# Patient Record
Sex: FEMALE | Race: WHITE | NOT HISPANIC OR LATINO | Employment: UNEMPLOYED | URBAN - METROPOLITAN AREA
[De-identification: names, ages, dates, MRNs, and addresses within clinical notes are randomized per-mention and may not be internally consistent; named-entity substitution may affect disease eponyms.]

---

## 2018-03-06 ENCOUNTER — TELEPHONE (OUTPATIENT)
Dept: SCHEDULING | Facility: CLINIC | Age: 38
End: 2018-03-06

## 2018-03-06 ENCOUNTER — OFFICE VISIT (OUTPATIENT)
Dept: CARDIOLOGY | Facility: CLINIC | Age: 38
End: 2018-03-06
Payer: COMMERCIAL

## 2018-03-06 DIAGNOSIS — I30.0 ACUTE IDIOPATHIC PERICARDITIS: Primary | ICD-10-CM

## 2018-03-06 DIAGNOSIS — I49.3 PVC (PREMATURE VENTRICULAR CONTRACTION): ICD-10-CM

## 2018-03-06 PROBLEM — R00.2 PALPITATIONS: Status: ACTIVE | Noted: 2017-09-14

## 2018-03-06 PROBLEM — R00.2 PALPITATIONS: Status: RESOLVED | Noted: 2017-09-14 | Resolved: 2018-03-06

## 2018-03-06 PROCEDURE — 93000 ELECTROCARDIOGRAM COMPLETE: CPT | Performed by: INTERNAL MEDICINE

## 2018-03-06 PROCEDURE — 99213 OFFICE O/P EST LOW 20 MIN: CPT | Performed by: INTERNAL MEDICINE

## 2018-03-06 RX ORDER — FEXOFENADINE HCL 60 MG/1
60 TABLET, FILM COATED ORAL AS NEEDED
COMMUNITY
Start: 2017-10-25 | End: 2018-12-14

## 2018-03-06 RX ORDER — INDOMETHACIN 50 MG/1
50 CAPSULE ORAL 2 TIMES DAILY WITH MEALS
Qty: 180 CAPSULE | Refills: 2 | Status: SHIPPED | OUTPATIENT
Start: 2018-03-06 | End: 2018-05-17

## 2018-03-06 RX ORDER — IBUPROFEN 200 MG
2-3 TABLET ORAL AS NEEDED
COMMUNITY
End: 2018-11-27

## 2018-03-06 RX ORDER — ACETAMINOPHEN 500 MG
2 TABLET ORAL 2 TIMES WEEKLY
COMMUNITY
Start: 2017-08-31 | End: 2018-12-14

## 2018-03-06 RX ORDER — FLUTICASONE PROPIONATE 50 MCG
1 SPRAY, SUSPENSION (ML) NASAL AS NEEDED
COMMUNITY
Start: 2017-10-25 | End: 2018-05-17

## 2018-03-06 ASSESSMENT — ENCOUNTER SYMPTOMS
DIZZINESS: 1
ABDOMINAL PAIN: 0
NUMBNESS: 0
FEVER: 1
IRREGULAR HEARTBEAT: 1
HEMOPTYSIS: 0
DYSPNEA ON EXERTION: 1
VOMITING: 0
WEAKNESS: 1
PSYCHIATRIC NEGATIVE: 1
LIGHT-HEADEDNESS: 1
DECREASED APPETITE: 0
CHILLS: 0
COUGH: 0
NAUSEA: 0
BLURRED VISION: 0
DOUBLE VISION: 0
PARESTHESIAS: 0

## 2018-03-06 NOTE — ASSESSMENT & PLAN NOTE
The patient comes to the office today after calling with symptoms of low grade fever, dizziness and facial flushing. She underwent a PVC ablation 3/1/2018. Her symptoms do improve with Advil 600 mg q 8 hrs. Her symptoms likely represent pericarditis post ablation. She will begin Indocin. No hemodynamic compromise at this time.

## 2018-03-06 NOTE — PROGRESS NOTES
Electrophysiology Note       Reason for visit:   Chief Complaint   Patient presents with   • Ventricular Premature Depolarization      HPI   Andressa Baker is a 37 y.o. female with a history of palpitations secondary to PVCS. Holter monitoring demonstrated a high burden of monomorphic PVCs.  She underwent EP study with attempted ablation on March 1.  At that time her right ventricular outflow tract PVCs were completely suppressed.  She developed some post ablation chest discomfort which over the weekend has persisted.  She does have relief with Advil. She describes some facial flushing, lightheadedness and low-grade temp.      Past Medical History:   Diagnosis Date   • Abnormal ECG    • Arrhythmia      Past Surgical History:   Procedure Laterality Date   • ABLATION OF DYSRHYTHMIC FOCUS       Social History     Social History Narrative   • No narrative on file     Family History   Problem Relation Age of Onset   • Hyperlipidemia Mother    • Hyperlipidemia Father    • Hypertension Father    • Hyperlipidemia Maternal Grandmother    • Clotting disorder Maternal Grandfather      No known allergies  Current Outpatient Prescriptions   Medication Sig Dispense Refill   • cholecalciferol, vitamin D3, (VITAMIN D3) 5,000 unit tablet Take 2 tablets by mouth 2 (two) times a week.     • fexofenadine (ALLEGRA ALLERGY) 60 mg tablet Take 60 mg by mouth as needed.     • fluticasone (FLONASE ALLERGY RELIEF) 50 mcg/actuation nasal spray Administer 1 Squirt into affected nostril(s) as needed.     • omega 3-dha-epa-fish oil (FISH OIL) capsule Take 1 capsule by mouth once.     • ibuprofen (ADVIL) 200 mg tablet Take 2-3 tablets by mouth as needed.     • indomethacin (INDOCIN) 50 mg capsule Take 1 capsule (50 mg total) by mouth 2 (two) times a day with meals. 180 capsule 2     No current facility-administered medications for this visit.        Review of Systems   Constitution: Positive for fever and weakness. Negative for chills and  decreased appetite.   Eyes: Negative for blurred vision, double vision and visual disturbance.   Cardiovascular: Positive for dyspnea on exertion and irregular heartbeat.   Respiratory: Negative for cough and hemoptysis.    Skin: Positive for flushing.   Gastrointestinal: Negative for abdominal pain, dysphagia, nausea and vomiting.   Neurological: Positive for dizziness and light-headedness. Negative for numbness and paresthesias.   Psychiatric/Behavioral: Negative.      Objective   There were no vitals filed for this visit.    Physical Exam   Constitutional: She is oriented to person, place, and time. She appears well-developed.   HENT:   Head: Normocephalic.   Eyes: EOM are normal. Pupils are equal, round, and reactive to light.   Neck: Normal range of motion.   Cardiovascular: Normal rate and normal heart sounds.    Irregular with PVCs.    Pulmonary/Chest: Effort normal and breath sounds normal.   Abdominal: Soft. Bowel sounds are normal. She exhibits no distension. There is no tenderness.   Musculoskeletal: Normal range of motion.   Neurological: She is alert and oriented to person, place, and time.   Skin:   Flushed face   Psychiatric: She has a normal mood and affect.       Lab Results   Component Value Date    WBC 4.86 02/26/2018    HGB 13.5 02/26/2018     (L) 02/26/2018    CHOL 196 01/29/2016    TRIG 58 01/29/2016    HDL 67 01/29/2016    ALT 20 09/13/2017    AST 18 09/13/2017     02/26/2018    K 3.7 02/26/2018    CREATININE 0.7 02/26/2018    TSH 0.78 01/29/2016    INR 1.0 09/13/2017    GLUF 74 01/24/2015    HGBA1C 4.9 01/29/2016      Imaging  Not applicable    ECG   ECG shows sinus rhythm with occasional premature ventricular complexes.     Assessment   Problem List Items Addressed This Visit        Circulatory    PVC (premature ventricular contraction)     She had a history of PVCs which had become increasingly symptomatic.  Symptoms associated with PACs included lightheadedness post exertion,  along with feeling of slow heartbeats.  A Holter monitor in September 2017 demonstrated a high burden of ectopy of approximately 20%.  Morphology of the PVCs were most consistent with outflow tract etiology.She was started on beta-blockers at that time but she was unable to tolerate the medication. She underwent PVC ablation on March 1, 2018. RVOT PVCs were completed suppressed during the procedure.          Relevant Orders    ECG 12 LEAD (Completed)    Acute idiopathic pericarditis - Primary     The patient comes to the office today after calling with symptoms of low grade fever, dizziness and facial flushing. She underwent a PVC ablation 3/1/2018. Her symptoms do improve with Advil 600 mg q 8 hrs. Her symptoms likely represent pericarditis post ablation. She will begin Indocin. No hemodynamic compromise at this time.           Relevant Medications    indomethacin (INDOCIN) 50 mg capsule

## 2018-03-06 NOTE — ASSESSMENT & PLAN NOTE
She had a history of PVCs which had become increasingly symptomatic.  Symptoms associated with PACs included lightheadedness post exertion, along with feeling of slow heartbeats.  A Holter monitor in September 2017 demonstrated a high burden of ectopy of approximately 20%.  Morphology of the PVCs were most consistent with outflow tract etiology.She was started on beta-blockers at that time but she was unable to tolerate the medication. She underwent PVC ablation on March 1, 2018. RVOT PVCs were completed suppressed during the procedure.

## 2018-03-12 ENCOUNTER — TELEPHONE (OUTPATIENT)
Dept: CARDIOLOGY | Facility: CLINIC | Age: 38
End: 2018-03-12

## 2018-04-02 NOTE — ASSESSMENT & PLAN NOTE
Left bundle inferior axis VPD's.  Mapped to the distal septal portion of the RVOT and successfully ablated.  She continues with VPD's but much less symptomatic and less frequent occurrences.

## 2018-04-02 NOTE — PROGRESS NOTES
Electrophysiology  Outpatient Progress Note       Reason for visit: Follow-up PVC ablation      HPI   Andressa Baker is a 37 y.o. female who is seen today for follow-up of her March 1, 2018 PVC catheter ablation.  She is s/p ablation of her clinical left bundle inferior axis VPD's mapped to the distal/septal portion of the RVOT. Extensive mapping was also performed of the LVOT with no VPD match and no ablation. She initial had great suppression of her VPDs, but her post ablation course was complicated by pericarditis. This has greatly improved. She continues to have occasional VPD's but they are far less frequent and less symptomatic, .       Past Medical History:   Diagnosis Date   • Abnormal ECG    • Arrhythmia      Past Surgical History:   Procedure Laterality Date   • ABLATION OF DYSRHYTHMIC FOCUS         Social History   Substance Use Topics   • Smoking status: Never Smoker   • Smokeless tobacco: Never Used   • Alcohol use Yes      Comment: occasionally     Family History   Problem Relation Age of Onset   • Hyperlipidemia Mother    • Hyperlipidemia Father    • Hypertension Father    • Hyperlipidemia Maternal Grandmother    • Clotting disorder Maternal Grandfather        ALLERGY:  No known allergies    Current Outpatient Prescriptions   Medication Sig Dispense Refill   • cholecalciferol, vitamin D3, (VITAMIN D3) 5,000 unit tablet Take 2 tablets by mouth 2 (two) times a week.     • fexofenadine (ALLEGRA ALLERGY) 60 mg tablet Take 60 mg by mouth as needed.     • fluticasone (FLONASE ALLERGY RELIEF) 50 mcg/actuation nasal spray Administer 1 Squirt into affected nostril(s) as needed.     • ibuprofen (ADVIL) 200 mg tablet Take 2-3 tablets by mouth as needed.     • indomethacin (INDOCIN) 50 mg capsule Take 1 capsule (50 mg total) by mouth 2 (two) times a day with meals. 180 capsule 2   • omega 3-dha-epa-fish oil (FISH OIL) capsule Take 1 capsule by mouth once.       No current facility-administered  medications for this visit.        Review of Systems   Constitution: Negative.   HENT: Negative.    Cardiovascular: Positive for palpitations. Negative for chest pain and dyspnea on exertion.   Respiratory: Negative.    Endocrine: Negative.    Skin: Negative.    Musculoskeletal: Negative.    Gastrointestinal: Negative.    Genitourinary: Negative.    Neurological: Negative.    Psychiatric/Behavioral: Negative.        Objective   Vitals:    04/03/18 1243   BP: 122/76   Pulse: 70   Resp: 16       Physical Exam   Constitutional: She is oriented to person, place, and time. She appears well-developed and well-nourished.   HENT:   Head: Normocephalic.   Eyes: Conjunctivae and EOM are normal. Pupils are equal, round, and reactive to light.   Neck: Normal range of motion.   Cardiovascular: Normal rate, regular rhythm and normal heart sounds.  Exam reveals no gallop and no friction rub.    No murmur heard.  Pulmonary/Chest: Effort normal and breath sounds normal. No respiratory distress.   Abdominal: Soft. Bowel sounds are normal. She exhibits no distension. There is no tenderness.   Musculoskeletal: Normal range of motion. She exhibits no edema.   Neurological: She is alert and oriented to person, place, and time.   Skin: Skin is warm and dry.   Psychiatric: She has a normal mood and affect. Her behavior is normal.       Lab Results   Component Value Date    WBC 4.86 02/26/2018    HGB 13.5 02/26/2018     (L) 02/26/2018    CHOL 196 01/29/2016    TRIG 58 01/29/2016    HDL 67 01/29/2016    ALT 20 09/13/2017    AST 18 09/13/2017     02/26/2018    K 3.7 02/26/2018    CREATININE 0.7 02/26/2018    TSH 0.78 01/29/2016    INR 1.0 09/13/2017    GLUF 74 01/24/2015    HGBA1C 4.9 01/29/2016       3/1/2018 VPD Ablation.  The BPD is more noted to be left bundle inferior axis.  The earliest site of activation was noted to be the distal/septal portion of the RVOT with activation 45 ms prior to the QRS onset.  Paced mapping  showed a 12 out of 12 match to the VPD's with correlation coefficient of 99%.  RF was delivered in this area with some irritative ectopy and for the lesions delivered in this region with similar ectopy induced.  Following these lesions spontaneous ectopy was much less frequent.  After isoproterenol program stimulation did not appear to facilitate ectopy.  Because of the location of the lesion sites and occasional ectopy the LVOT was also mapped.  Paced mapping throughout this region had no regional morphology mapped and activation of the rare beats EMR at best equal to the QRS onset.       Sinus rhythm with occasional PVC.    Assessment   Problem List Items Addressed This Visit        Circulatory    PVC (premature ventricular contraction) - Primary     Left bundle inferior axis VPD's.  Mapped to the distal septal portion of the RVOT and successfully ablated.  She continues with VPD's but much less symptomatic and less frequent occurrences.         Relevant Orders    ECG 12 lead (Completed)    Acute idiopathic pericarditis     Symptoms resolved; will continue weaning indomethacin.                    Pieter Manzanares MD  4/9/2018

## 2018-04-03 ENCOUNTER — OFFICE VISIT (OUTPATIENT)
Dept: CARDIOLOGY | Facility: CLINIC | Age: 38
End: 2018-04-03
Payer: COMMERCIAL

## 2018-04-03 VITALS
DIASTOLIC BLOOD PRESSURE: 76 MMHG | WEIGHT: 188 LBS | BODY MASS INDEX: 29.51 KG/M2 | RESPIRATION RATE: 16 BRPM | HEART RATE: 70 BPM | HEIGHT: 67 IN | SYSTOLIC BLOOD PRESSURE: 122 MMHG

## 2018-04-03 DIAGNOSIS — I30.0 ACUTE IDIOPATHIC PERICARDITIS: ICD-10-CM

## 2018-04-03 DIAGNOSIS — I49.3 PVC (PREMATURE VENTRICULAR CONTRACTION): Primary | ICD-10-CM

## 2018-04-03 PROCEDURE — 93000 ELECTROCARDIOGRAM COMPLETE: CPT | Performed by: INTERNAL MEDICINE

## 2018-04-03 PROCEDURE — 99214 OFFICE O/P EST MOD 30 MIN: CPT | Performed by: INTERNAL MEDICINE

## 2018-04-03 NOTE — LETTER
April 9, 2018     Gloria Vallejo MD  [address]  [address]th [address]  [place] [id number]    Patient: Andressa Baker   YOB: 1980   Date of Visit: 4/3/2018       Dear Dr. Vallejo:    Thank you for referring Andressa Baker to me for evaluation. Below are my notes for this consultation.    If you have questions, please do not hesitate to call me. I look forward to following your patient along with you.         Sincerely,        Pieter Manzanares MD        CC: No Recipients  Pieter Manzanares MD  4/9/2018 11:23 PM  Signed       Electrophysiology  Outpatient Progress Note       Reason for visit: Follow-up PVC ablation      HPI   Andressa Baker is a 37 y.o. female who is seen today for follow-up of her March 1, 2018 PVC catheter ablation.  She is s/p ablation of her clinical left bundle inferior axis VPD's mapped to the distal/septal portion of the RVOT. Extensive mapping was also performed of the LVOT with no VPD match and no ablation. She initial had great suppression of her VPDs, but her post ablation course was complicated by pericarditis. This has greatly improved. She continues to have occasional VPD's but they are far less frequent and less symptomatic, .       Past Medical History:   Diagnosis Date   • Abnormal ECG    • Arrhythmia      Past Surgical History:   Procedure Laterality Date   • ABLATION OF DYSRHYTHMIC FOCUS         Social History   Substance Use Topics   • Smoking status: Never Smoker   • Smokeless tobacco: Never Used   • Alcohol use Yes      Comment: occasionally     Family History   Problem Relation Age of Onset   • Hyperlipidemia Mother    • Hyperlipidemia Father    • Hypertension Father    • Hyperlipidemia Maternal Grandmother    • Clotting disorder Maternal Grandfather        ALLERGY:  No known allergies    Current Outpatient Prescriptions   Medication Sig Dispense Refill   • cholecalciferol, vitamin D3, (VITAMIN D3) 5,000 unit tablet Take 2 tablets by mouth 2  (two) times a week.     • fexofenadine (ALLEGRA ALLERGY) 60 mg tablet Take 60 mg by mouth as needed.     • fluticasone (FLONASE ALLERGY RELIEF) 50 mcg/actuation nasal spray Administer 1 Squirt into affected nostril(s) as needed.     • ibuprofen (ADVIL) 200 mg tablet Take 2-3 tablets by mouth as needed.     • indomethacin (INDOCIN) 50 mg capsule Take 1 capsule (50 mg total) by mouth 2 (two) times a day with meals. 180 capsule 2   • omega 3-dha-epa-fish oil (FISH OIL) capsule Take 1 capsule by mouth once.       No current facility-administered medications for this visit.        Review of Systems   Constitution: Negative.   HENT: Negative.    Cardiovascular: Positive for palpitations. Negative for chest pain and dyspnea on exertion.   Respiratory: Negative.    Endocrine: Negative.    Skin: Negative.    Musculoskeletal: Negative.    Gastrointestinal: Negative.    Genitourinary: Negative.    Neurological: Negative.    Psychiatric/Behavioral: Negative.        Objective   Vitals:    04/03/18 1243   BP: 122/76   Pulse: 70   Resp: 16       Physical Exam   Constitutional: She is oriented to person, place, and time. She appears well-developed and well-nourished.   HENT:   Head: Normocephalic.   Eyes: Conjunctivae and EOM are normal. Pupils are equal, round, and reactive to light.   Neck: Normal range of motion.   Cardiovascular: Normal rate, regular rhythm and normal heart sounds.  Exam reveals no gallop and no friction rub.    No murmur heard.  Pulmonary/Chest: Effort normal and breath sounds normal. No respiratory distress.   Abdominal: Soft. Bowel sounds are normal. She exhibits no distension. There is no tenderness.   Musculoskeletal: Normal range of motion. She exhibits no edema.   Neurological: She is alert and oriented to person, place, and time.   Skin: Skin is warm and dry.   Psychiatric: She has a normal mood and affect. Her behavior is normal.       Lab Results   Component Value Date    WBC 4.86 02/26/2018    HGB  13.5 02/26/2018     (L) 02/26/2018    CHOL 196 01/29/2016    TRIG 58 01/29/2016    HDL 67 01/29/2016    ALT 20 09/13/2017    AST 18 09/13/2017     02/26/2018    K 3.7 02/26/2018    CREATININE 0.7 02/26/2018    TSH 0.78 01/29/2016    INR 1.0 09/13/2017    GLUF 74 01/24/2015    HGBA1C 4.9 01/29/2016       3/1/2018 VPD Ablation.  The BPD is more noted to be left bundle inferior axis.  The earliest site of activation was noted to be the distal/septal portion of the RVOT with activation 45 ms prior to the QRS onset.  Paced mapping showed a 12 out of 12 match to the VPD's with correlation coefficient of 99%.  RF was delivered in this area with some irritative ectopy and for the lesions delivered in this region with similar ectopy induced.  Following these lesions spontaneous ectopy was much less frequent.  After isoproterenol program stimulation did not appear to facilitate ectopy.  Because of the location of the lesion sites and occasional ectopy the LVOT was also mapped.  Paced mapping throughout this region had no regional morphology mapped and activation of the rare beats EMR at best equal to the QRS onset.       Sinus rhythm with occasional PVC.    Assessment   Problem List Items Addressed This Visit        Circulatory    PVC (premature ventricular contraction) - Primary     Left bundle inferior axis VPD's.  Mapped to the distal septal portion of the RVOT and successfully ablated.  She continues with VPD's but much less symptomatic and less frequent occurrences.         Relevant Orders    ECG 12 lead (Completed)    Acute idiopathic pericarditis     Symptoms resolved; will continue weaning indomethacin.                    Pieter Manzanares MD  4/9/2018

## 2018-04-04 ASSESSMENT — ENCOUNTER SYMPTOMS
ENDOCRINE NEGATIVE: 1
DYSPNEA ON EXERTION: 0
PSYCHIATRIC NEGATIVE: 1
CONSTITUTIONAL NEGATIVE: 1
MUSCULOSKELETAL NEGATIVE: 1
RESPIRATORY NEGATIVE: 1
NEUROLOGICAL NEGATIVE: 1
PALPITATIONS: 1
GASTROINTESTINAL NEGATIVE: 1

## 2018-05-17 ENCOUNTER — OFFICE VISIT (OUTPATIENT)
Dept: INTERNAL MEDICINE | Facility: CLINIC | Age: 38
End: 2018-05-17
Payer: COMMERCIAL

## 2018-05-17 VITALS
DIASTOLIC BLOOD PRESSURE: 64 MMHG | TEMPERATURE: 98.4 F | BODY MASS INDEX: 29.04 KG/M2 | HEART RATE: 64 BPM | SYSTOLIC BLOOD PRESSURE: 118 MMHG | WEIGHT: 185.4 LBS | OXYGEN SATURATION: 97 %

## 2018-05-17 DIAGNOSIS — F41.9 ANXIETY: Primary | ICD-10-CM

## 2018-05-17 PROBLEM — H18.609 KERATOCONUS: Status: ACTIVE | Noted: 2018-05-17

## 2018-05-17 PROCEDURE — 99213 OFFICE O/P EST LOW 20 MIN: CPT | Performed by: FAMILY MEDICINE

## 2018-05-17 RX ORDER — OLOPATADINE HYDROCHLORIDE 1 MG/ML
1 SOLUTION/ DROPS OPHTHALMIC 2 TIMES DAILY
COMMUNITY
End: 2018-12-14

## 2018-05-17 RX ORDER — AZELASTINE 1 MG/ML
1 SPRAY, METERED NASAL 2 TIMES DAILY
COMMUNITY
End: 2018-12-14

## 2018-05-17 NOTE — ASSESSMENT & PLAN NOTE
Patient mostly feeling overwhelmed at this time.  Will initiate trend Helex 5 mg.  Increase as tolerated every 2 weeks.  The patient return to counseling.  Encourage her to have regular exercise.

## 2018-05-17 NOTE — PROGRESS NOTES
Subjective      Patient ID: Andressa Baker is a 38 y.o. female.    HPI    Since last visit : pt did have ablation since last visit and developed pericarditis.  She is now asymptomatic    Diet he is increasingly worse.  She is feeling overwhelmed.  I suspect a component of depression associated.  Given her anhedonia.    Current Outpatient Prescriptions:   •  azelastine (ASTELIN) 137 mcg (0.1 %) nasal spray, Administer 1 spray into each nostril 2 (two) times a day. Use in each nostril as directed., Disp: , Rfl:   •  cholecalciferol, vitamin D3, (VITAMIN D3) 5,000 unit tablet, Take 2 tablets by mouth 2 (two) times a week., Disp: , Rfl:   •  fexofenadine (ALLEGRA ALLERGY) 60 mg tablet, Take 60 mg by mouth as needed., Disp: , Rfl:   •  ibuprofen (ADVIL) 200 mg tablet, Take 2-3 tablets by mouth as needed., Disp: , Rfl:   •  olopatadine (PATANOL) 0.1 % ophthalmic solution, 1 drop 2 (two) times a day., Disp: , Rfl:   •  omega 3-dha-epa-fish oil (FISH OIL) capsule, Take 1 capsule by mouth once., Disp: , Rfl:   •  vortioxetine (TRINTELLIX) 5 mg tablet, Take 5 mg by mouth daily., Disp: 30 tablet, Rfl: 0          The following have been reviewed and updated as appropriate in this visit:  Allergies  Meds  Problems       Review of Systems    Objective   Vitals:    05/17/18 1259   BP: 118/64   BP Location: Right upper arm   Patient Position: Sitting   Pulse: 64   Temp: 36.9 °C (98.4 °F)   TempSrc: Oral   SpO2: 97%   Weight: 84.1 kg (185 lb 6.4 oz)       Physical Exam    Assessment/Plan   Problem List Items Addressed This Visit     Anxiety - Primary     Patient mostly feeling overwhelmed at this time.  Will initiate trend Helex 5 mg.  Increase as tolerated every 2 weeks.  The patient return to counseling.  Encourage her to have regular exercise.

## 2018-06-06 ENCOUNTER — TELEPHONE (OUTPATIENT)
Dept: SCHEDULING | Facility: CLINIC | Age: 38
End: 2018-06-06

## 2018-06-06 ENCOUNTER — TELEPHONE (OUTPATIENT)
Dept: INTERNAL MEDICINE | Facility: CLINIC | Age: 38
End: 2018-06-06

## 2018-06-06 ENCOUNTER — OFFICE VISIT (OUTPATIENT)
Dept: INTERNAL MEDICINE | Facility: CLINIC | Age: 38
End: 2018-06-06
Payer: COMMERCIAL

## 2018-06-06 VITALS
HEART RATE: 68 BPM | BODY MASS INDEX: 29.51 KG/M2 | DIASTOLIC BLOOD PRESSURE: 60 MMHG | WEIGHT: 188.4 LBS | TEMPERATURE: 98 F | OXYGEN SATURATION: 99 % | SYSTOLIC BLOOD PRESSURE: 108 MMHG

## 2018-06-06 DIAGNOSIS — I49.3 PVC (PREMATURE VENTRICULAR CONTRACTION): Primary | ICD-10-CM

## 2018-06-06 DIAGNOSIS — F41.9 ANXIETY: ICD-10-CM

## 2018-06-06 PROCEDURE — 93000 ELECTROCARDIOGRAM COMPLETE: CPT | Performed by: NURSE PRACTITIONER

## 2018-06-06 PROCEDURE — 36415 COLL VENOUS BLD VENIPUNCTURE: CPT | Performed by: NURSE PRACTITIONER

## 2018-06-06 PROCEDURE — 99213 OFFICE O/P EST LOW 20 MIN: CPT | Performed by: NURSE PRACTITIONER

## 2018-06-06 ASSESSMENT — ENCOUNTER SYMPTOMS
SHORTNESS OF BREATH: 0
PALPITATIONS: 1
DIZZINESS: 1
LIGHT-HEADEDNESS: 1

## 2018-06-06 NOTE — TELEPHONE ENCOUNTER
Has recent increase in palps; somewhat associate with increased stress/anxiety. Will add metoprolol ER 25 mg qHS and see how she responds.

## 2018-06-06 NOTE — TELEPHONE ENCOUNTER
Rcvd call from patient. Inquired about Trintellix. States that you discussed increasing her dose and she is almost out of current dose and wanted to know what to do.

## 2018-06-06 NOTE — PROGRESS NOTES
Subjective      PVC's, worsening in frequency and feels really tired.      Patient ID: Andressa Baker is a 38 y.o. female.    HPI    The following have been reviewed and updated as appropriate in this visit:  Meds       Review of Systems   Respiratory: Negative for shortness of breath.    Cardiovascular: Positive for chest pain (during palpitations ) and palpitations.   Neurological: Positive for dizziness and light-headedness.       Objective     Vitals:    06/06/18 1424   BP: 108/60   Pulse: 68   Temp: 36.7 °C (98 °F)   SpO2: 99%   Weight: 85.5 kg (188 lb 6.4 oz)     HR 72,BP recheck while sitting /80      Physical Exam   Constitutional: She is oriented to person, place, and time. She appears well-developed and well-nourished.   Eyes: Conjunctivae are normal.   Neck: Normal range of motion.   Cardiovascular: Normal rate.    + ectopy    Pulmonary/Chest: Effort normal and breath sounds normal.   Musculoskeletal: Normal range of motion.   Neurological: She is alert and oriented to person, place, and time.   Skin: Skin is warm.   Psychiatric: She has a normal mood and affect. Her behavior is normal.   Vitals reviewed.      Assessment/Plan   Problem List Items Addressed This Visit     PVC (premature ventricular contraction) - Primary     Symptoms have escalated since May with more frequent occurrences of PVC's with some chest pain/squeezing sensation during the episodes and dizziness. Has had a few during office visit.   EkG consistent with previous in Sept 2017.     Denies Sob or nausea.     Only new medication is Trintellix.     Increased stressors in her life.     Discussed limiting caffeine.     Checking TSH, CMP, Mag and CBC.     Called office of Dr. Manzanares to discuss and have pt scheduled to be seen. Faxed over EKG and discussed symptoms with office, they are sending message to Dr. Manzanares and his NP and will reach out directly to the patient.           Relevant Orders    ECG 12 LEAD OFFICE  PERFORMED (Completed)    CBC    Comprehensive metabolic panel    TSH w reflex FT4    Magnesium    Anxiety     Increased Trintellix to 10 mg once daily.

## 2018-06-06 NOTE — TELEPHONE ENCOUNTER
Left message to return my call.  EKG today in PCP office showed sinus rhythm with PVCs similar to EKG in the past.  I asked her to call me to review her symptoms to see if they have worsened.  Medication review shows she is not on any beta blocker.  Will also discuss moving appointment up with Dr. Manzanares.

## 2018-06-06 NOTE — PATIENT INSTRUCTIONS
EKG with Premature ventricular contractions as prior.     Checking CBC, CMP, Mag and TSH.     Called office of Dr. Manzanares to have you seen asap, waiting on call back, they will most likely call you directly to schedule but if any problems please call me.     Limit caffeine.     Increase water intake.     If you develop chest pain, shortness of breath or dizziness or symptoms worsen go to er.

## 2018-06-06 NOTE — TELEPHONE ENCOUNTER
Pt of Dr. Manzanares w/hx of PVC catheter ablation 3/1/18 and post procedure pericarditis. Call from BEKA Peña with [facility]. She saw pt in office today, and pt had c/o intermittent palpitations, associated with dizziness and chest squeezing. HR 68bpm and EKG similar to Sept 2017, SR with PVC's. Ms. Thomson did advise pt to report to ED if her symptoms worsened, but would like pt to be seen in office w/Dr. Manzanares ASAP if possible. Requesting callback to patient at [phone]. Thank you.

## 2018-06-06 NOTE — TELEPHONE ENCOUNTER
Pt Dr Leighton Bland,[facility], called requesting pt be seen by Dr Manzanares sooner than 7/10/18 appt.  Pt has been having frequent palpitations.  Pt had EKG today with no changes to most recent one.  Pls call pt back @ [phone] to schedule  Thank you

## 2018-06-06 NOTE — ASSESSMENT & PLAN NOTE
Symptoms have escalated since May with more frequent occurrences of PVC's with some chest pain/squeezing sensation during the episodes and dizziness. Has had a few during office visit.   EkG consistent with previous in Sept 2017.     Denies Sob or nausea.     Only new medication is Trintellix.     Increased stressors in her life.     Discussed limiting caffeine.     Checking TSH, CMP, Mag and CBC.     Called office of Dr. Manzanares to discuss and have pt scheduled to be seen. Faxed over EKG and discussed symptoms with office, they are sending message to Dr. Manzanares and his NP and will reach out directly to the patient.

## 2018-06-08 LAB
ALBUMIN SERPL-MCNC: 4.2 G/DL (ref 3.6–5.1)
ALBUMIN/GLOB SERPL: 1.5 (CALC) (ref 1–2.5)
ALP SERPL-CCNC: 41 U/L (ref 33–115)
ALT SERPL-CCNC: 12 U/L (ref 6–29)
AST SERPL-CCNC: 12 U/L (ref 10–30)
BILIRUB SERPL-MCNC: 0.5 MG/DL (ref 0.2–1.2)
BUN SERPL-MCNC: 14 MG/DL (ref 7–25)
BUN/CREAT SERPL: NORMAL (CALC) (ref 6–22)
CALCIUM SERPL-MCNC: 9 MG/DL (ref 8.6–10.2)
CHLORIDE SERPL-SCNC: 103 MMOL/L (ref 98–110)
CO2 SERPL-SCNC: 26 MMOL/L (ref 20–31)
CREAT SERPL-MCNC: 0.8 MG/DL (ref 0.5–1.1)
ERYTHROCYTE [DISTWIDTH] IN BLOOD BY AUTOMATED COUNT: 12.8 % (ref 11–15)
GFR SERPL CREATININE-BSD FRML MDRD: 94 ML/MIN/1.73M2
GLOBULIN SER CALC-MCNC: 2.8 G/DL (CALC) (ref 1.9–3.7)
GLUCOSE SERPL-MCNC: 72 MG/DL (ref 65–99)
HCT VFR BLD AUTO: 41.3 % (ref 35–45)
HGB BLD-MCNC: 13.7 G/DL (ref 11.7–15.5)
MAGNESIUM SERPL-MCNC: 2.2 MG/DL (ref 1.5–2.5)
MCH RBC QN AUTO: 30.8 PG (ref 27–33)
MCHC RBC AUTO-ENTMCNC: 33.2 G/DL (ref 32–36)
MCV RBC AUTO: 92.8 FL (ref 80–100)
PLATELET # BLD AUTO: 132 THOUSAND/UL (ref 140–400)
PMV BLD REES-ECKER: 11.7 FL (ref 7.5–12.5)
POTASSIUM SERPL-SCNC: 4 MMOL/L (ref 3.5–5.3)
PROT SERPL-MCNC: 7 G/DL (ref 6.1–8.1)
RBC # BLD AUTO: 4.45 MILLION/UL (ref 3.8–5.1)
SODIUM SERPL-SCNC: 137 MMOL/L (ref 135–146)
TSH SERPL-ACNC: 0.74 MIU/L
WBC # BLD AUTO: 5.7 THOUSAND/UL (ref 3.8–10.8)

## 2018-06-29 ENCOUNTER — OFFICE VISIT (OUTPATIENT)
Dept: INTERNAL MEDICINE | Facility: CLINIC | Age: 38
End: 2018-06-29
Payer: COMMERCIAL

## 2018-06-29 VITALS
SYSTOLIC BLOOD PRESSURE: 110 MMHG | OXYGEN SATURATION: 98 % | HEART RATE: 66 BPM | WEIGHT: 185.2 LBS | DIASTOLIC BLOOD PRESSURE: 68 MMHG | TEMPERATURE: 98.6 F | BODY MASS INDEX: 29.01 KG/M2

## 2018-06-29 DIAGNOSIS — D69.6 THROMBOCYTOPENIA (CMS/HCC): ICD-10-CM

## 2018-06-29 DIAGNOSIS — I49.3 PVC (PREMATURE VENTRICULAR CONTRACTION): ICD-10-CM

## 2018-06-29 DIAGNOSIS — F41.9 ANXIETY: Primary | ICD-10-CM

## 2018-06-29 DIAGNOSIS — R00.2 PALPITATIONS: ICD-10-CM

## 2018-06-29 PROBLEM — I30.0 ACUTE IDIOPATHIC PERICARDITIS: Status: RESOLVED | Noted: 2018-03-06 | Resolved: 2018-06-29

## 2018-06-29 PROCEDURE — 99213 OFFICE O/P EST LOW 20 MIN: CPT | Mod: 25 | Performed by: FAMILY MEDICINE

## 2018-06-29 PROCEDURE — 36415 COLL VENOUS BLD VENIPUNCTURE: CPT | Performed by: FAMILY MEDICINE

## 2018-06-29 ASSESSMENT — ENCOUNTER SYMPTOMS
COUGH: 0
PALPITATIONS: 1
ABDOMINAL DISTENTION: 0
CONSTIPATION: 1
FATIGUE: 1
UNEXPECTED WEIGHT CHANGE: 0
SHORTNESS OF BREATH: 0

## 2018-06-29 NOTE — ASSESSMENT & PLAN NOTE
She continues to have PVCs, discussed initiating magnesium at night to see if this helps decrease the frequency.  She otherwise has propranolol as needed.

## 2018-06-29 NOTE — PROGRESS NOTES
Subjective      Patient ID: Andressa Baker is a 38 y.o. female.    HPI  Pt is here for follow up anxiety: she is protecting her sleep, but still feels very sluggish in the morning.   The trintellix is causing constipation, but is overall handling things better and enjoying time with the kids.  Pt goes to [employer] 2 times a week. Also running and spinning.  She is still getting palpitations despite the ablation.   Worse around the menstrual cycle.   Miralax helps with bowels.   She continues to get PVCs frequently, but given pericarditis after ablation she is not willing to do any further procedures.  Most recently had a CBC which had low platelets at 132.  She does reassess today.      The following have been reviewed and updated as appropriate in this visit:  Allergies        Current Outpatient Prescriptions:   •  azelastine (ASTELIN) 137 mcg (0.1 %) nasal spray, Administer 1 spray into each nostril 2 (two) times a day. Use in each nostril as directed., Disp: , Rfl:   •  cholecalciferol, vitamin D3, (VITAMIN D3) 5,000 unit tablet, Take 2 tablets by mouth 2 (two) times a week., Disp: , Rfl:   •  fexofenadine (ALLEGRA ALLERGY) 60 mg tablet, Take 60 mg by mouth as needed., Disp: , Rfl:   •  fluticasone furoate (FLONASE SENSIMIST NASL), Administer 1 spray into affected nostril(s) daily., Disp: , Rfl:   •  ibuprofen (ADVIL) 200 mg tablet, Take 2-3 tablets by mouth as needed., Disp: , Rfl:   •  olopatadine (PATANOL) 0.1 % ophthalmic solution, 1 drop 2 (two) times a day., Disp: , Rfl:   •  omega 3-dha-epa-fish oil (FISH OIL) capsule, Take 1 capsule by mouth once., Disp: , Rfl:   •  vortioxetine (TRINTELLIX) 10 mg tablet, Take 1 tablet (10 mg total) by mouth daily., Disp: 30 tablet, Rfl: 2  Allergies   Allergen Reactions   • No Known Allergies      Past Medical History:   Diagnosis Date   • Abnormal ECG    • Acute idiopathic pericarditis 3/6/2018   • Arrhythmia    • Pericarditis 2018    After john ablation          Review of Systems   Constitutional: Positive for fatigue. Negative for unexpected weight change.   Respiratory: Negative for cough and shortness of breath.    Cardiovascular: Positive for palpitations. Negative for chest pain.   Gastrointestinal: Positive for constipation. Negative for abdominal distention.   Skin: Color change: easy bruising.       Objective   Vitals:    06/29/18 0926   BP: 110/68   BP Location: Right upper arm   Patient Position: Sitting   Pulse: 66   Temp: 37 °C (98.6 °F)   TempSrc: Oral   SpO2: 98%   Weight: 84 kg (185 lb 3.2 oz)       Physical Exam   Constitutional: She appears well-developed and well-nourished. No distress.   Neck: No thyromegaly present.   Cardiovascular: Normal rate and normal heart sounds.  Exam reveals no gallop and no friction rub.    No murmur heard.  Skipped beats noted   Pulmonary/Chest: Effort normal and breath sounds normal.       Assessment/Plan   Problem List Items Addressed This Visit     Palpitations    PVC (premature ventricular contraction)     She continues to have PVCs, discussed initiating magnesium at night to see if this helps decrease the frequency.  She otherwise has propranolol as needed.         Anxiety - Primary     Anxiety is doing better on current dose of trend Helex.  Will continue at current dose         Thrombocytopenia (CMS/HCC) (HCC)     Recheck CBC for platelet count to make sure a trend is stable or improving         Relevant Orders    CBC and differential

## 2018-06-30 LAB
BASOPHILS # BLD AUTO: 60 CELLS/UL (ref 0–200)
BASOPHILS NFR BLD AUTO: 1.5 %
EOSINOPHIL # BLD AUTO: 140 CELLS/UL (ref 15–500)
EOSINOPHIL NFR BLD AUTO: 3.5 %
ERYTHROCYTE [DISTWIDTH] IN BLOOD BY AUTOMATED COUNT: 12.3 % (ref 11–15)
HCT VFR BLD AUTO: 42.6 % (ref 35–45)
HGB BLD-MCNC: 14.1 G/DL (ref 11.7–15.5)
LYMPHOCYTES # BLD AUTO: 1560 CELLS/UL (ref 850–3900)
LYMPHOCYTES NFR BLD AUTO: 39 %
MCH RBC QN AUTO: 29.8 PG (ref 27–33)
MCHC RBC AUTO-ENTMCNC: 33.1 G/DL (ref 32–36)
MCV RBC AUTO: 90.1 FL (ref 80–100)
MONOCYTES # BLD AUTO: 312 CELLS/UL (ref 200–950)
MONOCYTES NFR BLD AUTO: 7.8 %
NEUTROPHILS # BLD AUTO: 1928 CELLS/UL (ref 1500–7800)
NEUTROPHILS NFR BLD AUTO: 48.2 %
PLATELET # BLD AUTO: 138 THOUSAND/UL (ref 140–400)
PMV BLD REES-ECKER: 12.1 FL (ref 7.5–12.5)
RBC # BLD AUTO: 4.73 MILLION/UL (ref 3.8–5.1)
WBC # BLD AUTO: 4 THOUSAND/UL (ref 3.8–10.8)

## 2018-07-10 ENCOUNTER — OFFICE VISIT (OUTPATIENT)
Dept: CARDIOLOGY | Facility: CLINIC | Age: 38
End: 2018-07-10
Payer: COMMERCIAL

## 2018-07-10 VITALS
HEART RATE: 59 BPM | DIASTOLIC BLOOD PRESSURE: 78 MMHG | WEIGHT: 183.2 LBS | SYSTOLIC BLOOD PRESSURE: 102 MMHG | BODY MASS INDEX: 28.75 KG/M2 | HEIGHT: 67 IN

## 2018-07-10 DIAGNOSIS — I49.3 PVC (PREMATURE VENTRICULAR CONTRACTION): Primary | ICD-10-CM

## 2018-07-10 PROCEDURE — 99214 OFFICE O/P EST MOD 30 MIN: CPT | Performed by: INTERNAL MEDICINE

## 2018-07-10 PROCEDURE — 93000 ELECTROCARDIOGRAM COMPLETE: CPT | Performed by: INTERNAL MEDICINE

## 2018-07-10 RX ORDER — ZINC GLUCONATE 50 MG
500 TABLET ORAL 2 TIMES DAILY
COMMUNITY
End: 2018-12-14

## 2018-07-10 NOTE — PROGRESS NOTES
Electrophysiology  Outpatient Progress Note       Reason for visit:   Chief Complaint   Patient presents with   • pvc       HPI   Andressa Baker is a 38 y.o. female who is seen today for follow-up of her March 1, 2018 PVC catheter ablation.  She is status post ablation of her clinical left bundle inferior axis VPD's mapped to the distal/septal portion of the RVOT.  Extensive mapping was also performed of the LVOT with no VPD match and no ablation.  She initially had great  supression of the VPD's, but her post ablation course was complicated by pericarditis.  This has since resolved.  She continues to have occasional VPDs,  mostly associated with her menstrual cycle but these are far less frequent and less symptomatic. She is able to exercise, which she could not previously.      Past Medical History:   Diagnosis Date   • Abnormal ECG    • Acute idiopathic pericarditis 3/6/2018   • Arrhythmia    • Pericarditis 2018    After john ablation     Past Surgical History:   Procedure Laterality Date   • ABLATION OF DYSRHYTHMIC FOCUS         Social History   Substance Use Topics   • Smoking status: Never Smoker   • Smokeless tobacco: Never Used   • Alcohol use Yes      Comment: occasionally     Family History   Problem Relation Age of Onset   • Hyperlipidemia Mother    • Hyperlipidemia Father    • Hypertension Father    • Hyperlipidemia Maternal Grandmother    • Clotting disorder Maternal Grandfather        ALLERGY:  No known allergies    Current Outpatient Prescriptions   Medication Sig Dispense Refill   • azelastine (ASTELIN) 137 mcg (0.1 %) nasal spray Administer 1 spray into each nostril 2 (two) times a day. Use in each nostril as directed.     • cholecalciferol, vitamin D3, (VITAMIN D3) 5,000 unit tablet Take 2 tablets by mouth 2 (two) times a week.     • fexofenadine (ALLEGRA ALLERGY) 60 mg tablet Take 60 mg by mouth as needed.     • fluticasone furoate (FLONASE SENSIMIST NASL) Administer 1 spray into  affected nostril(s) daily.     • ibuprofen (ADVIL) 200 mg tablet Take 2-3 tablets by mouth as needed.     • magnesium oxide 500 mg capsule Take 500 mg by mouth 2 (two) times a day.     • olopatadine (PATANOL) 0.1 % ophthalmic solution 1 drop 2 (two) times a day.     • omega 3-dha-epa-fish oil (FISH OIL) capsule Take 1 capsule by mouth once.     • vortioxetine (TRINTELLIX) 10 mg tablet Take 1 tablet (10 mg total) by mouth daily. 30 tablet 2     No current facility-administered medications for this visit.        Review of Systems   Cardiovascular: Positive for palpitations.   All other systems reviewed and are negative.      Objective   Vitals:    07/10/18 1053   BP: 102/78   Pulse: (!) 59       Physical Exam   Constitutional: She is oriented to person, place, and time. She appears well-developed and well-nourished. No distress.   HENT:   Head: Normocephalic.   Eyes: Conjunctivae and EOM are normal. Pupils are equal, round, and reactive to light.   Neck: Normal range of motion.   Cardiovascular: Normal rate, regular rhythm and normal heart sounds.  Exam reveals no gallop and no friction rub.    No murmur heard.  Pulmonary/Chest: Effort normal and breath sounds normal. No respiratory distress.   Abdominal: Soft. Bowel sounds are normal. She exhibits no distension.   Musculoskeletal: Normal range of motion. She exhibits edema.   Neurological: She is alert and oriented to person, place, and time.   Skin: Skin is warm and dry.   Psychiatric: She has a normal mood and affect. Her behavior is normal. Judgment and thought content normal.       Lab Results   Component Value Date    WBC 4.0 06/29/2018    HGB 14.1 06/29/2018     (L) 06/29/2018    CHOL 196 01/29/2016    TRIG 58 01/29/2016    HDL 67 01/29/2016    ALT 12 06/06/2018    AST 12 06/06/2018     06/06/2018    K 4.0 06/06/2018    CREATININE 0.80 06/06/2018    TSH 0.74 06/06/2018    INR 1.0 09/13/2017    HGBA1C 4.9 01/29/2016     3/1/2018 VPD Ablation.  The  BPD is more noted to be left bundle inferior axis.  The earliest site of activation was noted to be the distal/septal portion of the RVOT with activation 45 ms prior to the QRS onset.  Paced mapping showed a 12 out of 12 match to the VPD's with correlation coefficient of 99%.  RF was delivered in this area with some irritative ectopy and for the lesions delivered in this region with similar ectopy induced.  Following these lesions spontaneous ectopy was much less frequent.  After isoproterenol program stimulation did not appear to facilitate ectopy.  Because of the location of the lesion sites and occasional ectopy the LVOT was also mapped.  Paced mapping throughout this region had no regional morphology mapped and activation of the rare beats EMR at best equal to the QRS onset.    ECG  sinus rhythm    Assessment   Problem List Items Addressed This Visit        Circulatory    PVC (premature ventricular contraction) - Primary      Left bundle inferior axis VPD's.  Mapped to the distal septal portion of the RVOT and successfully ablated.  She continues with VPD's but much less symptomatic and less frequent occurrences and tolerates the occasional episodes well.  Her symptoms seem to be more prevalent during her menstrual cycle.              Relevant Orders    ECG 12 LEAD-OFFICE PERFORMED (Completed)               Pieter Manzanares MD  7/15/2018

## 2018-07-10 NOTE — LETTER
July 15, 2018     Gloria Vallejo MD  [address] [facility]  4th [address]  [place] [id number]    Patient: Andressa Baker   YOB: 1980   Date of Visit: 7/10/2018       Dear Dr. Vallejo:    Thank you for referring Andressa Baker to me for evaluation. Below are my notes for this consultation.    If you have questions, please do not hesitate to call me. I look forward to following your patient along with you.         Sincerely,        Pieter Manzanares MD        CC: No Recipients  Pieter Manzanares MD  7/15/2018 10:29 AM  Signed       Electrophysiology  Outpatient Progress Note       Reason for visit:   Chief Complaint   Patient presents with   • pvc       HPI   Andressa Baker is a 38 y.o. female who is seen today for follow-up of her March 1, 2018 PVC catheter ablation.  She is status post ablation of her clinical left bundle inferior axis VPD's mapped to the distal/septal portion of the RVOT.  Extensive mapping was also performed of the LVOT with no VPD match and no ablation.  She initially had great  supression of the VPD's, but her post ablation course was complicated by pericarditis.  This has since resolved.  She continues to have occasional VPDs,  mostly associated with her menstrual cycle but these are far less frequent and less symptomatic. She is able to exercise, which she could not previously.      Past Medical History:   Diagnosis Date   • Abnormal ECG    • Acute idiopathic pericarditis 3/6/2018   • Arrhythmia    • Pericarditis 2018    After john ablation     Past Surgical History:   Procedure Laterality Date   • ABLATION OF DYSRHYTHMIC FOCUS         Social History   Substance Use Topics   • Smoking status: Never Smoker   • Smokeless tobacco: Never Used   • Alcohol use Yes      Comment: occasionally     Family History   Problem Relation Age of Onset   • Hyperlipidemia Mother    • Hyperlipidemia Father    • Hypertension Father    • Hyperlipidemia Maternal Grandmother    •  Clotting disorder Maternal Grandfather        ALLERGY:  No known allergies    Current Outpatient Prescriptions   Medication Sig Dispense Refill   • azelastine (ASTELIN) 137 mcg (0.1 %) nasal spray Administer 1 spray into each nostril 2 (two) times a day. Use in each nostril as directed.     • cholecalciferol, vitamin D3, (VITAMIN D3) 5,000 unit tablet Take 2 tablets by mouth 2 (two) times a week.     • fexofenadine (ALLEGRA ALLERGY) 60 mg tablet Take 60 mg by mouth as needed.     • fluticasone furoate (FLONASE SENSIMIST NASL) Administer 1 spray into affected nostril(s) daily.     • ibuprofen (ADVIL) 200 mg tablet Take 2-3 tablets by mouth as needed.     • magnesium oxide 500 mg capsule Take 500 mg by mouth 2 (two) times a day.     • olopatadine (PATANOL) 0.1 % ophthalmic solution 1 drop 2 (two) times a day.     • omega 3-dha-epa-fish oil (FISH OIL) capsule Take 1 capsule by mouth once.     • vortioxetine (TRINTELLIX) 10 mg tablet Take 1 tablet (10 mg total) by mouth daily. 30 tablet 2     No current facility-administered medications for this visit.        Review of Systems   Cardiovascular: Positive for palpitations.   All other systems reviewed and are negative.      Objective   Vitals:    07/10/18 1053   BP: 102/78   Pulse: (!) 59       Physical Exam   Constitutional: She is oriented to person, place, and time. She appears well-developed and well-nourished. No distress.   HENT:   Head: Normocephalic.   Eyes: Conjunctivae and EOM are normal. Pupils are equal, round, and reactive to light.   Neck: Normal range of motion.   Cardiovascular: Normal rate, regular rhythm and normal heart sounds.  Exam reveals no gallop and no friction rub.    No murmur heard.  Pulmonary/Chest: Effort normal and breath sounds normal. No respiratory distress.   Abdominal: Soft. Bowel sounds are normal. She exhibits no distension.   Musculoskeletal: Normal range of motion. She exhibits edema.   Neurological: She is alert and oriented to  person, place, and time.   Skin: Skin is warm and dry.   Psychiatric: She has a normal mood and affect. Her behavior is normal. Judgment and thought content normal.       Lab Results   Component Value Date    WBC 4.0 06/29/2018    HGB 14.1 06/29/2018     (L) 06/29/2018    CHOL 196 01/29/2016    TRIG 58 01/29/2016    HDL 67 01/29/2016    ALT 12 06/06/2018    AST 12 06/06/2018     06/06/2018    K 4.0 06/06/2018    CREATININE 0.80 06/06/2018    TSH 0.74 06/06/2018    INR 1.0 09/13/2017    HGBA1C 4.9 01/29/2016     3/1/2018 VPD Ablation.  The BPD is more noted to be left bundle inferior axis.  The earliest site of activation was noted to be the distal/septal portion of the RVOT with activation 45 ms prior to the QRS onset.  Paced mapping showed a 12 out of 12 match to the VPD's with correlation coefficient of 99%.  RF was delivered in this area with some irritative ectopy and for the lesions delivered in this region with similar ectopy induced.  Following these lesions spontaneous ectopy was much less frequent.  After isoproterenol program stimulation did not appear to facilitate ectopy.  Because of the location of the lesion sites and occasional ectopy the LVOT was also mapped.  Paced mapping throughout this region had no regional morphology mapped and activation of the rare beats EMR at best equal to the QRS onset.    ECG  sinus rhythm    Assessment   Problem List Items Addressed This Visit        Circulatory    PVC (premature ventricular contraction) - Primary      Left bundle inferior axis VPD's.  Mapped to the distal septal portion of the RVOT and successfully ablated.  She continues with VPD's but much less symptomatic and less frequent occurrences and tolerates the occasional episodes well.  Her symptoms seem to be more prevalent during her menstrual cycle.              Relevant Orders    ECG 12 LEAD-OFFICE PERFORMED (Completed)               Pieter Manzanares MD  7/15/2018

## 2018-07-11 ASSESSMENT — ENCOUNTER SYMPTOMS: PALPITATIONS: 1

## 2018-07-11 NOTE — ASSESSMENT & PLAN NOTE
Left bundle inferior axis VPD's.  Mapped to the distal septal portion of the RVOT and successfully ablated.  She continues with VPD's but much less symptomatic and less frequent occurrences and tolerates the occasional episodes well.  Her symptoms seem to be more prevalent during her menstrual cycle.

## 2018-08-06 ENCOUNTER — TELEPHONE (OUTPATIENT)
Dept: INTERNAL MEDICINE | Facility: CLINIC | Age: 38
End: 2018-08-06

## 2018-08-06 RX ORDER — LORAZEPAM 0.5 MG/1
0.5 TABLET ORAL EVERY 6 HOURS PRN
Qty: 30 TABLET | Refills: 0 | Status: SHIPPED | OUTPATIENT
Start: 2018-08-06 | End: 2018-08-27 | Stop reason: SDUPTHER

## 2018-08-20 ENCOUNTER — OFFICE VISIT (OUTPATIENT)
Dept: INTERNAL MEDICINE | Facility: CLINIC | Age: 38
End: 2018-08-20
Payer: COMMERCIAL

## 2018-08-20 VITALS
HEART RATE: 72 BPM | DIASTOLIC BLOOD PRESSURE: 70 MMHG | SYSTOLIC BLOOD PRESSURE: 118 MMHG | TEMPERATURE: 98.3 F | OXYGEN SATURATION: 97 %

## 2018-08-20 DIAGNOSIS — Z01.419 WELL FEMALE EXAM WITH ROUTINE GYNECOLOGICAL EXAM: Primary | ICD-10-CM

## 2018-08-20 PROCEDURE — 99395 PREV VISIT EST AGE 18-39: CPT | Performed by: FAMILY MEDICINE

## 2018-08-20 NOTE — PROGRESS NOTES
Subjective      Patient ID: Andressa Baker is a 38 y.o. female.    HPI  Pt here today for her annual exam.  Still battling some anxiety and depression from the loss of 1 of the twins.  She is no longer on medication.  Most of the serotonin medicines we have tried have made her angry.  F/up insomnia: she is using ativan at night, stopped trintellix due to to anger  Pt is still exercising, personal training walking the dog.  Physically feeling stronger.    Finger is in some pain constant, some toe pain as well, intromiittant.    Considering pregnancy again in the near future.    The following have been reviewed and updated as appropriate in this visit:  Tobacco  Med Hx  Surg Hx  Fam Hx  Soc Hx       Allergies   Allergen Reactions   • No Known Allergies        Current Outpatient Prescriptions:   •  azelastine (ASTELIN) 137 mcg (0.1 %) nasal spray, Administer 1 spray into each nostril 2 (two) times a day. Use in each nostril as directed., Disp: , Rfl:   •  cholecalciferol, vitamin D3, (VITAMIN D3) 5,000 unit tablet, Take 2 tablets by mouth 2 (two) times a week., Disp: , Rfl:   •  fexofenadine (ALLEGRA ALLERGY) 60 mg tablet, Take 60 mg by mouth as needed., Disp: , Rfl:   •  ibuprofen (ADVIL) 200 mg tablet, Take 2-3 tablets by mouth as needed., Disp: , Rfl:   •  LORazepam (ATIVAN) 0.5 mg tablet, Take 1 tablet (0.5 mg total) by mouth every 6 (six) hours as needed for anxiety., Disp: 30 tablet, Rfl: 0  •  omega 3-dha-epa-fish oil (FISH OIL) capsule, Take 1 capsule by mouth once., Disp: , Rfl:   •  fluticasone furoate (FLONASE SENSIMIST NASL), Administer 1 spray into affected nostril(s) daily., Disp: , Rfl:   •  magnesium oxide 500 mg capsule, Take 500 mg by mouth 2 (two) times a day., Disp: , Rfl:   •  olopatadine (PATANOL) 0.1 % ophthalmic solution, 1 drop 2 (two) times a day., Disp: , Rfl:   Past Medical History:   Diagnosis Date   • Abnormal ECG     PVCs   • Arrhythmia 02/2018    PVCs, status post ablation,  partially successful.   • History of fetal demise, not currently pregnant     Twin pregnancy, 1 fetal demise, 1 live birth   • Pericarditis 2018    After john ablation     Past Surgical History:   Procedure Laterality Date   • ABLATION OF DYSRHYTHMIC FOCUS     •  SECTION       Family History   Problem Relation Age of Onset   • Hyperlipidemia Mother    • Hyperlipidemia Father    • Hypertension Father    • Hyperlipidemia Maternal Grandmother    • Clotting disorder Maternal Grandfather      Social History     Social History   • Marital status:      Spouse name: N/A   • Number of children: N/A   • Years of education: N/A     Occupational History   • Homemaker      Social History Main Topics   • Smoking status: Never Smoker   • Smokeless tobacco: Never Used   • Alcohol use Yes      Comment: occasionally   • Drug use: No   • Sexual activity: Not Asked      Comment:  with 4 children     Other Topics Concern   • None     Social History Narrative    Exercises 2-3 times per week    Sleep sometimes interrupted    Eating plan: Patient attempts low glycemic index eating    No history of domestic violence       Review of Systems   Constitutional: Negative for activity change, appetite change, chills, diaphoresis, fatigue, fever and unexpected weight change.   HENT: Negative for hearing loss, sore throat, trouble swallowing and voice change.    Eyes: Negative for itching and visual disturbance.   Respiratory: Negative for cough, chest tightness, shortness of breath and wheezing.    Cardiovascular: Negative for chest pain, palpitations and leg swelling.   Gastrointestinal: Negative for abdominal pain, blood in stool, constipation, diarrhea, nausea and vomiting.   Endocrine: Negative for cold intolerance, polydipsia and polyuria.   Genitourinary: Negative for difficulty urinating, dysuria, pelvic pain, vaginal bleeding and vaginal discharge.   Musculoskeletal: Negative for arthralgias, back pain, joint  swelling and myalgias.   Skin: Negative for rash.   Neurological: Negative for dizziness, tremors, syncope, weakness, numbness and headaches.   Hematological: Negative for adenopathy. Does not bruise/bleed easily.   Psychiatric/Behavioral: Negative for dysphoric mood and sleep disturbance. The patient is not nervous/anxious.        Objective   Vitals:    08/20/18 1116   BP: 118/70   BP Location: Left upper arm   Patient Position: Sitting   Pulse: 72   Temp: 36.8 °C (98.3 °F)   TempSrc: Oral   SpO2: 97%       Physical Exam   Constitutional: She appears well-developed and well-nourished.   HENT:   Right Ear: Tympanic membrane, external ear and ear canal normal.   Left Ear: Tympanic membrane, external ear and ear canal normal.   Mouth/Throat: Oropharynx is clear and moist.   Eyes: Conjunctivae and EOM are normal. Pupils are equal, round, and reactive to light.   Neck: Normal range of motion. Carotid bruit is not present. No thyroid mass and no thyromegaly present.   Cardiovascular: Normal rate, regular rhythm, normal heart sounds and intact distal pulses.  Exam reveals no gallop and no friction rub.    No murmur heard.  Pulmonary/Chest: No respiratory distress. She has no wheezes. She has no rales. Right breast exhibits no inverted nipple, no mass, no nipple discharge and no skin change. Left breast exhibits no inverted nipple, no mass, no nipple discharge and no skin change.   Abdominal: Soft. Bowel sounds are normal. She exhibits no distension and no mass. There is no tenderness.   Genitourinary: Uterus normal. No breast tenderness. There is no rash or lesion on the right labia. There is no rash or lesion on the left labia. Cervix exhibits no motion tenderness, no discharge and no friability. Right adnexum displays no mass, no tenderness and no fullness. Left adnexum displays no mass, no tenderness and no fullness. No vaginal discharge found.   Musculoskeletal: She exhibits no edema.   Lymphadenopathy:     She has no  cervical adenopathy.   Neurological: She displays no tremor and normal reflexes. No cranial nerve deficit. She exhibits normal muscle tone.   Skin: No lesion and no rash noted.   Psychiatric: Her speech is normal and behavior is normal.       Assessment/Plan   Problem List Items Addressed This Visit     Well female exam with routine gynecological exam - Primary     Tetanus was up-to-date with most recent pregnancy  Annual flu vaccine recommended  Pap test has been within the past 3 years, not due today.  No labs needed as patient has had all screenings done within the past year with history of ablation this year.  Mood continues to be strained, but patient is doing a better job with self-care.  She is exercising, eating properly, and sleeping better.  We will use Ativan as needed no daily medication at this time.

## 2018-08-24 PROBLEM — Z01.419 WELL FEMALE EXAM WITH ROUTINE GYNECOLOGICAL EXAM: Status: ACTIVE | Noted: 2018-08-24

## 2018-08-24 ASSESSMENT — ENCOUNTER SYMPTOMS
UNEXPECTED WEIGHT CHANGE: 0
FATIGUE: 0
MYALGIAS: 0
VOICE CHANGE: 0
DIARRHEA: 0
ABDOMINAL PAIN: 0
BLOOD IN STOOL: 0
SLEEP DISTURBANCE: 0
CHILLS: 0
SHORTNESS OF BREATH: 0
TROUBLE SWALLOWING: 0
ADENOPATHY: 0
WEAKNESS: 0
DIZZINESS: 0
NAUSEA: 0
POLYDIPSIA: 0
BACK PAIN: 0
TREMORS: 0
DIFFICULTY URINATING: 0
APPETITE CHANGE: 0
EYE ITCHING: 0
FEVER: 0
VOMITING: 0
WHEEZING: 0
PALPITATIONS: 0
CHEST TIGHTNESS: 0
JOINT SWELLING: 0
HEADACHES: 0
NUMBNESS: 0
NERVOUS/ANXIOUS: 0
ARTHRALGIAS: 0
COUGH: 0
DYSPHORIC MOOD: 0
ACTIVITY CHANGE: 0
BRUISES/BLEEDS EASILY: 0
DYSURIA: 0
CONSTIPATION: 0
SORE THROAT: 0
DIAPHORESIS: 0

## 2018-08-24 NOTE — ASSESSMENT & PLAN NOTE
Tetanus was up-to-date with most recent pregnancy  Annual flu vaccine recommended  Pap test has been within the past 3 years, not due today.  No labs needed as patient has had all screenings done within the past year with history of ablation this year.  Mood continues to be strained, but patient is doing a better job with self-care.  She is exercising, eating properly, and sleeping better.  We will use Ativan as needed no daily medication at this time.

## 2018-08-27 RX ORDER — LORAZEPAM 0.5 MG/1
0.5 TABLET ORAL EVERY 6 HOURS PRN
Qty: 30 TABLET | Refills: 0 | Status: SHIPPED | OUTPATIENT
Start: 2018-08-27 | End: 2018-09-18 | Stop reason: SDUPTHER

## 2018-09-17 NOTE — TELEPHONE ENCOUNTER
Pt called leaving a vm stating that her pharmacy has yet to receive the prescription for Ativan. Pt can be reached at [phone]

## 2018-09-18 RX ORDER — LORAZEPAM 0.5 MG/1
0.5 TABLET ORAL EVERY 6 HOURS PRN
Qty: 30 TABLET | Refills: 0 | Status: SHIPPED | OUTPATIENT
Start: 2018-09-18 | End: 2018-11-27

## 2018-11-20 ENCOUNTER — TRANSCRIBE ORDERS (OUTPATIENT)
Dept: SCHEDULING | Age: 38
End: 2018-11-20

## 2018-11-20 DIAGNOSIS — Z34.90 ENCOUNTER FOR SUPERVISION OF NORMAL PREGNANCY: Primary | ICD-10-CM

## 2018-11-21 ENCOUNTER — APPOINTMENT (OUTPATIENT)
Dept: LAB | Age: 38
End: 2018-11-21
Attending: OBSTETRICS & GYNECOLOGY
Payer: COMMERCIAL

## 2018-11-21 ENCOUNTER — TRANSCRIBE ORDERS (OUTPATIENT)
Dept: REGISTRATION | Age: 38
End: 2018-11-21

## 2018-11-21 ENCOUNTER — HOSPITAL ENCOUNTER (OUTPATIENT)
Dept: RADIOLOGY | Age: 38
Discharge: HOME | End: 2018-11-21
Attending: OBSTETRICS & GYNECOLOGY
Payer: COMMERCIAL

## 2018-11-21 DIAGNOSIS — N91.2 AMENORRHEA: ICD-10-CM

## 2018-11-21 DIAGNOSIS — N91.2 AMENORRHEA: Primary | ICD-10-CM

## 2018-11-21 DIAGNOSIS — R94.7 ABNORMAL RESULTS OF OTHER ENDOCRINE FUNCTION STUDIES: ICD-10-CM

## 2018-11-21 DIAGNOSIS — Z34.90 ENCOUNTER FOR SUPERVISION OF NORMAL PREGNANCY: ICD-10-CM

## 2018-11-21 LAB
HCG SERPL-ACNC: ABNORMAL MIU/L
PROGEST SERPL-MCNC: 17 NG/ML

## 2018-11-21 PROCEDURE — 84702 CHORIONIC GONADOTROPIN TEST: CPT

## 2018-11-21 PROCEDURE — 36415 COLL VENOUS BLD VENIPUNCTURE: CPT

## 2018-11-21 PROCEDURE — 76817 TRANSVAGINAL US OBSTETRIC: CPT

## 2018-11-21 PROCEDURE — 84144 ASSAY OF PROGESTERONE: CPT

## 2018-11-26 NOTE — PROGRESS NOTES
New OB History and Physical    HPI     Patient is a 38 y.o. female who presents with amenorrhea.     Has h/o vanishing twin in . Has seen PCP recently and still dealing with grief regarding loss. Also regularly sees cardiology and endocrine. Had official ultrasound done  that confirmed viable IUP consistent with LMP. Has Zofran Rx for moderate nausea; this helps. Bowels mostly WNL.    Lives with  and 4 children.  Hematologist.  Works as "[employer]  No Zika travel in last 6 months.  Pets at home: no cats  Chicken pox as child, vaccine.    OB History:   Obstetric History       T4      L2     SAB1   TAB0   Ectopic0   Multiple0   Live Births2       # Outcome Date GA Lbr Radhames/2nd Weight Sex Delivery Anes PTL Lv   6 Current            5 Term 05/09/15 40w1d  3.742 kg (8 lb 4 oz) F       4 SAB  14w0d    SAB   FD   3 Term 13   3.912 kg (8 lb 10 oz) M    EMILEE   2 Term 10/19/10   3.289 kg (7 lb 4 oz) F    EMILEE   1 Term 09   4.026 kg (8 lb 14 oz) F CS-LTranv         Complications: Failure to Progress in First Stage      Obstetric Comments   Twin pregnancy with vanishing twin at 12-14 weeks in .     Genetic Screening:   All reviewed and negative for both patient and partner.     Infection history:  All reviewed and negative for both patient and partner.    Medical History:   Past Medical History:   Diagnosis Date   • Abnormal ECG     PVCs   • Arrhythmia 2018    PVCs, status post ablation, partially successful.   • History of fetal demise, not currently pregnant     Twin pregnancy, 1 fetal demise, 1 live birth   • Migraine headache    • Pericarditis 2018    After john ablation     Surgical History:   Past Surgical History:   Procedure Laterality Date   • ABLATION OF DYSRHYTHMIC FOCUS  2018   •  SECTION       Social History:   Social History     Social History Narrative    Exercises 2-3 times per week    Sleep sometimes interrupted    Eating plan:  Patient attempts low glycemic index eating    No history of domestic violence       Family History:   Family History   Problem Relation Age of Onset   • Hyperlipidemia Mother    • Hyperlipidemia Father    • Hypertension Father    • Prostate cancer Father 78   • Dementia Father    • Hyperlipidemia Maternal Grandmother    • Clotting disorder Maternal Grandfather         developed in 60's - required tansfusions/platelets       Allergies: No known allergies    Prior to Admission medications    Medication Sig Start Date End Date Taking? Authorizing Provider   azelastine (ASTELIN) 137 mcg (0.1 %) nasal spray Administer 1 spray into each nostril 2 (two) times a day. Use in each nostril as directed.    Zia Enriquez MD   cholecalciferol, vitamin D3, (VITAMIN D3) 5,000 unit tablet Take 2 tablets by mouth 2 (two) times a week. 8/31/17   Kang Pulido MD   fexofenadine (ALLEGRA ALLERGY) 60 mg tablet Take 60 mg by mouth as needed. 10/25/17   Kang Pulido MD   fluticasone furoate (FLONASE SENSIMIST NASL) Administer 1 spray into affected nostril(s) daily.    Zia Enriquez MD   ibuprofen (ADVIL) 200 mg tablet Take 2-3 tablets by mouth as needed.    Kang Pulido MD   LORazepam (ATIVAN) 0.5 mg tablet Take 1 tablet (0.5 mg total) by mouth every 6 (six) hours as needed for anxiety. 9/18/18 10/18/18  Gloria Vallejo MD   magnesium oxide 500 mg capsule Take 500 mg by mouth 2 (two) times a day.    Zia Enriquez MD   olopatadine (PATANOL) 0.1 % ophthalmic solution 1 drop 2 (two) times a day.    Zia Enriquez MD   omega 3-dha-epa-fish oil (FISH OIL) capsule Take 1 capsule by mouth once. 6/30/15   Kang Pulido MD   vortioxetine (TRINTELLIX) 10 mg tablet Take 1 tablet (10 mg total) by mouth daily. 6/29/18 7/29/18  Gloria Vallejo MD     Review of Systems  Pertinent items are noted in HPI.    Objective     Vital Signs for the last 24 hours:  BP: (110)/(70) 110/70    Review of Systems and  "Physical Exam  The following have been reviewed and updated as appropriate in this visit: Tobacco  Allergies  Meds  Med Hx  Surg Hx  Fam Hx  Soc Hx      /70   Ht 1.702 m (5' 7\")   Wt 86.4 kg (190 lb 6.4 oz)   LMP 09/27/2018 (Exact Date) Comment: regular cycles  BMI 29.82 kg/m²   HPI  Review of Systems  Physical Exam   Constitutional: She is oriented to person, place, and time. She appears well-developed and well-nourished.   Genitourinary: Vagina normal and uterus normal.   External female genitalia normal.   Urethral meatus normal.   Urethra normal.   Vagina normal.   Cervix exam normal.  Uterus is normal.   Adnexa normal.   Neck: No thyromegaly present.   Pulmonary/Chest: Effort normal. Right breast exhibits no mass and no tenderness. Left breast exhibits no mass and no tenderness.   Abdominal: Soft. There is no tenderness.   Neurological: She is alert and oriented to person, place, and time.   Skin: Skin is warm and dry.   Psychiatric: She has a normal mood and affect. Her behavior is normal.   Vitals reviewed.      Assessment/Plan     Amenorrhea secondary to early pregnancy.  SHELIA based on LMP is 7/4/19.    Transvaginal ultrasound demonstrates +gest sac, +fetal pole 9w0d, 's    This is consistent with LMP SHELIA 7/4/19.    First OB labs ordered   Pap smear sent today.    Genetic screening counseling options reviewed.  Patient opts out.  SMA and CF carrier screening offered and patient opts out.    Practice, call, diet, exercise, pregnancy precautions discussed.      Amenorrhea secondary to pregnancy.  Call for pain/bleeding/problems.  Get New OB labs done.  Otherwise, RTO in 4 weeks for Return OB.    "

## 2018-11-27 ENCOUNTER — APPOINTMENT (OUTPATIENT)
Dept: OBSTETRICS AND GYNECOLOGY | Facility: CLINIC | Age: 38
End: 2018-11-27
Attending: NURSE PRACTITIONER
Payer: COMMERCIAL

## 2018-11-27 ENCOUNTER — OFFICE VISIT (OUTPATIENT)
Dept: OBSTETRICS AND GYNECOLOGY | Facility: CLINIC | Age: 38
End: 2018-11-27
Payer: COMMERCIAL

## 2018-11-27 VITALS
WEIGHT: 190.4 LBS | BODY MASS INDEX: 29.88 KG/M2 | HEIGHT: 67 IN | DIASTOLIC BLOOD PRESSURE: 70 MMHG | SYSTOLIC BLOOD PRESSURE: 110 MMHG

## 2018-11-27 DIAGNOSIS — Z34.80 ENCOUNTER FOR SUPERVISION OF NORMAL PREGNANCY IN MULTIGRAVIDA: Primary | ICD-10-CM

## 2018-11-27 DIAGNOSIS — Z34.81 ENCOUNTER FOR SUPERVISION OF OTHER NORMAL PREGNANCY IN FIRST TRIMESTER: ICD-10-CM

## 2018-11-27 DIAGNOSIS — N91.2 AMENORRHEA: ICD-10-CM

## 2018-11-27 PROBLEM — Z34.90 PREGNANCY: Status: ACTIVE | Noted: 2018-11-27

## 2018-11-27 LAB
GLUCOSE URINE, POC: NEGATIVE
PROTEIN, POC: NEGATIVE

## 2018-11-27 PROCEDURE — 76801 OB US < 14 WKS SINGLE FETUS: CPT | Performed by: NURSE PRACTITIONER

## 2018-11-27 PROCEDURE — 0500F INITIAL PRENATAL CARE VISIT: CPT | Performed by: NURSE PRACTITIONER

## 2018-11-27 PROCEDURE — 81002 URINALYSIS NONAUTO W/O SCOPE: CPT | Performed by: NURSE PRACTITIONER

## 2018-11-27 NOTE — PRENATAL NOTE
Reviewed all PNC guidelines (diet, exercise, weight gain, nutrition, MD call sched). Pt declines carrier screening and fetal aneuploidy testing. Declines flu shot. Pap/HPV sent today.

## 2018-11-28 LAB — QUEST PAP: NORMAL

## 2018-11-30 LAB
APPEARANCE UR: CLEAR
BACTERIA #/AREA URNS HPF: (no result) /HPF
BACTERIA UR CULT: NORMAL
BILIRUB UR QL STRIP: NEGATIVE
C TRACH RRNA SPEC QL NAA+PROBE: NOT DETECTED
CLINICAL INFO: NORMAL
COLOR UR: YELLOW
CYTO CVX: NORMAL
CYTOLOGIST CVX/VAG CYTO: NORMAL
CYTOLOGIST CVX/VAG CYTO: NORMAL
CYTOLOGY CMNT CVX/VAG CYTO-IMP: NORMAL
DATE OF PREVIOUS PAP: NORMAL
DATE PREVIOUS BX: NO
GLUCOSE UR QL STRIP: NEGATIVE
HGB UR QL STRIP: NEGATIVE
HPV E6+E7 MRNA CVX QL NAA+PROBE: NOT DETECTED
HYALINE CASTS #/AREA URNS LPF: (no result) /LPF
KETONES UR QL STRIP: NEGATIVE
LEUKOCYTE ESTERASE UR QL STRIP: NEGATIVE
LMP START DATE: NORMAL
N GONORRHOEA RRNA SPEC QL NAA+PROBE: NOT DETECTED
NITRITE UR QL STRIP: NEGATIVE
PH UR STRIP: 6.5 [PH] (ref 5–8)
PROT UR QL STRIP: NEGATIVE
QST (ALWAYS MESSAGE): NORMAL
QUEST COMMENT (PAP): NORMAL
RBC #/AREA URNS HPF: (no result) /HPF
SP GR UR STRIP: 1.02 (ref 1–1.03)
SPECIMEN SOURCE CVX/VAG CYTO: NORMAL
SQUAMOUS #/AREA URNS HPF: (no result) /HPF
STAT OF ADQ CVX/VAG CYTO-IMP: NORMAL
WBC #/AREA URNS HPF: (no result) /HPF

## 2018-12-14 ENCOUNTER — OFFICE VISIT (OUTPATIENT)
Dept: CARDIOLOGY | Facility: CLINIC | Age: 38
End: 2018-12-14
Payer: COMMERCIAL

## 2018-12-14 VITALS
BODY MASS INDEX: 29.98 KG/M2 | HEIGHT: 67 IN | SYSTOLIC BLOOD PRESSURE: 106 MMHG | OXYGEN SATURATION: 97 % | DIASTOLIC BLOOD PRESSURE: 66 MMHG | WEIGHT: 191 LBS | RESPIRATION RATE: 16 BRPM | HEART RATE: 78 BPM

## 2018-12-14 DIAGNOSIS — R00.2 PALPITATIONS: ICD-10-CM

## 2018-12-14 DIAGNOSIS — I49.3 PVC (PREMATURE VENTRICULAR CONTRACTION): Primary | ICD-10-CM

## 2018-12-14 DIAGNOSIS — Z3A.11 11 WEEKS GESTATION OF PREGNANCY: ICD-10-CM

## 2018-12-14 PROCEDURE — 99214 OFFICE O/P EST MOD 30 MIN: CPT | Performed by: INTERNAL MEDICINE

## 2018-12-14 PROCEDURE — 93000 ELECTROCARDIOGRAM COMPLETE: CPT | Performed by: INTERNAL MEDICINE

## 2018-12-14 RX ORDER — DOXYLAMINE SUCCINATE AND PYRIDOXINE HYDROCHLORIDE, DELAYED RELEASE TABLETS 10 MG/10 MG 10; 10 MG/1; MG/1
1 TABLET, DELAYED RELEASE ORAL DAILY PRN
COMMUNITY
End: 2019-03-27

## 2018-12-14 RX ORDER — ONDANSETRON 4 MG/1
TABLET, FILM COATED ORAL
COMMUNITY
Start: 2018-11-16 | End: 2018-12-14

## 2018-12-14 NOTE — LETTER
Reymundo Lamar MD  [address]  [address]  [facility] [address]    Patient: Andressa Baker   YOB: 1980   Date of Visit: 12/14/2018       Dear Dr. Lamar:    Thank you for referring Andressa Baker to me for evaluation. Below are my notes for this consultation.    If you have questions, please do not hesitate to call me. I look forward to following your patient along with you.         Sincerely,        Leslie Jane MD        CC: MD Buck Kim Maribel, MD  1/7/2019  6:10 PM  Signed       Electrophysiology  Outpatient Progress Note       Reason for visit:   Chief Complaint   Patient presents with   • Follow-up   December 14, 2018    HPI   Andressa Baker is a 38 y.o. female who is seen today for follow-up accompanied by her , . She has a history of symptomatic premature ventricular depolarization beats.  She presents today for evaluation since she is now 11 weeks pregnant.  They already have several children but they are very happy about the pregnancy.  She has noted an increase in the premature ventricular depolarization beats and she will occasionally feels episodes of bigeminy.  On occasion she has dizziness associated with a premature ventricular beats.  She has been having problems with morning sickness and fatigue.  She is status post catheter ablation on March 1, 2018 PVC catheter ablation. of her clinical left bundle inferior axis VPD's mapped to the distal/septal portion of the RVOT. The procedure was performed by my partner Dr. Patrick Manzanares at [facility]. Her post ablation course was complicated by pericarditis which resolved.  Had been doing much better and able to exercise after the ablation procedure until the frequency of the VPD is increased now that she is pregnant.  She has been using Zofran for treatment of her nausea and morning sickness however she was concerned about the  possibility that Zofran may contribute to the arrhythmias and wanted to find out whether it is safe to use Zofran given her history of premature ventricular beats. She also takes diclegis for nausea but she finds that zofran is more effective. She has not had any episodes of near syncope or syncope.    Past Medical History:   Diagnosis Date   • Abnormal ECG     PVCs   • Arrhythmia 2018    PVCs, status post ablation, partially successful.   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    Family History   Problem Relation Age of Onset   • Hyperlipidemia Mother    • Hyperlipidemia Father    • Hypertension Father    • Prostate cancer Father 78   • Dementia Father    • Hyperlipidemia Maternal Grandmother    • Clotting disorder Maternal Grandfather         developed in 60's - required tansfusions/platelets       ALLERGY:  No known allergies    Current Outpatient Prescriptions   Medication Sig Dispense Refill   • docosahexanoic acid (PRENATAL DHA) 200 mg capsule Prenatal + DHA     • doxylamine-pyridoxine, vit B6, (DICLEGIS) 10-10 mg tablet,delayed release (DR/EC) Take 1 tablet by mouth daily as needed.     •   30 tablet 3     No current facility-administered medications for this visit.        Review of Systems   Cardiovascular: Positive for palpitations.   Gastrointestinal: Positive for nausea.   All other systems reviewed and are negative.  As above in HPI.  Intrauterine pregnancy 11 weeks.    Objective    Vitals:    12/14/18 1226   BP: 106/66   Pulse: 78   Resp: 16   SpO2: 97%       Physical Exam   Constitutional: She is oriented to person, place, and time. She appears well-developed and well-nourished. No distress.   HENT:   Head: Normocephalic.   Eyes: Conjunctivae and EOM are normal. Pupils are equal, round, and reactive to light.   Neck: Normal range of motion.   Cardiovascular: Normal rate, regular rhythm and normal heart sounds.  Exam reveals no gallop and no friction rub.    No murmur heard.  Pulmonary/Chest: Effort normal and breath sounds normal. No respiratory distress.   Abdominal: Soft. Bowel sounds are normal. She exhibits no distension.   Musculoskeletal: Normal range of motion. She exhibits no edema.   Neurological: She is alert and oriented to person, place, and time.   Skin: Skin is warm and dry.   Psychiatric: She has a normal mood and affect. Her behavior is normal. Judgment and thought content normal.         EKG:   Sinus rhythm.  Normal EKG.    Assessment   Problem List Items Addressed This Visit     PVC (premature ventricular contraction) - Primary     She is s/p catheter ablation of VPD's mapped to RVOT. No history of ventricular tachycardia. I explained to the patient and her  that it is common for arrhythmias to become more frequent during pregnancy due to the hormonal and cardiovascular changes associated with pregnancy.  Zofran has been associated with prolongation of the QT interval. The patient has a normal QT interval and it has not been associated with increase in the frequency of premature ventricular beats.  Therefore she can take Zofran as needed if this is the best medication that controls her symptoms of morning sickness as long as it is safe for the fetus.  In the past she never tolerated beta-blocker therapy due to severe fatigue and were not effective.  However I reminded her that there are beta-blockers which are safe to be used during pregnancy if her symptoms of  palpitations secondary to premature ventricular beats worsen or she becomes more symptomatic with the episodes.  In addition the antiarrhythmic drug flecainide has been found to be safe in pregnancy.  At this time she will continue to be monitored without adding beta blockers or antiarrhythmic drugs.  I advised her to contact me if her symptoms become worse.    I will see her for follow up in 3 months.         Relevant Orders    ECG 12 LEAD-OFFICE PERFORMED (Completed)               Leslie Jane MD  1/7/2019

## 2019-01-03 ENCOUNTER — TRANSCRIBE ORDERS (OUTPATIENT)
Dept: SCHEDULING | Age: 39
End: 2019-01-03

## 2019-01-03 ENCOUNTER — APPOINTMENT (OUTPATIENT)
Dept: LAB | Facility: HOSPITAL | Age: 39
End: 2019-01-03
Attending: NURSE PRACTITIONER
Payer: COMMERCIAL

## 2019-01-03 ENCOUNTER — OFFICE VISIT (OUTPATIENT)
Dept: OBSTETRICS AND GYNECOLOGY | Facility: CLINIC | Age: 39
End: 2019-01-03
Payer: COMMERCIAL

## 2019-01-03 VITALS — DIASTOLIC BLOOD PRESSURE: 70 MMHG | SYSTOLIC BLOOD PRESSURE: 100 MMHG | BODY MASS INDEX: 30.23 KG/M2 | WEIGHT: 193 LBS

## 2019-01-03 DIAGNOSIS — Z3A.14 14 WEEKS GESTATION OF PREGNANCY: ICD-10-CM

## 2019-01-03 DIAGNOSIS — Z34.81 ENCOUNTER FOR SUPERVISION OF OTHER NORMAL PREGNANCY IN FIRST TRIMESTER: ICD-10-CM

## 2019-01-03 DIAGNOSIS — O35.10X0 MATERNAL CARE FOR SUSPECTED CHROMOSOMAL ABNORMALITY IN FETUS: Primary | ICD-10-CM

## 2019-01-03 DIAGNOSIS — Z34.82 ENCOUNTER FOR SUPERVISION OF OTHER NORMAL PREGNANCY IN SECOND TRIMESTER: Primary | ICD-10-CM

## 2019-01-03 LAB
ABO + RH BLD: NORMAL
BASOPHILS # BLD: 0.03 K/UL (ref 0.01–0.1)
BASOPHILS NFR BLD: 0.5 %
BLD GP AB SCN SERPL QL: NEGATIVE
D AG BLD QL: POSITIVE
DIFFERENTIAL METHOD BLD: ABNORMAL
EOSINOPHIL # BLD: 0.06 K/UL (ref 0.04–0.36)
EOSINOPHIL NFR BLD: 1 %
ERYTHROCYTE [DISTWIDTH] IN BLOOD BY AUTOMATED COUNT: 13.5 % (ref 11.7–14.4)
GLUCOSE URINE, POC: NEGATIVE
HBV SURFACE AG SER QL: NONREACTIVE
HCT VFR BLDCO AUTO: 34.5 %
HGB BLD-MCNC: 12.1 G/DL
HIV 1+2 AB+HIV1 P24 AG SERPL QL IA: NONREACTIVE
IMM GRANULOCYTES # BLD AUTO: 0 K/UL (ref 0–0.08)
IMM GRANULOCYTES NFR BLD AUTO: 0 %
LABORATORY COMMENT REPORT: NORMAL
LYMPHOCYTES # BLD: 1.14 K/UL (ref 1.2–3.5)
LYMPHOCYTES NFR BLD: 19.9 %
MCH RBC QN AUTO: 30 PG (ref 28–33.2)
MCHC RBC AUTO-ENTMCNC: 35.1 G/DL (ref 32.2–35.5)
MCV RBC AUTO: 85.4 FL (ref 83–98)
MONOCYTES # BLD: 0.33 K/UL (ref 0.28–0.8)
MONOCYTES NFR BLD: 5.7 %
NEUTROPHILS # BLD: 4.18 K/UL (ref 1.7–7)
NEUTS SEG NFR BLD: 72.9 %
NRBC BLD-RTO: 0 %
PDW BLD AUTO: 12.1 FL (ref 9.4–12.3)
PLATELET # BLD AUTO: 150 K/UL
PROTEIN, POC: NEGATIVE
RBC # BLD AUTO: 4.04 M/UL (ref 3.93–5.22)
RUBV IGG SER-ACNC: 133.7 IU/ML
WBC # BLD AUTO: 5.74 K/UL

## 2019-01-03 PROCEDURE — 87340 HEPATITIS B SURFACE AG IA: CPT

## 2019-01-03 PROCEDURE — 86762 RUBELLA ANTIBODY: CPT

## 2019-01-03 PROCEDURE — 86592 SYPHILIS TEST NON-TREP QUAL: CPT

## 2019-01-03 PROCEDURE — 0502F SUBSEQUENT PRENATAL CARE: CPT | Performed by: OBSTETRICS & GYNECOLOGY

## 2019-01-03 PROCEDURE — 87389 HIV-1 AG W/HIV-1&-2 AB AG IA: CPT

## 2019-01-03 PROCEDURE — 86787 VARICELLA-ZOSTER ANTIBODY: CPT

## 2019-01-03 PROCEDURE — 85025 COMPLETE CBC W/AUTO DIFF WBC: CPT

## 2019-01-03 PROCEDURE — 36415 COLL VENOUS BLD VENIPUNCTURE: CPT

## 2019-01-03 PROCEDURE — 86901 BLOOD TYPING SEROLOGIC RH(D): CPT

## 2019-01-03 PROCEDURE — 81002 URINALYSIS NONAUTO W/O SCOPE: CPT | Performed by: OBSTETRICS & GYNECOLOGY

## 2019-01-03 PROCEDURE — 86850 RBC ANTIBODY SCREEN: CPT

## 2019-01-03 NOTE — PRENATAL NOTE
C/o persistent nausea. Takes Diclegis and zofran as needed.     No VB; no other c/os.      Had flu vacc. In September.     Followed by Dr. Jane for R outflow tract PVC s/p cardiac ablation 3/18. Temp improved; but now back. Not as frequent. Concerned about Zofran but ok by cardiology.    Recc change to Bonjesta and try to avoid Zofran.    F/u 2 weeks

## 2019-01-04 LAB
RPR SER QL: NORMAL
VZV IGG SER-ACNC: 719 AU/ML

## 2019-01-07 ASSESSMENT — ENCOUNTER SYMPTOMS
PALPITATIONS: 1
NAUSEA: 1

## 2019-01-07 NOTE — PROGRESS NOTES
Electrophysiology  Outpatient Progress Note       Reason for visit:   Chief Complaint   Patient presents with   • Follow-up   December 14, 2018    HPI   Andressa LEE Olivia is a 38 y.o. female who is seen today for follow-up accompanied by her , . She has a history of symptomatic premature ventricular depolarization beats.  She presents today for evaluation since she is now 11 weeks pregnant.  They already have several children but they are very happy about the pregnancy.  She has noted an increase in the premature ventricular depolarization beats and she will occasionally feels episodes of bigeminy.  On occasion she has dizziness associated with a premature ventricular beats.  She has been having problems with morning sickness and fatigue.  She is status post catheter ablation on March 1, 2018 PVC catheter ablation. of her clinical left bundle inferior axis VPD's mapped to the distal/septal portion of the RVOT. The procedure was performed by my partner Dr. Patrick Manzanares at [facility]. Her post ablation course was complicated by pericarditis which resolved.  Had been doing much better and able to exercise after the ablation procedure until the frequency of the VPD is increased now that she is pregnant.  She has been using Zofran for treatment of her nausea and morning sickness however she was concerned about the possibility that Zofran may contribute to the arrhythmias and wanted to find out whether it is safe to use Zofran given her history of premature ventricular beats. She also takes diclegis for nausea but she finds that zofran is more effective. She has not had any episodes of near syncope or syncope.    Past Medical History:   Diagnosis Date   • Abnormal ECG     PVCs   • Arrhythmia 02/2018    PVCs, status post ablation, partially successful.   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    Family History   Problem Relation Age of Onset   • Hyperlipidemia Mother    • Hyperlipidemia Father    • Hypertension Father    • Prostate cancer Father 78   • Dementia Father    • Hyperlipidemia Maternal Grandmother    • Clotting disorder Maternal Grandfather         developed in 60's - required tansfusions/platelets       ALLERGY:  No known allergies    Current Outpatient Prescriptions   Medication Sig Dispense Refill   • docosahexanoic acid (PRENATAL DHA) 200 mg capsule Prenatal + DHA     • doxylamine-pyridoxine, vit B6, (DICLEGIS) 10-10 mg tablet,delayed release (DR/EC) Take 1 tablet by mouth daily as needed.     •   30 tablet 3     No current facility-administered medications for this visit.        Review of Systems   Cardiovascular: Positive for palpitations.   Gastrointestinal: Positive for nausea.   All other systems reviewed and are negative.  As above in HPI.  Intrauterine pregnancy 11 weeks.    Objective   Vitals:    18 1226   BP: 106/66   Pulse: 78   Resp: 16   SpO2: 97%       Physical Exam   Constitutional: She is oriented to person, place, and time. She appears well-developed and well-nourished. No distress.   HENT:   Head: Normocephalic.   Eyes: Conjunctivae and EOM are normal. Pupils are equal, round, and reactive to light.   Neck: Normal range of motion.   Cardiovascular: Normal rate, regular rhythm and normal heart sounds.  Exam reveals no gallop and no friction rub.    No murmur heard.  Pulmonary/Chest: Effort normal and breath sounds normal. No  respiratory distress.   Abdominal: Soft. Bowel sounds are normal. She exhibits no distension.   Musculoskeletal: Normal range of motion. She exhibits no edema.   Neurological: She is alert and oriented to person, place, and time.   Skin: Skin is warm and dry.   Psychiatric: She has a normal mood and affect. Her behavior is normal. Judgment and thought content normal.         EKG:   Sinus rhythm.  Normal EKG.    Assessment   Problem List Items Addressed This Visit     PVC (premature ventricular contraction) - Primary     She is s/p catheter ablation of VPD's mapped to RVOT. No history of ventricular tachycardia. I explained to the patient and her  that it is common for arrhythmias to become more frequent during pregnancy due to the hormonal and cardiovascular changes associated with pregnancy.  Zofran has been associated with prolongation of the QT interval. The patient has a normal QT interval and it has not been associated with increase in the frequency of premature ventricular beats.  Therefore she can take Zofran as needed if this is the best medication that controls her symptoms of morning sickness as long as it is safe for the fetus.  In the past she never tolerated beta-blocker therapy due to severe fatigue and were not effective.  However I reminded her that there are beta-blockers which are safe to be used during pregnancy if her symptoms of palpitations secondary to premature ventricular beats worsen or she becomes more symptomatic with the episodes.  In addition the antiarrhythmic drug flecainide has been found to be safe in pregnancy.  At this time she will continue to be monitored without adding beta blockers or antiarrhythmic drugs.  I advised her to contact me if her symptoms become worse.    I will see her for follow up in 3 months.         Relevant Orders    ECG 12 LEAD-OFFICE PERFORMED (Completed)               Leslie Jane MD  1/7/2019

## 2019-01-07 NOTE — ASSESSMENT & PLAN NOTE
She is s/p catheter ablation of VPD's mapped to RVOT. No history of ventricular tachycardia. I explained to the patient and her  that it is common for arrhythmias to become more frequent during pregnancy due to the hormonal and cardiovascular changes associated with pregnancy.  Zofran has been associated with prolongation of the QT interval. The patient has a normal QT interval and it has not been associated with increase in the frequency of premature ventricular beats.  Therefore she can take Zofran as needed if this is the best medication that controls her symptoms of morning sickness as long as it is safe for the fetus.  In the past she never tolerated beta-blocker therapy due to severe fatigue and were not effective.  However I reminded her that there are beta-blockers which are safe to be used during pregnancy if her symptoms of palpitations secondary to premature ventricular beats worsen or she becomes more symptomatic with the episodes.  In addition the antiarrhythmic drug flecainide has been found to be safe in pregnancy.  At this time she will continue to be monitored without adding beta blockers or antiarrhythmic drugs.  I advised her to contact me if her symptoms become worse.    I will see her for follow up in 3 months.

## 2019-01-23 ENCOUNTER — OFFICE VISIT (OUTPATIENT)
Dept: OBSTETRICS AND GYNECOLOGY | Facility: CLINIC | Age: 39
End: 2019-01-23
Payer: COMMERCIAL

## 2019-01-23 VITALS — DIASTOLIC BLOOD PRESSURE: 72 MMHG | SYSTOLIC BLOOD PRESSURE: 111 MMHG | WEIGHT: 195.2 LBS | BODY MASS INDEX: 30.57 KG/M2

## 2019-01-23 DIAGNOSIS — Z3A.16 16 WEEKS GESTATION OF PREGNANCY: ICD-10-CM

## 2019-01-23 DIAGNOSIS — Z34.82 ENCOUNTER FOR SUPERVISION OF OTHER NORMAL PREGNANCY IN SECOND TRIMESTER: Primary | ICD-10-CM

## 2019-01-23 LAB
GLUCOSE URINE, POC: NORMAL
PROTEIN, POC: NEGATIVE

## 2019-01-23 PROCEDURE — 0502F SUBSEQUENT PRENATAL CARE: CPT | Performed by: OBSTETRICS & GYNECOLOGY

## 2019-01-23 PROCEDURE — 81002 URINALYSIS NONAUTO W/O SCOPE: CPT | Performed by: OBSTETRICS & GYNECOLOGY

## 2019-01-23 NOTE — PRENATAL NOTE
Some light FM. Nausea getting better; still taking Bonjesta in the evening.     Pt reports increased palp today and some SOB. Recc consult w Dr. Prince soon.     Declined GT.     Has 20 week FAS u/s 2/14    F/u 4 weeks

## 2019-02-14 ENCOUNTER — HOSPITAL ENCOUNTER (OUTPATIENT)
Dept: PERINATAL CARE | Facility: HOSPITAL | Age: 39
Discharge: HOME | End: 2019-02-14
Attending: OBSTETRICS & GYNECOLOGY
Payer: COMMERCIAL

## 2019-02-14 DIAGNOSIS — O09.522 AMA (ADVANCED MATERNAL AGE) MULTIGRAVIDA 35+, SECOND TRIMESTER: ICD-10-CM

## 2019-02-14 DIAGNOSIS — Z36.3 ANTENATAL SCREENING FOR MALFORMATION USING ULTRASONICS: Primary | ICD-10-CM

## 2019-02-14 DIAGNOSIS — O35.9XX0 SUSPECTED FETAL ABNORMALITY AFFECTING MANAGEMENT OF MOTHER, SINGLE OR UNSPECIFIED FETUS: ICD-10-CM

## 2019-02-14 DIAGNOSIS — Z3A.20 20 WEEKS GESTATION OF PREGNANCY: ICD-10-CM

## 2019-02-14 PROCEDURE — 76811 OB US DETAILED SNGL FETUS: CPT

## 2019-02-19 ENCOUNTER — OFFICE VISIT (OUTPATIENT)
Dept: OBSTETRICS AND GYNECOLOGY | Facility: CLINIC | Age: 39
End: 2019-02-19
Payer: COMMERCIAL

## 2019-02-19 VITALS — SYSTOLIC BLOOD PRESSURE: 110 MMHG | DIASTOLIC BLOOD PRESSURE: 70 MMHG | BODY MASS INDEX: 31.45 KG/M2 | WEIGHT: 200.8 LBS

## 2019-02-19 DIAGNOSIS — Z34.92 ENCOUNTER FOR SUPERVISION OF NORMAL PREGNANCY IN SECOND TRIMESTER, UNSPECIFIED GRAVIDITY: Primary | ICD-10-CM

## 2019-02-19 PROCEDURE — 0502F SUBSEQUENT PRENATAL CARE: CPT | Performed by: OBSTETRICS & GYNECOLOGY

## 2019-03-18 PROBLEM — Z01.419 WELL FEMALE EXAM WITH ROUTINE GYNECOLOGICAL EXAM: Status: RESOLVED | Noted: 2018-08-24 | Resolved: 2019-03-18

## 2019-03-18 NOTE — PRENATAL NOTE
Feeling good   RX CBC/ 1 hour glucola   Palpitations - hx of PVCs s/p cardiac ablation 1 year ago, sees Dr. Jane.  F/U appt planned for April   Hx of CS with 3 successful VBACs, desires TOLAC.  Discussed risk of uterine rupture.  Discussed growth US at 36 weeks, epidural catheter placement.     Sees chiropractor Vibha Borrego for pubic symphysis dysfunction which helps  RTC 3 weeks

## 2019-03-19 ENCOUNTER — OFFICE VISIT (OUTPATIENT)
Dept: OBSTETRICS AND GYNECOLOGY | Facility: CLINIC | Age: 39
End: 2019-03-19
Payer: COMMERCIAL

## 2019-03-19 VITALS — DIASTOLIC BLOOD PRESSURE: 60 MMHG | BODY MASS INDEX: 32.01 KG/M2 | WEIGHT: 204.4 LBS | SYSTOLIC BLOOD PRESSURE: 100 MMHG

## 2019-03-19 DIAGNOSIS — Z3A.24 24 WEEKS GESTATION OF PREGNANCY: ICD-10-CM

## 2019-03-19 DIAGNOSIS — Z34.02 ENCOUNTER FOR SUPERVISION OF NORMAL FIRST PREGNANCY IN SECOND TRIMESTER: Primary | ICD-10-CM

## 2019-03-19 PROBLEM — Z98.891 HISTORY OF C-SECTION: Status: ACTIVE | Noted: 2019-03-19

## 2019-03-19 LAB
GLUCOSE URINE, POC: NEGATIVE
PROTEIN, POC: NEGATIVE

## 2019-03-19 PROCEDURE — 81002 URINALYSIS NONAUTO W/O SCOPE: CPT | Performed by: OBSTETRICS & GYNECOLOGY

## 2019-03-19 PROCEDURE — 0502F SUBSEQUENT PRENATAL CARE: CPT | Performed by: OBSTETRICS & GYNECOLOGY

## 2019-03-19 RX ORDER — ONDANSETRON 4 MG/1
TABLET, FILM COATED ORAL
Refills: 5 | COMMUNITY
Start: 2019-03-12 | End: 2019-07-11 | Stop reason: HOSPADM

## 2019-03-27 ENCOUNTER — OFFICE VISIT (OUTPATIENT)
Dept: CARDIOLOGY | Facility: CLINIC | Age: 39
End: 2019-03-27
Payer: COMMERCIAL

## 2019-03-27 VITALS
WEIGHT: 205.6 LBS | SYSTOLIC BLOOD PRESSURE: 100 MMHG | BODY MASS INDEX: 32.2 KG/M2 | OXYGEN SATURATION: 96 % | DIASTOLIC BLOOD PRESSURE: 70 MMHG | HEART RATE: 86 BPM | RESPIRATION RATE: 18 BRPM

## 2019-03-27 DIAGNOSIS — R00.2 PALPITATIONS: ICD-10-CM

## 2019-03-27 DIAGNOSIS — Z3A.26 26 WEEKS GESTATION OF PREGNANCY: ICD-10-CM

## 2019-03-27 DIAGNOSIS — I49.3 PVC (PREMATURE VENTRICULAR CONTRACTION): Primary | ICD-10-CM

## 2019-03-27 PROCEDURE — 99214 OFFICE O/P EST MOD 30 MIN: CPT | Performed by: INTERNAL MEDICINE

## 2019-03-27 PROCEDURE — 93000 ELECTROCARDIOGRAM COMPLETE: CPT | Performed by: INTERNAL MEDICINE

## 2019-03-27 ASSESSMENT — ENCOUNTER SYMPTOMS: PALPITATIONS: 1

## 2019-03-27 NOTE — LETTER
March 27, 2019     Raul Lim MD  [address]  [address]  [facility] [address]    Patient: Andressa Baker   YOB: 1980   Date of Visit: 3/27/2019       Dear Dr. Lim:    Thank you for referring Andressa Baker to me for evaluation. Below are my notes for this consultation.    If you have questions, please do not hesitate to call me. I look forward to following your patient along with you.         Sincerely,        Leslie Jane MD        CC: MD Buck Kim Maribel, MD  3/31/2019  9:25 PM  Signed       Electrophysiology  Outpatient Progress Note       Reason for visit:   Chief Complaint   Patient presents with   • Follow-up   3/27/2019      HPI  Andressa Baker is a 38 y.o. female who is seen today for follow-up.  She has a history of symptomatic premature ventricular depolarization beats. 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 The procedure was performed by my partner Dr. Patrick Manzanares at [facility]. Her post ablation course was complicated by pericarditis which resolved.  Had been doing much better and able to exercise after the ablation procedure.  Her VPD's worsened during early pregnancy, however her symptoms are now improving significantly as her pregnancy progresses.  She does get some dizziness related to low blood pressure.  She has no presyncope or syncope.  She denies chest pain or shortness of breath.  She is currently 26 weeks pregnant and plans for a vaginal delivery at [facility].  Her due date is July 4, 2019.    Past Medical History:   Diagnosis Date   • Abnormal ECG     PVCs   • Arrhythmia 02/2018    PVCs, status post ablation, partially successful.   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 She has 4 children and is pregnant with her fifth.    Family History   Problem Relation Age of Onset   • Hyperlipidemia Mother    • Hyperlipidemia Father    • Hypertension Father    • Prostate cancer Father 78   • Dementia Father    • Hyperlipidemia Maternal Grandmother    • Clotting disorder Maternal Grandfather         developed in 60's - required tansfusions/platelets     ALLERGY:  No known allergies    MEDICATIONS:   Current Outpatient Prescriptions:   •  docosahexanoic acid (PRENATAL DHA) 200 mg capsule, Prenatal + DHA, Disp: , Rfl:   •  doxylamine-pyridoxine, vit B6, (BONJESTA) 20-20 mg tablet,IR,delayed rel,biphasic, Take 1 tablet by mouth 2 (two) times a day., Disp: 30 tablet, Rfl: 3  •  ondansetron (ZOFRAN) 4 mg tablet, TAKE 1 TABLET BY MOUTH EVERY 12 HOURS AS NEEDED FOR NAUSEA. INSUR MAX 18 TAB/21 DAYS, Disp: , Rfl: 5    Review of Systems   Cardiovascular: Positive for palpitations.   All other systems reviewed and are negative.  As above in HPI.  Intrauterine pregnancy 26 weeks.    Objective   Vitals:    19 1324   BP: 100/70   Pulse: 86   Resp: 18   SpO2: 96%   Weight: 93.3 kg (205 lb 9.6 oz)     Physical Exam   Constitutional: She is oriented to person, place, and time. She appears well-developed and well-nourished. No distress.   HENT:   Head: Normocephalic.   Eyes: Conjunctivae and EOM are normal.   Neck: Normal range of motion.   Cardiovascular: Normal rate, regular rhythm and normal heart sounds.  Exam reveals no gallop and no friction rub.    No murmur heard.  Pulmonary/Chest: Effort  normal and breath sounds normal. No respiratory distress. She has no wheezes. She has no rales.   Abdominal: Soft. Bowel sounds are normal.   Musculoskeletal: Normal range of motion. She exhibits no edema.   Neurological: She is alert and oriented to person, place, and time.   Skin: Skin is warm and dry.   Psychiatric: She has a normal mood and affect. Her behavior is normal. Judgment and thought content normal.   Abdomen/Pelvis: Intrauterine pregnancy    EKG: I personally reviewed the 12 lead EKG obtained in the office today which reveals Sinus rhythm rate 89 bpm    Lab Results   Component Value Date    HGB 12.1 2019     2019    WBC 5.74 2019    ALT 12 2018    AST 12 2018     2018    BUN 14 2018    CREATININE 0.80 2018    K 4.0 2018    GLUCOSE NEGATIVE 2018    TSH 0.74 2018    CHOL 196 2016    HDL 67 2016    TRIG 58 2016    INR 1.0 2017    MG 2.2 2018     ASSESSMENT/PLAN:  1. Palpitations and PVC's. She is s/p catheter ablation procedure for VPD's originating from RVOT.  Her symptoms of palpitations have improved with the progression of her pregnancy.  Overall, she is feeling well.  She has not required beta-blocker therapy. This is a benign arrhythmia.     2. Intrauterine pregnancy:  I explained to the patient that there is no cardiovascular contraindication to epidural anesthesia,  vaginal delivery or  section if needed. She will be delivering at [facility] with a due date of 2019.    Please feels free to contact me or one of my electrophysiology partners if I am not availble for any questions at the time of labor and delivery.    She will see me  follow up in the office 6 months or as needed.        Thank you for allowing me to participate in the care of your patient.  Please do not hesitate to contact me if you have any questions regarding my recommendations and  management.    Sincerely;    Leslie Boyce MD [facility]      Mao BOLANOS PA-C, am scribing for, and in the presence of, Leslie Boyce MD.    I, Leslie Boyce MD, personally performed the services described in this documentation as described by Mao Lee in my presence, and it is both accurate and complete. I have seen and examined the patient.  I have reviewed the PA note and supporting documentation. The note has been amended as appropriate.

## 2019-03-27 NOTE — LETTER
March 27, 2019     Gloria Vallejo MD  [address]  4th [address]  [place] [address]    Patient: Andressa Baker   YOB: 1980   Date of Visit: 3/27/2019       Dear Dr. Vallejo:    Thank you for referring Andressa Baker to me for evaluation. Below are my notes for this consultation.    If you have questions, please do not hesitate to call me. I look forward to following your patient along with you.         Sincerely,        Leslie Jane MD        CC: MD Buck Monte Maribel, MD  3/31/2019  9:19 PM  Sign at close encounter       Electrophysiology  Outpatient Progress Note       Reason for visit:   Chief Complaint   Patient presents with   • Follow-up   3/27/2019      HPI  Andressa Baker is a 38 y.o. female who is seen today for follow-up.  She has a history of symptomatic premature ventricular depolarization beats. 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 The procedure was performed by my partner Dr. Patrick Manzanares at [facility]. Her post ablation course was complicated by pericarditis which resolved.  Had been doing much better and able to exercise after the ablation procedure.  Her VPD's worsened during early pregnancy, however her symptoms are now improving significantly as her pregnancy progresses.  She does get some dizziness related to low blood pressure.  She has no presyncope or syncope.  She denies chest pain or shortness of breath.  She is currently 26 weeks pregnant and plans for a vaginal delivery at [facility].  Her due date is July 4, 2019.    Past Medical History:   Diagnosis Date   • Abnormal ECG     PVCs   • Arrhythmia 02/2018    PVCs, status post ablation, partially successful.   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 She has 4 children and is pregnant with her fifth.    Family History   Problem Relation Age of Onset   • Hyperlipidemia Mother    • Hyperlipidemia Father    • Hypertension Father    • Prostate cancer Father 78   • Dementia Father    • Hyperlipidemia Maternal Grandmother    • Clotting disorder Maternal Grandfather         developed in 60's - required tansfusions/platelets     ALLERGY:  No known allergies    MEDICATIONS:   Current Outpatient Prescriptions:   •  docosahexanoic acid (PRENATAL DHA) 200 mg capsule, Prenatal + DHA, Disp: , Rfl:   •  doxylamine-pyridoxine, vit B6, (BONJESTA) 20-20 mg tablet,IR,delayed rel,biphasic, Take 1 tablet by mouth 2 (two) times a day., Disp: 30 tablet, Rfl: 3  •  ondansetron (ZOFRAN) 4 mg tablet, TAKE 1 TABLET BY MOUTH EVERY 12 HOURS AS NEEDED FOR NAUSEA. INSUR MAX 18 TAB/21 DAYS, Disp: , Rfl: 5    Review of Systems   Cardiovascular: Positive for palpitations.   All other systems reviewed and are negative.  As above in HPI.  Intrauterine pregnancy 26 weeks.    Objective   Vitals:    19 1324   BP: 100/70   Pulse: 86   Resp: 18   SpO2: 96%   Weight: 93.3 kg (205 lb 9.6 oz)     Physical Exam   Constitutional: She is oriented to person, place, and time. She appears well-developed and well-nourished. No distress.   HENT:   Head: Normocephalic.   Eyes: Conjunctivae and EOM are normal.   Neck: Normal range of motion.   Cardiovascular: Normal rate, regular rhythm and normal heart sounds.  Exam reveals no gallop and no friction rub.    No murmur  heard.  Pulmonary/Chest: Effort normal and breath sounds normal. No respiratory distress. She has no wheezes. She has no rales.   Abdominal: Soft. Bowel sounds are normal.   Musculoskeletal: Normal range of motion. She exhibits no edema.   Neurological: She is alert and oriented to person, place, and time.   Skin: Skin is warm and dry.   Psychiatric: She has a normal mood and affect. Her behavior is normal. Judgment and thought content normal.   Abdomen/Pelvis: Intrauterine pregnancy    EKG: I personally reviewed the 12 lead EKG obtained in the office today which reveals Sinus rhythm rate 89 bpm    Lab Results   Component Value Date    HGB 12.1 2019     2019    WBC 5.74 2019    ALT 12 2018    AST 12 2018     2018    BUN 14 2018    CREATININE 0.80 2018    K 4.0 2018    GLUCOSE NEGATIVE 2018    TSH 0.74 2018    CHOL 196 2016    HDL 67 2016    TRIG 58 2016    INR 1.0 2017    MG 2.2 2018     ASSESSMENT/PLAN:  1. Palpitations and PVC's. She is s/p catheter ablation procedure for VPD's originating from RVOT.  Her symptoms of palpitations have improved with the progression of her pregnancy.  Overall, she is feeling well.  She has not required beta-blocker therapy. This is a benign arrhythmia.     2. Intrauterine pregnancy:  I explained to the patient that there is no cardiovascular contraindication to epidural anesthesia,  vaginal delivery or  section if needed. She will be delivering at [facility] with a due date of 2019.    She will see me  follow up in 6 months or as needed.       Thank you for allowing me to participate in the care of your patient.  Please do not hesitate to contact me if you have any questions regarding my recommendations and management.    Sincerely;    Leslie Boyce MD [facility]      IMao PA-C, am scribing for, and in the presence of, Leslie Boyce  MD.    I, Leslie Boyce MD, personally performed the services described in this documentation as described by Mao Lee in my presence, and it is both accurate and complete. I have seen and examined the patient.  I have reviewed the PA note and supporting documentation. The note has been amended as appropriate.

## 2019-04-04 ENCOUNTER — APPOINTMENT (OUTPATIENT)
Dept: LAB | Facility: HOSPITAL | Age: 39
End: 2019-04-04
Attending: OBSTETRICS & GYNECOLOGY
Payer: COMMERCIAL

## 2019-04-04 DIAGNOSIS — Z34.02 ENCOUNTER FOR SUPERVISION OF NORMAL FIRST PREGNANCY IN SECOND TRIMESTER: ICD-10-CM

## 2019-04-04 LAB
ERYTHROCYTE [DISTWIDTH] IN BLOOD BY AUTOMATED COUNT: 13.2 % (ref 11.7–14.4)
GLUCOSE 1H P GLC SERPL-MCNC: 107 MG/DL (ref 65–139)
HCT VFR BLDCO AUTO: 34.3 %
HGB BLD-MCNC: 11.1 G/DL
MCH RBC QN AUTO: 28.7 PG (ref 28–33.2)
MCHC RBC AUTO-ENTMCNC: 32.4 G/DL (ref 32.2–35.5)
MCV RBC AUTO: 88.6 FL (ref 83–98)
PDW BLD AUTO: 11.9 FL (ref 9.4–12.3)
PLATELET # BLD AUTO: 164 K/UL
RBC # BLD AUTO: 3.87 M/UL (ref 3.93–5.22)
WBC # BLD AUTO: 7.23 K/UL

## 2019-04-04 PROCEDURE — 36415 COLL VENOUS BLD VENIPUNCTURE: CPT

## 2019-04-04 PROCEDURE — 82950 GLUCOSE TEST: CPT

## 2019-04-04 PROCEDURE — 85027 COMPLETE CBC AUTOMATED: CPT

## 2019-04-10 ENCOUNTER — OFFICE VISIT (OUTPATIENT)
Dept: OBSTETRICS AND GYNECOLOGY | Facility: CLINIC | Age: 39
End: 2019-04-10
Payer: COMMERCIAL

## 2019-04-10 VITALS — SYSTOLIC BLOOD PRESSURE: 100 MMHG | WEIGHT: 209 LBS | BODY MASS INDEX: 32.73 KG/M2 | DIASTOLIC BLOOD PRESSURE: 68 MMHG

## 2019-04-10 DIAGNOSIS — Z34.83 ENCOUNTER FOR SUPERVISION OF OTHER NORMAL PREGNANCY IN THIRD TRIMESTER: Primary | ICD-10-CM

## 2019-04-10 LAB
GLUCOSE URINE, POC: NEGATIVE
LEUKOCYTE EST, POC: NORMAL
PROTEIN, POC: NEGATIVE

## 2019-04-10 PROCEDURE — 81002 URINALYSIS NONAUTO W/O SCOPE: CPT | Performed by: OBSTETRICS & GYNECOLOGY

## 2019-04-10 PROCEDURE — 0502F SUBSEQUENT PRENATAL CARE: CPT | Performed by: OBSTETRICS & GYNECOLOGY

## 2019-04-10 RX ORDER — PANTOPRAZOLE SODIUM 20 MG/1
20 TABLET, DELAYED RELEASE ORAL DAILY
Qty: 30 TABLET | Refills: 3 | Status: SHIPPED | OUTPATIENT
Start: 2019-04-10 | End: 2019-10-06 | Stop reason: ALTCHOICE

## 2019-04-10 NOTE — PRENATAL NOTE
Palpitations seem to be getting better. Was seen by katia Méndez WNL  GTT/CBC WNL  Nexium working for GERD, but causing diarrhea  Will try switching to Protonix  RTO 2 wks

## 2019-04-24 NOTE — PRENATAL NOTE
- feeling well  - GTT/CBC normal. Platelets 164, given h/o thrombocytopenia will plan repeat platelets at 34-36wks  - active FM  - Discussed TDaP  - planning TOLAC, h/o successful TOLAC x 3. Will schedule back up c/s for 41 weeks. Also discussed IOL with AROM/low dose pitocin if cervix favorable at term.   - RTO 2 weeks

## 2019-04-25 ENCOUNTER — OFFICE VISIT (OUTPATIENT)
Dept: OBSTETRICS AND GYNECOLOGY | Facility: CLINIC | Age: 39
End: 2019-04-25
Payer: COMMERCIAL

## 2019-04-25 VITALS — BODY MASS INDEX: 32.73 KG/M2 | SYSTOLIC BLOOD PRESSURE: 110 MMHG | WEIGHT: 209 LBS | DIASTOLIC BLOOD PRESSURE: 70 MMHG

## 2019-04-25 DIAGNOSIS — D69.6 THROMBOCYTOPENIA (CMS/HCC): ICD-10-CM

## 2019-04-25 DIAGNOSIS — Z3A.30 30 WEEKS GESTATION OF PREGNANCY: ICD-10-CM

## 2019-04-25 DIAGNOSIS — Z34.83 ENCOUNTER FOR SUPERVISION OF OTHER NORMAL PREGNANCY IN THIRD TRIMESTER: Primary | ICD-10-CM

## 2019-04-25 LAB
GLUCOSE URINE, POC: NEGATIVE
PROTEIN, POC: NEGATIVE

## 2019-04-25 PROCEDURE — 0502F SUBSEQUENT PRENATAL CARE: CPT | Performed by: OBSTETRICS & GYNECOLOGY

## 2019-04-25 PROCEDURE — 81002 URINALYSIS NONAUTO W/O SCOPE: CPT | Performed by: OBSTETRICS & GYNECOLOGY

## 2019-05-08 ENCOUNTER — APPOINTMENT (OUTPATIENT)
Dept: LAB | Facility: HOSPITAL | Age: 39
End: 2019-05-08
Attending: OBSTETRICS & GYNECOLOGY
Payer: COMMERCIAL

## 2019-05-08 ENCOUNTER — OFFICE VISIT (OUTPATIENT)
Dept: OBSTETRICS AND GYNECOLOGY | Facility: CLINIC | Age: 39
End: 2019-05-08
Payer: COMMERCIAL

## 2019-05-08 VITALS — WEIGHT: 210.6 LBS | BODY MASS INDEX: 32.98 KG/M2 | SYSTOLIC BLOOD PRESSURE: 110 MMHG | DIASTOLIC BLOOD PRESSURE: 70 MMHG

## 2019-05-08 DIAGNOSIS — O09.523 ELDERLY MULTIGRAVIDA IN THIRD TRIMESTER: ICD-10-CM

## 2019-05-08 DIAGNOSIS — Z34.83 ENCOUNTER FOR SUPERVISION OF OTHER NORMAL PREGNANCY IN THIRD TRIMESTER: Primary | ICD-10-CM

## 2019-05-08 DIAGNOSIS — D69.6 THROMBOCYTOPENIA (CMS/HCC): ICD-10-CM

## 2019-05-08 LAB
ERYTHROCYTE [DISTWIDTH] IN BLOOD BY AUTOMATED COUNT: 13.2 % (ref 11.7–14.4)
GLUCOSE URINE, POC: NEGATIVE
HCT VFR BLDCO AUTO: 33.4 %
HGB BLD-MCNC: 11 G/DL
MCH RBC QN AUTO: 28.2 PG (ref 28–33.2)
MCHC RBC AUTO-ENTMCNC: 32.9 G/DL (ref 32.2–35.5)
MCV RBC AUTO: 85.6 FL (ref 83–98)
PDW BLD AUTO: 11.5 FL (ref 9.4–12.3)
PLATELET # BLD AUTO: 144 K/UL
PROTEIN, POC: NEGATIVE
RBC # BLD AUTO: 3.9 M/UL (ref 3.93–5.22)
WBC # BLD AUTO: 5.49 K/UL

## 2019-05-08 PROCEDURE — 81002 URINALYSIS NONAUTO W/O SCOPE: CPT | Performed by: OBSTETRICS & GYNECOLOGY

## 2019-05-08 PROCEDURE — 36415 COLL VENOUS BLD VENIPUNCTURE: CPT

## 2019-05-08 PROCEDURE — 85027 COMPLETE CBC AUTOMATED: CPT

## 2019-05-08 PROCEDURE — 0502F SUBSEQUENT PRENATAL CARE: CPT | Performed by: OBSTETRICS & GYNECOLOGY

## 2019-05-21 ENCOUNTER — OFFICE VISIT (OUTPATIENT)
Dept: OBSTETRICS AND GYNECOLOGY | Facility: CLINIC | Age: 39
End: 2019-05-21
Payer: COMMERCIAL

## 2019-05-21 VITALS — SYSTOLIC BLOOD PRESSURE: 100 MMHG | WEIGHT: 213.4 LBS | BODY MASS INDEX: 33.42 KG/M2 | DIASTOLIC BLOOD PRESSURE: 64 MMHG

## 2019-05-21 DIAGNOSIS — D69.6 THROMBOCYTOPENIA AFFECTING PREGNANCY (CMS/HCC): ICD-10-CM

## 2019-05-21 DIAGNOSIS — Z34.93 ENCOUNTER FOR SUPERVISION OF NORMAL PREGNANCY IN THIRD TRIMESTER, UNSPECIFIED GRAVIDITY: Primary | ICD-10-CM

## 2019-05-21 DIAGNOSIS — O99.119 THROMBOCYTOPENIA AFFECTING PREGNANCY (CMS/HCC): ICD-10-CM

## 2019-05-21 LAB
GLUCOSE URINE, POC: NEGATIVE
PROTEIN, POC: NEGATIVE

## 2019-05-21 PROCEDURE — 0502F SUBSEQUENT PRENATAL CARE: CPT | Performed by: OBSTETRICS & GYNECOLOGY

## 2019-05-21 PROCEDURE — 81002 URINALYSIS NONAUTO W/O SCOPE: CPT | Performed by: OBSTETRICS & GYNECOLOGY

## 2019-06-04 ENCOUNTER — OFFICE VISIT (OUTPATIENT)
Dept: OBSTETRICS AND GYNECOLOGY | Facility: CLINIC | Age: 39
End: 2019-06-04
Payer: COMMERCIAL

## 2019-06-04 ENCOUNTER — APPOINTMENT (OUTPATIENT)
Dept: LAB | Facility: HOSPITAL | Age: 39
End: 2019-06-04
Attending: OBSTETRICS & GYNECOLOGY
Payer: COMMERCIAL

## 2019-06-04 VITALS — BODY MASS INDEX: 33.39 KG/M2 | SYSTOLIC BLOOD PRESSURE: 110 MMHG | WEIGHT: 213.2 LBS | DIASTOLIC BLOOD PRESSURE: 60 MMHG

## 2019-06-04 DIAGNOSIS — O99.119 THROMBOCYTOPENIA AFFECTING PREGNANCY (CMS/HCC): ICD-10-CM

## 2019-06-04 DIAGNOSIS — D69.6 THROMBOCYTOPENIA AFFECTING PREGNANCY (CMS/HCC): ICD-10-CM

## 2019-06-04 DIAGNOSIS — Z34.93 ENCOUNTER FOR SUPERVISION OF NORMAL PREGNANCY IN THIRD TRIMESTER, UNSPECIFIED GRAVIDITY: ICD-10-CM

## 2019-06-04 DIAGNOSIS — Z34.83 ENCOUNTER FOR SUPERVISION OF OTHER NORMAL PREGNANCY IN THIRD TRIMESTER: Primary | ICD-10-CM

## 2019-06-04 LAB
ERYTHROCYTE [DISTWIDTH] IN BLOOD BY AUTOMATED COUNT: 14.6 % (ref 11.7–14.4)
GLUCOSE URINE, POC: NEGATIVE
HCT VFR BLDCO AUTO: 32.9 %
HGB BLD-MCNC: 10.7 G/DL
MCH RBC QN AUTO: 27.4 PG (ref 28–33.2)
MCHC RBC AUTO-ENTMCNC: 32.5 G/DL (ref 32.2–35.5)
MCV RBC AUTO: 84.1 FL (ref 83–98)
PDW BLD AUTO: 11.5 FL (ref 9.4–12.3)
PLATELET # BLD AUTO: 133 K/UL
PROTEIN, POC: NEGATIVE
RBC # BLD AUTO: 3.91 M/UL (ref 3.93–5.22)
WBC # BLD AUTO: 6.78 K/UL

## 2019-06-04 PROCEDURE — 0502F SUBSEQUENT PRENATAL CARE: CPT | Performed by: OBSTETRICS & GYNECOLOGY

## 2019-06-04 PROCEDURE — 87150 DNA/RNA AMPLIFIED PROBE: CPT | Performed by: OBSTETRICS & GYNECOLOGY

## 2019-06-04 PROCEDURE — 81002 URINALYSIS NONAUTO W/O SCOPE: CPT | Performed by: OBSTETRICS & GYNECOLOGY

## 2019-06-04 PROCEDURE — 87081 CULTURE SCREEN ONLY: CPT | Performed by: OBSTETRICS & GYNECOLOGY

## 2019-06-04 PROCEDURE — 36415 COLL VENOUS BLD VENIPUNCTURE: CPT

## 2019-06-04 PROCEDURE — 85027 COMPLETE CBC AUTOMATED: CPT

## 2019-06-04 NOTE — PRENATAL NOTE
Pt doing well, no c/o's    Feeling baby move    Plt this morning 133k, were 144k a month ago. Hx of gest thrombocytopenia, but never needed steroids. Plan to repeat CBC at 38 wks    GBS done    Getting Tdap today    RTO 1 wk

## 2019-06-05 LAB
GP B STREP DNA SPEC QL NAA+PROBE: NEGATIVE
GP B STREP SPEC QL CULT: NORMAL

## 2019-06-06 ENCOUNTER — HOSPITAL ENCOUNTER (OUTPATIENT)
Dept: PERINATAL CARE | Facility: HOSPITAL | Age: 39
Discharge: HOME | End: 2019-06-06
Attending: OBSTETRICS & GYNECOLOGY
Payer: COMMERCIAL

## 2019-06-06 DIAGNOSIS — Z3A.36 36 WEEKS GESTATION OF PREGNANCY: ICD-10-CM

## 2019-06-06 DIAGNOSIS — O09.523 ELDERLY MULTIGRAVIDA IN THIRD TRIMESTER: ICD-10-CM

## 2019-06-06 PROCEDURE — 76816 OB US FOLLOW-UP PER FETUS: CPT

## 2019-06-11 ENCOUNTER — OFFICE VISIT (OUTPATIENT)
Dept: OBSTETRICS AND GYNECOLOGY | Facility: CLINIC | Age: 39
End: 2019-06-11
Payer: COMMERCIAL

## 2019-06-11 VITALS — WEIGHT: 215.4 LBS | DIASTOLIC BLOOD PRESSURE: 76 MMHG | BODY MASS INDEX: 33.74 KG/M2 | SYSTOLIC BLOOD PRESSURE: 118 MMHG

## 2019-06-11 DIAGNOSIS — Z34.83 ENCOUNTER FOR SUPERVISION OF OTHER NORMAL PREGNANCY IN THIRD TRIMESTER: Primary | ICD-10-CM

## 2019-06-11 DIAGNOSIS — O41.03X0 OLIGOHYDRAMNIOS IN THIRD TRIMESTER, SINGLE OR UNSPECIFIED FETUS: ICD-10-CM

## 2019-06-11 DIAGNOSIS — O36.63X0 EXCESSIVE FETAL GROWTH AFFECTING MANAGEMENT OF PREGNANCY IN THIRD TRIMESTER, SINGLE OR UNSPECIFIED FETUS: ICD-10-CM

## 2019-06-11 DIAGNOSIS — Z3A.36 36 WEEKS GESTATION OF PREGNANCY: ICD-10-CM

## 2019-06-11 DIAGNOSIS — O34.219 DESIRES VBAC (VAGINAL BIRTH AFTER CESAREAN) TRIAL: ICD-10-CM

## 2019-06-11 DIAGNOSIS — O09.523 AMA (ADVANCED MATERNAL AGE) MULTIGRAVIDA 35+, THIRD TRIMESTER: ICD-10-CM

## 2019-06-11 LAB
GLUCOSE URINE, POC: NEGATIVE
PROTEIN, POC: NEGATIVE

## 2019-06-11 PROCEDURE — 0502F SUBSEQUENT PRENATAL CARE: CPT | Performed by: OBSTETRICS & GYNECOLOGY

## 2019-06-11 PROCEDURE — 59025 FETAL NON-STRESS TEST: CPT | Performed by: OBSTETRICS & GYNECOLOGY

## 2019-06-11 PROCEDURE — 81002 URINALYSIS NONAUTO W/O SCOPE: CPT | Performed by: OBSTETRICS & GYNECOLOGY

## 2019-06-11 NOTE — PRENATAL NOTE
Active FM. Incr ctx. No LOF or VB. Here w .    PTC u/s  EFW 7 lb 14 oz > 90%  GERHARD 8 cm. Vertex.    Discussed low nl GERHARD. Recc start weekly NSTs and weekly GERHARD checks.    Discussed EFW and desire for . Recc repeat growth u/s 38-39 weeks    F/u 1 week

## 2019-06-13 ENCOUNTER — HOSPITAL ENCOUNTER (OUTPATIENT)
Dept: PERINATAL CARE | Facility: HOSPITAL | Age: 39
Discharge: HOME | End: 2019-06-13
Attending: OBSTETRICS & GYNECOLOGY
Payer: COMMERCIAL

## 2019-06-13 DIAGNOSIS — Z3A.37 37 WEEKS GESTATION OF PREGNANCY: ICD-10-CM

## 2019-06-13 DIAGNOSIS — O09.523 AMA (ADVANCED MATERNAL AGE) MULTIGRAVIDA 35+, THIRD TRIMESTER: ICD-10-CM

## 2019-06-13 DIAGNOSIS — O41.03X0 OLIGOHYDRAMNIOS IN THIRD TRIMESTER, SINGLE OR UNSPECIFIED FETUS: ICD-10-CM

## 2019-06-13 PROCEDURE — 76815 OB US LIMITED FETUS(S): CPT

## 2019-06-18 ENCOUNTER — OFFICE VISIT (OUTPATIENT)
Dept: OBSTETRICS AND GYNECOLOGY | Facility: CLINIC | Age: 39
End: 2019-06-18
Payer: COMMERCIAL

## 2019-06-18 VITALS — BODY MASS INDEX: 33.8 KG/M2 | WEIGHT: 215.8 LBS | DIASTOLIC BLOOD PRESSURE: 62 MMHG | SYSTOLIC BLOOD PRESSURE: 108 MMHG

## 2019-06-18 DIAGNOSIS — Z34.83 ENCOUNTER FOR SUPERVISION OF OTHER NORMAL PREGNANCY IN THIRD TRIMESTER: Primary | ICD-10-CM

## 2019-06-18 LAB
GLUCOSE URINE, POC: NEGATIVE
LEUKOCYTE EST, POC: NORMAL
PROTEIN, POC: NORMAL

## 2019-06-18 PROCEDURE — 0502F SUBSEQUENT PRENATAL CARE: CPT | Performed by: OBSTETRICS & GYNECOLOGY

## 2019-06-18 PROCEDURE — 81002 URINALYSIS NONAUTO W/O SCOPE: CPT | Performed by: OBSTETRICS & GYNECOLOGY

## 2019-06-18 NOTE — PRENATAL NOTE
"GERHARD was 10 on . Has next growth US on Monday    We reviewed weights of previous children, and 3rd child was 9'0.\" Unless next growth US shows significant jump in EFW it's reasonable to still consider TOLAC, especially since pt has had successful  with large babies    Active FM  Labor S/Sx's  Repeat Plt Monday  RTO 1 wk  "

## 2019-06-24 ENCOUNTER — HOSPITAL ENCOUNTER (OUTPATIENT)
Dept: PERINATAL CARE | Facility: HOSPITAL | Age: 39
Discharge: HOME | End: 2019-06-24
Attending: OBSTETRICS & GYNECOLOGY
Payer: COMMERCIAL

## 2019-06-24 DIAGNOSIS — O09.523 AMA (ADVANCED MATERNAL AGE) MULTIGRAVIDA 35+, THIRD TRIMESTER: ICD-10-CM

## 2019-06-24 DIAGNOSIS — Z3A.38 38 WEEKS GESTATION OF PREGNANCY: ICD-10-CM

## 2019-06-24 DIAGNOSIS — O36.63X0 EXCESSIVE FETAL GROWTH AFFECTING MANAGEMENT OF PREGNANCY IN THIRD TRIMESTER, SINGLE OR UNSPECIFIED FETUS: ICD-10-CM

## 2019-06-24 DIAGNOSIS — O34.219 DESIRES VBAC (VAGINAL BIRTH AFTER CESAREAN) TRIAL: ICD-10-CM

## 2019-06-24 DIAGNOSIS — O26.843 SIZE OF FETUS INCONSISTENT WITH DATES IN THIRD TRIMESTER: ICD-10-CM

## 2019-06-24 PROCEDURE — 76816 OB US FOLLOW-UP PER FETUS: CPT

## 2019-06-25 ENCOUNTER — APPOINTMENT (OUTPATIENT)
Dept: LAB | Facility: HOSPITAL | Age: 39
End: 2019-06-25
Attending: OBSTETRICS & GYNECOLOGY
Payer: COMMERCIAL

## 2019-06-25 DIAGNOSIS — D69.6 THROMBOCYTOPENIA AFFECTING PREGNANCY (CMS/HCC): ICD-10-CM

## 2019-06-25 DIAGNOSIS — Z34.83 ENCOUNTER FOR SUPERVISION OF OTHER NORMAL PREGNANCY IN THIRD TRIMESTER: ICD-10-CM

## 2019-06-25 DIAGNOSIS — O99.119 THROMBOCYTOPENIA AFFECTING PREGNANCY (CMS/HCC): ICD-10-CM

## 2019-06-25 LAB
ERYTHROCYTE [DISTWIDTH] IN BLOOD BY AUTOMATED COUNT: 14.8 % (ref 11.7–14.4)
HCT VFR BLDCO AUTO: 34 %
HGB BLD-MCNC: 11 G/DL
MCH RBC QN AUTO: 27.1 PG (ref 28–33.2)
MCHC RBC AUTO-ENTMCNC: 32.4 G/DL (ref 32.2–35.5)
MCV RBC AUTO: 83.7 FL (ref 83–98)
PDW BLD AUTO: 12.3 FL (ref 9.4–12.3)
PLATELET # BLD AUTO: 162 K/UL
RBC # BLD AUTO: 4.06 M/UL (ref 3.93–5.22)
WBC # BLD AUTO: 7.37 K/UL

## 2019-06-25 PROCEDURE — 36415 COLL VENOUS BLD VENIPUNCTURE: CPT

## 2019-06-25 PROCEDURE — 85027 COMPLETE CBC AUTOMATED: CPT

## 2019-06-26 ENCOUNTER — OFFICE VISIT (OUTPATIENT)
Dept: OBSTETRICS AND GYNECOLOGY | Facility: CLINIC | Age: 39
End: 2019-06-26
Payer: COMMERCIAL

## 2019-06-26 VITALS — WEIGHT: 218.2 LBS | DIASTOLIC BLOOD PRESSURE: 60 MMHG | BODY MASS INDEX: 34.17 KG/M2 | SYSTOLIC BLOOD PRESSURE: 120 MMHG

## 2019-06-26 DIAGNOSIS — Z34.83 ENCOUNTER FOR SUPERVISION OF OTHER NORMAL PREGNANCY IN THIRD TRIMESTER: Primary | ICD-10-CM

## 2019-06-26 LAB
GLUCOSE URINE, POC: NEGATIVE
LEUKOCYTE EST, POC: NORMAL
PROTEIN, POC: NORMAL

## 2019-06-26 PROCEDURE — 81002 URINALYSIS NONAUTO W/O SCOPE: CPT | Performed by: OBSTETRICS & GYNECOLOGY

## 2019-06-26 PROCEDURE — 0502F SUBSEQUENT PRENATAL CARE: CPT | Performed by: OBSTETRICS & GYNECOLOGY

## 2019-06-26 NOTE — PRENATAL NOTE
"Active FM    Labor S/Sx's rev    PTC US : EFW 8'15\", HC/AC WNL. GERHARD 8.74. The current EFW is in line with size of previous children when she had successful .    Plt have increased to 162K. Plan to check with routine CBC when in labor.    Discussed IOL if doesn't go into labor before scheduled back-up     RTO 1 wk  "

## 2019-07-03 ENCOUNTER — OFFICE VISIT (OUTPATIENT)
Dept: OBSTETRICS AND GYNECOLOGY | Facility: CLINIC | Age: 39
End: 2019-07-03
Payer: COMMERCIAL

## 2019-07-03 VITALS — SYSTOLIC BLOOD PRESSURE: 120 MMHG | BODY MASS INDEX: 34.27 KG/M2 | WEIGHT: 218.8 LBS | DIASTOLIC BLOOD PRESSURE: 68 MMHG

## 2019-07-03 DIAGNOSIS — Z34.83 ENCOUNTER FOR SUPERVISION OF OTHER NORMAL PREGNANCY IN THIRD TRIMESTER: Primary | ICD-10-CM

## 2019-07-03 PROBLEM — I49.3 VENTRICULAR PREMATURE BEATS: Status: ACTIVE | Noted: 2018-11-20

## 2019-07-03 LAB
GLUCOSE URINE, POC: NEGATIVE
PROTEIN, POC: NEGATIVE

## 2019-07-03 PROCEDURE — 0502F SUBSEQUENT PRENATAL CARE: CPT | Performed by: OBSTETRICS & GYNECOLOGY

## 2019-07-03 PROCEDURE — 81002 URINALYSIS NONAUTO W/O SCOPE: CPT | Performed by: OBSTETRICS & GYNECOLOGY

## 2019-07-03 RX ORDER — TETANUS TOXOID, REDUCED DIPHTHERIA TOXOID AND ACELLULAR PERTUSSIS VACCINE, ADSORBED 5; 2.5; 8; 8; 2.5 [IU]/.5ML; [IU]/.5ML; UG/.5ML; UG/.5ML; UG/.5ML
1 SUSPENSION INTRAMUSCULAR ONCE
Refills: 0 | COMMUNITY
Start: 2019-06-12 | End: 2019-07-11 | Stop reason: HOSPADM

## 2019-07-03 NOTE — PRENATAL NOTE
Having more CTX  Active FM  Plan on IOL 7/10/19 with oxytocin and AROM if undelivered, pt agreeable  RTO next Monday for PNV/NST

## 2019-07-06 ENCOUNTER — HOSPITAL ENCOUNTER (OUTPATIENT)
Facility: HOSPITAL | Age: 39
Setting detail: SURGERY ADMIT
Discharge: HOME | End: 2019-07-06
Attending: OBSTETRICS & GYNECOLOGY | Admitting: OBSTETRICS & GYNECOLOGY
Payer: COMMERCIAL

## 2019-07-06 VITALS
BODY MASS INDEX: 34.53 KG/M2 | RESPIRATION RATE: 18 BRPM | WEIGHT: 220 LBS | HEIGHT: 67 IN | DIASTOLIC BLOOD PRESSURE: 61 MMHG | SYSTOLIC BLOOD PRESSURE: 126 MMHG | HEART RATE: 84 BPM | TEMPERATURE: 98 F

## 2019-07-07 NOTE — H&P
HPI     Andressa RamosOlivia is a 39 y.o. female  at 40w2d with an estimated due date of 2019, by Last Menstrual Period c/w 9 wk US.who presents with LOF at 0740 and then 0840 for clear fluid. Patient reports minimal leakage throughout the day. She reports feeling contractions irregularly, q15min at most. Denies VB and is feeling FM.      Pregnancy is complicated by a history of 1'LTCS 2/2 failure to progress with 3 subsequent successful VBACs.   Pregnancy is also complicated by LGA, Palpitations (s/p cardiac ablation 2018- palpitations now just with zofran) and anxiety (no meds). Patient has been taking protonix 20mg qd for gerd in this pregnancy.     Last PTC U/S on  EFW: 4048g 8#15 oz,>90%. HC/AC 0.99. Last cervical exam was 3cm.     OB History:   Obstetric History       T4      L4     SAB0   TAB0   Ectopic0   Multiple0   Live Births4       # Outcome Date GA Lbr Radhames/2nd Weight Sex Delivery Anes PTL Lv   5 Current            4 Term 05/09/15 40w1d  3.742 kg (8 lb 4 oz) F       3 Term 13   4.082 kg (9 lb) M    EMILEE   2 Term 10/19/10   3.289 kg (7 lb 4 oz) F    EMILEE   1 Term 09   4.026 kg (8 lb 14 oz) F CS-LTranv         Complications: Failure to Progress in First Stage      Obstetric Comments   Twin pregnancy with vanishing twin at 12-14 weeks in .       Medical History:   Past Medical History:   Diagnosis Date   • Abnormal ECG     PVCs   • Arrhythmia 2018    PVCs, status post ablation, partially successful.   • History of fetal demise, not currently pregnant     Twin pregnancy, 1 fetal demise, 1 live birth   • Migraine headache    • Ovarian cyst    • Pericarditis 2018    After john ablation       Surgical History:   Past Surgical History:   Procedure Laterality Date   • ABLATION OF DYSRHYTHMIC FOCUS  2018   •  SECTION         Social History:   Social History     Social History   • Marital status:      Spouse name: N/A   • Number of  children: N/A   • Years of education: N/A     Occupational History   • Homemaker      Social History Main Topics   • Smoking status: Never Smoker   • Smokeless tobacco: Never Used   • Alcohol use No      Comment: occasionally   • Drug use: No   • Sexual activity: Yes      Comment:  with 4 children     Other Topics Concern   • None     Social History Narrative    Exercises 2-3 times per week    Sleep sometimes interrupted    Eating plan: Patient attempts low glycemic index eating    No history of domestic violence        Family History:   Family History   Problem Relation Age of Onset   • Hyperlipidemia Mother    • Hyperlipidemia Father    • Hypertension Father    • Prostate cancer Father 78   • Dementia Father    • Hyperlipidemia Maternal Grandmother    • Clotting disorder Maternal Grandfather         developed in 60's - required tansfusions/platelets       Allergies: No known allergies    Prior to Admission medications    Medication Sig Start Date End Date Taking? Authorizing Provider   BOOSTRIX TDAP 2.5-8-5 Lf-mcg-Lf/0.5mL injection Inject 1 vial into the shoulder, thigh, or buttocks once. 6/12/19   Zia Enriquez MD   docosahexanoic acid (PRENATAL DHA) 200 mg capsule Prenatal + DHA    Zia Enirquez MD   doxylamine-pyridoxine, vit B6, (BONJESTA) 20-20 mg tablet,IR,delayed rel,biphasic Take 1 tablet by mouth 2 (two) times a day. 1/3/19   Reymundo Lamar MD   ondansetron (ZOFRAN) 4 mg tablet TAKE 1 TABLET BY MOUTH EVERY 12 HOURS AS NEEDED FOR NAUSEA. INSUR MAX 18 TAB/21 DAYS 3/12/19   Zia Enriquez MD   pantoprazole (PROTONIX) 20 mg EC tablet Take 1 tablet (20 mg total) by mouth daily. 4/10/19 4/9/20  Raul Lim MD       Review of Systems  Pertinent items are noted in HPI.    Objective     Vital Signs for the last 24 hours:  Heart Rate:  [84] 84  BP: (126)/(61) 126/61    Latest cervical exam:  Cervical Dilation (cm): 3  Cervical Effacement: 50  Fetal Station:  -3  Method: sterile exam per physician (19 [id number])    Additional Tests:   Sterile Speculum Exam: yes  Pooling: no  Nitrazine: no  Ferning: no  Bleeding: none  Vaginal Discharge: clear white   Cervical Abnormailites: none     Fetal Monitoring:  FHR Baseline: 140s  FHR Variability: mod  FHR Accelerations: present  FHR Decelerations: absent     Contraction Frequency: irregular     Ultrasounds:   I have reviewed the applicable Ultrasounds.    Bedside Ultrasounds:   cephalic  GERHARD 7.97    Labs:  B+, Rubella immune    Assessment/Plan     Andressa RamosOlivia is a 39 y.o. female  at 40w2d admitted for rule out SROM.     FHR: Reactive  GBS: negative     Rule out SROM: Patient with sterile speculum exam without pooling of fluids on valsalva. Nitrazine was negative and no ferning was present on wet mount. Patients GERHARD today was 7.97, consistent with US  GERHARD of 8.74. Will discharge patient with ROM precautions. Patient has plan for IOL on 7/10.     Rachel Jenkins MD

## 2019-07-08 ENCOUNTER — OFFICE VISIT (OUTPATIENT)
Dept: OBSTETRICS AND GYNECOLOGY | Facility: CLINIC | Age: 39
End: 2019-07-08
Payer: COMMERCIAL

## 2019-07-08 VITALS — SYSTOLIC BLOOD PRESSURE: 108 MMHG | BODY MASS INDEX: 33.99 KG/M2 | DIASTOLIC BLOOD PRESSURE: 62 MMHG | WEIGHT: 217 LBS

## 2019-07-08 DIAGNOSIS — Z34.83 ENCOUNTER FOR SUPERVISION OF OTHER NORMAL PREGNANCY IN THIRD TRIMESTER: Primary | ICD-10-CM

## 2019-07-08 DIAGNOSIS — O48.0 POST-TERM PREGNANCY, 40-42 WEEKS OF GESTATION: ICD-10-CM

## 2019-07-08 LAB
GLUCOSE URINE, POC: NEGATIVE
PROTEIN, POC: NEGATIVE

## 2019-07-08 PROCEDURE — 81002 URINALYSIS NONAUTO W/O SCOPE: CPT | Performed by: OBSTETRICS & GYNECOLOGY

## 2019-07-08 PROCEDURE — 59025 FETAL NON-STRESS TEST: CPT | Performed by: OBSTETRICS & GYNECOLOGY

## 2019-07-08 PROCEDURE — 0502F SUBSEQUENT PRENATAL CARE: CPT | Performed by: OBSTETRICS & GYNECOLOGY

## 2019-07-08 NOTE — PRENATAL NOTE
- Feels well.  - NST today reactive: baseline 135bpm, moderate variability, +accels, no decels, toco quiet.  - IOL rescheduled 7/9 at 5:30am.

## 2019-07-09 ENCOUNTER — ANESTHESIA (INPATIENT)
Dept: OBSTETRICS AND GYNECOLOGY | Facility: HOSPITAL | Age: 39
End: 2019-07-09
Payer: COMMERCIAL

## 2019-07-09 ENCOUNTER — ANESTHESIA EVENT (INPATIENT)
Dept: OBSTETRICS AND GYNECOLOGY | Facility: HOSPITAL | Age: 39
End: 2019-07-09
Payer: COMMERCIAL

## 2019-07-09 ENCOUNTER — HOSPITAL ENCOUNTER (INPATIENT)
Facility: HOSPITAL | Age: 39
LOS: 2 days | Discharge: HOME | End: 2019-07-11
Attending: OBSTETRICS & GYNECOLOGY | Admitting: OBSTETRICS & GYNECOLOGY
Payer: COMMERCIAL

## 2019-07-09 DIAGNOSIS — I49.3 PVC (PREMATURE VENTRICULAR CONTRACTION): Primary | ICD-10-CM

## 2019-07-09 PROBLEM — I07.1 MILD TRICUSPID REGURGITATION BY PRIOR ECHOCARDIOGRAM: Status: ACTIVE | Noted: 2019-07-09

## 2019-07-09 LAB
ABO + RH BLD: NORMAL
ATRIAL RATE: 94
BLD GP AB SCN SERPL QL: NEGATIVE
D AG BLD QL: POSITIVE
ERYTHROCYTE [DISTWIDTH] IN BLOOD BY AUTOMATED COUNT: 14.9 % (ref 11.7–14.4)
HCT VFR BLDCO AUTO: 36.6 %
HGB BLD-MCNC: 11.5 G/DL
LABORATORY COMMENT REPORT: NORMAL
MCH RBC QN AUTO: 26.3 PG (ref 28–33.2)
MCHC RBC AUTO-ENTMCNC: 31.4 G/DL (ref 32.2–35.5)
MCV RBC AUTO: 83.8 FL (ref 83–98)
P AXIS: 12
PDW BLD AUTO: 12.1 FL (ref 9.4–12.3)
PLATELET # BLD AUTO: 156 K/UL
PR INTERVAL: 152
QRS DURATION: 84
QT INTERVAL: 382
QTC CALCULATION(BAZETT): 477
R AXIS: 18
RBC # BLD AUTO: 4.37 M/UL (ref 3.93–5.22)
T PALLIDUM AB SER QL IF: NONREACTIVE
T WAVE AXIS: 16
VENTRICULAR RATE: 94
WBC # BLD AUTO: 7.76 K/UL

## 2019-07-09 PROCEDURE — 63600000 HC DRUGS/DETAIL CODE

## 2019-07-09 PROCEDURE — 37000005 HC ANESTHESIA EPIDURAL/SPINAL

## 2019-07-09 PROCEDURE — 12000000 HC ROOM AND CARE MED/SURG

## 2019-07-09 PROCEDURE — 3E033VJ INTRODUCTION OF OTHER HORMONE INTO PERIPHERAL VEIN, PERCUTANEOUS APPROACH: ICD-10-PCS | Performed by: OBSTETRICS & GYNECOLOGY

## 2019-07-09 PROCEDURE — 86901 BLOOD TYPING SEROLOGIC RH(D): CPT

## 2019-07-09 PROCEDURE — 25000000 HC PHARMACY GENERAL: Performed by: ANESTHESIOLOGY

## 2019-07-09 PROCEDURE — 85027 COMPLETE CBC AUTOMATED: CPT | Performed by: STUDENT IN AN ORGANIZED HEALTH CARE EDUCATION/TRAINING PROGRAM

## 2019-07-09 PROCEDURE — 93010 ELECTROCARDIOGRAM REPORT: CPT | Performed by: INTERNAL MEDICINE

## 2019-07-09 PROCEDURE — 63700000 HC SELF-ADMINISTRABLE DRUG: Performed by: STUDENT IN AN ORGANIZED HEALTH CARE EDUCATION/TRAINING PROGRAM

## 2019-07-09 PROCEDURE — 72000012 HC VAGINAL DELIVERY LEVEL 2

## 2019-07-09 PROCEDURE — 63600000 HC DRUGS/DETAIL CODE: Performed by: STUDENT IN AN ORGANIZED HEALTH CARE EDUCATION/TRAINING PROGRAM

## 2019-07-09 PROCEDURE — 0HQ9XZZ REPAIR PERINEUM SKIN, EXTERNAL APPROACH: ICD-10-PCS | Performed by: OBSTETRICS & GYNECOLOGY

## 2019-07-09 PROCEDURE — 63600000 HC DRUGS/DETAIL CODE: Performed by: ANESTHESIOLOGY

## 2019-07-09 PROCEDURE — 86780 TREPONEMA PALLIDUM: CPT | Performed by: STUDENT IN AN ORGANIZED HEALTH CARE EDUCATION/TRAINING PROGRAM

## 2019-07-09 PROCEDURE — 25000000 HC PHARMACY GENERAL: Performed by: STUDENT IN AN ORGANIZED HEALTH CARE EDUCATION/TRAINING PROGRAM

## 2019-07-09 PROCEDURE — 36415 COLL VENOUS BLD VENIPUNCTURE: CPT | Performed by: STUDENT IN AN ORGANIZED HEALTH CARE EDUCATION/TRAINING PROGRAM

## 2019-07-09 PROCEDURE — 99254 IP/OBS CNSLTJ NEW/EST MOD 60: CPT | Performed by: INTERNAL MEDICINE

## 2019-07-09 PROCEDURE — 10D17Z9 MANUAL EXTRACTION OF PRODUCTS OF CONCEPTION, RETAINED, VIA NATURAL OR ARTIFICIAL OPENING: ICD-10-PCS | Performed by: OBSTETRICS & GYNECOLOGY

## 2019-07-09 PROCEDURE — 93005 ELECTROCARDIOGRAM TRACING: CPT | Performed by: STUDENT IN AN ORGANIZED HEALTH CARE EDUCATION/TRAINING PROGRAM

## 2019-07-09 RX ORDER — OXYTOCIN/0.9 % SODIUM CHLORIDE 30/500 ML
0-10 PLASTIC BAG, INJECTION (ML) INTRAVENOUS CONTINUOUS
Status: DISCONTINUED | OUTPATIENT
Start: 2019-07-09 | End: 2019-07-09

## 2019-07-09 RX ORDER — AMOXICILLIN 250 MG
1 CAPSULE ORAL 2 TIMES DAILY
Status: DISCONTINUED | OUTPATIENT
Start: 2019-07-09 | End: 2019-07-11 | Stop reason: HOSPADM

## 2019-07-09 RX ORDER — OXYTOCIN/0.9 % SODIUM CHLORIDE 20/1000 ML
125 PLASTIC BAG, INJECTION (ML) INTRAVENOUS ONCE AS NEEDED
Status: COMPLETED | OUTPATIENT
Start: 2019-07-09 | End: 2019-07-09

## 2019-07-09 RX ORDER — BISACODYL 10 MG/1
10 SUPPOSITORY RECTAL DAILY PRN
Status: DISCONTINUED | OUTPATIENT
Start: 2019-07-09 | End: 2019-07-11 | Stop reason: HOSPADM

## 2019-07-09 RX ORDER — ALUMINUM HYDROXIDE, MAGNESIUM HYDROXIDE, AND SIMETHICONE 1200; 120; 1200 MG/30ML; MG/30ML; MG/30ML
30 SUSPENSION ORAL EVERY 4 HOURS PRN
Status: DISCONTINUED | OUTPATIENT
Start: 2019-07-09 | End: 2019-07-11 | Stop reason: HOSPADM

## 2019-07-09 RX ORDER — ONDANSETRON HYDROCHLORIDE 2 MG/ML
4 INJECTION, SOLUTION INTRAVENOUS EVERY 8 HOURS PRN
Status: DISCONTINUED | OUTPATIENT
Start: 2019-07-09 | End: 2019-07-11 | Stop reason: HOSPADM

## 2019-07-09 RX ORDER — ONDANSETRON 4 MG/1
4 TABLET, ORALLY DISINTEGRATING ORAL EVERY 8 HOURS PRN
Status: DISCONTINUED | OUTPATIENT
Start: 2019-07-09 | End: 2019-07-11 | Stop reason: HOSPADM

## 2019-07-09 RX ORDER — SODIUM CHLORIDE, SODIUM LACTATE, POTASSIUM CHLORIDE, CALCIUM CHLORIDE 600; 310; 30; 20 MG/100ML; MG/100ML; MG/100ML; MG/100ML
125 INJECTION, SOLUTION INTRAVENOUS CONTINUOUS
Status: DISCONTINUED | OUTPATIENT
Start: 2019-07-09 | End: 2019-07-09

## 2019-07-09 RX ORDER — DIBUCAINE 1 %
1 OINTMENT (GRAM) TOPICAL AS NEEDED
Status: DISCONTINUED | OUTPATIENT
Start: 2019-07-09 | End: 2019-07-11 | Stop reason: HOSPADM

## 2019-07-09 RX ORDER — DIPHENHYDRAMINE HCL 25 MG
25 CAPSULE ORAL EVERY 6 HOURS PRN
Status: DISCONTINUED | OUTPATIENT
Start: 2019-07-09 | End: 2019-07-11 | Stop reason: HOSPADM

## 2019-07-09 RX ORDER — FENTANYL CITRATE 50 UG/ML
INJECTION, SOLUTION INTRAMUSCULAR; INTRAVENOUS AS NEEDED
Status: DISCONTINUED | OUTPATIENT
Start: 2019-07-09 | End: 2019-07-09 | Stop reason: SURG

## 2019-07-09 RX ORDER — CALCIUM CARBONATE 200(500)MG
500 TABLET,CHEWABLE ORAL EVERY 4 HOURS PRN
Status: DISCONTINUED | OUTPATIENT
Start: 2019-07-09 | End: 2019-07-11 | Stop reason: HOSPADM

## 2019-07-09 RX ORDER — IBUPROFEN 600 MG/1
600 TABLET ORAL EVERY 6 HOURS PRN
Status: DISCONTINUED | OUTPATIENT
Start: 2019-07-09 | End: 2019-07-11 | Stop reason: HOSPADM

## 2019-07-09 RX ORDER — OXYCODONE HYDROCHLORIDE 5 MG/1
5-10 TABLET ORAL EVERY 4 HOURS PRN
Status: DISCONTINUED | OUTPATIENT
Start: 2019-07-09 | End: 2019-07-11 | Stop reason: HOSPADM

## 2019-07-09 RX ORDER — LIDOCAINE HCL/EPINEPHRINE/PF 2%-1:200K
VIAL (ML) INJECTION AS NEEDED
Status: DISCONTINUED | OUTPATIENT
Start: 2019-07-09 | End: 2019-07-09 | Stop reason: SURG

## 2019-07-09 RX ORDER — DIPHENHYDRAMINE HCL 50 MG/ML
25 VIAL (ML) INJECTION EVERY 6 HOURS PRN
Status: DISCONTINUED | OUTPATIENT
Start: 2019-07-09 | End: 2019-07-11 | Stop reason: HOSPADM

## 2019-07-09 RX ORDER — ACETAMINOPHEN 325 MG/1
650 TABLET ORAL EVERY 4 HOURS PRN
Status: DISCONTINUED | OUTPATIENT
Start: 2019-07-09 | End: 2019-07-11 | Stop reason: HOSPADM

## 2019-07-09 RX ORDER — FERROUS SULFATE 325(65) MG
325 TABLET ORAL 2 TIMES DAILY WITH MEALS
Status: DISCONTINUED | OUTPATIENT
Start: 2019-07-09 | End: 2019-07-11 | Stop reason: HOSPADM

## 2019-07-09 RX ADMIN — IBUPROFEN 600 MG: 600 TABLET ORAL at 15:34

## 2019-07-09 RX ADMIN — BENZOCAINE AND LEVOMENTHOL: 200; 5 SPRAY TOPICAL at 15:34

## 2019-07-09 RX ADMIN — ACETAMINOPHEN 650 MG: 325 TABLET, FILM COATED ORAL at 20:02

## 2019-07-09 RX ADMIN — FENTANYL CITRATE 100 MCG: 50 INJECTION, SOLUTION INTRAMUSCULAR; INTRAVENOUS at 10:02

## 2019-07-09 RX ADMIN — Medication 125 ML/HR: at 12:57

## 2019-07-09 RX ADMIN — SODIUM CHLORIDE, POTASSIUM CHLORIDE, SODIUM LACTATE AND CALCIUM CHLORIDE 125 ML/HR: 600; 310; 30; 20 INJECTION, SOLUTION INTRAVENOUS at 08:30

## 2019-07-09 RX ADMIN — Medication 50 ML: at 10:29

## 2019-07-09 RX ADMIN — LIDOCAINE HYDROCHLORIDE,EPINEPHRINE BITARTRATE 5 ML: 20; .005 INJECTION, SOLUTION EPIDURAL; INFILTRATION; INTRACAUDAL; PERINEURAL at 10:01

## 2019-07-09 RX ADMIN — SODIUM CHLORIDE, POTASSIUM CHLORIDE, SODIUM LACTATE AND CALCIUM CHLORIDE 125 ML/HR: 600; 310; 30; 20 INJECTION, SOLUTION INTRAVENOUS at 06:42

## 2019-07-09 RX ADMIN — Medication 2 MILLI-UNITS/MIN: at 06:43

## 2019-07-09 RX ADMIN — DIBUCAINE 1 APPLICATION.: 1 OINTMENT TOPICAL at 15:34

## 2019-07-09 RX ADMIN — SENNOSIDES AND DOCUSATE SODIUM 1 TABLET: 8.6; 5 TABLET ORAL at 20:02

## 2019-07-09 RX ADMIN — FERROUS SULFATE TAB 325 MG (65 MG ELEMENTAL FE) 325 MG: 325 (65 FE) TAB at 18:49

## 2019-07-09 ASSESSMENT — ENCOUNTER SYMPTOMS
HEADACHES: 1
DYSRHYTHMIAS: 1

## 2019-07-09 NOTE — NURSING NOTE
0.2 mg of IM Methergine administered in pts right thigh at 1219 by ROBERT Peña RN. Pt educated on medication and verbalizes understanding. Pt denies allergies to medication.

## 2019-07-09 NOTE — PLAN OF CARE
Problem: Patient Care Overview  Goal: Plan of Care Review  Outcome: Ongoing (interventions implemented as appropriate)    Goal: Individualization & Mutuality  Outcome: Ongoing (interventions implemented as appropriate)    Goal: Discharge Needs Assessment  Outcome: Ongoing (interventions implemented as appropriate)      Problem: Labor (Cervical Ripen, Induct, Augment) (Adult,Obstetrics,Pediatric)  Goal: Signs and Symptoms of Listed Potential Problems Will be Absent, Minimized or Managed (Labor)  Outcome: Ongoing (interventions implemented as appropriate)

## 2019-07-09 NOTE — NURSING NOTE
1005 Dr. Gómez notified that pt HR 40s. Dash monitor applied. Pt with bigeminy/trigeminy. Pt denies symptoms of same. Pt with hx of ablation 3/18.  Dr. Cruz notified of same at 1030. Pt to have cardiology consult. Cardiac monitoring remains in place.

## 2019-07-09 NOTE — ASSESSMENT & PLAN NOTE
Murmur noted on exam; chronic per patient, appears unchanged  Review of records shows TTE in 2017 with mild tricuspid regurg, otherwise normal

## 2019-07-09 NOTE — PROGRESS NOTES
Pt comfortable w epidural. Feels well.    PVCs noted by anesthesia. H/o cardiac ablation last year; followed by cardiology; Dr. Jane.    AVSS    FHR cat 1    Ctx q 3-4 min on 8 mIU of pitocin    Cvx 5/80/-2/V    Cont current management    Cardiology consult for PVCs

## 2019-07-09 NOTE — PROGRESS NOTES
Pt w mild cramping on pit 6.    AVSS    FHR cat 1    Ctx irreg    Cvx 3/80/-2/V    AROM- clear fluid      GBS neg  Hgb 11.5  plts 156k    A/P: 39 y.o.  at 40w5d admitted with IOL 2/2 suspected macrosomia, desires TOLAC; s/p successful  x 3      -EFW () 8 lb 15 oz    -pt and  previously counseled on  risks    -cont low dose pitocin    -epidural prn

## 2019-07-09 NOTE — ASSESSMENT & PLAN NOTE
Known history of PVCs in the past; s/p catheter ablation in 10/18. Mapped to distal/septal portion of RVOT  Appear to have exacerbated during labor with significant symptoms; however improved following delivery  Current EKG reviewed; focus exhibits LBBB morphology with inferior axis consistent with RVOT source as similarly localized in ablation procedure  Continue telemetry and monitor for recurrence of symptoms  At this time, no indication for further medical management.  Educated patient on need for follow-up following hospitalization  If symptoms return or worsen, patient may benefit from repeat ablation in the future. Did not tolerate medical management well in the past.

## 2019-07-09 NOTE — PROGRESS NOTES
Labor and Delivery Progress Note    Subjective       Patient feeling suprapubic pressure, denies incisional pain.     Objective     Vital Signs for the last 24 hours:  Temp:  [36.4 °C (97.5 °F)-37.1 °C (98.7 °F)] 37.1 °C (98.7 °F)  Heart Rate:  [] 69  Resp:  [16-18] 18  BP: ()/(51-79) 110/54     Fetal Monitoring:  FHR Baseline: 135  FHR Variability: moderate  FHR Accelerations: present  FHR Decelerations: absent      Contraction Frequency: 2--3 min    Latest cervical exam:  Cervical Dilation (cm): 10  Cervical Effacement: 100  Fetal Station: -1  Method: sterile exam per physician (19 1142)           Current Facility-Administered Medications:   •  fentaNYL-ropivacaine-NaCl (PF) 2-0.15 mcg/mL-% PCEA syringe, , epidural, Continuous, Abel Gómez MD, Last Rate: 10 mL/hr at 19 1029, 50 mL at 19 1029  •  lactated ringer's bolus 1,000 mL, 1,000 mL, intravenous, Once, Last Rate: 4,000 mL/hr at 19 0922, 1,000 mL at 19 0922 **FOLLOWED BY** lactated ringer's infusion, 125 mL/hr, intravenous, Continuous, Carmelita Wright MD, Last Rate: 125 mL/hr at 19 0830, 125 mL/hr at 19 0830  •  lactated ringer's infusion, 125 mL/hr, intravenous, Continuous, Stopped at 19 0741 **AND** oxytocin in 0.9 % NSS (PITOCIN) 30 unit/500 mL infusion, 0-10 jennie-units/min, intravenous, Continuous, Carmelita Wright MD, Last Rate: 8 mL/hr at 19 1100, 8 jennie-units/min at 19 1100    Facility-Administered Medications Ordered in Other Encounters:   •  fentaNYL (PF) (SUBLIMAZE) injection, , epidural, PRN, Abel Gómez MD, 100 mcg at 19 1002  •  lidocaine-epinephrine (PF) (XYLOCAINE W/EPI) 2 %-1:200,000 injection, , , PRN, Abel Gómez MD, 5 mL at 19 1001    Assessment/Plan     Andressa LEE Olivia is a 39 y.o. female  at 40w5d admitted with induction of labor, TOLAC, suspected macrosomia     FHR: Category I  GBS: negative   TOLAC: Now complete, given high station will  plan to sit in high fowlers and labor down. Continue pitocin for active management, currently at 8. Bladder emptied prior to cervical exam.     D/w Dr. Cruz.     Viry Pulido MD

## 2019-07-09 NOTE — CONSULTS
Electrophysiology Consult Note      Reason for consult: PVCs during delivery  Referred by: Reymundo Lamar *      SUBJECTIVE    Andressa Baker is a 39 y.o. female who was admitted for delivery. EP is consulted for exacerbation of symptomatic PVCs during delivery.  Patient states that her PVCs had been appropriately controlled after ablation last year, and she had not required medical therapy. Although PVCs became more frequent and symptomatic at the onset of pregnancy, they improved as the pregnancy progressed and she has not felt symptoms attributable to them lately prior today's events.  During labor today, patient felt lightheaded and dyspneic. She denies feeling palpitations, but indicates that her heart feels unusually slow. She reports that following delivery, the frequency of these events decreased significantly, and when seen at bedside, patient indicated that she had become asymptomatic despite continued occurrence of PVCs on telemetry.      Allergies: No known allergies    Home medications  •  pantoprazole, Take 1 tablet (20 mg total) by mouth daily.  •  BOOSTRIX TDAP, Inject 1 vial into the shoulder, thigh, or buttocks once.  •  docosahexanoic acid, Prenatal + DHA  •  doxylamine-pyridoxine (vit B6), Take 1 tablet by mouth 2 (two) times a day.  •  ondansetron, TAKE 1 TABLET BY MOUTH EVERY 12 HOURS AS NEEDED FOR NAUSEA. INSUR MAX 18 TAB/21 DAYS    Inpatient medications  •  acetaminophen, 650 mg, oral, q4h PRN  •  calcium carbonate, 500 mg, oral, q4h PRN **AND** alum-mag hydroxide-simeth, 30 mL, oral, q4h PRN  •  benzocaine-menthol, , Topical, PRN  •  bisacodyl, 10 mg, rectal, Daily PRN  •  dibucaine, 1 application, Topical, PRN  •  diphenhydrAMINE, 25 mg, oral, q6h PRN **OR** diphenhydrAMINE, 25 mg, intravenous, q6h PRN  •  ferrous sulfate, 325 mg, oral, BID with meals  •  ibuprofen, 600 mg, oral, q6h PRN  •  measles, mumps and rubella, 0.5 mL, subcutaneous, During hospitalization  •   ondansetron ODT, 4 mg, oral, q8h PRN **OR** ondansetron, 4 mg, intravenous, q8h PRN  •  oxyCODONE, 5-10 mg, oral, q4h PRN  •  prenatal vit-iron fum-folic ac, 1 tablet, oral, Daily  •  sennosides-docusate sodium, 1 tablet, oral, BID  •  tetanus,diphth,pertus(acell), 0.5 mL, intramuscular, During hospitalization  •  witch hazel, , Topical, PRN    History  Medical History:   Past Medical History:   Diagnosis Date   • Abnormal ECG     PVCs   • Arrhythmia 2018    PVCs, status post ablation, partially successful.   • History of fetal demise, not currently pregnant     Twin pregnancy, 1 fetal demise, 1 live birth   • Migraine headache    • Ovarian cyst    • Pericarditis 2018    After john ablation       Surgical History:   Past Surgical History:   Procedure Laterality Date   • ABLATION OF DYSRHYTHMIC FOCUS  2018   •  SECTION         Social History:   Social History     Social History   • Marital status:      Spouse name: Shashank RamosOlivia   • Number of children: N/A   • Years of education: N/A     Occupational History   • Homemaker      Social History Main Topics   • Smoking status: Never Smoker   • Smokeless tobacco: Never Used   • Alcohol use No      Comment: occasionally   • Drug use: No   • Sexual activity: Yes      Comment:  with 4 children     Other Topics Concern   • None     Social History Narrative    Exercises 2-3 times per week    Sleep sometimes interrupted    Eating plan: Patient attempts low glycemic index eating    No history of domestic violence       Family History:   Family History   Problem Relation Age of Onset   • Hyperlipidemia Mother    • Hyperlipidemia Father    • Hypertension Father    • Prostate cancer Father 78   • Dementia Father    • Hyperlipidemia Maternal Grandmother    • Clotting disorder Maternal Grandfather         developed in 60's - required tansfusions/platelets         Review of Systems : Aside from HPI, ROS otherwise negative.      OBJECTIVE  BP  "(!) 126/59 (Patient Position: Sitting)   Pulse 99   Temp 37.1 °C (98.7 °F) (Oral)   Resp 18   Ht 1.702 m (5' 7\")   Wt 98.4 kg (217 lb)   LMP 09/27/2018 (Exact Date) Comment: regular cycles  SpO2 100%   Breastfeeding? Yes   BMI 33.99 kg/m²     Physical Exam   Constitutional: Well-developed and well-nourished. No distress.   Neck: No JVD present.   Cardiovascular: Normal rate and regular rhythm with frequent PVCs. 2/6 systolic murmur.  Pulmonary/Chest: Good bilateral breath sounds, clear lung fields  Abdominal: Normal bowel sounds. Soft, non tender, no distension. No rebound or guarding.   Extremities: No LE edema bilaterally. 2+ pulses in jamel LEs   Neurological: Alert and oriented to person, place, and time.   Skin: Skin is warm and dry.   Psychiatric: Normal mood, affect and behavior.    Labs                Results from last 7 days  Lab Units 07/09/19  0621   WBC K/uL 7.76   HEMOGLOBIN g/dL 11.5*   HEMATOCRIT % 36.6   PLATELETS K/uL 156         Cardiology results  ECG : Sinus rhythm with PVCs  Telemetry : Sinus rhythm with PVCs          ASSESSMENT AND PLAN  39 y.o. female, Electrophysiology is now being consulted for    PVC (premature ventricular contraction)   Assessment & Plan    Known history of PVCs in the past; s/p catheter ablation in 10/18. Mapped to distal/septal portion of RVOT  Appear to have exacerbated during labor with significant symptoms; however improved following delivery  Current EKG reviewed; focus exhibits LBBB morphology with inferior axis consistent with RVOT source as similarly localized in ablation procedure  Continue telemetry and monitor for recurrence of symptoms  At this time, no indication for further medical management. Patient did not tolerate B-blocker therapy in the past. If symptoms return, may consider alternative therapy as necessary.     Mild tricuspid regurgitation by prior echocardiogram   Assessment & Plan    Murmur noted on exam; chronic per patient, appears " unchanged  Review of records shows TTE in 2017 with mild tricuspid regurg, otherwise normal         Thank you for your consultation. Will continue to follow    Patient seen and discussed with my attending physician, Dr. Jane, who agrees with the management plan.    Xiomara Gibson MD  7/9/2019  4:57 PM

## 2019-07-09 NOTE — H&P
HPI     Andressa LEE Olivia is a 39 y.o. female  at 40w5d with an estimated due date of 2019 consistent with 9 wk ultrasound, by Last Menstrual Period who presents with induction of labor secondary to suspected macrosomia and for TOLAC.    Denies any VB, LOF, CTX, and reports FM. Patient denies any complications this pregnancy including elevated blood pressures and blood sugars.    Patient was last checked yesterday and reports an exam of 3/80.    Last PO intake: 530    OB History:   Obstetric History       T4      L4     SAB0   TAB0   Ectopic0   Multiple0   Live Births4       # Outcome Date GA Lbr Radhames/2nd Weight Sex Delivery Anes PTL Lv   5 Current            4 Term 05/09/15 40w1d  3.742 kg (8 lb 4 oz) F       3 Term 13   4.082 kg (9 lb) M    EMILEE   2 Term 10/19/10   3.289 kg (7 lb 4 oz) F    EMILEE   1 Term 09   4.026 kg (8 lb 14 oz) F CS-LTranv         Complications: Failure to Progress in First Stage      Obstetric Comments   Twin pregnancy with vanishing twin at 12-14 weeks in .     GYN History: Denies history of abnormal pap smears and fibroids. Reports ovarian cyst that went away.    Medical History:   Past Medical History:   Diagnosis Date   • Abnormal ECG     PVCs   • Arrhythmia 2018    PVCs, status post ablation, partially successful.   • History of fetal demise, not currently pregnant     Twin pregnancy, 1 fetal demise, 1 live birth   • Migraine headache    • Ovarian cyst    • Pericarditis 2018    After john ablation       Surgical History:   Past Surgical History:   Procedure Laterality Date   • ABLATION OF DYSRHYTHMIC FOCUS  2018   •  SECTION         Social History:   Social History     Social History   • Marital status:      Spouse name: Shashank RamosOlivia   • Number of children: N/A   • Years of education: N/A     Occupational History   • Homemaker      Social History Main Topics   • Smoking status: Never Smoker   •  Smokeless tobacco: Never Used   • Alcohol use No      Comment: occasionally   • Drug use: No   • Sexual activity: Yes      Comment:  with 4 children     Other Topics Concern   • None     Social History Narrative    Exercises 2-3 times per week    Sleep sometimes interrupted    Eating plan: Patient attempts low glycemic index eating    No history of domestic violence        Family History:   Family History   Problem Relation Age of Onset   • Hyperlipidemia Mother    • Hyperlipidemia Father    • Hypertension Father    • Prostate cancer Father 78   • Dementia Father    • Hyperlipidemia Maternal Grandmother    • Clotting disorder Maternal Grandfather         developed in 60's - required tansfusions/platelets       Allergies: No known allergies    Prior to Admission medications    Medication Sig Start Date End Date Taking? Authorizing Provider   BOOSTRIX TDAP 2.5-8-5 Lf-mcg-Lf/0.5mL injection Inject 1 vial into the shoulder, thigh, or buttocks once. 6/12/19   Zia Enriquez MD   docosahexanoic acid (PRENATAL DHA) 200 mg capsule Prenatal + DHA    Zia Enriquez MD   doxylamine-pyridoxine, vit B6, (BONJESTA) 20-20 mg tablet,IR,delayed rel,biphasic Take 1 tablet by mouth 2 (two) times a day. 1/3/19   Reymundo Lamar MD   ondansetron (ZOFRAN) 4 mg tablet TAKE 1 TABLET BY MOUTH EVERY 12 HOURS AS NEEDED FOR NAUSEA. INSUR MAX 18 TAB/21 DAYS 3/12/19   Zia Enriquez MD   pantoprazole (PROTONIX) 20 mg EC tablet Take 1 tablet (20 mg total) by mouth daily. 4/10/19 4/9/20  Raul Lim MD       Objective     Vital Signs for the last 24 hours:  Temp:  [36.6 °C (97.8 °F)] 36.6 °C (97.8 °F)  Heart Rate:  [91] 91  Resp:  [16] 16  BP: (108-125)/(62-75) 125/75    Physical Exam:    General: No acute distress, comfortable  Cardiovascular: Regular rate and rhythm   Lungs: Clear to auscultation bilaterally  Abdomen: Soft, gravid, bowel sounds auscultated, RUQ non-tender to palpation  Extremities:  No swelling bilaterally    Latest cervical exam:  Cervical Dilation (cm): 3  Cervical Effacement: 70  Fetal Station: -3  Method: sterile exam per physician (19)    Fetal Monitoring:  FHR Baseline: 130  FHR Variability: Moderate  FHR Accelerations: Present  FHR Decelerations: Absent    Contraction Frequency: Quiet    Ultrasounds:   Cephalic    Labs:  B Positive  Rubella Immune  GBS Negative    Assessment/Plan     Andressa Baker is a 39 y.o. female  at 40w5d admitted for induction of labor 2/2 suspected macrosomia and TOLAC    1. FHR: Reactive  2. GBS: negative   3. Induction of Labor 2/2 suspected macrosomia and TOLAC: Tocometer is quiet. Will give patient Pitocin for induction. Will not exceed an amount of 10 because she is a . Recheck in two hours or as needed.    Discussed with Dr. Anthony Wright MD

## 2019-07-09 NOTE — PROGRESS NOTES
Labor and Delivery Progress Note    Subjective     Patient comfortable without epidural.    Objective     Fetal Monitoring:  FHR Baseline: 140s  FHR Variability: Moderate  FHR Accelerations: absent  FHR Decelerations: absent     Contraction Frequency: q4min    Latest cervical exam:  Cervical Dilation (cm): 3  Cervical Effacement: 70  Fetal Station: -3  Method: sterile exam per physician (19 0616)    Assessment/Plan     Andressa RamosOlivia is a 39 y.o.  at 40w5d admitted with IOL 2/2 suspected macrosomia,TOLAC.     1. FHR: Category I  2. GBS: negative  3. IOL: Patient ctx q4min with pitocin. Will continue to uptitrate as needed and check in 1 hour.     Plan per Dr. Anthony Jenkins MD

## 2019-07-09 NOTE — ANESTHESIA PROCEDURE NOTES
Epidural Block    Patient location during procedure: OB  Start time: 7/9/2019 9:41 AM  End time: 7/9/2019 10:17 AM  Reason for block: labor analgesia requested by patient and obstetrician  Staffing  Anesthesiologist: JENI CONNER  Performed: anesthesiologist   Preanesthetic Checklist  Completed: patient identified, surgical consent, pre-op evaluation, timeout performed, IV checked, risks and benefits discussed, monitors and equipment checked and sterile field maintained during procedure  Epidural  Patient position: sitting  Prep: ChloraPrep  Patient monitoring: cardiac monitor, continuous pulse ox and blood pressure  Approach: midline  Location: lumbar (1-5)  Injection technique: RONNI air  Needle  Needle type: Isaiah   Needle gauge: 17 G  Needle length: 3.5 in  Catheter type: Single orifice  Catheter size: 19 G  Test dose: negative  Assessment  Sensory level: T10

## 2019-07-09 NOTE — ANESTHESIA PREPROCEDURE EVALUATION
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

## 2019-07-09 NOTE — PLAN OF CARE
Problem: Patient Care Overview  Goal: Plan of Care Review  Outcome: Ongoing (interventions implemented as appropriate)   07/09/19 1505   Coping/Psychosocial   Plan Of Care Reviewed With patient   Plan of Care Review   Progress improving     Goal: Individualization & Mutuality  Outcome: Ongoing (interventions implemented as appropriate)    Goal: Discharge Needs Assessment  Outcome: Ongoing (interventions implemented as appropriate)   07/09/19 0617 07/09/19 0712   DC Needs Assessment   Concerns To Be Addressed --  no discharge needs identified   Readmission Within The Last 30 Days current reason for admission unrelated to previous admission --    Equipment Needed After Discharge none --    Current Health   Anticipated Changes Related to Illness none --        Problem: Breastfeeding (Adult,Obstetrics,Pediatric)  Goal: Signs and Symptoms of Listed Potential Problems Will be Absent, Minimized or Managed (Breastfeeding)  Outcome: Ongoing (interventions implemented as appropriate)   07/09/19 1505   Breastfeeding   Problems Assessed (Breastfeeding) all   Problems Present (Breastfeeding) none       Problem: Postpartum (Vaginal Delivery) (Adult,Obstetrics,Pediatric)  Goal: Signs and Symptoms of Listed Potential Problems Will be Absent, Minimized or Managed (Postpartum)  Outcome: Ongoing (interventions implemented as appropriate)   07/09/19 1505   Postpartum (Vaginal Delivery)   Problems Assessed (Postpartum Vaginal Delivery) all   Problems Present (Postpartum Vaginal Delivery) none

## 2019-07-09 NOTE — L&D DELIVERY NOTE
OB Vaginal Delivery Note    Delivery:2019 at 12:10 PM   Patient:Andressa Baker  :1980     Review the Delivery Report for details.     Delivery Details    Pre-Op Diagnosis: 1. 39 y.o.  intrauterine pregnancy at 40w5d with barbosa gestation.  2. TOLAC  3. GBS negative status  4. Small 1st degree perineal laceration  5. Manual removal of placenta   Post-Op Diagnosis: 1. Same as above and delivery viable male   2. Repair of 1st degree laceration   Delivery Clinician: Reymundo Lamar    Delivery Assist;Delivery Nurse;Delivery Nurse;Nursery Nurse;Nursery Nurse  Antonina Heaton;Renate Santana;Yajaira Peña;Luca Dewitt;Rosalba Kan    Delivery Type: Vaginal, Spontaneous Delivery    Labor Complications: None   EBL: 500  mL   Anesthesia Type: Epidural    Placenta Delivery Spontaneous    Placenta Disposition: discarded    Additional Specimens None      INFANT INFORMATION  Time of Birth:12:10 PM   Presentation: Vertex   Position:Left ,Occiput ,Anterior   Cord: 3 vessels ,Complications:None   [place] Sex: male   [place] Weight: 4.45 kg (9 lb 13 oz)      1 Minute 5 Minute 10 Minute   Apgar Totals: 8    9            Information for the patient's :  Darrel Baker  [[id number]]      Cord Gas     None          Delivery Details:    Andressa Baker is a 39 y.o.  at 40w5d gestation who presented to the hospital for IOL 2/2 suspected macrosomia, TOLAC. Patient was 3 cm dilated on exam and started on Pitocin for induction. The patient progressed to fully dilated and pushed for 3 minutes over 2 contractions.  A viable  male  infant was delivered by Vaginal, Spontaneous Delivery  from Left ,Occiput ,Anterior .  One loose nuchal cord was idenitfied and reduced with ease. The infants nose and mouth were bulb suctioned at the perineum. The anterior and posterior shoulders delivered without difficulty followed by the remainder of the infant. A spontaneous  cry was heard, and the infant appeared to be moving all 4 extremities. The cord was clamped and cut with 3 vessels  noted.  The perineum, vagina, cervix were inspected, and a small 1st degree perineal laceration was identified. The laceration was repaired with three interrupted stitches of 3-0 chromic. Excellent hemostasis was noted. The placenta was not readily delivering after about 20 minutes of gentle traction and fundal massage. A bimanual exam was performed to manually remove the placenta which was accomplished with ease and examined and found to be whole.  One dose of IM methergine was given for atony following delivery of the placenta. Fundal massage was performed and the fundus was found to be firm, and lochia was within normal limits.  At the completion of the case, sponge and needle counts were correct. The infant and patient were left in the delivery room in stable condition.     Attending Attestation: I was present and scrubbed for the entire procedure.    Reymundo Lamar MD

## 2019-07-10 LAB
ERYTHROCYTE [DISTWIDTH] IN BLOOD BY AUTOMATED COUNT: 14.7 % (ref 11.7–14.4)
HCT VFR BLDCO AUTO: 28.9 %
HGB BLD-MCNC: 9.1 G/DL
MCH RBC QN AUTO: 26.8 PG (ref 28–33.2)
MCHC RBC AUTO-ENTMCNC: 31.5 G/DL (ref 32.2–35.5)
MCV RBC AUTO: 85 FL (ref 83–98)
PDW BLD AUTO: 11.8 FL (ref 9.4–12.3)
PLATELET # BLD AUTO: 144 K/UL
RBC # BLD AUTO: 3.4 M/UL (ref 3.93–5.22)
WBC # BLD AUTO: 10.24 K/UL

## 2019-07-10 PROCEDURE — 85027 COMPLETE CBC AUTOMATED: CPT | Performed by: STUDENT IN AN ORGANIZED HEALTH CARE EDUCATION/TRAINING PROGRAM

## 2019-07-10 PROCEDURE — 200200 PR NO CHARGE: Performed by: INTERNAL MEDICINE

## 2019-07-10 PROCEDURE — 12000000 HC ROOM AND CARE MED/SURG

## 2019-07-10 PROCEDURE — 36415 COLL VENOUS BLD VENIPUNCTURE: CPT | Performed by: STUDENT IN AN ORGANIZED HEALTH CARE EDUCATION/TRAINING PROGRAM

## 2019-07-10 PROCEDURE — 63700000 HC SELF-ADMINISTRABLE DRUG: Performed by: STUDENT IN AN ORGANIZED HEALTH CARE EDUCATION/TRAINING PROGRAM

## 2019-07-10 RX ADMIN — FERROUS SULFATE TAB 325 MG (65 MG ELEMENTAL FE) 325 MG: 325 (65 FE) TAB at 08:36

## 2019-07-10 RX ADMIN — SENNOSIDES AND DOCUSATE SODIUM 1 TABLET: 8.6; 5 TABLET ORAL at 08:36

## 2019-07-10 RX ADMIN — IBUPROFEN 600 MG: 600 TABLET ORAL at 15:33

## 2019-07-10 RX ADMIN — SENNOSIDES AND DOCUSATE SODIUM 1 TABLET: 8.6; 5 TABLET ORAL at 19:31

## 2019-07-10 RX ADMIN — IBUPROFEN 600 MG: 600 TABLET ORAL at 00:32

## 2019-07-10 RX ADMIN — ACETAMINOPHEN 650 MG: 325 TABLET, FILM COATED ORAL at 12:48

## 2019-07-10 RX ADMIN — FERROUS SULFATE TAB 325 MG (65 MG ELEMENTAL FE) 325 MG: 325 (65 FE) TAB at 15:34

## 2019-07-10 RX ADMIN — PRENATAL VIT W/ FE FUMARATE-FA TAB 27-0.8 MG 1 TABLET: 27-0.8 TAB at 08:36

## 2019-07-10 RX ADMIN — IBUPROFEN 600 MG: 600 TABLET ORAL at 21:16

## 2019-07-10 RX ADMIN — ACETAMINOPHEN 650 MG: 325 TABLET, FILM COATED ORAL at 05:08

## 2019-07-10 RX ADMIN — IBUPROFEN 600 MG: 600 TABLET ORAL at 08:41

## 2019-07-10 ASSESSMENT — PAIN SCALES - GENERAL: PAIN_LEVEL: 0

## 2019-07-10 NOTE — ANESTHESIA POSTPROCEDURE EVALUATION
Patient: Andressa Baker    Procedure Summary     Date:  07/09/19 Room / Location:      Anesthesia Start:  0937 Anesthesia Stop:  1210    Procedure:  Labor Analgesia Diagnosis:      Scheduled Providers:   Responsible Provider:  Abel Gómez MD    Anesthesia Type:  labor epidural ASA Status:  3          Anesthesia Type: labor epidural  PACU Vitals     No data found.            Anesthesia Post Evaluation    Pain score: 0  Pain management: adequate  Patient location during evaluation: floor  Patient participation: complete - patient participated  Level of consciousness: awake and alert  Cardiovascular status: acceptable  Airway Patency: adequate  Respiratory status: acceptable  Hydration status: acceptable  Anesthetic complications: no

## 2019-07-10 NOTE — PLAN OF CARE
Problem: Patient Care Overview  Goal: Plan of Care Review  Outcome: Ongoing (interventions implemented as appropriate)   07/10/19 0344   Coping/Psychosocial   Plan Of Care Reviewed With patient   Plan of Care Review   Progress improving   Outcome Summary VSS, voiding without difficulty, no c/o of PVC S&S, telemetry maintained     Goal: Individualization & Mutuality  Outcome: Ongoing (interventions implemented as appropriate)   07/10/19 0344   Individualization   Patient Specific Preferences Infant to nursery for feeding on demand   Patient Specific Goals Infant to nursery for feeding on demand   Patient Specific Interventions Infant to nursery for feeding on demand   Mutuality/Individual Preferences   What Anxieties, Fears, Concerns, or Questions Do You Have About Your Care? none   Is There Anything About Yourself or Those You Care About That Would Help Us Take Better Care of You? Not sure what we are going to call him, his hair is darker than I expected so he does not look like a Garrick to me...   Mutuality   What Questions Do You Have About Your Health or Care? none     Goal: Discharge Needs Assessment  Outcome: Ongoing (interventions implemented as appropriate)   07/10/19 0344   DC Needs Assessment   Concerns To Be Addressed denies needs/concerns at this time   Concerns Comments Exp parents, 5th baby   Readmission Within The Last 30 Days no previous admission in last 30 days   Provider Choice List(s) Given no   Anticipated Discharge Disposition home without services   Community Agency Name(s) Breastfeeding & emotional support resources provided   Equipment Needed After Discharge other (see comments)  (breast pump)   Discharge Planning Comments Need to complete d/c paper work   Current Health   Anticipated Changes Related to Illness none   Discharge Planning   Patient/Family Concerns Related to Expected Discharge Disposition none   Activity/Self Care ROS   Equipment Currently Used at Home none     Goal:  Interprofessional Rounds/Family Conf  Outcome: Ongoing (interventions implemented as appropriate)   07/10/19 0344   Interdisciplinary Rounds/Family Conf   Summary na       Problem: Breastfeeding (Adult,Obstetrics,Pediatric)  Intervention: Promote Breast Care/Comfort   07/09/19 2045 07/10/19 0344   Reproductive Interventions   Breast Care: Breastfeeding --  frequency of feedings adjusted   Promote Breast Care/Comfort   Nipple Care (Lactating) water cleansing;open to air --      Intervention: Provide Support During Feeding Sessions   07/09/19 2045 07/10/19 0344   Coping/Psychosocial Interventions   Parent/Child Attachment Promotion --  attachment promoted;positive reinforcement provided;strengths emphasized;parent-child separation minimized;rooming-in promoted;skin-to-skin contact encouraged   Nutrition Interventions   Breastfeeding Assistance support offered;infant latch-on verified;feeding session observed;both breasts offered each feeding;assisted with positioning --      Intervention: Promote Positive Maternal Experience   07/09/19 2045 07/10/19 0344   Promote Infant/Parent Attachment   Coping Interventions --  care explained to patient/family prior to performing;choices provided for parent/caregiver;counseling provided;questions encouraged/answered;self-care promoted;supportive/safe environment provided;support provided   Coping/Psychosocial Interventions   Supportive Measures active listening utilized;counseling provided;decision-making supported;journaling promoted;positive reinforcement provided;problem solving facilitated;self-reflection promoted;self-care encouraged;relaxation techniques promoted;self-responsibility promoted;verbalization of feelings encouraged --      Intervention: Support Exclusive Breastfeeding Success   07/09/19 2045   Nutrition Interventions   Maternal Breastfeeding Support encouragement offered;infant-mother separation minimized;lactation counseling provided;maternal hydration  promoted;maternal nutrition promoted;maternal rest encouraged       Goal: Signs and Symptoms of Listed Potential Problems Will be Absent, Minimized or Managed (Breastfeeding)  Outcome: Ongoing (interventions implemented as appropriate)   07/10/19 0344   Breastfeeding   Problems Assessed (Breastfeeding) all   Problems Present (Breastfeeding) none       Problem: Postpartum (Vaginal Delivery) (Adult,Obstetrics,Pediatric)  Goal: Signs and Symptoms of Listed Potential Problems Will be Absent, Minimized or Managed (Postpartum)  Outcome: Ongoing (interventions implemented as appropriate)   07/10/19 0344   Postpartum (Vaginal Delivery)   Problems Assessed (Postpartum Vaginal Delivery) all   Problems Present (Postpartum Vaginal Delivery) none       Problem: Cardiac Rhythm Management Device (Adult)  Intervention: Monitor/Manage Pain Considering Device Effect   07/09/19 2045   Manage Acute Burn Pain   Pain Management Interventions care clustered;cold applied;pain management plan reviewed with patient/caregiver;pillow support provided;relaxation techniques promoted;position adjusted;quiet environment facilitated;relaxation promoted;unnecessary movement minimized   Safety Interventions   Medication Review/Management medications reviewed     Intervention: Support/Optimize Psychosocial Response to Condition   07/09/19 2045   Coping/Psychosocial Interventions   Supportive Measures active listening utilized;counseling provided;decision-making supported;journaling promoted;positive reinforcement provided;problem solving facilitated;self-reflection promoted;self-care encouraged;relaxation techniques promoted;self-responsibility promoted;verbalization of feelings encouraged   Environmental Support calm environment promoted;distractions minimized;personal routine supported;rest periods encouraged;rooming-in facilitated   Psychosocial Support   Family/Support System Care involvement promoted;presence promoted;self-care encouraged;support  provided       Goal: Signs and Symptoms of Listed Potential Problems Will be Absent, Minimized or Managed (Cardiac Rhythm Management Device)  Outcome: Ongoing (interventions implemented as appropriate)   07/10/19 8227   Cardiac Rhythm Management Device   Problems Assessed (Cardiac Rhythm Management Device) dysrhythmia/arrhythmia   Problems Present (Cardiac Rhythm Management Device) other (see comments)  (had bi-trigemny PVCs)

## 2019-07-10 NOTE — PROGRESS NOTES
"Electrophysiology Progress Note    SUBJECTIVE  Miss Baker feels much better this am. She reports resolution of symptoms related to her PVCs and otherwise has no cardiovascular complaints.    Inpatient medications:  •  acetaminophen, 650 mg, oral, q4h PRN  •  calcium carbonate, 500 mg, oral, q4h PRN **AND** alum-mag hydroxide-simeth, 30 mL, oral, q4h PRN  •  benzocaine-menthol, , Topical, PRN  •  bisacodyl, 10 mg, rectal, Daily PRN  •  dibucaine, 1 application, Topical, PRN  •  diphenhydrAMINE, 25 mg, oral, q6h PRN **OR** diphenhydrAMINE, 25 mg, intravenous, q6h PRN  •  ferrous sulfate, 325 mg, oral, BID with meals  •  ibuprofen, 600 mg, oral, q6h PRN  •  measles, mumps and rubella, 0.5 mL, subcutaneous, During hospitalization  •  ondansetron ODT, 4 mg, oral, q8h PRN **OR** ondansetron, 4 mg, intravenous, q8h PRN  •  oxyCODONE, 5-10 mg, oral, q4h PRN  •  prenatal vit-iron fum-folic ac, 1 tablet, oral, Daily  •  sennosides-docusate sodium, 1 tablet, oral, BID  •  tetanus,diphth,pertus(acell), 0.5 mL, intramuscular, During hospitalization  •  witch hazel, , Topical, PRN    Review of Systems: Aside from HPI, review of systems otherwise normal.    OBJECTIVE:   BP (!) 105/54 (BP Location: Right upper arm, Patient Position: Sitting)   Pulse 82   Temp 36.7 °C (98 °F) (Oral)   Resp 16   Ht 1.702 m (5' 7\")   Wt 95.7 kg (211 lb)   LMP 09/27/2018 (Exact Date) Comment: regular cycles  SpO2 98%   Breastfeeding? Yes   BMI 33.05 kg/m²     Physical Exam   Constitutional: Well-developed and well-nourished. No acute distress.   HENT: Normocephalic and atraumatic. Moist mucous membranes.  Neck: No JVD present.   Cardiovascular: Normal rate and regular rhythm. No murmur, rub or gallop. Pulmonary/Chest: Good bilateral breath sounds, clear lung fields. No crackles, no wheezes  Abdominal: Normal bowel sounds. Soft, non tender, no distension. No rebound or guarding.   Extremities: No LE edema jamel. 2+ pulses in jamel " LEs  Neurological: Alert and oriented to person, place, and time.   Skin: Skin is warm and dry.  Psychiatric: Normal mood, affect and behavior.    Labs        Results from last 7 days  Lab Units 07/10/19  0423 07/09/19  0621   WBC K/uL 10.24 7.76   HEMOGLOBIN g/dL 9.1* 11.5*   HEMATOCRIT % 28.9* 36.6   PLATELETS K/uL 144* 156             Cardiology results  ECG : Sinus rhythm with some PVCs consistent with likely RVOT origin  Telemetry : Sinus with minimal PVCs  TTE:  Catheterization:     Imaging: Imaging reviewed.    ASSESSMENT AND PLAN  39 y.o. female admitted for Pregnancy [Z34.90]. Electrophysiology is consulted for    PVC (premature ventricular contraction)   Assessment & Plan    Known history of PVCs in the past; s/p catheter ablation in 10/18. Mapped to distal/septal portion of RVOT  Appear to have exacerbated during labor with significant symptoms; however improved following delivery  Current EKG reviewed; focus exhibits LBBB morphology with inferior axis consistent with RVOT source as similarly localized in ablation procedure  Continue telemetry and monitor for recurrence of symptoms  At this time, no indication for further medical management.  Educated patient on need for follow-up following hospitalization  If symptoms return or worsen, patient may benefit from repeat ablation in the future. Did not tolerate medical management well in the past.     Mild tricuspid regurgitation by prior echocardiogram   Assessment & Plan    Murmur noted on exam; chronic per patient, appears unchanged  Review of records shows TTE in 2017 with mild tricuspid regurg, otherwise normal           Patient seen and discussed with my attending physician, Dr. Manzanares, who agrees with the management plan.    Xiomara Gibson MD  7/10/2019  11:48 AM

## 2019-07-10 NOTE — PROGRESS NOTES
Obstetrics Postpartum Progress Note    Events  Pt seen/examined. No acute events overnight. Pt denies HA/CP/SOB. Denies N/V.     Subjective  Pain: Well-controlled.   Bleeding: lochia minimal  Diet: taking regular diet  Voiding: without difficulty  Bowel: passing flatus no bowel movement  Ambulating: as tolerated     Vitals  Temp:  [36.4 °C (97.5 °F)-37.1 °C (98.7 °F)] 36.6 °C (97.9 °F)  Heart Rate:  [] 95  Resp:  [16-18] 18  BP: ()/(42-79) 118/67    I&O    Intake/Output Summary (Last 24 hours) at 07/10/19 0629  Last data filed at 19 1700   Gross per 24 hour   Intake                0 ml   Output             1600 ml   Net            -1600 ml       Physical Exam  General: Well appearing. AAOx3. NAD.  Heart: RRR, +S1S2  Lungs: CTABL, no wheezes, rales or rhonchi  Abdomen: Soft, NT, ND, no rebound/rigidity/guarding.  Fundus: firm, below umbilicus and nontender  Extremities: symmetric and no edema    Labs  Labs Reviewed:  Lab Results   Component Value Date    ABO B 2019    LABRH Positive 2019      Rubella: immune    CBC Results       07/10/19 07/09/19 06/25/19                    0423 0621 1349         WBC 10.24 7.76 7.37         RBC 3.40 (L) 4.37 4.06         HGB 9.1 (L) 11.5 (L) 11.0 (L)         HCT 28.9 (L) 36.6 34.0 (L)         MCV 85.0 83.8 83.7         MCH 26.8 (L) 26.3 (L) 27.1 (L)         MCHC 31.5 (L) 31.4 (L) 32.4          (L) 156 162                       Assessment/Plan   Problem-based Assessment and Plan    Andressa Baker is a 39 y.o.  PPD# 1 s/p Vaginal, Spontaneous Delivery     1. Vital Signs: stable  2. Hemodynamics: stable Hgb: 11.5-->9.1.  cc, s/p methergine x1. Fundus firm. Lochia minimal. No s/sx acute anemia.   3. Pain: controlled  4. VTE Assessment: Early Ambulation  5. Vaccinations/Rhogam: rhogam not indicated, MMR not indicated   6. Ventricular ectopy: S/p EP consult. No need for treatment with beta blockers or antiarrhythmic drugs. Continue  cardiac monitoring. Patient is asymptomatic this morning. Per cardiology should improve in postpartum period.   7. Continue routine postpartum care.     Antonina Heaton,

## 2019-07-10 NOTE — PLAN OF CARE
Problem: Patient Care Overview  Goal: Plan of Care Review  Outcome: Ongoing (interventions implemented as appropriate)   07/10/19 0344 07/10/19 0944   Coping/Psychosocial   Plan Of Care Reviewed With --  patient   Plan of Care Review   Progress --  improving   Outcome Summary VSS, voiding without difficulty, no c/o of PVC S&S, telemetry maintained --      Goal: Individualization & Mutuality  Outcome: Ongoing (interventions implemented as appropriate)   07/10/19 0344   Mutuality/Individual Preferences   Is There Anything About Yourself or Those You Care About That Would Help Us Take Better Care of You? Not sure what we are going to call him, his hair is darker than I expected so he does not look like a Garrick to me...     Goal: Discharge Needs Assessment  Outcome: Ongoing (interventions implemented as appropriate)   07/10/19 0344   DC Needs Assessment   Concerns To Be Addressed denies needs/concerns at this time   Concerns Comments Exp parents, 5th baby   Readmission Within The Last 30 Days no previous admission in last 30 days   Provider Choice List(s) Given no   Anticipated Discharge Disposition home without services   Community Agency Name(s) Breastfeeding & emotional support resources provided   Equipment Needed After Discharge other (see comments)  (breast pump)   Discharge Planning Comments Need to complete d/c paper work   Current Health   Anticipated Changes Related to Illness none   Discharge Planning   Patient/Family Concerns Related to Expected Discharge Disposition none   Activity/Self Care ROS   Equipment Currently Used at Home none       Problem: Breastfeeding (Adult,Obstetrics,Pediatric)  Goal: Signs and Symptoms of Listed Potential Problems Will be Absent, Minimized or Managed (Breastfeeding)  Outcome: Ongoing (interventions implemented as appropriate)   07/10/19 0944   Breastfeeding   Problems Assessed (Breastfeeding) all   Problems Present (Breastfeeding) none       Problem: Postpartum (Vaginal  Delivery) (Adult,Obstetrics,Pediatric)  Goal: Signs and Symptoms of Listed Potential Problems Will be Absent, Minimized or Managed (Postpartum)  Outcome: Ongoing (interventions implemented as appropriate)   07/10/19 0944   Postpartum (Vaginal Delivery)   Problems Assessed (Postpartum Vaginal Delivery) all   Problems Present (Postpartum Vaginal Delivery) none       Problem: Cardiac Rhythm Management Device (Adult)  Goal: Signs and Symptoms of Listed Potential Problems Will be Absent, Minimized or Managed (Cardiac Rhythm Management Device)  Outcome: Ongoing (interventions implemented as appropriate)   07/10/19 0944   Cardiac Rhythm Management Device   Problems Assessed (Cardiac Rhythm Management Device) all   Problems Present (Cardiac Rhythm Management Device) none

## 2019-07-10 NOTE — LACTATION NOTE
Brief visit. Family in room. Mom reports baby breastfeeding well. Slight difficulty on right breast. Offered assist with next feed. Mom aware to call.     Late Entry: 7/11/19  Assisted with latch. Mom with large, wide nipples. Creased in center. Baby latched well to right breast in football hold. Showed how to get a deep latch and importance of wide mouth to get past nipple. Will follow up tomorrow.

## 2019-07-11 ENCOUNTER — TELEPHONE (OUTPATIENT)
Dept: OBSTETRICS AND GYNECOLOGY | Facility: CLINIC | Age: 39
End: 2019-07-11

## 2019-07-11 VITALS
HEIGHT: 67 IN | TEMPERATURE: 98 F | OXYGEN SATURATION: 98 % | WEIGHT: 211 LBS | SYSTOLIC BLOOD PRESSURE: 109 MMHG | DIASTOLIC BLOOD PRESSURE: 69 MMHG | HEART RATE: 84 BPM | BODY MASS INDEX: 33.12 KG/M2 | RESPIRATION RATE: 18 BRPM

## 2019-07-11 PROCEDURE — 63700000 HC SELF-ADMINISTRABLE DRUG: Performed by: STUDENT IN AN ORGANIZED HEALTH CARE EDUCATION/TRAINING PROGRAM

## 2019-07-11 RX ORDER — MUPIROCIN 20 MG/G
1 OINTMENT TOPICAL 3 TIMES DAILY
Status: DISCONTINUED | OUTPATIENT
Start: 2019-07-11 | End: 2019-07-11 | Stop reason: HOSPADM

## 2019-07-11 RX ADMIN — PRENATAL VIT W/ FE FUMARATE-FA TAB 27-0.8 MG 1 TABLET: 27-0.8 TAB at 08:57

## 2019-07-11 RX ADMIN — IBUPROFEN 600 MG: 600 TABLET ORAL at 05:06

## 2019-07-11 RX ADMIN — SENNOSIDES AND DOCUSATE SODIUM 1 TABLET: 8.6; 5 TABLET ORAL at 08:57

## 2019-07-11 RX ADMIN — FERROUS SULFATE TAB 325 MG (65 MG ELEMENTAL FE) 325 MG: 325 (65 FE) TAB at 08:57

## 2019-07-11 NOTE — TELEPHONE ENCOUNTER
Called in tripple nipple ointment to [facility], called pt and lvm that this is where I am sending it to and they will call pt once it is done. Adv pt to call office if she has any questions or concerns.

## 2019-07-11 NOTE — NURSING NOTE
Monitor room tech called here & told the RN this pt having frequent PVC's now, paged DR Cruz , pt checked in room , she said she did feel  PVC's when she was packing stuff to go home , now she in bed & feeding baby , Dr sola ahumada víctor & asked to inform cardio so paged DR Mcgovern , waiting for his call back ..No primary care provider on file. on monitor SR 84..

## 2019-07-11 NOTE — PROGRESS NOTES
Pt s c/o.    AVSS    Hgb 9.1  B+    A/P: PPD #2     -pt stable for d/c    -instructions reviewed    -f/u 6 weeks

## 2019-07-11 NOTE — DISCHARGE SUMMARY
Obstetrical Discharge Form          Date of Delivery: 7/9/2019 at 12:10 PM     Gestational Age:40w5d    Delivered By: Reymundo Lamar     Delivery Type: spontaneous vaginal delivery    Antepartum complications: none    Baby: Liveborn male , Apgars 8   /9   , 4.45 kg (9 lb 13 oz)     Anesthesia: Epidural     Intrapartum complications: None    Laceration: 1st     Feeding method: breast    Rh Immune globulin given: no    Discharge Date: 7/11/19      Plan:    Follow-up appointment with your doctors in 6 weeks, please call for an appointment

## 2019-07-11 NOTE — LACTATION NOTE
Mom reports increased pain on left nipple with feeds. Given handpump to use with 30mm flange prior to latch. Crease in nipple pulled out and milk started flowing quickly. Baby latched well to left breast with wide mouth. Mom reported decreased pain. Reviewed wide mouth latch and supportive positioning. Recommended handpump if latching is not tolerable. Asked OB for prescription for APNO for home use. Reviewed resources after DC.

## 2019-08-13 NOTE — PROGRESS NOTES
Visit Date: 2019   Andressa Baker is 39 y.o. P5 w female presenting today for 5+ week post partum visit s/p ;  #4. ( lb 13 oz boy; manual removal of placenta.     Bleeding resolving. Some difficulty w urination. Also passing lots of gas. Still taking stool softner and FeSO4 pills.    Part time work from home.     No contraception desired; prefers NFP.      Physical Exam  /70   Wt 93.3 kg (205 lb 9.6 oz)   LMP 2018 (Exact Date) Comment: regular cycles  BMI 32.20 kg/m²      General Appearance: Alert, cooperative, no acute distress  Head: Normocephalic, without obvious abnormality, atraumatic  Breast: full of milk, no masses, nontender and no discharge  Abdomen: Soft, nontender, nondistended,   no masses, no organomegaly    Pelvic:  Vulva: normal  Vagina: normal mucosa  Cervix: no lesions  Uterus: normal size  Adenexa: no mass, fullness, tenderness      Assessment & Plan  Nl 6 week pp exam    Pap due     Contraception declines    Return in about 1 year (around 2020).  Reymundo Lamar MD

## 2019-08-14 ENCOUNTER — OFFICE VISIT (OUTPATIENT)
Dept: OBSTETRICS AND GYNECOLOGY | Facility: CLINIC | Age: 39
End: 2019-08-14
Payer: COMMERCIAL

## 2019-08-14 VITALS — BODY MASS INDEX: 32.2 KG/M2 | DIASTOLIC BLOOD PRESSURE: 70 MMHG | SYSTOLIC BLOOD PRESSURE: 110 MMHG | WEIGHT: 205.6 LBS

## 2019-08-14 PROCEDURE — 0503F POSTPARTUM CARE VISIT: CPT | Performed by: OBSTETRICS & GYNECOLOGY

## 2019-08-30 ENCOUNTER — TELEPHONE (OUTPATIENT)
Dept: OBSTETRICS AND GYNECOLOGY | Facility: CLINIC | Age: 39
End: 2019-08-30

## 2019-10-01 ENCOUNTER — OFFICE VISIT (OUTPATIENT)
Dept: INTERNAL MEDICINE | Facility: CLINIC | Age: 39
End: 2019-10-01
Payer: COMMERCIAL

## 2019-10-01 VITALS
WEIGHT: 208.6 LBS | DIASTOLIC BLOOD PRESSURE: 70 MMHG | BODY MASS INDEX: 32.74 KG/M2 | SYSTOLIC BLOOD PRESSURE: 110 MMHG | TEMPERATURE: 98.1 F | HEIGHT: 67 IN | HEART RATE: 75 BPM | RESPIRATION RATE: 16 BRPM | OXYGEN SATURATION: 97 %

## 2019-10-01 DIAGNOSIS — Z00.00 WELL FEMALE EXAM WITHOUT GYNECOLOGICAL EXAM: ICD-10-CM

## 2019-10-01 DIAGNOSIS — R00.2 PALPITATIONS: Primary | ICD-10-CM

## 2019-10-01 PROBLEM — Z34.90 PREGNANCY: Status: RESOLVED | Noted: 2018-11-27 | Resolved: 2019-10-01

## 2019-10-01 LAB
BASOPHILS # BLD: 0.06 K/UL (ref 0.01–0.1)
BASOPHILS NFR BLD: 1.1 %
DIFFERENTIAL METHOD BLD: NORMAL
EOSINOPHIL # BLD: 0.17 K/UL (ref 0.04–0.36)
EOSINOPHIL NFR BLD: 3 %
ERYTHROCYTE [DISTWIDTH] IN BLOOD BY AUTOMATED COUNT: 15 % (ref 11.7–14.4)
EST. AVERAGE GLUCOSE BLD GHB EST-MCNC: 100 MG/DL
HBA1C MFR BLD HPLC: 5.1 %
HCT VFR BLDCO AUTO: 44.7 %
HGB BLD-MCNC: 14 G/DL
IMM GRANULOCYTES # BLD AUTO: 0.01 K/UL (ref 0–0.08)
IMM GRANULOCYTES NFR BLD AUTO: 0.2 %
LYMPHOCYTES # BLD: 2.07 K/UL (ref 1.2–3.5)
LYMPHOCYTES NFR BLD: 37 %
MCH RBC QN AUTO: 27 PG (ref 28–33.2)
MCHC RBC AUTO-ENTMCNC: 31.3 G/DL (ref 32.2–35.5)
MCV RBC AUTO: 86.1 FL (ref 83–98)
MONOCYTES # BLD: 0.46 K/UL (ref 0.28–0.8)
MONOCYTES NFR BLD: 8.2 %
NEUTROPHILS # BLD: 2.83 K/UL (ref 1.7–7)
NEUTS SEG NFR BLD: 50.5 %
NRBC BLD-RTO: 0 %
PDW BLD AUTO: 12.5 FL (ref 9.4–12.3)
PLATELET # BLD AUTO: 176 K/UL
RBC # BLD AUTO: 5.19 M/UL (ref 3.93–5.22)
T3 SERPL-MCNC: 115 NG/DL (ref 87–178)
T4 FREE SERPL-MCNC: 1.03 NG/DL (ref 0.58–1.64)
TSH SERPL DL<=0.05 MIU/L-ACNC: 1.08 MIU/L (ref 0.34–5.6)
WBC # BLD AUTO: 5.6 K/UL

## 2019-10-01 PROCEDURE — 84439 ASSAY OF FREE THYROXINE: CPT | Performed by: INTERNAL MEDICINE

## 2019-10-01 PROCEDURE — 90686 IIV4 VACC NO PRSV 0.5 ML IM: CPT | Performed by: INTERNAL MEDICINE

## 2019-10-01 PROCEDURE — 83735 ASSAY OF MAGNESIUM: CPT | Performed by: INTERNAL MEDICINE

## 2019-10-01 PROCEDURE — 84443 ASSAY THYROID STIM HORMONE: CPT | Performed by: INTERNAL MEDICINE

## 2019-10-01 PROCEDURE — 84480 ASSAY TRIIODOTHYRONINE (T3): CPT | Performed by: INTERNAL MEDICINE

## 2019-10-01 PROCEDURE — 85025 COMPLETE CBC W/AUTO DIFF WBC: CPT | Performed by: INTERNAL MEDICINE

## 2019-10-01 PROCEDURE — 36415 COLL VENOUS BLD VENIPUNCTURE: CPT | Performed by: INTERNAL MEDICINE

## 2019-10-01 PROCEDURE — 90471 IMMUNIZATION ADMIN: CPT | Performed by: INTERNAL MEDICINE

## 2019-10-01 PROCEDURE — 83036 HEMOGLOBIN GLYCOSYLATED A1C: CPT | Performed by: INTERNAL MEDICINE

## 2019-10-01 PROCEDURE — 99395 PREV VISIT EST AGE 18-39: CPT | Mod: 25 | Performed by: INTERNAL MEDICINE

## 2019-10-01 RX ORDER — VIT C/E/ZN/COPPR/LUTEIN/ZEAXAN 250MG-90MG
10000 CAPSULE ORAL WEEKLY
COMMUNITY
End: 2022-02-17

## 2019-10-01 NOTE — ASSESSMENT & PLAN NOTE
Counseled extensively on diet and exercise. Goal will be to carve out specific time for self care and exercise and to replace less healthy habits with healthy ones. Goal for gradual weight loss encouraged, not more than 1 lb per week.

## 2019-10-01 NOTE — PATIENT INSTRUCTIONS
Problem List Items Addressed This Visit     Palpitations - Primary     Still symptomatic with palpitations  Follow up with Dr. Jane         Relevant Orders    CBC and differential    TSH    T4, free    T3    CBC    Diff Count    BMI 32.0-32.9,adult    Relevant Orders    Hemoglobin A1c    Well female exam without gynecological exam     Nutrition:   -- Doing MD weight loss, doing a meal plan, off of processed foods    Physical activity:   -- Goal: increase heart rate 2-3 times per week for half an hour, recommend light weights, online classes    GYN Hx:  -- menstrual cycles: Has not returned since birth 3 months ago  -- following with Dr. 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

## 2019-10-01 NOTE — ASSESSMENT & PLAN NOTE
Nutrition:   -- Doing MD weight loss, doing a meal plan, off of processed foods    Physical activity:   -- Goal: increase heart rate 2-3 times per week for half an hour, recommend light weights, online classes    GYN Hx:  -- menstrual cycles: Has not returned since birth 3 months ago  -- following with Dr. 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

## 2019-10-01 NOTE — PROGRESS NOTES
Andressa LEE Olivia is a 39 y.o. female who presents today for     Chief Complaint   Patient presents with   • Annual Exam         SUBJECTIVE:  HPI    Palpitations - have been ongoing for years. Had been anemic in the past, also having loss of focus and low energy. Mood is good, no PPD. Helping her mother care for her father who has dementia.     Currently breastfeeding. Getting 6 hours of sleep at night.     Weight gain - has gained some weight after baby was born.       Medical/surgical/family/social histories reviewed.     No Known Allergies      Current Outpatient Prescriptions:   •  cholecalciferol (VITAMIN D3) 10,000 unit capsule, Take 10,000 Units by mouth once a week.  , Disp: , Rfl:   •  docosahexanoic acid (PRENATAL DHA) 200 mg capsule, Prenatal + DHA, Disp: , Rfl:     Past Medical History:   Diagnosis Date   • Abnormal ECG     PVCs   • Arrhythmia 2018    PVCs, status post ablation, partially successful.   • History of fetal demise, not currently pregnant     Twin pregnancy, 1 fetal demise, 1 live birth   • Migraine headache    • Ovarian cyst    • Pericarditis 2018    After john ablation       Past Surgical History:   Procedure Laterality Date   • ABLATION OF DYSRHYTHMIC FOCUS  2018   •  SECTION         Family History   Problem Relation Age of Onset   • Hyperlipidemia Biological Mother    • Hyperlipidemia Biological Father    • Hypertension Biological Father    • Prostate cancer Biological Father 78   • Dementia Biological Father    • Hyperlipidemia Maternal Grandmother    • Clotting disorder Maternal Grandfather         developed in 60's - required tansfusions/platelets   • Hyperlipidemia Paternal Grandmother    • Dementia Paternal Grandmother    • Diabetes Paternal Grandfather        Social History     Social History   • Marital status:      Spouse name: Shashank RamosOlivia   • Number of children: N/A   • Years of education: N/A     Occupational History   • Homemaker   "    Social History Main Topics   • Smoking status: Never Smoker   • Smokeless tobacco: Never Used   • Alcohol use Yes      Comment: occasionally   • Drug use: No   • Sexual activity: Yes      Comment:  with 4 children, NFP     Other Topics Concern   • Not on file     Social History Narrative    Exercises 2-3 times per week    Sleep sometimes interrupted    Eating plan: Patient attempts low glycemic index eating    No history of domestic violence       ROS   Constitutional: no unintentional weight changes  HENT: no nasal congestion, post-nasal drip   Eyes: denies recent changes in vision   Cardiac: no current chest pain, not current palpitations  Respiratory: denies shortness of breath and cough   GI: no abdominal pain, diarrhea or constipation. Denies melena and hematochezia  : per HPI  Msk: no arthralgias or myalgias  Neuro: denies headache, + dizziness. No issues with weakness   Skin: denies changes in skin, no rashes or new lesions     OBJECTIVE:  Vitals:    10/01/19 1407   BP: 110/70   BP Location: Left upper arm   Patient Position: Sitting   Pulse: 75   Resp: 16   Temp: 36.7 °C (98.1 °F)   TempSrc: Oral   SpO2: 97%   Weight: 94.6 kg (208 lb 9.6 oz)   Height: 1.702 m (5' 7\")       Wt Readings from Last 3 Encounters:   10/01/19 94.6 kg (208 lb 9.6 oz)   08/14/19 93.3 kg (205 lb 9.6 oz)   07/10/19 95.7 kg (211 lb)     Body mass index is 32.67 kg/m².      Physical Exam   Constitutional: She is oriented to person, place, and time. She appears well-developed and well-nourished.   HENT:   Head: Normocephalic and atraumatic.   Right Ear: External ear normal.   Left Ear: External ear normal.   Nose: Nose normal.   Mouth/Throat: Oropharynx is clear and moist.   Eyes: Pupils are equal, round, and reactive to light. EOM are normal.   Neck: Normal range of motion. No thyromegaly present.   Cardiovascular: Normal rate, regular rhythm and normal heart sounds.  Exam reveals no gallop and no friction rub.    No murmur " heard.  Pulmonary/Chest: Effort normal and breath sounds normal. No stridor. No respiratory distress. She has no wheezes. She has no rales.   Abdominal: Soft. Bowel sounds are normal. She exhibits no distension. There is no tenderness. There is no guarding.   Musculoskeletal: Normal range of motion. She exhibits no edema or deformity.   Lymphadenopathy:     She has no cervical adenopathy.   Neurological: She is alert and oriented to person, place, and time. She displays normal reflexes. She exhibits normal muscle tone. Coordination normal.   Skin:   Mole under L breast   Psychiatric: She has a normal mood and affect. Her behavior is normal. Thought content normal.         ASSESSMENT & PLAN:   Diagnoses and all orders for this visit:    Palpitations (Primary)  Assessment & Plan:  Still symptomatic with palpitations  Follow up with Dr. Jane    Orders:  -     CBC and differential  -     TSH  -     T4, free  -     T3  -     CBC  -     Diff Count    Well female exam without gynecological exam  Assessment & Plan:  Nutrition:   -- Doing MD weight loss, doing a meal plan, off of processed foods    Physical activity:   -- Goal: increase heart rate 2-3 times per week for half an hour, recommend light weights, online classes    GYN Hx:  -- menstrual cycles: Has not returned since birth 3 months ago  -- following with Dr. 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 Goal will be to carve out specific time for self care and exercise and to replace less healthy habits with healthy ones. Goal for gradual weight loss encouraged, not more than 1 lb per week.      Orders:  -     Hemoglobin A1c    Other orders  -     Influenza vaccine quadrivalent preservative free 6 mon and older IM (FluLaval)

## 2019-10-03 ENCOUNTER — OFFICE VISIT (OUTPATIENT)
Dept: CARDIOLOGY | Facility: CLINIC | Age: 39
End: 2019-10-03
Payer: COMMERCIAL

## 2019-10-03 VITALS
BODY MASS INDEX: 32.49 KG/M2 | WEIGHT: 207 LBS | SYSTOLIC BLOOD PRESSURE: 118 MMHG | HEIGHT: 67 IN | HEART RATE: 70 BPM | DIASTOLIC BLOOD PRESSURE: 80 MMHG

## 2019-10-03 DIAGNOSIS — I49.3 VENTRICULAR PREMATURE BEATS: Primary | ICD-10-CM

## 2019-10-03 DIAGNOSIS — R00.2 PALPITATIONS: ICD-10-CM

## 2019-10-03 LAB — MAGNESIUM SERPL-MCNC: 2.1 MG/DL (ref 1.8–2.5)

## 2019-10-03 PROCEDURE — 93000 ELECTROCARDIOGRAM COMPLETE: CPT | Performed by: INTERNAL MEDICINE

## 2019-10-03 PROCEDURE — 99214 OFFICE O/P EST MOD 30 MIN: CPT | Performed by: INTERNAL MEDICINE

## 2019-10-03 ASSESSMENT — ENCOUNTER SYMPTOMS: PALPITATIONS: 1

## 2019-10-03 NOTE — LETTER
October 3, 2019     Iesha Domingo MD  [facility]  [address] [place] [address] [place] [address]    Patient: Andressa Baker  YOB: 1980  Date of Visit: 10/3/2019      Dear Dr. Domingo:    Thank you for referring Andressa Baker to me for evaluation. Below are my notes for this consultation.    If you have questions, please do not hesitate to call me. I look forward to following your patient along with you.         Sincerely,        Leslie Jane MD        CC: No Recipients  Leslie Jane MD  10/6/2019  5:51 PM  Signed       Electrophysiology  Outpatient Progress Note       Reason for visit:   Chief Complaint   Patient presents with   • Palpitations   • VPD's   10/3/2019      HPI  Andressa Baker is a 39 y.o. female who is seen today for follow-up.  She has a history of symptomatic premature ventricular depolarization beats. 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 The procedure was performed by my partner Dr. Patrick Manzanares at [facility]. Her post ablation course was complicated by pericarditis which resolved.      She delivered her 5th child on 07/09/2019. She did have increased VPD's during delivery and was monitored on telemetry. She continues to have frequent VPD's with symptoms of palpitations, dizziness, and lightheadedness. She denies near syncope or  syncope. She has taken beta blockers in the past, however, they did not help with her symptoms, and cause side effects of fatigue. She is currently breastfeeding her 3 month old son and would like to continue to breast feed him for one year. She is under significant personal stress. In addition to taking care of her baby, she has 4 other young children at home and she is helping her mother take care of the patient's  father who has severe dementia.    Past Medical History:   Diagnosis Date   • Abnormal ECG     PVCs    • Arrhythmia 2018    PVCs, status post ablation, partially successful.   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 She has 5 children, ages 10, 9, 6, 4, and 3 months.    Family History   Problem Relation Age of Onset   • Hyperlipidemia Biological Mother    • Hyperlipidemia Biological Father    • Hypertension Biological Father    • Prostate cancer Biological Father 78   • Dementia Biological Father    • Hyperlipidemia Maternal Grandmother    • Clotting disorder Maternal Grandfather         developed in 60's - required tansfusions/platelets   • Hyperlipidemia Paternal Grandmother    • Dementia Paternal Grandmother    • Diabetes Paternal Grandfather      ALLERGY:  Patient has no known allergies.    MEDICATIONS:   Current Outpatient Prescriptions:   •  cholecalciferol (VITAMIN D3) 10,000 unit capsule, Take 10,000 Units by mouth once a week.  , Disp: , Rfl:   •  docosahexanoic acid (PRENATAL DHA) 200 mg capsule, Prenatal + DHA, Disp: , Rfl:   •  ibuprofen (ADVIL ORAL), Take by mouth daily as needed., Disp: , Rfl:   •  olopatadine HCl (PATANOL OPHT), Administer into affected eye(s) daily., Disp: , Rfl:     Review of Systems   Cardiovascular: Positive for palpitations.   All other systems reviewed and are negative.  As above in HPI.     Objective   Vitals:    10/03/19 1041   BP: 118/80   BP Location: Left upper arm   Patient Position: Sitting   Pulse: 70   Weight: 93.9 kg (207 lb)  "  Height: 1.689 m (5' 6.5\")     Physical Exam   Constitutional: She is oriented to person, place, and time. She appears well-developed and well-nourished. No distress.   HENT:   Head: Normocephalic.   Eyes: Conjunctivae and EOM are normal.   Neck: Normal range of motion.   Cardiovascular: Normal rate, regular rhythm and normal heart sounds.  Exam reveals no gallop and no friction rub.    No murmur heard.  Pulmonary/Chest: Effort normal and breath sounds normal. No respiratory distress. She has no wheezes. She has no rales.   Abdominal: Soft. Bowel sounds are normal.   Musculoskeletal: Normal range of motion. She exhibits no edema.   Neurological: She is alert and oriented to person, place, and time.   Skin: Skin is warm and dry.   Psychiatric: She has a normal mood and affect. Her behavior is normal. Judgment and thought content normal.       EKG: I personally reviewed the 12 lead EKG obtained in the office today which reveals normal sinus rhythm with single VPD's.    Lab Results   Component Value Date    HGB 14.0 10/01/2019     10/01/2019    WBC 5.60 10/01/2019    ALT 12 06/06/2018    AST 12 06/06/2018     06/06/2018    BUN 14 06/06/2018    CREATININE 0.80 06/06/2018    K 4.0 06/06/2018    GLUCOSE NEGATIVE 11/27/2018    TSH 1.08 10/01/2019    CHOL 196 01/29/2016    HDL 67 01/29/2016    TRIG 58 01/29/2016    INR 1.0 09/13/2017    MG 2.1 10/01/2019     ASSESSMENT/PLAN:  1. Ventricular premature beats. She is s/p catheter ablation procedure for VPD's originating from RVOT.  Her symptoms of palpitations have increased since delivery of her 5th child 3 months ago. She was not able to tolerate beta blockers in the past due to side effects they were not effective to suppress her ecotpy.  I discussed with her treatment with an antiarrhythmic drug such as flecainide but it will be best to wait until she stops breast feeding. She is willing to tolerate the arrhythmia for at least another 3 more months to " continue breast feeding. In addition it is possible that the frequency of the arrhyhtmia may improve after she stops breast feeding since it may have been triggered by homonal changes. She does not want to undergo another catheter ablation procedure.  She will see me  follow up in the office in 3 months or earlier as needed.     Thank you for allowing me to participate in the care of your patient.  Please do not hesitate to contact me if you have any questions regarding my recommendations and management.    Sincerely;    Leslie Boyce MD [facility]      Mao BOLANOS PA-C, am scribing for, and in the presence of, Leslie Boyce MD.    ILeslie MD, personally performed the services described in this documentation as described by Mao Lee in my presence, and it is both accurate and complete. I have seen and examined the patient.  I have reviewed the PA note and supporting documentation. The note has been amended as appropriate.

## 2019-10-03 NOTE — PROGRESS NOTES
Electrophysiology  Outpatient Progress Note       Reason for visit:   Chief Complaint   Patient presents with   • Palpitations   • VPD's   10/3/2019      HPI  Andressa Baker is a 39 y.o. female who is seen today for follow-up.  She has a history of symptomatic premature ventricular depolarization beats. 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 The procedure was performed by my partner Dr. Patrick Manzanares at [facility]. Her post ablation course was complicated by pericarditis which resolved.      She delivered her 5th child on 2019. She did have increased VPD's during delivery and was monitored on telemetry. She continues to have frequent VPD's with symptoms of palpitations, dizziness, and lightheadedness. She denies near syncope or  syncope. She has taken beta blockers in the past, however, they did not help with her symptoms, and cause side effects of fatigue. She is currently breastfeeding her 3 month old son and would like to continue to breast feed him for one year. She is under significant personal stress. In addition to taking care of her baby, she has 4 other young children at home and she is helping her mother take care of the patient's  father who has severe dementia.    Past Medical History:   Diagnosis Date   • Abnormal ECG     PVCs   • Arrhythmia 2018    PVCs, status post ablation, partially successful.   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 She has 5 children, ages 10, 9, 6, 4, and 3 months.    Family History   Problem Relation Age of Onset   • Hyperlipidemia Biological Mother    • Hyperlipidemia Biological Father    • Hypertension Biological Father    • Prostate cancer Biological Father 78   • Dementia Biological Father    • Hyperlipidemia Maternal Grandmother    • Clotting disorder Maternal Grandfather         developed in 60's - required tansfusions/platelets   • Hyperlipidemia Paternal Grandmother    • Dementia Paternal Grandmother    • Diabetes Paternal Grandfather      ALLERGY:  Patient has no known allergies.    MEDICATIONS:   Current Outpatient Prescriptions:   •  cholecalciferol (VITAMIN D3) 10,000 unit capsule, Take 10,000 Units by mouth once a week.  , Disp: , Rfl:   •  docosahexanoic acid (PRENATAL DHA) 200 mg capsule, Prenatal + DHA, Disp: , Rfl:   •  ibuprofen (ADVIL ORAL), Take by mouth daily as needed., Disp: , Rfl:   •  olopatadine HCl (PATANOL OPHT), Administer into affected eye(s) daily., Disp: , Rfl:     Review of Systems   Cardiovascular: Positive for palpitations.   All other systems reviewed and are negative.  As above in HPI.     Objective   Vitals:    10/03/19 1041   BP: 118/80   BP Location: Left upper arm   Patient Position: Sitting   Pulse: 70   Weight: 93.9 kg (207 lb)   Height: 1.689 m (5' 6.5\")     Physical Exam   Constitutional: She is oriented to person, place, and time. She appears well-developed and well-nourished. No distress.   HENT:   Head: Normocephalic.   Eyes: Conjunctivae and EOM are normal.   Neck: Normal range of motion.   Cardiovascular: Normal rate, regular rhythm and normal heart sounds.  Exam reveals no gallop and no friction rub.    No murmur heard.  Pulmonary/Chest: Effort normal and breath sounds normal. No respiratory distress. She has no wheezes. She has no rales. "   Abdominal: Soft. Bowel sounds are normal.   Musculoskeletal: Normal range of motion. She exhibits no edema.   Neurological: She is alert and oriented to person, place, and time.   Skin: Skin is warm and dry.   Psychiatric: She has a normal mood and affect. Her behavior is normal. Judgment and thought content normal.       EKG: I personally reviewed the 12 lead EKG obtained in the office today which reveals normal sinus rhythm with single VPD's.    Lab Results   Component Value Date    HGB 14.0 10/01/2019     10/01/2019    WBC 5.60 10/01/2019    ALT 12 06/06/2018    AST 12 06/06/2018     06/06/2018    BUN 14 06/06/2018    CREATININE 0.80 06/06/2018    K 4.0 06/06/2018    GLUCOSE NEGATIVE 11/27/2018    TSH 1.08 10/01/2019    CHOL 196 01/29/2016    HDL 67 01/29/2016    TRIG 58 01/29/2016    INR 1.0 09/13/2017    MG 2.1 10/01/2019     ASSESSMENT/PLAN:  1. Ventricular premature beats. She is s/p catheter ablation procedure for VPD's originating from RVOT.  Her symptoms of palpitations have increased since delivery of her 5th child 3 months ago. She was not able to tolerate beta blockers in the past due to side effects they were not effective to suppress her ecotpy.  I discussed with her treatment with an antiarrhythmic drug such as flecainide but it will be best to wait until she stops breast feeding. She is willing to tolerate the arrhythmia for at least another 3 more months to continue breast feeding. In addition it is possible that the frequency of the arrhyhtmia may improve after she stops breast feeding since it may have been triggered by homonal changes. She does not want to undergo another catheter ablation procedure.  She will see me  follow up in the office in 3 months or earlier as needed.     Thank you for allowing me to participate in the care of your patient.  Please do not hesitate to contact me if you have any questions regarding my recommendations and management.    Sincerely;    Leslie  Austen MARQUEZ [facility]      Mao BOLANOS PA-C, am scribing for, and in the presence of, Leslie Boyce MD.    I, Leslie Boyce MD, personally performed the services described in this documentation as described by Mao Lee in my presence, and it is both accurate and complete. I have seen and examined the patient.  I have reviewed the PA note and supporting documentation. The note has been amended as appropriate.

## 2019-10-18 ENCOUNTER — TELEPHONE (OUTPATIENT)
Dept: INTERNAL MEDICINE | Facility: CLINIC | Age: 39
End: 2019-10-18

## 2019-11-04 ENCOUNTER — APPOINTMENT (OUTPATIENT)
Dept: LAB | Age: 39
End: 2019-11-04
Attending: INTERNAL MEDICINE
Payer: COMMERCIAL

## 2019-11-04 ENCOUNTER — HOSPITAL ENCOUNTER (OUTPATIENT)
Facility: CLINIC | Age: 39
Discharge: HOME | End: 2019-11-04
Attending: FAMILY MEDICINE
Payer: COMMERCIAL

## 2019-11-04 ENCOUNTER — TRANSCRIBE ORDERS (OUTPATIENT)
Dept: LAB | Age: 39
End: 2019-11-04

## 2019-11-04 VITALS
SYSTOLIC BLOOD PRESSURE: 118 MMHG | DIASTOLIC BLOOD PRESSURE: 68 MMHG | WEIGHT: 205 LBS | TEMPERATURE: 98.5 F | BODY MASS INDEX: 32.18 KG/M2 | HEIGHT: 67 IN | HEART RATE: 72 BPM | RESPIRATION RATE: 18 BRPM

## 2019-11-04 DIAGNOSIS — R19.4 CHANGE IN BOWEL HABIT: Primary | ICD-10-CM

## 2019-11-04 DIAGNOSIS — R14.3 FLATULENCE: ICD-10-CM

## 2019-11-04 DIAGNOSIS — J01.00 ACUTE MAXILLARY SINUSITIS, RECURRENCE NOT SPECIFIED: Primary | ICD-10-CM

## 2019-11-04 DIAGNOSIS — R19.4 CHANGE IN BOWEL HABIT: ICD-10-CM

## 2019-11-04 LAB
CRP SERPL-MCNC: 17.73 MG/L
TSH SERPL DL<=0.05 MIU/L-ACNC: 1.24 MIU/L (ref 0.34–5.6)

## 2019-11-04 PROCEDURE — 86140 C-REACTIVE PROTEIN: CPT

## 2019-11-04 PROCEDURE — 99213 OFFICE O/P EST LOW 20 MIN: CPT | Performed by: FAMILY MEDICINE

## 2019-11-04 PROCEDURE — 83516 IMMUNOASSAY NONANTIBODY: CPT

## 2019-11-04 PROCEDURE — 84443 ASSAY THYROID STIM HORMONE: CPT

## 2019-11-04 PROCEDURE — S9083 URGENT CARE CENTER GLOBAL: HCPCS | Performed by: FAMILY MEDICINE

## 2019-11-04 PROCEDURE — 36415 COLL VENOUS BLD VENIPUNCTURE: CPT

## 2019-11-04 PROCEDURE — 82784 ASSAY IGA/IGD/IGG/IGM EACH: CPT

## 2019-11-04 RX ORDER — AMOXICILLIN 500 MG/1
500 CAPSULE ORAL 3 TIMES DAILY
Qty: 30 CAPSULE | Refills: 0 | Status: SHIPPED | OUTPATIENT
Start: 2019-11-04 | End: 2020-01-31 | Stop reason: ALTCHOICE

## 2019-11-04 ASSESSMENT — ENCOUNTER SYMPTOMS
SINUS PRESSURE: 1
CHILLS: 0
FEVER: 1
SHORTNESS OF BREATH: 0
SINUS PAIN: 1
PALPITATIONS: 1

## 2019-11-04 NOTE — DISCHARGE INSTRUCTIONS
Push fluids, rest, mucinex 1200 mg twice daily, amoxicillin as directed for 10 days and follow up with cardiologist for palpitations.

## 2019-11-04 NOTE — ED PROVIDER NOTES
History  Chief Complaint   Patient presents with   • Fever   • Cough   • Headache   • Chest Pain     Two week history of sinus pressure and now with low grade temperature.  She is nursing her 5th baby and has been using mucinex.  Also states she has been having palpitations with some chest pain for the last several days and was seen by her doctor and an EKG was done.  They plan to start flecanide once she is finished nursing.          Past Medical History:   Diagnosis Date   • Abnormal ECG     PVCs   • Arrhythmia 2018    PVCs, status post ablation, partially successful.   • History of fetal demise, not currently pregnant     Twin pregnancy, 1 fetal demise, 1 live birth   • Migraine headache    • Ovarian cyst    • Pericarditis 2018    After john ablation       Past Surgical History:   Procedure Laterality Date   • ABLATION OF DYSRHYTHMIC FOCUS  2018   •  SECTION         Family History   Problem Relation Age of Onset   • Hyperlipidemia Biological Mother    • Hyperlipidemia Biological Father    • Hypertension Biological Father    • Prostate cancer Biological Father 78   • Dementia Biological Father    • Hyperlipidemia Maternal Grandmother    • Clotting disorder Maternal Grandfather         developed in 60's - required tansfusions/platelets   • Hyperlipidemia Paternal Grandmother    • Dementia Paternal Grandmother    • Diabetes Paternal Grandfather        Social History     Tobacco Use   • Smoking status: Never Smoker   • Smokeless tobacco: Never Used   Substance Use Topics   • Alcohol use: Yes     Comment: occasionally   • Drug use: No       Review of Systems   Constitutional: Positive for fever. Negative for chills.   HENT: Positive for congestion, sinus pressure and sinus pain.    Respiratory: Negative for shortness of breath.    Cardiovascular: Positive for palpitations.       Physical Exam  ED Triage Vitals [19 1444]   Temp Heart Rate Resp BP SpO2   36.9 °C (98.5 °F) 72 18 118/68 --       Temp src Heart Rate Source Patient Position BP Location FiO2 (%) (Set)   -- -- -- -- --       Physical Exam   Constitutional: She appears well-developed and well-nourished. No distress.   HENT:   Head: Normocephalic and atraumatic.   Nose: Mucosal edema present. Right sinus exhibits maxillary sinus tenderness. Left sinus exhibits no maxillary sinus tenderness.   Eyes: Conjunctivae are normal.   Neck: Neck supple.   Cardiovascular: Normal rate and regular rhythm.   No murmur heard.  Pulmonary/Chest: Effort normal and breath sounds normal. No respiratory distress.   Abdominal: Soft. There is no tenderness.   Musculoskeletal: She exhibits no edema.   Neurological: She is alert.   Skin: Skin is warm and dry.   Psychiatric: She has a normal mood and affect.   Nursing note and vitals reviewed.        Procedures  Procedures    UC Course  Clinical Impressions as of Nov 04 1505   Acute maxillary sinusitis, recurrence not specified       MDM  Number of Diagnoses or Management Options  Acute maxillary sinusitis, recurrence not specified:   Diagnosis management comments: Push fluids, rest, mucinex 1200 mg twice daily, amoxicillin as directed for 10 days and follow up with cardiologist for palpitations.                   Sarahy Acharya MD  11/04/19 1500

## 2019-11-05 LAB
IGA SERPL-MCNC: 61 MG/DL (ref 85–385)
TTG IGA SER IA-ACNC: <0.1 ELIA U/ML

## 2019-11-06 LAB — TTG IGG SER IA-ACNC: 0.7 ELIA U/ML

## 2019-12-01 ENCOUNTER — TELEPHONE (OUTPATIENT)
Dept: OBSTETRICS AND GYNECOLOGY | Facility: CLINIC | Age: 39
End: 2019-12-01

## 2019-12-01 RX ORDER — DICLOXACILLIN SODIUM 500 MG/1
500 CAPSULE ORAL 4 TIMES DAILY
Qty: 40 CAPSULE | Refills: 0 | Status: SHIPPED | OUTPATIENT
Start: 2019-12-01 | End: 2020-01-31 | Stop reason: ALTCHOICE

## 2019-12-01 NOTE — TELEPHONE ENCOUNTER
Pt called answering service, spoke with pt. Has had fevers for the past 2 days, woke up this morning with sore right breast. Likely mastitis. Rx sent for dicloxacillin.

## 2019-12-09 ENCOUNTER — OFFICE VISIT (OUTPATIENT)
Dept: INTERNAL MEDICINE | Facility: CLINIC | Age: 39
End: 2019-12-09
Payer: COMMERCIAL

## 2019-12-09 VITALS
DIASTOLIC BLOOD PRESSURE: 64 MMHG | HEIGHT: 67 IN | BODY MASS INDEX: 32.96 KG/M2 | TEMPERATURE: 97.8 F | RESPIRATION RATE: 14 BRPM | SYSTOLIC BLOOD PRESSURE: 104 MMHG | WEIGHT: 210 LBS | OXYGEN SATURATION: 98 % | HEART RATE: 66 BPM

## 2019-12-09 DIAGNOSIS — M54.50 CHRONIC BILATERAL LOW BACK PAIN WITHOUT SCIATICA: ICD-10-CM

## 2019-12-09 DIAGNOSIS — M76.31 ILIOTIBIAL BAND SYNDROME OF RIGHT SIDE: Primary | ICD-10-CM

## 2019-12-09 DIAGNOSIS — G89.29 CHRONIC BILATERAL LOW BACK PAIN WITHOUT SCIATICA: ICD-10-CM

## 2019-12-09 DIAGNOSIS — R79.82 CRP ELEVATED: ICD-10-CM

## 2019-12-09 DIAGNOSIS — D69.6 THROMBOCYTOPENIA (CMS/HCC): ICD-10-CM

## 2019-12-09 DIAGNOSIS — M77.8 DELTOID TENDONITIS OF RIGHT SHOULDER: ICD-10-CM

## 2019-12-09 DIAGNOSIS — R39.198 DIFFICULTY VOIDING: ICD-10-CM

## 2019-12-09 DIAGNOSIS — F43.0 STRESS REACTION: ICD-10-CM

## 2019-12-09 LAB — CRP SERPL-MCNC: 8.46 MG/L

## 2019-12-09 PROCEDURE — 86140 C-REACTIVE PROTEIN: CPT | Performed by: INTERNAL MEDICINE

## 2019-12-09 PROCEDURE — 99214 OFFICE O/P EST MOD 30 MIN: CPT | Performed by: INTERNAL MEDICINE

## 2019-12-09 PROCEDURE — 36415 COLL VENOUS BLD VENIPUNCTURE: CPT | Performed by: INTERNAL MEDICINE

## 2019-12-09 NOTE — ASSESSMENT & PLAN NOTE
SI joint pain, she will follow up with [facility]  Emphasized importance of gradual increase in physical activity in the postpartum period to avoid injury. Recommended focusing on strength-building exercises like barre/yoga/pilates.

## 2019-12-09 NOTE — PATIENT INSTRUCTIONS
I recommend Dr. Hilliard - [address] [facility] [address], [place] [address]   [phone]      [facility] - they offer counseling.   Address: [address] [place] [address], [place] [address]   Phone: [phone]

## 2019-12-09 NOTE — PROGRESS NOTES
Andressa Baker is a 39 y.o. female who presents today for     Chief Complaint   Patient presents with   • Generalized Body Aches   • Weight Management   • Headache   • Arm Pain     intermittent shooting pain, with exercise         SUBJECTIVE:  HPI    Depression - this is situational. Her father is really sick with progressive dementia and has been deteriorating. He is no longer able to come over to her house, so her mother does not visit as much to help out. She has 5 kids, the youngest is 5 months and is breastfeeding. Unfortunately she had mastitis over thanksgiving. She had been on zoloft, tramadol and trintellix. She does not have suicidal ideation or thoughts of self harm. Her sleep is interrupted often.     Pain - she tried aqua nahid class, but feels tired the next day. She has bilateral back pain, right sided leg pain and IT band pain. Hurt her R arm swimming laps at the insertion of the deltoid.     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 Going to the [employer] once a week, walking 1-2 times per week. She would really like to run again but IT band pain is stopping her.       No Known Allergies      Current Outpatient Medications:   •  cholecalciferol (VITAMIN D3) 10,000 unit capsule, Take 10,000 Units by mouth once a week.  , Disp: , Rfl:   •  dicloxacillin (DYNAPEN) 500 mg capsule, Take 1 capsule (500 mg total) by mouth 4 (four) times a day for 10 days., Disp: 40 capsule, Rfl: 0  •  ibuprofen (ADVIL ORAL), Take by mouth daily as needed., Disp: , Rfl:   •  olopatadine HCl (PATANOL OPHT), Administer into affected eye(s) daily., Disp: , Rfl:   •  docosahexanoic acid (PRENATAL DHA) 200 mg capsule, Prenatal + DHA, Disp: , Rfl:       ROS  Per HPI       OBJECTIVE:  Vitals:    12/09/19 0947   BP: 104/64   BP Location: Left upper arm   Patient Position: Sitting   Pulse: 66   Resp: 14   Temp: 36.6 °C (97.8 °F)   TempSrc: Oral  "  SpO2: 98%   Weight: 95.3 kg (210 lb)   Height: 1.702 m (5' 7\")       Wt Readings from Last 3 Encounters:   12/09/19 95.3 kg (210 lb)   11/04/19 93 kg (205 lb)   10/03/19 93.9 kg (207 lb)     Body mass index is 32.89 kg/m².      Physical Exam   Constitutional: She appears well-developed and well-nourished.   HENT:   Head: Normocephalic and atraumatic.   Right Ear: External ear normal.   Left Ear: External ear normal.   Mouth/Throat: Oropharynx is clear and moist.   Eyes: Pupils are equal, round, and reactive to light. EOM are normal.   Neck: Normal range of motion. Neck supple. No thyromegaly present.   Cardiovascular: Normal rate, regular rhythm and normal heart sounds. Exam reveals no friction rub.   No murmur heard.  Pulmonary/Chest: Effort normal and breath sounds normal. No stridor. No respiratory distress.   Musculoskeletal: Normal range of motion. She exhibits no edema.        Right shoulder: Normal.        Left shoulder: Normal.        Thoracic back: Normal.        Lumbar back: Normal.   TTP over R IT band  Straight leg raise negative, hip internal and external rotation is normal  R deltoid is non-tender       ASSESSMENT & PLAN:   Diagnoses and all orders for this visit:    Iliotibial band syndrome of right side (Primary)  -     Ambulatory referral to Physical Therapy; Future    CRP elevated  -     C-reactive protein    Thrombocytopenia (CMS/[facility])  Assessment & Plan:  Plt stable 176k      Stress reaction    Deltoid tendonitis of right shoulder  Assessment & Plan:  Recommend rest from aggravating physical activities and given rx for PT, ok to use PRN anagesics, tylenol       Difficulty voiding  Assessment & Plan:  Recommended follow up with PM&R, Dr. Hilliard for pelvic floor assessment/rehab      Chronic bilateral low back pain without sciatica  Assessment & Plan:  SI joint pain, she will follow up with PCOM OMT  Emphasized importance of gradual increase in physical activity in the postpartum period to avoid " injury. Recommended focusing on strength-building exercises like barre/yoga/pilates.                   Depression Follow-up Statement  Andressa ROSA Baker scored positive on the PHQ-2 Depression Screening:    Have You Felt Down, Depressed or Hopeless?: yes  Have You Felt Little Interest or Pleasure in Doing Things?: no    PHQ-9: Brief Depression Severity Measure Score: 10    The patient's follow-up plan for a positive depression screening includes one or more of the following:  Suicide Risk Assessment, counseling resources given

## 2020-01-28 ENCOUNTER — TELEPHONE (OUTPATIENT)
Dept: BARIATRICS/WEIGHT MGMT | Facility: CLINIC | Age: 40
End: 2020-01-28

## 2020-01-28 NOTE — TELEPHONE ENCOUNTER
Former patient. She is having difficulty losing weight after new baby. She is also experiencing increased heat palpitations. She is seeing a Cardio as well. Could we see her sooner than September? Please Advise

## 2020-01-31 ENCOUNTER — OFFICE VISIT (OUTPATIENT)
Dept: BARIATRICS/WEIGHT MGMT | Facility: CLINIC | Age: 40
End: 2020-01-31
Payer: COMMERCIAL

## 2020-01-31 VITALS
WEIGHT: 214.8 LBS | SYSTOLIC BLOOD PRESSURE: 108 MMHG | HEIGHT: 67 IN | DIASTOLIC BLOOD PRESSURE: 70 MMHG | BODY MASS INDEX: 33.71 KG/M2 | TEMPERATURE: 98.1 F | OXYGEN SATURATION: 97 % | RESPIRATION RATE: 16 BRPM | HEART RATE: 73 BPM

## 2020-01-31 DIAGNOSIS — F41.9 ANXIETY: ICD-10-CM

## 2020-01-31 DIAGNOSIS — I49.3 PVC (PREMATURE VENTRICULAR CONTRACTION): Primary | ICD-10-CM

## 2020-01-31 DIAGNOSIS — E66.811 CLASS 1 OBESITY WITH SERIOUS COMORBIDITY AND BODY MASS INDEX (BMI) OF 33.0 TO 33.9 IN ADULT, UNSPECIFIED OBESITY TYPE: ICD-10-CM

## 2020-01-31 PROCEDURE — 99214 OFFICE O/P EST MOD 30 MIN: CPT | Performed by: FAMILY MEDICINE

## 2020-01-31 ASSESSMENT — ENCOUNTER SYMPTOMS
UNEXPECTED WEIGHT CHANGE: 0
PALPITATIONS: 0
DIZZINESS: 0
TREMORS: 0
SHORTNESS OF BREATH: 0
DIARRHEA: 0
JOINT SWELLING: 0
LIGHT-HEADEDNESS: 0
SORE THROAT: 0
MYALGIAS: 0
HYPERACTIVE: 0
CONSTIPATION: 0
ACTIVITY CHANGE: 0
ARTHRALGIAS: 1
COUGH: 0
FATIGUE: 1
DYSPHORIC MOOD: 1
CONFUSION: 0
DECREASED CONCENTRATION: 0
NERVOUS/ANXIOUS: 0

## 2020-01-31 NOTE — PROGRESS NOTES
Questions YES NO   1 During the last 3 months, did you have any episodes of excessive overeating (i.e. eating significantly more than what most people would eat in a similar period)?   x    NOTE: IF YOU ANSWERED “NO” TO QUESTION 1, YOU MAY STOP. THE REMAINING QUESTIONS DO NOT APPLY TO YOU       2 Do you feel distresses about your episodes of excessive overeating? x        Within the past 3 months… Never or Rarely Sometimes Often Always   3 During your episodes of excessive overwaiting, how often did you feel like you had no control over your eating (e.g., not being able to stop eating, feel compelled to eat, or going back and forth for more food)?  x     4 During your episodes of excessive overeating, how often did you continue eating even though you were not hungry?    x   5 During your episodes of excessive overeating, how often were you embarrassed by how much you ate?    x   6 During your episodes of excessive overeating, how often did you feel disgusted with yourself or guilty afterwards?    x   7 During the last 3 months, how often did you make yourself vomit as a means to control your weight or shape? Never

## 2020-01-31 NOTE — ASSESSMENT & PLAN NOTE
Stress reduction through mindful meditation.    Continue on medications as outlined by cardiology.    Reduce excitotoxin foods including sugar.    Routine physical activity.

## 2020-01-31 NOTE — PATIENT INSTRUCTIONS
Problem List Items Addressed This Visit        Circulatory    PVC (premature ventricular contraction) - Primary     Stress reduction through mindful meditation.    Continue on medications as outlined by cardiology.    Reduce excitotoxin foods including sugar.    Routine physical activity.            Endocrine/Metabolic    Class 1 obesity with serious comorbidity and body mass index (BMI) of 33.0 to 33.9 in adult     Biggest risk factor for difficulty regulating weight is binge eating disorder exacerbated by grief and anxiety.    Hallmark of treatment strategy for binge eating disorder is frequent timed meals and adequate amounts of protein.  Secondarily, stress reduction is also important.    Aim for 4 or more fruits and veggies in a day.   6 or more proteins in a day, 4-8 fats in a day.   Avoid high glycemic index foods if possible.    Meditation with nursing instead of phone.     Mini-obstacle -- with urge to binge, play with or hold the baby in an effort to create comfort and self soothing.    Consider beginner yoga as a miniobstacle as well.    Educational and counseling on strategies for lifestyle change to address weight related health conditions over 1/2 of todays 25 minute appointment.                 Other    Anxiety     Work on positive body image.  Discussed mindfulness techniques today.

## 2020-01-31 NOTE — PROGRESS NOTES
Subjective     Patient ID: Andressa Baker is a 39 y.o. female.    HPI  Patient  is here today to follow up on using lifestyle modification and weight loss as a adjunct to traditional medical therapy/treatment strategy for binge eating disorder, depression anxiety, and PVCs..  Medications reviewed.  Patient is taking all medications and supplements as prescribed    On a was doing well with weight regulation prior to her most recent pregnancy.  She currently has 6 children at home.  She admits depression after 1 of her previous pregnancies starting as a twin pregnancy with loss of 1 of the twins during the pregnancy.  She continues to have intermittent difficulty with depression despite being a quite functional mother and homemaker.    Challenges : knee pain (pfs), nursing baby (6 month old), mom of 6, putting herself last.     Knee pain exacerbated by pregnancy which limits walking.    During this most recent pregnancy, she had significant nausea, She weight 207 at the end of pregnancy, and was 179 pre pregnancy. 3 months ago 1st episode of BED.   She reports that for the first time in her life she ate 4 doughnuts and she realized that that felt really good.    She has limited time for other self-care but is trying the following techniques:  Other options -- seeing Dr. Kat, self care -- nails, moms group    She admits to daily sugar intake despite that not being consistent with her goals, and 1-2 times a week eating more than 1000 jamia of sugar at a time.  She always does this in secret and she feels guilty about it.    On questioning she does admit to having a lot of escape time on her phone through social media and shopping sites.    Review of Systems   Constitutional: Positive for fatigue. Negative for activity change and unexpected weight change.   HENT: Negative for sore throat.    Respiratory: Negative for cough and shortness of breath.    Cardiovascular: Negative for chest pain, palpitations and leg  "swelling.   Gastrointestinal: Negative for constipation and diarrhea.   Endocrine: Negative for cold intolerance and heat intolerance.   Musculoskeletal: Positive for arthralgias. Negative for joint swelling and myalgias.   Skin: Negative for rash.   Neurological: Negative for dizziness, tremors and light-headedness.   Psychiatric/Behavioral: Positive for dysphoric mood. Negative for confusion and decreased concentration. The patient is not nervous/anxious and is not hyperactive.        Objective     Vitals:    01/31/20 1103   BP: 108/70   BP Location: Left upper arm   Patient Position: Sitting   Pulse: 73   Resp: 16   Temp: 36.7 °C (98.1 °F)   TempSrc: Oral   SpO2: 97%   Weight: 97.4 kg (214 lb 12.8 oz)   Height: 1.702 m (5' 7\")     Body mass index is 33.64 kg/m².    Physical Exam    Assessment/Plan   Diagnoses and all orders for this visit:    PVC (premature ventricular contraction) (Primary)  Assessment & Plan:  Stress reduction through mindful meditation.    Continue on medications as outlined by cardiology.    Reduce excitotoxin foods including sugar.    Routine physical activity.      Class 1 obesity with serious comorbidity and body mass index (BMI) of 33.0 to 33.9 in adult, unspecified obesity type  Assessment & Plan:  Biggest risk factor for difficulty regulating weight is binge eating disorder exacerbated by grief and anxiety.    Hallmark of treatment strategy for binge eating disorder is frequent timed meals and adequate amounts of protein.  Secondarily, stress reduction is also important.    Aim for 4 or more fruits and veggies in a day.   6 or more proteins in a day, 4-8 fats in a day.   Avoid high glycemic index foods if possible.    Meditation with nursing instead of phone.     Mini-obstacle -- with urge to binge, play with or hold the baby in an effort to create comfort and self soothing.    Consider beginner yoga as a miniobstacle as well.    Educational and counseling on strategies for lifestyle " change to address weight related health conditions over 1/2 of todays 25 minute appointment.           Anxiety  Assessment & Plan:  Work on positive body image.  Discussed mindfulness techniques today.

## 2020-01-31 NOTE — ASSESSMENT & PLAN NOTE
Biggest risk factor for difficulty regulating weight is binge eating disorder exacerbated by grief and anxiety.    Hallmark of treatment strategy for binge eating disorder is frequent timed meals and adequate amounts of protein.  Secondarily, stress reduction is also important.    Aim for 4 or more fruits and veggies in a day.   6 or more proteins in a day, 4-8 fats in a day.   Avoid high glycemic index foods if possible.    Meditation with nursing instead of phone.     Mini-obstacle -- with urge to binge, play with or hold the baby in an effort to create comfort and self soothing.    Consider beginner yoga as a miniobstacle as well.    Educational and counseling on strategies for lifestyle change to address weight related health conditions over 1/2 of todays 25 minute appointment.

## 2020-02-11 NOTE — ADDENDUM NOTE
Addended by: MARS MONROY on: 5/21/2019 02:17 PM     Modules accepted: Orders    
Addended by: MARS MONROY on: 5/21/2019 03:46 PM     Modules accepted: Orders    
nasal cannula

## 2020-03-10 ENCOUNTER — OFFICE VISIT (OUTPATIENT)
Dept: BARIATRICS/WEIGHT MGMT | Facility: CLINIC | Age: 40
End: 2020-03-10
Payer: COMMERCIAL

## 2020-03-10 VITALS
SYSTOLIC BLOOD PRESSURE: 118 MMHG | WEIGHT: 210.3 LBS | DIASTOLIC BLOOD PRESSURE: 76 MMHG | RESPIRATION RATE: 16 BRPM | BODY MASS INDEX: 32.94 KG/M2 | OXYGEN SATURATION: 97 % | HEART RATE: 80 BPM

## 2020-03-10 DIAGNOSIS — F41.9 ANXIETY: Primary | ICD-10-CM

## 2020-03-10 DIAGNOSIS — E66.811 CLASS 1 OBESITY WITH SERIOUS COMORBIDITY AND BODY MASS INDEX (BMI) OF 33.0 TO 33.9 IN ADULT, UNSPECIFIED OBESITY TYPE: ICD-10-CM

## 2020-03-10 PROCEDURE — 99213 OFFICE O/P EST LOW 20 MIN: CPT | Performed by: FAMILY MEDICINE

## 2020-03-10 NOTE — PROGRESS NOTES
Subjective     Patient ID: Andressa Baker is a 39 y.o. female.    HPI  Patient  is here today to follow up on using lifestyle modification and weight loss as a adjunct to traditional medical therapy/treatment strategy for binge eating, anxiety, and joint pain.  Medications reviewed.  Patient is taking all medications and supplements as recommended.     Since last visit -- made a rule that there is no eating in secret -- this has reduced BED episode, still snacking, but not over snacking.  Starting to release guilt associated with food.    Concerns : she is struggling with sleeping due to irrational thoughts and worsening palpitations.  Anxiety continues to worsen, mostly about ideas that something bad and uncontrollable will happen to her children.  This is been present since having a twin pregnancy with only 1 viable twin.  She is trying to avoid medication at this time because of nursing her baby.    Nutrition is improving and patient is mindful to get 3 meals and every day no matter what.  Overnight oats with rashel and powdered PB.   Lunch will be lean protein and a salad.   Snacks on fruits and veggies  Diner is lean protein and with more veggies.     She is making time to walk regularly.    Review of Systems    Objective     Vitals:    03/10/20 1312   BP: 118/76   BP Location: Left upper arm   Patient Position: Sitting   Pulse: 80   Resp: 16   SpO2: 97%   Weight: 95.4 kg (210 lb 4.8 oz)     Body mass index is 32.94 kg/m².    Physical Exam    Assessment/Plan   Diagnoses and all orders for this visit:    Anxiety (Primary)  Assessment & Plan:  Anxiety is worsening, and patient is open to visiting with emotional wellness professional.  She is also open to making time for relaxation techniques.    She will initiate nighttime magnesium to help with sleep and palpitations.  She will reinitiate B12 and be mindful to get her vitamin D and weekly.      Class 1 obesity with serious comorbidity and body mass index  (BMI) of 33.0 to 33.9 in adult, unspecified obesity type  Assessment & Plan:  Nutrition goal : Supplement diet with sublingual B12,  Magnesium 250 -500 mg at night, restart vitamin d 10,000 IU per week with food.     Relaxation: continue to work on reducing the screen time, head space rashid for relaxation.      Physical activity: dedicated 1 times per week and 2 other episode 2 days a week.  Look for excuses to get outside and walk to declutter your brain.      Consider emotional wellness for women.     Educational and counseling on strategies for lifestyle change to address weight related health conditions over 1/2 of todays 20 minute appointment.

## 2020-03-10 NOTE — PATIENT INSTRUCTIONS
Problem List Items Addressed This Visit        Endocrine/Metabolic    Class 1 obesity with serious comorbidity and body mass index (BMI) of 33.0 to 33.9 in adult - Primary     Nutrition goal : Supplement diet with sublingual B12,  Magnesium 250 -500 mg at night, restart vitamin d 10,000 IU per week with food.     Relaxation: continue to work on reducing the screen time, head space rashid for relaxation.      Physical activity: dedicated 1 times per week and 2 other episode 2 days a week.  Look for excuses to get outside and walk to declutter your brain.      Consider emotional wellness for women.

## 2020-03-10 NOTE — ASSESSMENT & PLAN NOTE
Nutrition goal : Supplement diet with sublingual B12,  Magnesium 250 -500 mg at night, restart vitamin d 10,000 IU per week with food.     Relaxation: continue to work on reducing the screen time, head space rashid for relaxation.      Physical activity: dedicated 1 times per week and 2 other episode 2 days a week.  Look for excuses to get outside and walk to declutter your brain.      Consider emotional wellness for women.     Educational and counseling on strategies for lifestyle change to address weight related health conditions over 1/2 of todays 20 minute appointment.

## 2020-03-10 NOTE — LETTER
March 12, 2020     Patient: Andressa Baker  YOB: 1980  Date of Visit: 3/10/2020    To Whom it May Concern:    Andressa Baker was seen in my clinic on 3/10/2020 at 1:00 pm. Please excuse Andressa for her absence from school on this day to make the appointment.    If you have any questions or concerns, please don't hesitate to call.         Sincerely,         Gloria Donato MD        CC: No Recipients

## 2020-03-12 NOTE — ASSESSMENT & PLAN NOTE
Anxiety is worsening, and patient is open to visiting with emotional wellness professional.  She is also open to making time for relaxation techniques.    She will initiate nighttime magnesium to help with sleep and palpitations.  She will reinitiate B12 and be mindful to get her vitamin D and weekly.

## 2020-04-01 ENCOUNTER — TELEMEDICINE (OUTPATIENT)
Dept: INTERNAL MEDICINE | Facility: CLINIC | Age: 40
End: 2020-04-01
Payer: COMMERCIAL

## 2020-04-01 ENCOUNTER — CLINICAL SUPPORT (OUTPATIENT)
Dept: OTHER | Facility: CLINIC | Age: 40
End: 2020-04-01
Attending: INTERNAL MEDICINE
Payer: COMMERCIAL

## 2020-04-01 DIAGNOSIS — R05.9 COUGH: Primary | ICD-10-CM

## 2020-04-01 DIAGNOSIS — R50.9 FEVER, UNSPECIFIED: Primary | ICD-10-CM

## 2020-04-01 DIAGNOSIS — R50.9 FEVER, UNSPECIFIED: ICD-10-CM

## 2020-04-01 PROCEDURE — 99442 PR PHYS/QHP TELEPHONE EVALUATION 11-20 MIN: CPT | Performed by: INTERNAL MEDICINE

## 2020-04-01 NOTE — PROGRESS NOTES
Request for Consent:  You and I are about to have a telemedicine check-in or visit. This is allowed because you are already my patient, and you have requested it.  This telemedicine visit will be billed to your health insurance or you, if you are self-insured.  You understand you will be responsible for any copayments or coinsurances that apply to your telemedicine visit.  Before starting our telemedicine visit, I am required to get your consent for this virtual check-in or visit by telemedicine. Do you consent?      Patient Response to Request for Consent: Yes    The following have been reviewed and updated as appropriate in this visit:         Visit Documentation:      Headache, sore throat, cough. She is not short of breath. She had a low grade temperature around 99 this morning. Her father just passed away at home after developing pneumonia. He was tested after he  and was found to be COVID-19 positive. She has been in quarantine, however she was last in contact with her mother  and her mother apparently had some mild symptoms at that time. She is the main caregiver for her 5 kids, she is nursing a baby.     Her mother got COVID testing in the [employer] system yesterday. Her younger brother is also sick.       TESTING:  Based on patient's symptoms and/or risk factors, recommend testing for COVID-19.   The importance of social distancing and self-isolation were discussed at length.  Patient advised to call the office with worsening symptoms.   [facility] COVID-19 Communication Center was called.   COVID-19 test ordered. Command center to call [facility] for PUI number. Pt was informed of the plan.         Time Spent in Medical Discussion During This Encounter:  15 minutes

## 2020-04-01 NOTE — PROGRESS NOTES
Patient was processed today at [facility] drive-up clinic.  Pt denies any sort of medical distress today.  After identifying the patient, a nasopharyngeal sample was obtained and placed in viral transport medium.  Pt was given a post-collection education sheet and encouraged to self-isolate until they receive the results.  Pt directed to [facility] website for [facility] Privacy Practices.  Sample sent to the lab noted on the order and requisition.  Pt was without additional questions or concerns and was dispositioned from the testing area to home.

## 2020-04-07 ENCOUNTER — TELEPHONE (OUTPATIENT)
Dept: INTERNAL MEDICINE | Facility: CLINIC | Age: 40
End: 2020-04-07

## 2020-04-07 LAB
QUEST OVERALL RESULT:: NOT DETECTED
SARS-COV RNA SPEC QL NAA+PROBE: NEGATIVE
SARS-COV-2 RNA RESP QL NAA+PROBE: NEGATIVE
SPECIMEN SOURCE: NORMAL
SYMPTOM: YES

## 2020-04-28 ENCOUNTER — TELEMEDICINE (OUTPATIENT)
Dept: INTERNAL MEDICINE | Facility: CLINIC | Age: 40
End: 2020-04-28
Payer: COMMERCIAL

## 2020-04-28 ENCOUNTER — TELEPHONE (OUTPATIENT)
Dept: INTERNAL MEDICINE | Facility: CLINIC | Age: 40
End: 2020-04-28

## 2020-04-28 DIAGNOSIS — F41.9 ANXIETY: Primary | ICD-10-CM

## 2020-04-28 PROCEDURE — 99213 OFFICE O/P EST LOW 20 MIN: CPT | Mod: 95 | Performed by: INTERNAL MEDICINE

## 2020-04-28 RX ORDER — SERTRALINE HYDROCHLORIDE 25 MG/1
25 TABLET, FILM COATED ORAL DAILY
Qty: 90 TABLET | Refills: 3 | Status: SHIPPED | OUTPATIENT
Start: 2020-04-28 | End: 2020-07-13 | Stop reason: SDUPTHER

## 2020-04-28 RX ORDER — B-COMPLEX WITH VITAMIN C
1 TABLET ORAL DAILY
COMMUNITY
End: 2022-10-18

## 2020-04-28 NOTE — TELEPHONE ENCOUNTER
Dr. Domingo    Patient called back returning your call from an hour ago    Phone # [phone]    Thank You

## 2020-04-28 NOTE — ASSESSMENT & PLAN NOTE
-- start sertraline 25 mg daily, take 1/2 tablet for the first week, then increase to 25 mg daily   -- counseled on side effects, including possibility of increased suicidal thoughts    -- counseled on delayed effect, increased dose may be needed    -- close follow-up, call with any problems   -- continue weekly therapy/CBT  -- counseled on the importance of healthy lifestyle, including eating a well-balanced diet, exercising and sleeping well   -- recommended meditation and mindfulness   -- follow up in 4-6 weeks for side effect check/titration

## 2020-04-28 NOTE — PROGRESS NOTES
Verification of Patient Location:  The patient affirms they are currently located in the following state: [place]    Request for Consent:   Audio Only Encounter   You and I are about to have a telemedicine check-in or visit. This is allowed because you have requested it. This telemedicine visit will be billed to your health insurance or you, if you are self-insured. You understand you will be responsible for any copayments or coinsurances that apply to your telemedicine visit. Before starting our telemedicine visit, I am required to get your consent for this virtual check-in or visit by telemedicine. Do you consent?    Patient Response to Request for Consent:  Yes      Visit Documentation:  Subjective     Patient ID: Andressa Baker is a 39 y.o. female.  1980      HPI     Anxiety and Depression - she has difficulty sleeping, getting angry at the kids constantly. By 10-11 am she takes out her anger and grief on her kids. She is seeing a therapist once a week. She finds the therapy helpful, she has known her therapist a long time and she does CBT techniques with her. She has episodes of depressed mood. She has no thoughts of suicidality but she does wish she could disappear. She hasn't had any time for herself to process her father's death.     She has taken zoloft in the past and it had helped, but at 100 mg dosing it made her gain weight. She is nursing her 9 month old baby.     She has also taken trintellix and has taken ativan as needed for anxiety.     Wt today 207.4 lbs. She was losing weight before her dad , now she has been eating healthier and has lost 4 lbs from her peak. She is now able to go for walks.         The following have been reviewed and updated as appropriate in this visit:  Allergies  Meds  Problems       Review of Systems  Per HPI    Assessment/Plan   Diagnoses and all orders for this visit:    Anxiety (Primary)  Assessment & Plan:  -- start sertraline 25 mg daily,  take 1/2 tablet for the first week, then increase to 25 mg daily   -- counseled on side effects, including possibility of increased suicidal thoughts    -- counseled on delayed effect, increased dose may be needed    -- close follow-up, call with any problems   -- continue weekly therapy/CBT  -- counseled on the importance of healthy lifestyle, including eating a well-balanced diet, exercising and sleeping well   -- recommended meditation and mindfulness   -- follow up in 4-6 weeks for side effect check/titration        Other orders  -     sertraline (ZOLOFT) 25 mg tablet; Take 1 tablet (25 mg total) by mouth daily.      Time Spent in Medical Discussion During This Encounter:      Total encounter time, with >50 percent spent counseling/coordinating: 10 minutes

## 2020-05-15 ENCOUNTER — TELEPHONE (OUTPATIENT)
Dept: INTERNAL MEDICINE | Facility: CLINIC | Age: 40
End: 2020-05-15

## 2020-05-15 RX ORDER — LORAZEPAM 0.5 MG/1
0.5 TABLET ORAL EVERY 8 HOURS PRN
Qty: 20 TABLET | Refills: 1 | Status: SHIPPED | OUTPATIENT
Start: 2020-05-15 | End: 2020-10-06 | Stop reason: SDUPTHER

## 2020-05-15 NOTE — TELEPHONE ENCOUNTER
Discussed anxiety with patient, she is having feelings of panic over saurabh COVID, very burnt out caring for 5 kids. Counseled on getting outdoors, getting exercise. PDMP queried, no evidence of misuse or diversion. Refilled ativan 0.5 mg. Counseled not to mix with alcohol.

## 2020-05-19 ENCOUNTER — TELEMEDICINE (OUTPATIENT)
Dept: BARIATRICS/WEIGHT MGMT | Facility: CLINIC | Age: 40
End: 2020-05-19
Payer: COMMERCIAL

## 2020-05-19 DIAGNOSIS — E66.811 CLASS 1 OBESITY WITH SERIOUS COMORBIDITY AND BODY MASS INDEX (BMI) OF 33.0 TO 33.9 IN ADULT, UNSPECIFIED OBESITY TYPE: ICD-10-CM

## 2020-05-19 DIAGNOSIS — F41.9 ANXIETY: Primary | ICD-10-CM

## 2020-05-19 PROCEDURE — 99214 OFFICE O/P EST MOD 30 MIN: CPT | Mod: 95 | Performed by: FAMILY MEDICINE

## 2020-05-19 NOTE — PATIENT INSTRUCTIONS
Problem List Items Addressed This Visit        Endocrine/Metabolic    Class 1 obesity with serious comorbidity and body mass index (BMI) of 33.0 to 33.9 in adult     Stop trying to lose weight and start trying to gain health.    Enlist the children's help with housework.  Create a sticker chart, hand them out for everything.    We outlined a structure for eating today which included  Breakfast: Overnight oats and hard-boiled egg   midmorning snack: Fruit and Greek yogurt  Lunch: Large homemade salad with what ever the kids are eating  Afternoon snacks: Nuts and raw fruits or vegetables  Dinner: Balanced home meal or harm reduction if ordering out --- chicken or fish with vegetables  If you feel the need to eat in between the structured times, please do a mini obstacle first.  Keep this list on your refrigerator.  Walk to the end of the driveway and back  Take 6 deep breaths  Drink 8 ounces of water  Eat 1 cup of raw vegetables or fruit  Dance to 1 song  Do 1 push-up, 1 squat, 1 sit up              Other    Anxiety - Primary     Having good control over mood and managing stress is an important part of successful weight management.    Please continue to follow the  plan as outlined by your treating provider with Zoloft.    Make a list of self soothing activities such as coloring, puzzling, walking outside,etc and do these prior to looking for food to fill that emotional hunger.

## 2020-05-19 NOTE — ASSESSMENT & PLAN NOTE
Having good control over mood and managing stress is an important part of successful weight management.    Please continue to follow the  plan as outlined by your treating provider with Zoloft.    Make a list of self soothing activities such as coloring, puzzling, walking outside,etc and do these prior to looking for food to fill that emotional hunger.

## 2020-05-19 NOTE — PROGRESS NOTES
DETAILS OF VISIT   Start of visit: 11:40   End of visit: 12:00  Total time: 20 minutes    Telemedicine visit: audio-visual zoom  Consent statement Read: You and I are about to have a Telemedicine visit.  This is allowed because you are already my patient, and you have requested it.  This telemedicine visit will be billed to your health insurance or to you, if you are self-insured. You understand that you ill be responsible for any co-payments or co-insurances that apply to your telemedicine visit.  Before starting our telemedicine visit, I am required to get your consent for this virtual check-in.  Do you consent?  Patient reply : Yes    PCP: Iesha Domingo MD   HISTORY OF PRESENT ILLNESS        Andressa Baker is a 40 y.o. female following up on lifestyle management and weight loss as adjunctive treatment strategies for anxiety, myalgias.  Medications reviewed today.   Patient has the following concerns or questions: Addressing emotional eating and feeling overwhelmed    Interval history: Father passed from COVID and her mother was in the ICU.  She has lost her help.      Nutrition update: emotionally eating    Physical activity update: pacing, no dedicated exercise    Emotional Wellness update: Father passed away at the end of March from COVID, mother was in the ICU.  Patient has 5 children, and no support to help with the children, who are all doing home school, her  has to go out to work as he is an oncologist.    Sleep update: Trouble with anxiety affecting sleep.      PAST MEDICAL AND SURGICAL HISTORY        Past Medical History:   Diagnosis Date   • Abnormal ECG     PVCs   • Arrhythmia 02/2018    PVCs, status post ablation, partially successful.   • Back pain    • GERD (gastroesophageal reflux disease)    • Heartburn    • History of fetal demise, not currently pregnant     Twin pregnancy, 1 fetal demise, 1 live birth   • Joint pain    • Migraine headache    • Ovarian cyst    • Palpitations    •  Pericarditis 2018    After john ablation       Past Surgical History:   Procedure Laterality Date   • ABLATION OF DYSRHYTHMIC FOCUS  2018   •  SECTION         MEDICATIONS        Current Outpatient Medications on File Prior to Visit   Medication Sig Dispense Refill   • B-complex with vitamin C tablet Take 1 tablet by mouth daily.     • cholecalciferol (VITAMIN D3) 10,000 unit capsule Take 10,000 Units by mouth once a week.       • ibuprofen (ADVIL ORAL) Take by mouth daily as needed.     • LORazepam (ATIVAN) 0.5 mg tablet Take 1 tablet (0.5 mg total) by mouth every 8 (eight) hours as needed for anxiety. 20 tablet 1   • magnesium 250 mg tablet Take 500 mg by mouth daily.     • sertraline (ZOLOFT) 25 mg tablet Take 1 tablet (25 mg total) by mouth daily. 90 tablet 3     No current facility-administered medications on file prior to visit.        ALLERGIES        Patient has no known allergies.           REVIEW OF SYSTEMS      Review of Systems    Review of Systems     PHYSICAL EXAMINATION      There were no vitals taken for this visit.   There is no height or weight on file to calculate BMI.    BP Readings from Last 3 Encounters:   03/10/20 118/76   20 108/70   19 104/64     Wt Readings from Last 3 Encounters:   03/10/20 95.4 kg (210 lb 4.8 oz)   20 97.4 kg (214 lb 12.8 oz)   19 95.3 kg (210 lb)       Physical Exam      LABS / IMAGING / EKG        Reviewed the following Labs:    Lab Results   Component Value Date    HGBA1C 5.1 10/01/2019    HGBA1C 4.9 2016     Lab Results   Component Value Date    CHOL 196 2016     Lab Results   Component Value Date    HDL 67 2016     Lab Results   Component Value Date    LDLCALC 117 (H) 2016     Lab Results   Component Value Date    TRIG 58 2016     No results found for: CHOLHDL      ASSESSMENT AND PLAN         Anxiety  Having good control over mood and managing stress is an important part of successful weight  management.    Please continue to follow the  plan as outlined by your treating provider with Zoloft.    Make a list of self soothing activities such as coloring, puzzling, walking outside,etc and do these prior to looking for food to fill that emotional hunger.    Class 1 obesity with serious comorbidity and body mass index (BMI) of 33.0 to 33.9 in adult  Stop trying to lose weight and start trying to gain health.    Enlist the children's help with housework.  Create a sticker chart, hand them out for everything.    We outlined a structure for eating today which included  Breakfast: Overnight oats and hard-boiled egg   midmorning snack: Fruit and Greek yogurt  Lunch: Large homemade salad with what ever the kids are eating  Afternoon snacks: Nuts and raw fruits or vegetables  Dinner: Balanced home meal or harm reduction if ordering out --- chicken or fish with vegetables  If you feel the need to eat in between the structured times, please do a mini obstacle first.  Keep this list on your refrigerator.  Walk to the end of the driveway and back  Take 6 deep breaths  Drink 8 ounces of water  Eat 1 cup of raw vegetables or fruit  Dance to 1 song  Do 1 push-up, 1 squat, 1 sit up            Thank you very much for allowing us to participate in the care of your patient. Please do not hesitate to call or email if there are any questions.

## 2020-05-19 NOTE — ASSESSMENT & PLAN NOTE
Stop trying to lose weight and start trying to gain health.    Enlist the children's help with housework.  Create a sticker chart, hand them out for everything.    We outlined a structure for eating today which included  Breakfast: Overnight oats and hard-boiled egg   midmorning snack: Fruit and Greek yogurt  Lunch: Large homemade salad with what ever the kids are eating  Afternoon snacks: Nuts and raw fruits or vegetables  Dinner: Balanced home meal or harm reduction if ordering out --- chicken or fish with vegetables  If you feel the need to eat in between the structured times, please do a mini obstacle first.  Keep this list on your refrigerator.  Walk to the end of the driveway and back  Take 6 deep breaths  Drink 8 ounces of water  Eat 1 cup of raw vegetables or fruit  Dance to 1 song  Do 1 push-up, 1 squat, 1 sit up

## 2020-06-05 ENCOUNTER — TELEMEDICINE (OUTPATIENT)
Dept: BARIATRICS/WEIGHT MGMT | Facility: CLINIC | Age: 40
End: 2020-06-05
Payer: COMMERCIAL

## 2020-06-05 DIAGNOSIS — E66.811 CLASS 1 OBESITY WITH SERIOUS COMORBIDITY AND BODY MASS INDEX (BMI) OF 33.0 TO 33.9 IN ADULT, UNSPECIFIED OBESITY TYPE: Primary | ICD-10-CM

## 2020-06-05 PROCEDURE — 99999 PR OFFICE/OUTPT VISIT,PROCEDURE ONLY: CPT | Mod: 95 | Performed by: DIETITIAN, REGISTERED

## 2020-06-05 PROCEDURE — 97802 MEDICAL NUTRITION INDIV IN: CPT | Mod: 95 | Performed by: DIETITIAN, REGISTERED

## 2020-06-05 NOTE — PROGRESS NOTES
"DETAILS OF TELEMEDICINE VISIT     Consulting Service:  [facility] Comprehensive Weight and Wellness Program - Dietitian    Referring Physician: No ref. provider found    PCP: Iesha Domingo MD    Reason for Consultation: No chief complaint on file.      Type of Telemedicine Encounter: Audiovisual    Request for Consent: You and I are about to have a telemedicine check-in or visit. This is allowed because you have requested it. This telemedicine visit will be billed to your health insurance or you, if you are self-insured. You understand you will be responsible for any copayments or coinsurances that apply to your telemedicine visit. Before starting our telemedicine visit, I am required to get your consent for this virtual check-in or visit by telemedicine. Do you consent?    Patient Response to Request for Consent: Yes     VISIT DESCRIPTION         Andressa Baker is a 40 y.o. female here for initial nutrition evaluation.    Current Weight:   Wt Readings from Last 1 Encounters:   03/10/20 95.4 kg (210 lb 4.8 oz)     Height:   Ht Readings from Last 1 Encounters:   01/31/20 1.702 m (5' 7\")        BMI: There is no height or weight on file to calculate BMI.  Ideal Body Weight: 135#  Goal/Desired Weight: Not specified, pre-pregnancy weight was 179#     Weight Trends:    Wt Readings from Last 3 Encounters:   03/10/20 95.4 kg (210 lb 4.8 oz)   01/31/20 97.4 kg (214 lb 12.8 oz)   12/09/19 95.3 kg (210 lb)       BMI Readings from Last 3 Encounters:   03/10/20 32.94 kg/m²   01/31/20 33.64 kg/m²   12/09/19 32.89 kg/m²       Patient seen by this RD for evaluation in the Comprehensive Weight & Wellness Program. During today's visit, we discussed the following:    Nutrition: Working on structured meal timing with meal composition as a harm reduction strategy for h/o BED. Andressa endorses an ongoing struggle with mindless grazing and snacking between meals which she attributes to convenience, boredom, and stress. Her food quality is " "high: breakfast is overnight oats with rashel, banana, almondmilk, and collagen protein; lunch is whatever she prepares for her kids (hot dog, chicken tenders, quesadilla, etc) plus a large green salad; dinner is a cooked protein with a veggie and cooked whole grain. Her morning snack is usually Greek yogurt with fruit. We discussed adding in a planned afternoon snack (2:30 PM) to prevent grazing on chocolate chips, veggie straws, popcorn, and Kind/RX bars like she's been doing. Some options we discussed include carrots with hummus, fruit and string cheese, or mixed nuts. Her fluid intake is adequate with water or seltzer on most days. She is actively breastfeeding and is frustrated that she is not losing weight.    Physical Activity: Limited 2/2 IT band syndrome with associated pain. Andressa has an all-or-nothing mentality surrounding physical activity, which we discussed likely stems from her history as an avid runner. She has a stationary bike at home which she uses occasionally but because it doesn't feel as vigorous as running, she doesn't consider this to be true exercise. We also discussed including outdoor walks for a stress relief technique.     Sleep/Stress: Very high stress life - five children,  is a physician, father recently  of COVID, mother was critically ill. She works with a therapist and recently enrolled in a mindful eating webinar series. She wants to start journaling in a diet workbook to help her overcome some of her perceived barrier to weight loss: mindless eating, low activity, chronic fatigue, etc. Her sleep quality is poor as she is up every 2-3 hours with the 10-month-old. I believe high cortisol levels are part of Andressa's struggle with losing weight, but this has not been confirmed by lab draw or diagnosed by a physician at this time. We discussed many options for stress relief and mindfulness/meditation to help her reduce her constant feeling of \"fight or flight.\"    The following " "hand-outs and/or educational materials were given: Snack list, breakfast ideas    A patient-centered approach using motivational interviewing was used to develop the plan and recommendations outlined below.     Time Spent with Patient for virtual visit: 45 minutes      MEDICATIONS AND ALLERGIES     Current Outpatient Medications on File Prior to Visit   Medication Sig Dispense Refill   • B-complex with vitamin C tablet Take 1 tablet by mouth daily.     • cholecalciferol (VITAMIN D3) 10,000 unit capsule Take 10,000 Units by mouth once a week.       • ibuprofen (ADVIL ORAL) Take by mouth daily as needed.     • LORazepam (ATIVAN) 0.5 mg tablet Take 1 tablet (0.5 mg total) by mouth every 8 (eight) hours as needed for anxiety. 20 tablet 1   • magnesium 250 mg tablet Take 500 mg by mouth daily.     • sertraline (ZOLOFT) 25 mg tablet Take 1 tablet (25 mg total) by mouth daily. 90 tablet 3     No current facility-administered medications on file prior to visit.          Patient has no known allergies.    CURRENT DIET        Plan: Harm reduction r/t BED - structured meal timing, planned snacks, adequate protein, \"all foods fit\"  Food Encounters: 3 meals, 2 planned snacks  Eating Interval: Not specified     RECOMMENDATIONS     Class 1 obesity with serious comorbidity and body mass index (BMI) of 33.0 to 33.9 in adult  1. Continue to prioritize protein and fiber at breakfast - see handouts sent to your email for ideas other than overnight oats or a protein smoothie  2. Implement a planned afternoon snack with protein and fiber to prevent mindless grazing - carrots with hummus, fruit with string cheese, mixed nuts, etc.  3. Commit to 15 minutes of dedicate physical activity 4 times per week for stress relief        Freda Holder, MS, RD, LDN  Registered Dietitian for [facility] Comprehensive Weight and Wellness Program  6/5/2020     Thank you very much for allowing us to participate in the care of your patient. Please do not " hesitate to call or email if there are any questions.

## 2020-06-05 NOTE — ASSESSMENT & PLAN NOTE
1. Continue to prioritize protein and fiber at breakfast - see handouts sent to your email for ideas other than overnight oats or a protein smoothie  2. Implement a planned afternoon snack with protein and fiber to prevent mindless grazing - carrots with hummus, fruit with string cheese, mixed nuts, etc.  3. Commit to 15 minutes of dedicate physical activity 4 times per week for stress relief

## 2020-06-19 ENCOUNTER — TELEMEDICINE (OUTPATIENT)
Dept: BARIATRICS/WEIGHT MGMT | Facility: CLINIC | Age: 40
End: 2020-06-19
Payer: COMMERCIAL

## 2020-06-19 DIAGNOSIS — E66.811 CLASS 1 OBESITY WITH SERIOUS COMORBIDITY AND BODY MASS INDEX (BMI) OF 33.0 TO 33.9 IN ADULT, UNSPECIFIED OBESITY TYPE: Primary | ICD-10-CM

## 2020-06-19 PROCEDURE — 99999 PR OFFICE/OUTPT VISIT,PROCEDURE ONLY: CPT | Mod: 95 | Performed by: DIETITIAN, REGISTERED

## 2020-06-19 PROCEDURE — 97803 MED NUTRITION INDIV SUBSEQ: CPT | Mod: 95 | Performed by: DIETITIAN, REGISTERED

## 2020-06-19 NOTE — PROGRESS NOTES
"DETAILS OF TELEMEDICINE VISIT     Consulting Service:  [facility] Comprehensive Weight and Wellness Program - Dietitian    Referring Physician: No ref. provider found    PCP: Iesha Domingo MD    Reason for Consultation: No chief complaint on file.      Type of Telemedicine Encounter: Audiovisual     Request for Consent: You and I are about to have a telemedicine check-in or visit. This is allowed because you have requested it. This telemedicine visit will be billed to your health insurance or you, if you are self-insured. You understand you will be responsible for any copayments or coinsurances that apply to your telemedicine visit. Before starting our telemedicine visit, I am required to get your consent for this virtual check-in or visit by telemedicine. Do you consent?    Patient Response to Request for Consent: Yes     VISIT DESCRIPTION         Andressa Baker is a 40 y.o. female here for nutrition follow-up.    Current Weight: 205# (reported)  Wt Readings from Last 1 Encounters:   03/10/20 95.4 kg (210 lb 4.8 oz)     Height:   Ht Readings from Last 1 Encounters:   01/31/20 1.702 m (5' 7\")        BMI: There is no height or weight on file to calculate BMI.    Weight Trends:    Wt Readings from Last 3 Encounters:   03/10/20 95.4 kg (210 lb 4.8 oz)   01/31/20 97.4 kg (214 lb 12.8 oz)   12/09/19 95.3 kg (210 lb)       BMI Readings from Last 3 Encounters:   03/10/20 32.94 kg/m²   01/31/20 33.64 kg/m²   12/09/19 32.89 kg/m²       Patient seen by this RD for evaluation in the Comprehensive Weight & Wellness Program. During today's visit, we discussed the following:    Nutrition: Doing well with set meal and snack times! Her food quality is transitioning from processed snack foods like pre-popped popcorn and crackers to whole foods like berries, nuts, and veggies with hummus. She is down -5# since her first visit with me two weeks ago. Currently utilizing a diet workbook to help her gain awareness of emotional versus " hunger driven eating. She is considering counting calories as a way to restrict her intake and following a diabetes diet so that she and her  and her children can all eat the same food. I encouraged her to discuss starting a more structured low glycemic index plan at her next visit with Dr. Vallejo, given her history of binge eating and yo-yo dieting. We discussed that her current priority should be to continue to focus on meal timing and meal composition due to breastfeeding and increased activity. She is feeling very motivated to continue with this and ultimately transition to a LGI plan.    Physical Activity: Started running with a friend a few mornings per week! NEAT is high. Motivated to continue.    Sleep/Stress: No change.    The following hand-outs and/or educational materials were given: N/A    A patient-centered approach using motivational interviewing was used to develop the plan and recommendations outlined below.     Time Spent with Patient for virtual visit: 30 minutes      MEDICATIONS AND ALLERGIES     Current Outpatient Medications on File Prior to Visit   Medication Sig Dispense Refill   • B-complex with vitamin C tablet Take 1 tablet by mouth daily.     • cholecalciferol (VITAMIN D3) 10,000 unit capsule Take 10,000 Units by mouth once a week.       • ibuprofen (ADVIL ORAL) Take by mouth daily as needed.     • LORazepam (ATIVAN) 0.5 mg tablet Take 1 tablet (0.5 mg total) by mouth every 8 (eight) hours as needed for anxiety. 20 tablet 1   • magnesium 250 mg tablet Take 500 mg by mouth daily.     • sertraline (ZOLOFT) 25 mg tablet Take 1 tablet (25 mg total) by mouth daily. 90 tablet 3     No current facility-administered medications on file prior to visit.          Patient has no known allergies.    CURRENT DIET        Plan: No restrictions - focus on meal timing to prevent binge, food quality improvement  Food Encounters: 3 meals, 2 planned snacks  Eating Interval: Not specified      RECOMMENDATIONS     Class 1 obesity with serious comorbidity and body mass index (BMI) of 33.0 to 33.9 in adult  1. Continue to focus on meal timing and food quality with the ultimate goal of transitioning to a more structured low glycemic index plan  2. Continue to include dedicated physical activity where possible  3. Utilize a journal or workbook for mindfulness        Freda Holder, MS, RD, LDN  Registered Dietitian for [facility] Comprehensive Weight and Wellness Program  6/19/2020     Thank you very much for allowing us to participate in the care of your patient. Please do not hesitate to call or email if there are any questions.

## 2020-06-19 NOTE — ASSESSMENT & PLAN NOTE
1. Continue to focus on meal timing and food quality with the ultimate goal of transitioning to a more structured low glycemic index plan  2. Continue to include dedicated physical activity where possible  3. Utilize a journal or workbook for mindfulness

## 2020-07-10 ENCOUNTER — OFFICE VISIT (OUTPATIENT)
Dept: BARIATRICS/WEIGHT MGMT | Facility: CLINIC | Age: 40
End: 2020-07-10
Payer: COMMERCIAL

## 2020-07-10 VITALS
DIASTOLIC BLOOD PRESSURE: 80 MMHG | OXYGEN SATURATION: 99 % | WEIGHT: 211.6 LBS | BODY MASS INDEX: 33.14 KG/M2 | RESPIRATION RATE: 16 BRPM | SYSTOLIC BLOOD PRESSURE: 110 MMHG | HEART RATE: 64 BPM

## 2020-07-10 DIAGNOSIS — F41.9 ANXIETY: Primary | ICD-10-CM

## 2020-07-10 DIAGNOSIS — E66.811 CLASS 1 OBESITY WITH SERIOUS COMORBIDITY AND BODY MASS INDEX (BMI) OF 33.0 TO 33.9 IN ADULT, UNSPECIFIED OBESITY TYPE: ICD-10-CM

## 2020-07-10 DIAGNOSIS — I49.3 PVC (PREMATURE VENTRICULAR CONTRACTION): ICD-10-CM

## 2020-07-10 PROCEDURE — 99214 OFFICE O/P EST MOD 30 MIN: CPT | Performed by: FAMILY MEDICINE

## 2020-07-10 RX ORDER — TOBRAMYCIN AND DEXAMETHASONE 3; 1 MG/ML; MG/ML
SUSPENSION/ DROPS OPHTHALMIC
COMMUNITY
Start: 2020-04-20 | End: 2020-08-21 | Stop reason: ALTCHOICE

## 2020-07-10 RX ORDER — ALCAFTADINE 2.5 MG/ML
SOLUTION/ DROPS OPHTHALMIC
COMMUNITY
Start: 2020-06-10 | End: 2021-09-28

## 2020-07-10 NOTE — ASSESSMENT & PLAN NOTE
Continue to increase physical activity.  Look for ways to do alternative activities instead of eat in response to emotional cues.    Excellent job spacing herself from your screen time.    Gave handout for low glycemic index eating today:  4 or more Low GL choices  4 moderate GL choices  2  High GL choices  5 protein choices  5 fat choices  1 OPI/Week.     Discussed options for improving sleep quality including using audiobooks to help fall asleep which does not require visual stimulation due to patient's eyesight limitations without her contacts in.

## 2020-07-10 NOTE — PROGRESS NOTES
Subjective     Patient ID: Andressa Baker is a 40 y.o. female.    HPI  Patient  is here today to follow up on using lifestyle modification and weight loss as a adjunct to traditional medical therapy/treatment strategy for anxiety .  Medications reviewed.       She has made a connection with getting enough sleep sleep and being able to execute her meal plan.  She is interested in learning more about low glycemic index eating and what her specific levels would be.    Doing a C to 5K 3 days a week.     Recognizing that screen time interferes with sleep and her self control.     Nutrition review:getting protein and getting vegetables -- doing a lot of home cooking.  Some days she spends time picking --- looking at habit, cues, and rewards.  Distracted eating.      Physical activity review:   NEAT: Increased movement and keeping a high ring.   Exercise: 3 runs a week.      Emotional Wellness review: Improving with less screen time.  Feeling better on the Zoloft.  Thinks she may need an increased dose especially around the time of her menstrual cycle.    Sleep review: her mom is helping with nighttime routine.  Would like to try melatonin, but is afraid of increasing PVCs.        Current Outpatient Medications on File Prior to Visit   Medication Sig Dispense Refill   • B-complex with vitamin C tablet Take 1 tablet by mouth daily.     • cholecalciferol (VITAMIN D3) 10,000 unit capsule Take 10,000 Units by mouth once a week.       • docosahexaenoic acid-epa 120-180 mg capsule Take 1,000 mg by mouth.     • ibuprofen (ADVIL ORAL) Take by mouth daily as needed.     • LASTACAFT 0.25 % drops      • LORazepam (ATIVAN) 0.5 mg tablet Take 1 tablet (0.5 mg total) by mouth every 8 (eight) hours as needed for anxiety. 20 tablet 1   • magnesium 250 mg tablet Take 500 mg by mouth daily.     • sertraline (ZOLOFT) 25 mg tablet Take 1 tablet (25 mg total) by mouth daily. 90 tablet 3   • tobramycin-dexamethasone (TOBRADEX)  ophthalmic solution INSTILL 1 DROP INTO BOTH EYES FOUR TIMES A DAY AS DIRECTED THEN 2 TIMES A DAY FOR 5 DAYS       No current facility-administered medications on file prior to visit.             Patient has no known allergies.             Review of Systems    Objective     Vitals:    07/10/20 1030   BP: 110/80   BP Location: Left upper arm   Patient Position: Sitting   Pulse: 64   Resp: 16   SpO2: 99%   Weight: 96 kg (211 lb 9.6 oz)     Body mass index is 33.14 kg/m².    Physical Exam    Assessment/Plan   Diagnoses and all orders for this visit:    Anxiety (Primary)  Assessment & Plan:  Check out Heart math as an option for relaxation.     Mag at night -- take earlier with intent to get to bed earlier to improve sleep quantity.     Continue on Zoloft 25 mg, will reach out to primary care about increasing to 50 mg around premenstrual time.      Class 1 obesity with serious comorbidity and body mass index (BMI) of 33.0 to 33.9 in adult, unspecified obesity type  Assessment & Plan:  Continue to increase physical activity.  Look for ways to do alternative activities instead of eat in response to emotional cues.    Excellent job spacing herself from your screen time.    Gave handout for low glycemic index eating today:  4 or more Low GL choices  4 moderate GL choices  2  High GL choices  5 protein choices  5 fat choices  1 OPI/Week.     Discussed options for improving sleep quality including using audiobooks to help fall asleep which does not require visual stimulation due to patient's eyesight limitations without her contacts in.    Educational and counseling on strategies for lifestyle change to address weight related health conditions over 1/2 of todays 20 minute appointment.

## 2020-07-10 NOTE — ASSESSMENT & PLAN NOTE
Educational and counseling on strategies for lifestyle change to address anxiety and stress which can lead to worsening PVCs  over 1/2 of todays 25 minute appointment.     Specifically discussed tapping, alternate activities, and heart math

## 2020-07-10 NOTE — ASSESSMENT & PLAN NOTE
Check out Heart math as an option for relaxation.     Mag at night -- take earlier with intent to get to bed earlier to improve sleep quantity.     Continue on Zoloft 25 mg, will reach out to primary care about increasing to 50 mg around premenstrual time.

## 2020-07-10 NOTE — PATIENT INSTRUCTIONS
Problem List Items Addressed This Visit        Endocrine/Metabolic    Class 1 obesity with serious comorbidity and body mass index (BMI) of 33.0 to 33.9 in adult     Continue to increase physical activity.  Look for ways to do alternative activities instead of eat in response to emotional cues.    Excellent job spacing herself from your screen time.    Gave handout for low glycemic index eating today:  4 or more Low GL choices  4 moderate GL choices  2  High GL choices  5 protein choices  5 fat choices  1 OPI/Week.     Discussed options for improving sleep quality including using audiobooks to help fall asleep which does not require visual stimulation due to patient's eyesight limitations without her contacts in.    Educational and counseling on strategies for lifestyle change to address weight related health conditions over 1/2 of todays 20 minute appointment.                   Other    Anxiety - Primary     Check out Heart math as an option for relaxation.     Mag at night -- take earlier with intent to get to bed earlier to improve sleep quantity.     Continue on Zoloft 25 mg, will reach out to primary care about increasing to 50 mg around premenstrual time.

## 2020-07-13 RX ORDER — SERTRALINE HYDROCHLORIDE 50 MG/1
50 TABLET, FILM COATED ORAL DAILY
Qty: 90 TABLET | Refills: 1 | Status: CANCELLED | OUTPATIENT
Start: 2020-07-13 | End: 2021-07-13

## 2020-07-13 RX ORDER — SERTRALINE HYDROCHLORIDE 50 MG/1
25 TABLET, FILM COATED ORAL DAILY
Qty: 90 TABLET | Refills: 1 | Status: SHIPPED | OUTPATIENT
Start: 2020-07-13 | End: 2020-07-14

## 2020-07-14 RX ORDER — SERTRALINE HYDROCHLORIDE 50 MG/1
TABLET, FILM COATED ORAL
Qty: 90 TABLET | Refills: 0 | COMMUNITY
Start: 2020-07-14 | End: 2020-08-11

## 2020-07-14 NOTE — PROGRESS NOTES
Discussed medication change from Zoloft 25 mg daily to Zoloft 25 mg daily except increase to 50 mg around the time of menstrual cycle.  Patient to call back and let me know what she needs as far as refills.

## 2020-08-11 RX ORDER — SERTRALINE HYDROCHLORIDE 50 MG/1
50 TABLET, FILM COATED ORAL DAILY
Qty: 90 TABLET | Refills: 1 | Status: SHIPPED | OUTPATIENT
Start: 2020-08-11 | End: 2021-01-28

## 2020-08-21 ENCOUNTER — OFFICE VISIT (OUTPATIENT)
Dept: INTERNAL MEDICINE | Facility: CLINIC | Age: 40
End: 2020-08-21
Payer: COMMERCIAL

## 2020-08-21 VITALS
HEART RATE: 77 BPM | DIASTOLIC BLOOD PRESSURE: 78 MMHG | WEIGHT: 207.8 LBS | OXYGEN SATURATION: 98 % | BODY MASS INDEX: 32.62 KG/M2 | TEMPERATURE: 97.8 F | SYSTOLIC BLOOD PRESSURE: 104 MMHG | HEIGHT: 67 IN | RESPIRATION RATE: 18 BRPM

## 2020-08-21 DIAGNOSIS — Z13.220 SCREENING FOR HYPERLIPIDEMIA: ICD-10-CM

## 2020-08-21 DIAGNOSIS — G44.219 EPISODIC TENSION-TYPE HEADACHE, NOT INTRACTABLE: ICD-10-CM

## 2020-08-21 DIAGNOSIS — F41.9 ANXIETY: Primary | ICD-10-CM

## 2020-08-21 DIAGNOSIS — Z13.29 SCREENING FOR THYROID DISORDER: ICD-10-CM

## 2020-08-21 DIAGNOSIS — Z13.1 SCREENING FOR DIABETES MELLITUS: ICD-10-CM

## 2020-08-21 DIAGNOSIS — Z13.0 SCREENING FOR DEFICIENCY ANEMIA: ICD-10-CM

## 2020-08-21 PROBLEM — G44.209 TENSION TYPE HEADACHE: Status: ACTIVE | Noted: 2020-08-21

## 2020-08-21 PROCEDURE — 99213 OFFICE O/P EST LOW 20 MIN: CPT | Performed by: INTERNAL MEDICINE

## 2020-08-21 RX ORDER — AZELASTINE 1 MG/ML
1 SPRAY, METERED NASAL 2 TIMES DAILY
Qty: 30 ML | Refills: 1 | Status: SHIPPED | OUTPATIENT
Start: 2020-08-21 | End: 2020-11-01 | Stop reason: SDUPTHER

## 2020-08-21 RX ORDER — MINERAL OIL
180 ENEMA (ML) RECTAL DAILY
COMMUNITY
End: 2022-12-27 | Stop reason: ALTCHOICE

## 2020-08-21 RX ORDER — SERTRALINE HYDROCHLORIDE 25 MG/1
25 TABLET, FILM COATED ORAL DAILY
Qty: 90 TABLET | Refills: 1 | Status: SHIPPED | OUTPATIENT
Start: 2020-08-21 | End: 2021-06-01

## 2020-08-21 NOTE — PROGRESS NOTES
Andressa Baker is a 40 y.o. female who presents today for     Chief Complaint   Patient presents with   • Annual Exam     Concerns about headaches & allergy         SUBJECTIVE:  HPI    Here for CPE  Was told needs root canal.     Headaches - all the time. She thinks it might be allergies or lack of sleep. It came on with her period 8/8/2020 and they just kept coming back daily. Better with advil or tylenol. She gets headaches most days. She has photophobia, no vision changes. Sometimes they flare into a migraine. This may have correlated with increasing her dose of zoloft.     Anxiety is much better on zoloft. Sleep has been an issue. She has been taking half a benadryl to help with sleep. Uses ativan rarely, maybe 3 times in a month. She has been keeping weight on track, walk/runs 3 mornings a week, working out with a .      Palpitations - these are more sporadic now, generally improving. She is still breastfeeding. Plans to follow up with Dr. Jane          No Known Allergies      Current Outpatient Medications:   •  B-complex with vitamin C tablet, Take 1 tablet by mouth daily., Disp: , Rfl:   •  cholecalciferol (VITAMIN D3) 10,000 unit capsule, Take 10,000 Units by mouth once a week.  , Disp: , Rfl:   •  docosahexaenoic acid-epa 120-180 mg capsule, Take 1,000 mg by mouth., Disp: , Rfl:   •  fexofenadine (ALLEGRA) 180 mg tablet, Take 180 mg by mouth daily., Disp: , Rfl:   •  ibuprofen (ADVIL ORAL), Take by mouth daily as needed., Disp: , Rfl:   •  LORazepam (ATIVAN) 0.5 mg tablet, Take 1 tablet (0.5 mg total) by mouth every 8 (eight) hours as needed for anxiety., Disp: 20 tablet, Rfl: 1  •  magnesium 250 mg tablet, Take 500 mg by mouth daily., Disp: , Rfl:   •  sertraline (ZOLOFT) 50 mg tablet, Take 1 tablet (50 mg total) by mouth daily., Disp: 90 tablet, Rfl: 1  •  azelastine (ASTELIN) 137 mcg (0.1 %) nasal spray, Administer 1 spray into each nostril 2 (two) times a day. Use in each  nostril as directed., Disp: 30 mL, Rfl: 1  •  LASTACAFT 0.25 % drops, , Disp: , Rfl:   •  sertraline (ZOLOFT) 25 mg tablet, Take 1 tablet (25 mg total) by mouth daily., Disp: 90 tablet, Rfl: 1    Past Medical History:   Diagnosis Date   • Abnormal ECG     PVCs   • Arrhythmia 2018    PVCs, status post ablation, partially successful.   • Back pain    • GERD (gastroesophageal reflux disease)    • Heartburn    • History of fetal demise, not currently pregnant     Twin pregnancy, 1 fetal demise, 1 live birth   • Joint pain    • Migraine headache    • Ovarian cyst    • Palpitations    • Pericarditis 2018    After john ablation       Past Surgical History:   Procedure Laterality Date   • ABLATION OF DYSRHYTHMIC FOCUS  2018   •  SECTION         Family History   Problem Relation Age of Onset   • Hyperlipidemia Biological Mother    • Hyperlipidemia Biological Father    • Hypertension Biological Father    • Prostate cancer Biological Father 78   • Dementia Biological Father    • Hyperlipidemia Maternal Grandmother    • Clotting disorder Maternal Grandfather         developed in 60's - required tansfusions/platelets   • Hyperlipidemia Paternal Grandmother    • Dementia Paternal Grandmother    • Diabetes Paternal Grandfather        Social History     Socioeconomic History   • Marital status:      Spouse name: Shashank Baker   • Number of children: Not on file   • Years of education: Not on file   • Highest education level: Not on file   Occupational History   • Occupation: Homemaker   Social Needs   • Financial resource strain: Not on file   • Food insecurity:     Worry: Not on file     Inability: Not on file   • Transportation needs:     Medical: Not on file     Non-medical: Not on file   Tobacco Use   • Smoking status: Never Smoker   • Smokeless tobacco: Never Used   Substance and Sexual Activity   • Alcohol use: Yes     Comment: occasionally   • Drug use: No   • Sexual activity: Yes      "Comment:  with 5children, NFP   Lifestyle   • Physical activity:     Days per week: Not on file     Minutes per session: Not on file   • Stress: Not on file   Relationships   • Social connections:     Talks on phone: Not on file     Gets together: Not on file     Attends Mu-ism service: Not on file     Active member of club or organization: Not on file     Attends meetings of clubs or organizations: Not on file     Relationship status: Not on file   • Intimate partner violence:     Fear of current or ex partner: Not on file     Emotionally abused: Not on file     Physically abused: Not on file     Forced sexual activity: Not on file   Other Topics Concern   • Not on file   Social History Narrative    Exercises 2-3 times per week    Sleep sometimes interrupted    Eating plan: Patient attempts low glycemic index eating    No history of domestic violence    Lives with  and 5 children     Has a dog       ROS   Constitutional: + weight loss (intentional)  HENT: no nasal congestion, post-nasal drip   Eyes: denies recent changes in vision   Cardiac: + chest pain + palpitations  Respiratory: denies shortness of breath and cough   GI: no abdominal pain, diarrhea or constipation. Denies melena and hematochezia  : per HPI  Msk: no arthralgias or myalgias  Neuro: + headache (not currently), dizziness. No issues with paresthesia or weakness   Skin: denies changes in skin, no rashes or new lesions     OBJECTIVE:  Vitals:    08/21/20 1419   BP: 104/78   BP Location: Left upper arm   Patient Position: Sitting   Pulse: 77   Resp: 18   Temp: 36.6 °C (97.8 °F)   TempSrc: Temporal   SpO2: 98%   Weight: 94.3 kg (207 lb 12.8 oz)   Height: 1.702 m (5' 7\")       Wt Readings from Last 3 Encounters:   08/21/20 94.3 kg (207 lb 12.8 oz)   07/10/20 96 kg (211 lb 9.6 oz)   03/10/20 95.4 kg (210 lb 4.8 oz)     Body mass index is 32.55 kg/m².      Physical Exam   Constitutional: She is oriented to person, place, and time. She " appears well-developed and well-nourished.   HENT:   Head: Normocephalic and atraumatic.   Right Ear: External ear normal.   Left Ear: External ear normal.   Eyes: Pupils are equal, round, and reactive to light. EOM are normal.   Neck: Normal range of motion. Neck supple. No thyromegaly present.   Cardiovascular: Normal rate, regular rhythm, normal heart sounds and intact distal pulses. Exam reveals no gallop and no friction rub.   No murmur heard.  Pulmonary/Chest: Effort normal and breath sounds normal. No stridor. No respiratory distress. She has no wheezes. She has no rales.   Abdominal: Soft. Bowel sounds are normal. She exhibits no distension and no mass. There is no tenderness. There is no rebound and no guarding.   Musculoskeletal: Normal range of motion. She exhibits no edema.   Lymphadenopathy:     She has no cervical adenopathy.   Neurological: She is alert and oriented to person, place, and time. She displays normal reflexes. She exhibits normal muscle tone. Coordination normal.   Skin: Skin is warm and dry.   Psychiatric: She has a normal mood and affect. Her behavior is normal. Thought content normal.   Vitals reviewed.        ASSESSMENT & PLAN:   Diagnoses and all orders for this visit:    Anxiety (Primary)  Assessment & Plan:  Increase zoloft to 75 mg daily  Continue with counseling and exercise  Recommend sleep training 13 month old      Screening for thyroid disorder  -     TSH; Future    Screening for hyperlipidemia  -     Lipid panel; Future    Screening for deficiency anemia  -     CBC and differential; Future    Screening for diabetes mellitus  -     Comprehensive metabolic panel; Future  -     Hemoglobin A1c; Future    Episodic tension-type headache, not intractable  Assessment & Plan:  Recommend weaning NSAIDS to avoid medication overuse  Treat sinus congestion with astelin for 3 days  Call if no improvement      Other orders  -     sertraline (ZOLOFT) 25 mg tablet; Take 1 tablet (25 mg  total) by mouth daily.  -     azelastine (ASTELIN) 137 mcg (0.1 %) nasal spray; Administer 1 spray into each nostril 2 (two) times a day. Use in each nostril as directed.

## 2020-08-21 NOTE — PATIENT INSTRUCTIONS
"Increase zoloft to 75 mg daily    Try an advil holiday for your headaches    Allergies - go back to Dr. Thomson for allergies, restart astelin, you can use for 3 days as needed.    Thank you for coming in today for your wellness exam.   An annual health care visit is a great way to take control of your own health and wellness. Routine health care visits can 1) help find problems early through appropriate screening and 2) help prevent health problems before they occur.     Here are some ways that you can positively affect your health:    -- Eat a largely plant-based diet. This includes the following:   -- fruits and vegetables    -- legumes    -- whole grains (ex: brown or wild rice, quinoa, farro, steel cut oats)   -- healthy fats: extra virgin olive oil, nuts, avocado   -- Limit your consumption of the following:   -- red meat and processed meats     -- dairy (ex: milk, yogurt, cheese)   -- refined carbohydrates (ex: white bread, white rice)   -- processed foods   -- added or artificial sugar    -- trans fats and saturated fats    -- \"junk food\" (ex: granola bars, cookies, cakes, candy, chips)  -- Drink at least 60 oz of water each day.  -- Make regular, scheduled exercise a part of your weekly routine.    -- 150 minutes of moderate-intensity aerobic activity (ex: brisk walking)   -- muscle-strengthening exercises at least 2 days per week   -- Do your best to get at least 7-8 hours of sleep each night.   -- Limit your alcohol consumption to no more than 1 drink per day and no more than 5 drinks per week.  -- Do not smoke cigarettes or e-cigarettes. If you do, please do your best to cut back and quit all together. This is one of the best things that you can do for your overall health.  -- Make sure to have a dental exam every 6 months and a routine eye exam annually.   -- Wear SPF daily and check your skin each month to make sure there are no new or changing lesions. You should have a routine skin exam every year. "   -- Wear your seatbelt in the car and do not text while driving.     Please sign up for MyChart if you have not done so already. This is our preferred method of communication.

## 2020-08-21 NOTE — ASSESSMENT & PLAN NOTE
Recommend weaning NSAIDS to avoid medication overuse  Treat sinus congestion with astelin for 3 days  Call if no improvement

## 2020-08-21 NOTE — ASSESSMENT & PLAN NOTE
Increase zoloft to 75 mg daily  Continue with counseling and exercise  Recommend sleep training 13 month old

## 2020-08-22 ENCOUNTER — APPOINTMENT (OUTPATIENT)
Dept: LAB | Age: 40
End: 2020-08-22
Attending: INTERNAL MEDICINE
Payer: COMMERCIAL

## 2020-08-22 DIAGNOSIS — Z13.1 SCREENING FOR DIABETES MELLITUS: ICD-10-CM

## 2020-08-22 DIAGNOSIS — Z13.220 SCREENING FOR HYPERLIPIDEMIA: ICD-10-CM

## 2020-08-22 DIAGNOSIS — Z13.29 SCREENING FOR THYROID DISORDER: ICD-10-CM

## 2020-08-22 DIAGNOSIS — Z13.0 SCREENING FOR DEFICIENCY ANEMIA: ICD-10-CM

## 2020-08-22 LAB
ALBUMIN SERPL-MCNC: 3.9 G/DL (ref 3.4–5)
ALP SERPL-CCNC: 51 IU/L (ref 35–126)
ALT SERPL-CCNC: 17 IU/L (ref 11–54)
ANION GAP SERPL CALC-SCNC: 9 MEQ/L (ref 3–15)
AST SERPL-CCNC: 15 IU/L (ref 15–41)
BASOPHILS # BLD: 0.05 K/UL (ref 0.01–0.1)
BASOPHILS NFR BLD: 1.4 %
BILIRUB SERPL-MCNC: 1 MG/DL (ref 0.3–1.2)
BUN SERPL-MCNC: 9 MG/DL (ref 8–20)
CALCIUM SERPL-MCNC: 8.8 MG/DL (ref 8.9–10.3)
CHLORIDE SERPL-SCNC: 105 MEQ/L (ref 98–109)
CHOLEST SERPL-MCNC: 183 MG/DL
CO2 SERPL-SCNC: 23 MEQ/L (ref 22–32)
CREAT SERPL-MCNC: 0.7 MG/DL (ref 0.6–1.1)
DIFFERENTIAL METHOD BLD: ABNORMAL
EOSINOPHIL # BLD: 0.11 K/UL (ref 0.04–0.36)
EOSINOPHIL NFR BLD: 3.1 %
ERYTHROCYTE [DISTWIDTH] IN BLOOD BY AUTOMATED COUNT: 13.2 % (ref 11.7–14.4)
EST. AVERAGE GLUCOSE BLD GHB EST-MCNC: 103 MG/DL
GFR SERPL CREATININE-BSD FRML MDRD: >60 ML/MIN/1.73M*2
GLUCOSE SERPL-MCNC: 83 MG/DL (ref 70–99)
HBA1C MFR BLD HPLC: 5.2 %
HCT VFR BLDCO AUTO: 44.1 % (ref 35–45)
HDLC SERPL-MCNC: 67 MG/DL
HDLC SERPL: 2.7 {RATIO}
HGB BLD-MCNC: 13.9 G/DL (ref 11.8–15.7)
IMM GRANULOCYTES # BLD AUTO: 0.01 K/UL (ref 0–0.08)
IMM GRANULOCYTES NFR BLD AUTO: 0.3 %
LDLC SERPL CALC-MCNC: 108 MG/DL
LYMPHOCYTES # BLD: 1.23 K/UL (ref 1.2–3.5)
LYMPHOCYTES NFR BLD: 34.6 %
MCH RBC QN AUTO: 28.7 PG (ref 28–33.2)
MCHC RBC AUTO-ENTMCNC: 31.5 G/DL (ref 32.2–35.5)
MCV RBC AUTO: 91.1 FL (ref 83–98)
MONOCYTES # BLD: 0.31 K/UL (ref 0.28–0.8)
MONOCYTES NFR BLD: 8.7 %
NEUTROPHILS # BLD: 1.85 K/UL (ref 1.7–7)
NEUTS SEG NFR BLD: 51.9 %
NONHDLC SERPL-MCNC: 116 MG/DL
NRBC BLD-RTO: 0 %
PDW BLD AUTO: 12.7 FL (ref 9.4–12.3)
PLAT MORPH BLD: NORMAL
PLATELET # BLD AUTO: 141 K/UL (ref 150–369)
PLATELET # BLD EST: ABNORMAL 10*3/UL
POTASSIUM SERPL-SCNC: 4.1 MEQ/L (ref 3.6–5.1)
PROT SERPL-MCNC: 6.5 G/DL (ref 6–8.2)
RBC # BLD AUTO: 4.84 M/UL (ref 3.93–5.22)
RBC MORPH BLD: NORMAL
SODIUM SERPL-SCNC: 137 MEQ/L (ref 136–144)
TRIGL SERPL-MCNC: 40 MG/DL (ref 30–149)
TSH SERPL DL<=0.05 MIU/L-ACNC: 0.73 MIU/L (ref 0.34–5.6)
WBC # BLD AUTO: 3.56 K/UL (ref 3.8–10.5)

## 2020-08-22 PROCEDURE — 80061 LIPID PANEL: CPT

## 2020-08-22 PROCEDURE — 83036 HEMOGLOBIN GLYCOSYLATED A1C: CPT

## 2020-08-22 PROCEDURE — 36415 COLL VENOUS BLD VENIPUNCTURE: CPT

## 2020-08-22 PROCEDURE — 84443 ASSAY THYROID STIM HORMONE: CPT

## 2020-08-22 PROCEDURE — 85025 COMPLETE CBC W/AUTO DIFF WBC: CPT

## 2020-08-22 PROCEDURE — 80053 COMPREHEN METABOLIC PANEL: CPT

## 2020-08-27 DIAGNOSIS — D69.6 THROMBOCYTOPENIA (CMS/HCC): Primary | ICD-10-CM

## 2020-09-11 ENCOUNTER — OFFICE VISIT (OUTPATIENT)
Dept: BARIATRICS/WEIGHT MGMT | Facility: CLINIC | Age: 40
End: 2020-09-11
Payer: COMMERCIAL

## 2020-09-11 VITALS
DIASTOLIC BLOOD PRESSURE: 60 MMHG | HEART RATE: 62 BPM | OXYGEN SATURATION: 98 % | WEIGHT: 202.7 LBS | RESPIRATION RATE: 16 BRPM | SYSTOLIC BLOOD PRESSURE: 100 MMHG | BODY MASS INDEX: 31.75 KG/M2

## 2020-09-11 DIAGNOSIS — F51.01 PRIMARY INSOMNIA: ICD-10-CM

## 2020-09-11 DIAGNOSIS — E66.811 CLASS 1 OBESITY WITH SERIOUS COMORBIDITY AND BODY MASS INDEX (BMI) OF 33.0 TO 33.9 IN ADULT, UNSPECIFIED OBESITY TYPE: ICD-10-CM

## 2020-09-11 DIAGNOSIS — F41.9 ANXIETY: Primary | ICD-10-CM

## 2020-09-11 DIAGNOSIS — R00.2 PALPITATIONS: ICD-10-CM

## 2020-09-11 PROCEDURE — 99214 OFFICE O/P EST MOD 30 MIN: CPT | Performed by: FAMILY MEDICINE

## 2020-09-11 NOTE — PROGRESS NOTES
Subjective     Patient ID: Andressa Baker is a 40 y.o. female.    HPI  Patient  is here today to follow up on using lifestyle modification and weight loss as a adjunct to traditional medical therapy/treatment strategy for palpitations,  .  Medications reviewed.     Palpitations are still present despite using magnesium 70% of the time.      Interval history: She is doing well with getting in her meals.  She is doing less binge eating.  Exercise is regular.  Her mom does give some help.      Nutrition review:Gettgin 3 meals a day.     Physical activity review: Consistant regular exercise    Emotional Wellness review: some anxiety and grief.     Sleep review: Thinking about doing a sleep course.          Current Outpatient Medications on File Prior to Visit   Medication Sig Dispense Refill   • azelastine (ASTELIN) 137 mcg (0.1 %) nasal spray Administer 1 spray into each nostril 2 (two) times a day. Use in each nostril as directed. 30 mL 1   • B-complex with vitamin C tablet Take 1 tablet by mouth daily.     • cholecalciferol (VITAMIN D3) 10,000 unit capsule Take 10,000 Units by mouth once a week.       • docosahexaenoic acid-epa 120-180 mg capsule Take 1,000 mg by mouth.     • fexofenadine (ALLEGRA) 180 mg tablet Take 180 mg by mouth daily.     • ibuprofen (ADVIL ORAL) Take by mouth daily as needed.     • LASTACAFT 0.25 % drops      • magnesium 250 mg tablet Take 500 mg by mouth daily.     • sertraline (ZOLOFT) 25 mg tablet Take 1 tablet (25 mg total) by mouth daily. 90 tablet 1   • sertraline (ZOLOFT) 50 mg tablet Take 1 tablet (50 mg total) by mouth daily. 90 tablet 1   • LORazepam (ATIVAN) 0.5 mg tablet Take 1 tablet (0.5 mg total) by mouth every 8 (eight) hours as needed for anxiety. 20 tablet 1     No current facility-administered medications on file prior to visit.             Patient has no known allergies.             Review of Systems   Constitutional: Negative for activity change and unexpected  weight change.   HENT: Negative for sore throat.    Respiratory: Negative for cough and shortness of breath.    Cardiovascular: Positive for palpitations. Negative for chest pain and leg swelling.   Gastrointestinal: Negative for constipation and diarrhea.   Endocrine: Negative for cold intolerance and heat intolerance.   Musculoskeletal: Negative for joint swelling and myalgias.   Skin: Negative for rash.   Neurological: Negative for dizziness, tremors and light-headedness.   Psychiatric/Behavioral: Positive for sleep disturbance. Negative for confusion, decreased concentration and dysphoric mood. The patient is nervous/anxious. The patient is not hyperactive.        Objective     Vitals:    09/11/20 0833   BP: 100/60   BP Location: Left upper arm   Patient Position: Sitting   Pulse: 62   Resp: 16   SpO2: 98%   Weight: 91.9 kg (202 lb 11.2 oz)     Body mass index is 31.75 kg/m².    Physical Exam    Assessment/Plan   Diagnoses and all orders for this visit:    Anxiety (Primary)  Assessment & Plan:  Keep the zoloft the 75 mg with focus on continued progress in lifestyle modification -- sleep, continued meditations on the [employer] rashid.  Build the hallowed rashid in to the school day.       Primary insomnia  Assessment & Plan:  Consider a nighttime routine to help with better sleep.     Soothing herbal tea, magnesium, or decaf coffee, doug.     Shut off the phone at 10 pm and shift setting in the phone to orange light by 8 pm.              Class 1 obesity with serious comorbidity and body mass index (BMI) of 33.0 to 33.9 in adult, unspecified obesity type  Assessment & Plan:  Excellent progress with rethinking health.     Continue with regular exercise.     Continue with mindfulness practice.     Educational and counseling on strategies for lifestyle change to address weight related health conditions over 1/2 of todays 25 minute appointment.         Palpitations  Assessment & Plan:  Palpitations continue.  Continue to  monitor stress, dedicated to mindfulness practice--possibly as part of the school day?    Continue with magnesium.  Continue with Zoloft.    Follows with cardiology.

## 2020-09-11 NOTE — ASSESSMENT & PLAN NOTE
Consider a nighttime routine to help with better sleep.     Soothing herbal tea, magnesium, or decaf coffee, doug.     Shut off the phone at 10 pm and shift setting in the phone to orange light by 8 pm.

## 2020-09-11 NOTE — ASSESSMENT & PLAN NOTE
Keep the zoloft the 75 mg with focus on continued progress in lifestyle modification -- sleep, continued meditations on the Mu-ism rashid.  Build the hallowed rashid in to the school day.

## 2020-09-11 NOTE — PATIENT INSTRUCTIONS
Problem List Items Addressed This Visit        Endocrine/Metabolic    Class 1 obesity with serious comorbidity and body mass index (BMI) of 33.0 to 33.9 in adult     Excellent progress with rethinking health.     Continue with regular exercise.     Continue with mindfulness practice.     Educational and counseling on strategies for lifestyle change to address weight related health conditions over 1/2 of todays 25 minute appointment.               Other    Anxiety - Primary     Keep the zoloft the 75 mg with focus on continued progress in lifestyle modification -- sleep, continued meditations on the [employer] [place] rashid.  Build the hallowed rashid in to the school day.

## 2020-09-14 ASSESSMENT — ENCOUNTER SYMPTOMS
PALPITATIONS: 1
HYPERACTIVE: 0
SLEEP DISTURBANCE: 1
DECREASED CONCENTRATION: 0
LIGHT-HEADEDNESS: 0
CONFUSION: 0
NERVOUS/ANXIOUS: 1
JOINT SWELLING: 0
UNEXPECTED WEIGHT CHANGE: 0
COUGH: 0
MYALGIAS: 0
CONSTIPATION: 0
DIARRHEA: 0
DIZZINESS: 0
SHORTNESS OF BREATH: 0
ACTIVITY CHANGE: 0
DYSPHORIC MOOD: 0
SORE THROAT: 0
TREMORS: 0

## 2020-09-15 NOTE — ASSESSMENT & PLAN NOTE
Palpitations continue.  Continue to monitor stress, dedicated to mindfulness practice--possibly as part of the school day?    Continue with magnesium.  Continue with Zoloft.    Follows with cardiology.

## 2020-10-06 RX ORDER — LORAZEPAM 0.5 MG/1
0.5 TABLET ORAL EVERY 8 HOURS PRN
Qty: 20 TABLET | Refills: 1 | Status: SHIPPED | OUTPATIENT
Start: 2020-10-06 | End: 2021-06-22 | Stop reason: SDUPTHER

## 2020-11-02 RX ORDER — AZELASTINE 1 MG/ML
1 SPRAY, METERED NASAL 2 TIMES DAILY
Qty: 30 ML | Refills: 5 | Status: SHIPPED | OUTPATIENT
Start: 2020-11-02 | End: 2022-02-17

## 2021-01-25 ENCOUNTER — OFFICE VISIT (OUTPATIENT)
Dept: INTERNAL MEDICINE | Facility: CLINIC | Age: 41
End: 2021-01-25
Payer: COMMERCIAL

## 2021-01-25 VITALS
DIASTOLIC BLOOD PRESSURE: 70 MMHG | HEART RATE: 70 BPM | OXYGEN SATURATION: 98 % | HEIGHT: 67 IN | RESPIRATION RATE: 17 BRPM | WEIGHT: 199.4 LBS | SYSTOLIC BLOOD PRESSURE: 120 MMHG | BODY MASS INDEX: 31.3 KG/M2

## 2021-01-25 DIAGNOSIS — D69.6 THROMBOCYTOPENIA (CMS/HCC): ICD-10-CM

## 2021-01-25 DIAGNOSIS — F41.9 ANXIETY: ICD-10-CM

## 2021-01-25 DIAGNOSIS — F51.01 PRIMARY INSOMNIA: ICD-10-CM

## 2021-01-25 DIAGNOSIS — I49.3 PVC (PREMATURE VENTRICULAR CONTRACTION): Primary | ICD-10-CM

## 2021-01-25 DIAGNOSIS — R79.89 LOW VITAMIN D LEVEL: ICD-10-CM

## 2021-01-25 LAB
25(OH)D3 SERPL-MCNC: 29 NG/ML (ref 30–100)
ANION GAP SERPL CALC-SCNC: 7 MEQ/L (ref 3–15)
BASOPHILS # BLD: 0.06 K/UL (ref 0.01–0.1)
BASOPHILS NFR BLD: 1.2 %
BUN SERPL-MCNC: 9 MG/DL (ref 8–20)
CALCIUM SERPL-MCNC: 9.2 MG/DL (ref 8.9–10.3)
CHLORIDE SERPL-SCNC: 105 MEQ/L (ref 98–109)
CO2 SERPL-SCNC: 26 MEQ/L (ref 22–32)
CREAT SERPL-MCNC: 0.7 MG/DL (ref 0.6–1.1)
DIFFERENTIAL METHOD BLD: ABNORMAL
EOSINOPHIL # BLD: 0.1 K/UL (ref 0.04–0.36)
EOSINOPHIL NFR BLD: 2 %
ERYTHROCYTE [DISTWIDTH] IN BLOOD BY AUTOMATED COUNT: 12.6 % (ref 11.7–14.4)
GFR SERPL CREATININE-BSD FRML MDRD: >60 ML/MIN/1.73M*2
GLUCOSE SERPL-MCNC: 80 MG/DL (ref 70–99)
HCT VFR BLDCO AUTO: 42.6 % (ref 35–45)
HGB BLD-MCNC: 13.9 G/DL (ref 11.8–15.7)
IMM GRANULOCYTES # BLD AUTO: 0.01 K/UL (ref 0–0.08)
IMM GRANULOCYTES NFR BLD AUTO: 0.2 %
LYMPHOCYTES # BLD: 1.97 K/UL (ref 1.2–3.5)
LYMPHOCYTES NFR BLD: 40.1 %
MAGNESIUM SERPL-MCNC: 2 MG/DL (ref 1.8–2.5)
MCH RBC QN AUTO: 29.4 PG (ref 28–33.2)
MCHC RBC AUTO-ENTMCNC: 32.6 G/DL (ref 32.2–35.5)
MCV RBC AUTO: 90.3 FL (ref 83–98)
MONOCYTES # BLD: 0.35 K/UL (ref 0.28–0.8)
MONOCYTES NFR BLD: 7.1 %
NEUTROPHILS # BLD: 2.42 K/UL (ref 1.7–7)
NEUTS SEG NFR BLD: 49.4 %
NRBC BLD-RTO: 0 %
PDW BLD AUTO: 12.6 FL (ref 9.4–12.3)
PLATELET # BLD AUTO: 148 K/UL (ref 150–369)
POTASSIUM SERPL-SCNC: 4 MEQ/L (ref 3.6–5.1)
RBC # BLD AUTO: 4.72 M/UL (ref 3.93–5.22)
SODIUM SERPL-SCNC: 138 MEQ/L (ref 136–144)
WBC # BLD AUTO: 4.91 K/UL (ref 3.8–10.5)

## 2021-01-25 PROCEDURE — 85025 COMPLETE CBC W/AUTO DIFF WBC: CPT | Performed by: INTERNAL MEDICINE

## 2021-01-25 PROCEDURE — 99214 OFFICE O/P EST MOD 30 MIN: CPT | Performed by: INTERNAL MEDICINE

## 2021-01-25 PROCEDURE — 93000 ELECTROCARDIOGRAM COMPLETE: CPT | Performed by: INTERNAL MEDICINE

## 2021-01-25 PROCEDURE — 80048 BASIC METABOLIC PNL TOTAL CA: CPT | Performed by: INTERNAL MEDICINE

## 2021-01-25 PROCEDURE — 83735 ASSAY OF MAGNESIUM: CPT | Performed by: INTERNAL MEDICINE

## 2021-01-25 PROCEDURE — 82306 VITAMIN D 25 HYDROXY: CPT | Performed by: INTERNAL MEDICINE

## 2021-01-25 RX ORDER — TRAZODONE HYDROCHLORIDE 50 MG/1
50 TABLET ORAL NIGHTLY
Qty: 90 TABLET | Refills: 0 | Status: SHIPPED | OUTPATIENT
Start: 2021-01-25 | End: 2021-04-19 | Stop reason: SDUPTHER

## 2021-01-25 ASSESSMENT — PATIENT HEALTH QUESTIONNAIRE - PHQ9
SUM OF ALL RESPONSES TO PHQ9 QUESTIONS 1 & 2: 3
SUM OF ALL RESPONSES TO PHQ QUESTIONS 1-9: 11

## 2021-01-25 NOTE — PATIENT INSTRUCTIONS
COVID Vaccine - 1a based on BMI. Pharmacy,  [place], [place] - [phone]  [facility]

## 2021-01-25 NOTE — PROGRESS NOTES
"Andressa Baker is a 40 y.o. female who presents today for     Chief Complaint   Patient presents with   • Anxiety     med check         SUBJECTIVE:  HPI    Anxiety - feeling more anxious over the past 3 weeks. She had not been taking ativan, but had started taking it more in the past few weeks for panic. She feels chest pressure, shortness of breath, \"stuck.\" She feels overwhelmed. 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     Current dose of zoloft is 75 mg daily.     Started back in therapy. Working out with a .     She does feel depressed. She takes benadryl every night to sleep. She is still breastfeeding.     No Known Allergies      Current Outpatient Medications:   •  B-complex with vitamin C tablet, Take 1 tablet by mouth daily., Disp: , Rfl:   •  cholecalciferol (VITAMIN D3) 10,000 unit capsule, Take 10,000 Units by mouth once a week.  , Disp: , Rfl:   •  docosahexaenoic acid-epa 120-180 mg capsule, Take 1,000 mg by mouth., Disp: , Rfl:   •  fexofenadine (ALLEGRA) 180 mg tablet, Take 180 mg by mouth daily., Disp: , Rfl:   •  ibuprofen (ADVIL ORAL), Take by mouth daily as needed., Disp: , Rfl:   •  magnesium 250 mg tablet, Take 500 mg by mouth daily., Disp: , Rfl:   •  sertraline (ZOLOFT) 25 mg tablet, Take 1 tablet (25 mg total) by mouth daily., Disp: 90 tablet, Rfl: 1  •  sertraline (ZOLOFT) 50 mg tablet, Take 1 tablet (50 mg total) by mouth daily., Disp: 90 tablet, Rfl: 1  •  azelastine (ASTELIN) 137 mcg (0.1 %) nasal spray, ADMINISTER 1 SPRAY INTO EACH NOSTRIL 2 (TWO) TIMES A DAY. USE IN EACH NOSTRIL AS DIRECTED., Disp: 30 mL, Rfl: 5  •  LASTACAFT 0.25 % drops, , Disp: , Rfl:   •  LORazepam (ATIVAN) 0.5 mg tablet, Take 1 tablet (0.5 mg total) by mouth every 8 (eight) hours as needed for anxiety., Disp: 20 tablet, Rfl: 1  •  traZODone (DESYREL) 50 mg tablet, Take 1 tablet (50 mg total) by mouth nightly., Disp: 90 tablet, Rfl: 0      ROS  Per HPI       OBJECTIVE:  Vitals:    " "01/25/21 1148   BP: 120/70   BP Location: Left upper arm   Patient Position: Sitting   Pulse: 70   Resp: 17   SpO2: 98%   Weight: 90.4 kg (199 lb 6.4 oz)   Height: 1.702 m (5' 7\")       Wt Readings from Last 3 Encounters:   01/25/21 90.4 kg (199 lb 6.4 oz)   09/11/20 91.9 kg (202 lb 11.2 oz)   08/21/20 94.3 kg (207 lb 12.8 oz)     Body mass index is 31.23 kg/m².      Physical Exam  Vitals signs reviewed.   Constitutional:       Appearance: She is well-developed.   HENT:      Head: Normocephalic and atraumatic.      Right Ear: External ear normal.      Left Ear: External ear normal.   Eyes:      Pupils: Pupils are equal, round, and reactive to light.   Neck:      Musculoskeletal: Normal range of motion and neck supple.      Thyroid: No thyromegaly.   Cardiovascular:      Rate and Rhythm: Normal rate.      Heart sounds: Normal heart sounds. No murmur. No friction rub. No gallop.       Comments: + premature beats  Pulmonary:      Effort: Pulmonary effort is normal. No respiratory distress.      Breath sounds: Normal breath sounds. No stridor. No wheezing or rales.   Musculoskeletal: Normal range of motion.   Lymphadenopathy:      Cervical: No cervical adenopathy.   Skin:     General: Skin is warm and dry.   Neurological:      Mental Status: She is alert and oriented to person, place, and time.      Motor: No abnormal muscle tone.      Coordination: Coordination normal.      Deep Tendon Reflexes: Reflexes normal.   Psychiatric:         Behavior: Behavior normal.         Thought Content: Thought content normal.         ASSESSMENT & PLAN:   Diagnoses and all orders for this visit:    PVC (premature ventricular contraction) (Primary)  Assessment & Plan:  No PVCs on EKG today  Will check for electrolyte depletion, continue Mag supplementation    Orders:  -     ECG 12 LEAD OFFICE PERFORMED  -     Basic metabolic panel  -     Magnesium    Thrombocytopenia (CMS/HCC)  -     CBC and differential    Low vitamin D level  -     " Vitamin D 25 hydroxy    Primary insomnia  Assessment & Plan:  Start trazodone 50 mg nightly  Get kids on good sleep routine.   Continue Calm rashid      Anxiety  Assessment & Plan:  Continue zoloft at current dose 75 mg daily  Continue counseling/calm rashid  Continue cardiovascular exercise  Weaning from breastfeeding can exacerbate anxiety/depressio in the short term, aim for improved sleep to help ease panic  Work on getting COVID vaccine for in laws, mother and self (1a by BMI)      Other orders  -     traZODone (DESYREL) 50 mg tablet; Take 1 tablet (50 mg total) by mouth nightly.

## 2021-01-27 PROBLEM — G47.00 INSOMNIA: Status: ACTIVE | Noted: 2020-09-11

## 2021-01-28 RX ORDER — SERTRALINE HYDROCHLORIDE 50 MG/1
TABLET, FILM COATED ORAL
Qty: 30 TABLET | Refills: 5 | Status: SHIPPED | OUTPATIENT
Start: 2021-01-28 | End: 2021-06-21

## 2021-01-28 NOTE — ASSESSMENT & PLAN NOTE
Continue zoloft at current dose 75 mg daily  Continue counseling/calm rashid  Continue cardiovascular exercise  Weaning from breastfeeding can exacerbate anxiety/depressio in the short term, aim for improved sleep to help ease panic  Work on getting COVID vaccine for in laws, mother and self (1a by BMI)

## 2021-03-19 ENCOUNTER — OFFICE VISIT (OUTPATIENT)
Dept: BARIATRICS/WEIGHT MGMT | Facility: CLINIC | Age: 41
End: 2021-03-19
Payer: COMMERCIAL

## 2021-03-19 VITALS — HEIGHT: 67 IN | BODY MASS INDEX: 31.93 KG/M2 | WEIGHT: 203.44 LBS

## 2021-03-19 DIAGNOSIS — E66.811 CLASS 1 OBESITY WITH SERIOUS COMORBIDITY AND BODY MASS INDEX (BMI) OF 33.0 TO 33.9 IN ADULT, UNSPECIFIED OBESITY TYPE: ICD-10-CM

## 2021-03-19 DIAGNOSIS — F41.9 ANXIETY: Primary | ICD-10-CM

## 2021-03-19 PROCEDURE — 99213 OFFICE O/P EST LOW 20 MIN: CPT | Performed by: FAMILY MEDICINE

## 2021-03-19 NOTE — ASSESSMENT & PLAN NOTE
Spent today's entire visit discussing anxiety, grief reaction, and anniversary of loss of her father from Covid.  She recognizes that refused to grieve is interfering with her relationships with her children as well as with her .    She recognizes that going to [employer] to pray and taking a weekend away could be very therapeutic.    She agrees to stop at [employer] on the way home from our office today.    She will consider a weekend away with her .    The week and away will also give her time to wean her 18-month-old from nursing, which will overall improve her sleep.    I spent 20 minutes on this date of service performing the following activities: providing counseling and education.

## 2021-03-19 NOTE — ASSESSMENT & PLAN NOTE
Consider [employer] as a resource to learn more about mindfulness and the scientific backing of mindfulness as a take technique to reduce stress, anxiety, and emotional eating.  Explanation of his apps are on the website in addition to links to free YouTube videos and Yazan talks.    [employer] rashid by [employer] and Dr. Elsi Aleman  Discount code HCP20 for 20% off the rashid.      I spent 20 minutes on this date of service performing the following activities: obtaining history, obtaining / reviewing records and providing counseling and education.

## 2021-03-19 NOTE — PROGRESS NOTES
Subjective     Patient ID: Andressa Baker is a 40 y.o. female.    HPI  Patient  is here today to follow up on using lifestyle modification and weight loss as a adjunct to traditional medical therapy/treatment strategy for anxiety .  Medications reviewed.  Reports 0 missed doses.  Reports good  tolerance.  Struggling right now with mood.    Concerns or questions: She hit a plateau     Interval history: Struggling with motivation right now.    She is having trouble sleeping.  She is having some difficulty with her relationships with her children and  due to her her own anger from holding back her grieving process about her father's passing.    She is back to the space she was approximately a year ago when she was emotionally eating.  She would like to discontinue this behavior.    Physical activity goals:: She stopped running recently even though she enjoys it.  She lost her running partner because she is 20 months pregnant and is not motivated to go to the gym or doing independent exercise.    Sleep goals:: Still nursing her 18-month-old 1-2 times at night.        Current Outpatient Medications on File Prior to Visit   Medication Sig Dispense Refill   • azelastine (ASTELIN) 137 mcg (0.1 %) nasal spray ADMINISTER 1 SPRAY INTO EACH NOSTRIL 2 (TWO) TIMES A DAY. USE IN EACH NOSTRIL AS DIRECTED. 30 mL 5   • B-complex with vitamin C tablet Take 1 tablet by mouth daily.     • cholecalciferol (VITAMIN D3) 10,000 unit capsule Take 10,000 Units by mouth once a week.       • docosahexaenoic acid-epa 120-180 mg capsule Take 1,000 mg by mouth.     • fexofenadine (ALLEGRA) 180 mg tablet Take 180 mg by mouth daily.     • ibuprofen (ADVIL ORAL) Take by mouth daily as needed.     • LASTACAFT 0.25 % drops      • LORazepam (ATIVAN) 0.5 mg tablet Take 1 tablet (0.5 mg total) by mouth every 8 (eight) hours as needed for anxiety. 20 tablet 1   • magnesium 250 mg tablet Take 500 mg by mouth daily.     • sertraline (ZOLOFT)  "25 mg tablet Take 1 tablet (25 mg total) by mouth daily. 90 tablet 1   • sertraline (ZOLOFT) 50 mg tablet TAKE 1 TABLET BY MOUTH EVERY DAY 30 tablet 5   • traZODone (DESYREL) 50 mg tablet Take 1 tablet (50 mg total) by mouth nightly. 90 tablet 0     No current facility-administered medications on file prior to visit.             Patient has no known allergies.             Review of Systems    Objective     Vitals:    03/19/21 1316   Weight: 92.3 kg (203 lb 7 oz)   Height: 1.702 m (5' 7\")     Body mass index is 31.86 kg/m².    Physical Exam    Assessment/Plan   Diagnoses and all orders for this visit:    Anxiety (Primary)  Assessment & Plan:  Spent today's entire visit discussing anxiety, grief reaction, and anniversary of loss of her father from Covid.  She recognizes that refused to grieve is interfering with her relationships with her children as well as with her .    She recognizes that going to [employer] to pray and taking a weekend away could be very therapeutic.    She agrees to stop at [employer] on the way home from our office today.    She will consider a weekend away with her .    The week and away will also give her time to wean her 18-month-old from nursing, which will overall improve her sleep.    I spent 20 minutes on this date of service performing the following activities: providing counseling and education.        Class 1 obesity with serious comorbidity and body mass index (BMI) of 33.0 to 33.9 in adult, unspecified obesity type  Assessment & Plan:  Consider Dr[employer]com as a resource to learn more about mindfulness and the scientific backing of mindfulness as a take technique to reduce stress, anxiety, and emotional eating.  Explanation of his apps are on the website in addition to links to free YouTube videos and Ayzan talks.    [employer]at[employer]Now rashid by [employer] and Dr. Elsi Aleman  Discount code HCP20 for 20% off the rashid.      I spent 20 minutes on this date of service performing the following " activities: obtaining history, obtaining / reviewing records and providing counseling and education.

## 2021-03-19 NOTE — PATIENT INSTRUCTIONS
Problem List Items Addressed This Visit        Endocrine/Metabolic    Class 1 obesity with serious comorbidity and body mass index (BMI) of 33.0 to 33.9 in adult     Consider Shashi[employer]com as a resource to learn more about mindfulness and the scientific backing of mindfulness as a take technique to reduce stress, anxiety, and emotional eating.  Explanation of his apps are on the website in addition to links to free YouTube videos and Yazan talks.    [employer]atRightNow rashid by [employer] and Dr. Elsi Aleman  Discount code HCP20 for 20% off the rashid.      I spent 20 minutes on this date of service performing the following activities: obtaining history, obtaining / reviewing records and providing counseling and education.              Other    Anxiety - Primary     Spent today's entire visit discussing anxiety, grief reaction, and anniversary of loss of her father from Covid.  She recognizes that refused to grieve is interfering with her relationships with her children as well as with her .    She recognizes that going to [employer] to pray and taking a weekend away could be very therapeutic.    She agrees to stop at [employer] on the way home from our office today.    She will consider a weekend away with her .    The week and away will also give her time to wean her 18-month-old from nursing, which will overall improve her sleep.    I spent 20 minutes on this date of service performing the following activities: providing counseling and education.

## 2021-04-13 DIAGNOSIS — Z23 ENCOUNTER FOR IMMUNIZATION: ICD-10-CM

## 2021-04-19 RX ORDER — TRAZODONE HYDROCHLORIDE 50 MG/1
TABLET ORAL
Qty: 90 TABLET | Refills: 1 | Status: SHIPPED | OUTPATIENT
Start: 2021-04-19 | End: 2021-06-22 | Stop reason: SDUPTHER

## 2021-06-01 RX ORDER — SERTRALINE HYDROCHLORIDE 25 MG/1
TABLET, FILM COATED ORAL
Qty: 90 TABLET | Refills: 1 | Status: SHIPPED | OUTPATIENT
Start: 2021-06-01 | End: 2021-06-22 | Stop reason: ALTCHOICE

## 2021-06-02 ENCOUNTER — TELEMEDICINE (OUTPATIENT)
Dept: BARIATRICS/WEIGHT MGMT | Facility: CLINIC | Age: 41
End: 2021-06-02
Payer: COMMERCIAL

## 2021-06-02 VITALS — BODY MASS INDEX: 30.38 KG/M2 | WEIGHT: 194 LBS

## 2021-06-02 DIAGNOSIS — F41.9 ANXIETY: Primary | ICD-10-CM

## 2021-06-02 DIAGNOSIS — E66.811 CLASS 1 OBESITY WITH SERIOUS COMORBIDITY AND BODY MASS INDEX (BMI) OF 33.0 TO 33.9 IN ADULT, UNSPECIFIED OBESITY TYPE: ICD-10-CM

## 2021-06-02 PROCEDURE — 3008F BODY MASS INDEX DOCD: CPT | Performed by: FAMILY MEDICINE

## 2021-06-02 PROCEDURE — 99213 OFFICE O/P EST LOW 20 MIN: CPT | Mod: GT,95 | Performed by: FAMILY MEDICINE

## 2021-06-02 NOTE — PATIENT INSTRUCTIONS
Problem: off plan items related to Celebrations    Go in with a plan: know that you will have a treat at the party  Sacrifice a carb at some point to have a piece of cake.    Increase movement by 100 calories to offset the insulin spike. Really push the water on party days as well.      Using a cooler in the car to help with water intake.     Problem List Items Addressed This Visit        Endocrine/Metabolic    Class 1 obesity with serious comorbidity and body mass index (BMI) of 33.0 to 33.9 in adult     Excellent progress with weight loss, enlisting nutrition and physical activity support and accountability.    Sleep improvement makes a big difference in weight regulation as well as being able to execute intentions.    We discussed addictive potential of sugar foods.  Dopamine bursts can increase cravings after eating or having no fiber carbohydrates including processed carbohydrates or high sugar items.    Going into a situation with eyes wide open understanding that cravings can increase and that it still may be worth doing, will help reduce damage.    We discussed an extra walk or physical activity on days of social gatherings, eliminating a carbohydrate choice earlier in the day to make up for the dessert that would be enjoyed at the party.  And making sure to focus and enjoy the item with the respect it deserves at the social gathering.  This will help increase pleasure with a smaller volume, and therefore will be less damaging.                Other    Anxiety - Primary     Doing well with current lifestyle modifications, medication, and support structure.  No change to current plan.

## 2021-06-02 NOTE — ASSESSMENT & PLAN NOTE
Excellent progress with weight loss, enlisting nutrition and physical activity support and accountability.    Sleep improvement makes a big difference in weight regulation as well as being able to execute intentions.    We discussed addictive potential of sugar foods.  Dopamine bursts can increase cravings after eating or having no fiber carbohydrates including processed carbohydrates or high sugar items.    Going into a situation with eyes wide open understanding that cravings can increase and that it still may be worth doing, will help reduce damage.    We discussed an extra walk or physical activity on days of social gatherings, eliminating a carbohydrate choice earlier in the day to make up for the dessert that would be enjoyed at the party.  And making sure to focus and enjoy the item with the respect it deserves at the social gathering.  This will help increase pleasure with a smaller volume, and therefore will be less damaging.

## 2021-06-02 NOTE — ASSESSMENT & PLAN NOTE
Doing well with current lifestyle modifications, medication, and support structure.  No change to current plan.

## 2021-06-02 NOTE — PROGRESS NOTES
DETAILS OF VISIT   Start of visit: 10:08   End of visit: 10:25  Total time: 25 minutes on this date of service performing the following activities: obtaining history, documenting and providing counseling and education    State:[place]    Telemedicine visit: Audio and Video Encounter   Hello, my name is Gloria Donato MD.  Before we proceed, can you please verify your identification by telling me your full name and date of birth?  Can you tell me who is in the room with you?    You and I are about to have a telemedicine check-in or visit because you have requested it.  This is a live video-conference.  I am a real person, speaking to you in real time.  There is no one else with me on the video-conference.  However, when we use ([employer], [employer], etc) it is important for you to know that the video-conference may not be secure or private.  I am not recording this conversation and I am asking you not to record it.  This telemedicine visit will be billed to your health insurance or you, if you are self-insured.  You understand you will be responsible for any copayments or coinsurances that apply to your telemedicine visit.  Communication platform used for this encounter:  [employer]     Before starting our telemedicine visit, I am required to get your consent for this virtual check-in or visit by telemedicine. Do you consent?    Consent statement Read: You and I are about to have a Telemedicine visit.  This is allowed because you are already my patient, and you have requested it.  This telemedicine visit will be billed to your health insurance or to you, if you are self-insured. You understand that you ill be responsible for any co-payments or co-insurances that apply to your telemedicine visit.  Before starting our telemedicine visit, I am required to get your consent for this virtual check-in.  Do you consent?  Patient reply : Yes    PCP: Iesha Domingo MD   HISTORY OF PRESENT ILLNESS        Andressa LEE  Olivia is a 41 y.o. female following up on lifestyle management and weight loss as adjunctive treatment strategies for anxiety and emotional eating.      Medications used to support lifestyle modifications: None  Side effects: N/A    Interval history: Since last visit, Andressa has increased her support structures.  She is going back to MD weight loss to have 2 times per week check-in's, and microadjustments of her nutrition.  She is also began meeting with a  1 time per week and is increased her running up to 5 miles and incorporated more frequent strength training.    Concerns or questions: She continues to increase awareness around self soothing behaviors.  She has stopped her online shopping, she is thinking about how to manipulate foods including sugars that could be addictive for her to be able to incorporate them into her week but not incorporate them every day.  She notices that even when she plans to do something differently, she generally will end up having cake or ice cream at a social gathering.    Nutrition goals:  Struggling with hunger: No  Struggling with non-hunger eating: Occasionally, especially at social gatherings  Meeting protein requirements: Yes, and working closely with MD weight loss  Getting adequate water intake: Yes, but harder in the summer, needs plan for keeping water children in the car.    Consistently meeting hourly movement goals: Yes  Current exercise regimen: Working with , increasing strength and endurance    Insights on emotional wellness, awareness, and sleep: Sleep is improved with use of trazodone nightly.  Overall feeling better with better sleep.      PAST MEDICAL History        Past Medical History:   Diagnosis Date   • Abnormal ECG     PVCs   • Arrhythmia 02/2018    PVCs, status post ablation, partially successful.   • Back pain    • GERD (gastroesophageal reflux disease)    • Heartburn    • History of fetal demise, not currently  pregnant     Twin pregnancy, 1 fetal demise, 1 live birth   • Joint pain    • Migraine headache    • Ovarian cyst    • Palpitations    • Pericarditis 03/06/2018    After john ablation       MEDICATIONS        Current Outpatient Medications on File Prior to Visit   Medication Sig Dispense Refill   • azelastine (ASTELIN) 137 mcg (0.1 %) nasal spray ADMINISTER 1 SPRAY INTO EACH NOSTRIL 2 (TWO) TIMES A DAY. USE IN EACH NOSTRIL AS DIRECTED. 30 mL 5   • B-complex with vitamin C tablet Take 1 tablet by mouth daily.     • cholecalciferol (VITAMIN D3) 10,000 unit capsule Take 10,000 Units by mouth once a week.       • docosahexaenoic acid-epa 120-180 mg capsule Take 1,000 mg by mouth.     • fexofenadine (ALLEGRA) 180 mg tablet Take 180 mg by mouth daily.     • ibuprofen (ADVIL ORAL) Take by mouth daily as needed.     • LASTACAFT 0.25 % drops      • LORazepam (ATIVAN) 0.5 mg tablet Take 1 tablet (0.5 mg total) by mouth every 8 (eight) hours as needed for anxiety. 20 tablet 1   • magnesium 250 mg tablet Take 500 mg by mouth daily.     • sertraline (ZOLOFT) 25 mg tablet TAKE 1 TABLET BY MOUTH EVERY DAY 90 tablet 1   • sertraline (ZOLOFT) 50 mg tablet TAKE 1 TABLET BY MOUTH EVERY DAY 30 tablet 5   • traZODone (DESYREL) 50 mg tablet TAKE 1 TABLET BY MOUTH EVERY DAY AT NIGHT 90 tablet 1     No current facility-administered medications on file prior to visit.       ALLERGIES        Patient has no known allergies.           REVIEW OF SYSTEMS      Review of Systems    Review of Systems     PHYSICAL EXAMINATION      Visit Vitals  Wt 88 kg (194 lb)   BMI 30.38 kg/m²      Body mass index is 30.38 kg/m².    BP Readings from Last 3 Encounters:   01/25/21 120/70   09/11/20 100/60   08/21/20 104/78     Wt Readings from Last 3 Encounters:   06/02/21 88 kg (194 lb)   03/19/21 92.3 kg (203 lb 7 oz)   01/25/21 90.4 kg (199 lb 6.4 oz)       Physical Exam      LABS / IMAGING / EKG        Reviewed the following Labs:    Lab Results   Component  Value Date    HGBA1C 5.2 08/22/2020    HGBA1C 5.1 10/01/2019    HGBA1C 4.9 01/29/2016     Lab Results   Component Value Date    CHOL 183 08/22/2020    CHOL 196 01/29/2016     Lab Results   Component Value Date    HDL 67 08/22/2020    HDL 67 01/29/2016     Lab Results   Component Value Date    LDLCALC 108 (H) 08/22/2020    LDLCALC 117 (H) 01/29/2016     Lab Results   Component Value Date    TRIG 40 08/22/2020    TRIG 58 01/29/2016     No results found for: CHOLHDL      ASSESSMENT AND PLAN         Anxiety  Doing well with current lifestyle modifications, medication, and support structure.  No change to current plan.    Class 1 obesity with serious comorbidity and body mass index (BMI) of 33.0 to 33.9 in adult  Excellent progress with weight loss, enlisting nutrition and physical activity support and accountability.    Sleep improvement makes a big difference in weight regulation as well as being able to execute intentions.    We discussed addictive potential of sugar foods.  Dopamine bursts can increase cravings after eating or having no fiber carbohydrates including processed carbohydrates or high sugar items.    Going into a situation with eyes wide open understanding that cravings can increase and that it still may be worth doing, will help reduce damage.    We discussed an extra walk or physical activity on days of social gatherings, eliminating a carbohydrate choice earlier in the day to make up for the dessert that would be enjoyed at the party.  And making sure to focus and enjoy the item with the respect it deserves at the social gathering.  This will help increase pleasure with a smaller volume, and therefore will be less damaging.              Thank you very much for allowing us to participate in the care of your patient. Please do not hesitate to call or email if there are any questions.

## 2021-06-21 RX ORDER — SERTRALINE HYDROCHLORIDE 50 MG/1
TABLET, FILM COATED ORAL
Qty: 30 TABLET | Refills: 5 | Status: SHIPPED | OUTPATIENT
Start: 2021-06-21 | End: 2021-08-04

## 2021-06-22 ENCOUNTER — OFFICE VISIT (OUTPATIENT)
Dept: INTERNAL MEDICINE | Facility: CLINIC | Age: 41
End: 2021-06-22
Payer: COMMERCIAL

## 2021-06-22 VITALS
OXYGEN SATURATION: 98 % | WEIGHT: 201 LBS | BODY MASS INDEX: 31.55 KG/M2 | HEIGHT: 67 IN | HEART RATE: 65 BPM | TEMPERATURE: 97.7 F | RESPIRATION RATE: 16 BRPM | DIASTOLIC BLOOD PRESSURE: 60 MMHG | SYSTOLIC BLOOD PRESSURE: 118 MMHG

## 2021-06-22 DIAGNOSIS — F51.01 PRIMARY INSOMNIA: Primary | ICD-10-CM

## 2021-06-22 PROCEDURE — 3008F BODY MASS INDEX DOCD: CPT | Performed by: INTERNAL MEDICINE

## 2021-06-22 PROCEDURE — 99213 OFFICE O/P EST LOW 20 MIN: CPT | Performed by: INTERNAL MEDICINE

## 2021-06-22 RX ORDER — LORAZEPAM 0.5 MG/1
0.5 TABLET ORAL EVERY 8 HOURS PRN
Qty: 20 TABLET | Refills: 1 | Status: SHIPPED | OUTPATIENT
Start: 2021-06-22 | End: 2021-08-19 | Stop reason: SDUPTHER

## 2021-06-22 RX ORDER — TRAZODONE HYDROCHLORIDE 100 MG/1
100 TABLET ORAL NIGHTLY
Qty: 90 TABLET | Refills: 1 | Status: SHIPPED | OUTPATIENT
Start: 2021-06-22 | End: 2022-01-28

## 2021-06-22 ASSESSMENT — PATIENT HEALTH QUESTIONNAIRE - PHQ9: SUM OF ALL RESPONSES TO PHQ9 QUESTIONS 1 & 2: 0

## 2021-06-22 NOTE — PROGRESS NOTES
Andressa Baker is a 41 y.o. female who presents today for     Chief Complaint   Patient presents with   • office visit      med check          SUBJECTIVE:  HPI    Insomnia - having difficulty focusing, not sleeping much. She is almost done nursing her baby. She has been taking 50 mg sertraline now since pharmacy was out of the 25 mg tablet. She is working out twice a week with a . She feels anxious at night in particular, scrolling the phone before bed. Falls asleep at midnight, wakes up at 6. Denies SI, feels mildly depressed. Does have escape fantasies. She has a couple of trips planned away at the end of the month. She is still taking trazodone at night.     Now she is starting to train for a half marathon.       No Known Allergies      Current Outpatient Medications:   •  B-complex with vitamin C tablet, Take 1 tablet by mouth daily., Disp: , Rfl:   •  cholecalciferol (VITAMIN D3) 10,000 unit capsule, Take 10,000 Units by mouth once a week.  , Disp: , Rfl:   •  docosahexaenoic acid-epa 120-180 mg capsule, Take 1,000 mg by mouth., Disp: , Rfl:   •  fexofenadine (ALLEGRA) 180 mg tablet, Take 180 mg by mouth daily., Disp: , Rfl:   •  ibuprofen (ADVIL ORAL), Take by mouth daily as needed., Disp: , Rfl:   •  LASTACAFT 0.25 % drops, , Disp: , Rfl:   •  magnesium 250 mg tablet, Take 500 mg by mouth daily., Disp: , Rfl:   •  sertraline (ZOLOFT) 50 mg tablet, TAKE 1 TABLET BY MOUTH EVERY DAY, Disp: 30 tablet, Rfl: 5  •  traZODone (DESYREL) 100 mg tablet, Take 1 tablet (100 mg total) by mouth nightly., Disp: 90 tablet, Rfl: 1  •  azelastine (ASTELIN) 137 mcg (0.1 %) nasal spray, ADMINISTER 1 SPRAY INTO EACH NOSTRIL 2 (TWO) TIMES A DAY. USE IN EACH NOSTRIL AS DIRECTED., Disp: 30 mL, Rfl: 5  •  LORazepam (ATIVAN) 0.5 mg tablet, Take 1 tablet (0.5 mg total) by mouth every 8 (eight) hours as needed for anxiety., Disp: 20 tablet, Rfl: 1      ROS  Per HPI       OBJECTIVE:  Vitals:    06/22/21 1016   BP:  "118/60   BP Location: Left upper arm   Patient Position: Sitting   Pulse: 65   Resp: 16   Temp: 36.5 °C (97.7 °F)   TempSrc: Temporal   SpO2: 98%   Weight: 91.2 kg (201 lb)   Height: 1.702 m (5' 7\")       Wt Readings from Last 3 Encounters:   06/22/21 91.2 kg (201 lb)   06/02/21 88 kg (194 lb)   03/19/21 92.3 kg (203 lb 7 oz)     Body mass index is 31.48 kg/m².      Physical Exam  Vitals reviewed.   Constitutional:       Appearance: Normal appearance.   HENT:      Head: Normocephalic and atraumatic.   Pulmonary:      Effort: Pulmonary effort is normal. No respiratory distress.   Skin:     General: Skin is warm and dry.   Neurological:      General: No focal deficit present.      Mental Status: She is alert and oriented to person, place, and time.   Psychiatric:         Mood and Affect: Mood normal.         Behavior: Behavior normal.         Thought Content: Thought content normal.           ASSESSMENT & PLAN:   Diagnoses and all orders for this visit:    Primary insomnia (Primary)  Assessment & Plan:  Increase trazodone to 100 mg nightly  Decrease sertraline to 50 mg daily  Psychiatry recommendations given  Ativan PRN, PDMP queried, no evidence of misuse or diversion      Other orders  -     traZODone (DESYREL) 100 mg tablet; Take 1 tablet (100 mg total) by mouth nightly.                "

## 2021-06-22 NOTE — PATIENT INSTRUCTIONS
Decrease sertraline to 50 mg daily  Increase trazodone to 100 mg daily    [facility]  By Phone: [phone]  By Fax: [phone]  By Email: [email or web address]    [place]  [phone]    Krissy Guerrero - in [place]    Minnie Ward - in [place]    Dr. Wlimer Florian -- [phone]     Dr. Dmitri Otto -- [phone]

## 2021-06-23 NOTE — ASSESSMENT & PLAN NOTE
Increase trazodone to 100 mg nightly  Decrease sertraline to 50 mg daily  Psychiatry recommendations given  Ativan PRN, PDMP queried, no evidence of misuse or diversion

## 2021-07-20 ENCOUNTER — HOSPITAL ENCOUNTER (OUTPATIENT)
Facility: CLINIC | Age: 41
Discharge: HOME | End: 2021-07-20
Attending: EMERGENCY MEDICINE
Payer: COMMERCIAL

## 2021-07-20 VITALS — OXYGEN SATURATION: 99 % | TEMPERATURE: 98.9 F | HEART RATE: 72 BPM

## 2021-07-20 DIAGNOSIS — M79.662 PAIN OF LEFT CALF: Primary | ICD-10-CM

## 2021-07-20 PROCEDURE — S9088 SERVICES PROVIDED IN URGENT: HCPCS | Performed by: NURSE PRACTITIONER

## 2021-07-20 PROCEDURE — 99213 OFFICE O/P EST LOW 20 MIN: CPT | Performed by: NURSE PRACTITIONER

## 2021-07-20 ASSESSMENT — ENCOUNTER SYMPTOMS
NECK STIFFNESS: 0
NAUSEA: 0
DIARRHEA: 0
EYES NEGATIVE: 1
WEAKNESS: 0
VOMITING: 0
PSYCHIATRIC NEGATIVE: 1
HEADACHES: 0
CHILLS: 0
ARTHRALGIAS: 0
BACK PAIN: 0
ENDOCRINE NEGATIVE: 1
ALLERGIC/IMMUNOLOGIC NEGATIVE: 1
SHORTNESS OF BREATH: 0
MYALGIAS: 1
HEMATOLOGIC/LYMPHATIC NEGATIVE: 1
NECK PAIN: 0
COUGH: 0
NUMBNESS: 0
ABDOMINAL PAIN: 0
FEVER: 0
FATIGUE: 0
JOINT SWELLING: 0
LIGHT-HEADEDNESS: 0
DIZZINESS: 0

## 2021-07-20 NOTE — ED PROVIDER NOTES
Emergency Medicine Note  HPI   HISTORY OF PRESENT ILLNESS     Sharp pain in left calf while working in the kitchen on .   Tried Motrin and Tylenol with some relief. Has been wearing a compression sleeve and icing the area.   Saturday afternoon ran 3.5 miles, no pain at that time.   5 hour car ride on  afternoon.   Non smoker, no HRT, no recent surgeries.   Reports being at a retreat all last weekend and sitting a lot during that time.   Denies chest pain or SOB.   She reports that the pain is worse with bearing weight on the left leg.             Patient History   PAST HISTORY     Reviewed from Nursing Triage:      Past Medical History:   Diagnosis Date   • Abnormal ECG     PVCs   • Arrhythmia 2018    PVCs, status post ablation, partially successful.   • Back pain    • GERD (gastroesophageal reflux disease)    • Heartburn    • History of fetal demise, not currently pregnant     Twin pregnancy, 1 fetal demise, 1 live birth   • Joint pain    • Migraine headache    • Ovarian cyst    • Palpitations    • Pericarditis 2018    After john ablation       Past Surgical History:   Procedure Laterality Date   • ABLATION OF DYSRHYTHMIC FOCUS  2018   •  SECTION         Family History   Problem Relation Age of Onset   • Hyperlipidemia Biological Mother    • Hyperlipidemia Biological Father    • Hypertension Biological Father    • Prostate cancer Biological Father 78   • Dementia Biological Father    • Hyperlipidemia Maternal Grandmother    • Clotting disorder Maternal Grandfather         developed in 60's - required tansfusions/platelets   • Hyperlipidemia Paternal Grandmother    • Dementia Paternal Grandmother    • Diabetes Paternal Grandfather        Social History     Tobacco Use   • Smoking status: Never Smoker   • Smokeless tobacco: Never Used   Vaping Use   • Vaping Use: Never used   Substance Use Topics   • Alcohol use: Yes     Comment: occasionally   • Drug use: No         Review of Systems    REVIEW OF SYSTEMS     Review of Systems   Constitutional: Negative for chills, fatigue and fever.   HENT: Negative.    Eyes: Negative.    Respiratory: Negative for cough and shortness of breath.    Cardiovascular: Negative for chest pain.   Gastrointestinal: Negative for abdominal pain, diarrhea, nausea and vomiting.   Endocrine: Negative.    Genitourinary: Negative.    Musculoskeletal: Positive for myalgias. Negative for arthralgias, back pain, gait problem, joint swelling, neck pain and neck stiffness.   Skin: Negative.    Allergic/Immunologic: Negative.    Neurological: Negative for dizziness, weakness, light-headedness, numbness and headaches.   Hematological: Negative.    Psychiatric/Behavioral: Negative.          VITALS     ED Vitals    Date/Time Temp Pulse Resp BP SpO2 [facility]   07/20/21 1705 37.2 °C (98.9 °F) 72 -- -- 99 % SL                       Physical Exam   PHYSICAL EXAM     Physical Exam  Vitals reviewed.   Constitutional:       General: She is not in acute distress.     Appearance: Normal appearance. She is not ill-appearing, toxic-appearing or diaphoretic.   HENT:      Head: Normocephalic and atraumatic.   Cardiovascular:      Rate and Rhythm: Normal rate.      Pulses: Normal pulses.   Pulmonary:      Effort: Pulmonary effort is normal. No respiratory distress.   Musculoskeletal:         General: No swelling, tenderness, deformity or signs of injury. Normal range of motion.      Cervical back: Normal range of motion. No rigidity.      Right lower leg: No edema.      Left lower leg: No edema.   Skin:     General: Skin is warm and dry.      Findings: No erythema.   Neurological:      General: No focal deficit present.      Mental Status: She is alert and oriented to person, place, and time.   Psychiatric:         Mood and Affect: Mood normal.         Behavior: Behavior normal.           PROCEDURES     Procedures     DATA     Results     None              No orders to display       Scoring tools                                  ED Course & MDM   MDM / ED COURSE and CLINICAL IMPRESSIONS     MDM  Number of Diagnoses or Management Options  Pain of left calf  Diagnosis management comments: Denies CP or SOB. Only risk factor for DVT is long car ride with sitting for extended periods of time this past weekend.   Placed an order for outpatient US to r/o DVT.  She has a f/u appointment with PCP coming up.   She has a [employer] account.   Discussed strict return to ED precautions for any chest pain or SOB.  Patient verbalized understanding and was agreeable with the plan.         Clinical Impressions as of Jul 20 1726   Pain of left calf            Molly Witt CRNP  07/20/21 1736

## 2021-07-21 ENCOUNTER — OFFICE VISIT (OUTPATIENT)
Dept: INTERNAL MEDICINE | Facility: CLINIC | Age: 41
End: 2021-07-21
Payer: COMMERCIAL

## 2021-07-21 VITALS
HEART RATE: 75 BPM | WEIGHT: 204 LBS | OXYGEN SATURATION: 99 % | BODY MASS INDEX: 32.02 KG/M2 | SYSTOLIC BLOOD PRESSURE: 104 MMHG | RESPIRATION RATE: 14 BRPM | HEIGHT: 67 IN | TEMPERATURE: 97.6 F | DIASTOLIC BLOOD PRESSURE: 60 MMHG

## 2021-07-21 DIAGNOSIS — M79.605 LEFT LEG PAIN: Primary | ICD-10-CM

## 2021-07-21 PROCEDURE — 99213 OFFICE O/P EST LOW 20 MIN: CPT | Performed by: NURSE PRACTITIONER

## 2021-07-21 PROCEDURE — 3008F BODY MASS INDEX DOCD: CPT | Performed by: NURSE PRACTITIONER

## 2021-07-21 RX ORDER — MELOXICAM 15 MG/1
15 TABLET ORAL DAILY
Qty: 15 TABLET | Refills: 0 | Status: SHIPPED | OUTPATIENT
Start: 2021-07-21 | End: 2021-09-28

## 2021-07-21 RX ORDER — GLUCOSAMINE/CHONDRO SU A 500-400 MG
1 TABLET ORAL DAILY
COMMUNITY
End: 2022-10-18

## 2021-07-21 NOTE — PROGRESS NOTES
[facility]       CHIEF COMPLAINT   Leg Pain     HISTORY OF PRESENT ILLNESS      This is a 41 y.o. female who presents with L leg pain.  Her symptoms started  afternoon.  Saturday she ran 3.5 miles on the treadmill which is unusual for her.  Took a 5 hour car ride on .  She has iced it, used aleve and is wearing a compression sock but it is still bothersome.  Feeling frustrated because she is training for a marathon and this is a setback.  Denies CP/SOB.  Seen at  yesterday and thought to be unlikely to be DVT, US slip given to r/o.    PAST MEDICAL AND SURGICAL HISTORY      PMHx:  Past Medical History:   Diagnosis Date   • Abnormal ECG     PVCs   • Arrhythmia 2018    PVCs, status post ablation, partially successful.   • Back pain    • GERD (gastroesophageal reflux disease)    • Heartburn    • History of fetal demise, not currently pregnant     Twin pregnancy, 1 fetal demise, 1 live birth   • Joint pain    • Migraine headache    • Ovarian cyst    • Palpitations    • Pericarditis 2018    After john ablation       PSHx:  Past Surgical History:   Procedure Laterality Date   • ABLATION OF DYSRHYTHMIC FOCUS  2018   •  SECTION         MEDICATIONS      Current Outpatient Medications on File Prior to Visit   Medication Sig Dispense Refill   • B-complex with vitamin C tablet Take 1 tablet by mouth daily.     • cholecalciferol (VITAMIN D3) 10,000 unit capsule Take 10,000 Units by mouth once a week.       • docosahexaenoic acid-epa 120-180 mg capsule Take 1,000 mg by mouth.     • fexofenadine (ALLEGRA) 180 mg tablet Take 180 mg by mouth daily.     • glucosamine-chondroitin 500-400 mg tablet Take 1 tablet by mouth 3 (three) times a day.     • ibuprofen (ADVIL ORAL) Take by mouth daily as needed.     • LORazepam (ATIVAN) 0.5 mg tablet Take 1 tablet (0.5 mg total) by mouth every 8 (eight) hours as needed for anxiety. 20 tablet 1   • traZODone (DESYREL) 100 mg  tablet Take 1 tablet (100 mg total) by mouth nightly. (Patient taking differently: Take 50 mg by mouth nightly.  ) 90 tablet 1   • azelastine (ASTELIN) 137 mcg (0.1 %) nasal spray ADMINISTER 1 SPRAY INTO EACH NOSTRIL 2 (TWO) TIMES A DAY. USE IN EACH NOSTRIL AS DIRECTED. 30 mL 5   • LASTACAFT 0.25 % drops      • magnesium 250 mg tablet Take 500 mg by mouth daily.     • sertraline (ZOLOFT) 50 mg tablet TAKE 1 TABLET BY MOUTH EVERY DAY (Patient not taking: Reported on 7/21/2021) 30 tablet 5     No current facility-administered medications on file prior to visit.       Home medications were personally reviewed.    ALLERGIES      Patient has no known allergies.    FAMILY HISTORY      Family History   Problem Relation Age of Onset   • Hyperlipidemia Biological Mother    • Hyperlipidemia Biological Father    • Hypertension Biological Father    • Prostate cancer Biological Father 78   • Dementia Biological Father    • Hyperlipidemia Maternal Grandmother    • Clotting disorder Maternal Grandfather         developed in 60's - required tansfusions/platelets   • Hyperlipidemia Paternal Grandmother    • Dementia Paternal Grandmother    • Diabetes Paternal Grandfather             REVIEW OF SYSTEMS      Review of Systems   Constitutional: Negative.    HENT: Negative.    Eyes: Negative.    Respiratory: Negative.    Cardiovascular: Negative.    Gastrointestinal: Negative.    Endocrine: Negative.    Genitourinary: Negative.    Musculoskeletal: Negative.    Skin: Negative.    Allergic/Immunologic: Negative.    Neurological: Negative.    Hematological: Negative.    Psychiatric/Behavioral: Negative.        PHYSICAL EXAMINATION      Vitals:    07/21/21 1648   BP: 104/60   Pulse: 75   Resp: 14   Temp: 36.4 °C (97.6 °F)   SpO2: 99%       Body mass index is 31.95 kg/m².    Physical Exam  Constitutional:       Appearance: She is well-developed.   HENT:      Head: Normocephalic and atraumatic.   Eyes:      Conjunctiva/sclera: Conjunctivae  normal.      Pupils: Pupils are equal, round, and reactive to light.   Cardiovascular:      Rate and Rhythm: Normal rate and regular rhythm.   Pulmonary:      Effort: Pulmonary effort is normal.      Breath sounds: Normal breath sounds.   Musculoskeletal:         General: Normal range of motion.      Cervical back: Normal range of motion and neck supple.      Comments: Mild tenderness L calf  No swelling, erythema, heat, lymphadenopathy noted   Skin:     General: Skin is warm and dry.      Capillary Refill: Capillary refill takes less than 2 seconds.   Neurological:      Mental Status: She is alert and oriented to person, place, and time.   Psychiatric:         Behavior: Behavior normal.         LABS / IMAGING / STUDIES        Labs  No new labs.    Imaging  N/A      HEALTHCARE MAINTENANCE   Health Maintenance Due   Topic Date Due   • Varicella Vaccines (1 of 2 - 2-dose childhood series) Never done   • COVID-19 Vaccine (1) Never done   • Hepatitis C Screening  Never done          ASSESSMENT AND PLAN           Problem List Items Addressed This Visit     None

## 2021-07-21 NOTE — ED ATTESTATION NOTE
I was immediately available to provide supervision and direction for the care of the patient.     Garrick Mccord, DO  07/20/21 6938

## 2021-07-21 NOTE — PATIENT INSTRUCTIONS
Patient Education     Muscle Strain  A muscle strain is an injury that occurs when a muscle is stretched beyond its normal length. Usually, a small number of muscle fibers are torn when this happens. There are three types of muscle strains. First-degree strains have the least amount of muscle fiber tearing and the least amount of pain. Second-degree and third-degree strains have more tearing and pain.  Usually, recovery from muscle strain takes 1-2 weeks. Complete healing normally takes 5-6 weeks.  What are the causes?  This condition is caused when a sudden, violent force is placed on a muscle and stretches it too far. This may occur with a fall, lifting, or sports.  What increases the risk?  This condition is more likely to develop in athletes and people who are physically active.  What are the signs or symptoms?  Symptoms of this condition include:  · Pain.  · Bruising.  · Swelling.  · Trouble using the muscle.  How is this diagnosed?  This condition is diagnosed based on a physical exam and your medical history. Tests may also be done, including an X-ray, ultrasound, or MRI.  How is this treated?  This condition is initially treated with PRICE therapy. This therapy involves:  · Protecting the muscle from being injured again.  · Resting the injured muscle.  · Icing the injured muscle.  · Applying pressure (compression) to the injured muscle. This may be done with a splint or elastic bandage.  · Raising (elevating) the injured muscle.  Your health care provider may also recommend medicine for pain.  Follow these instructions at home:  If you have a splint:  · Wear the splint as told by your health care provider. Remove it only as told by your health care provider.  · Loosen the splint if your fingers or toes tingle, become numb, or turn cold and blue.  · Keep the splint clean.  · If the splint is not waterproof:  ? Do not let it get wet.  ? Cover it with a watertight covering when you take a bath or a  shower.  Managing pain, stiffness, and swelling    · If directed, put ice on the injured area.  ? If you have a removable splint, remove it as told by your health care provider.  ? Put ice in a plastic bag.  ? Place a towel between your skin and the bag.  ? Leave the ice on for 20 minutes, 2-3 times a day.  · Move your fingers or toes often to avoid stiffness and to lessen swelling.  · Raise (elevate) the injured area above the level of your heart while you are sitting or lying down.  · Wear an elastic bandage as told by your health care provider. Make sure that it is not too tight.  General instructions  · Take over-the-counter and prescription medicines only as told by your health care provider.  · Restrict your activity and rest the injured muscle as told by your health care provider. Gentle movements may be allowed.  · If physical therapy was prescribed, do exercises as told by your health care provider.  · Do not put pressure on any part of the splint until it is fully hardened. This may take several hours.  · Do not use any products that contain nicotine or tobacco, such as cigarettes and e-cigarettes. These can delay bone healing. If you need help quitting, ask your health care provider.  · Ask your health care provider when it is safe to drive if you have a splint.  · Keep all follow-up visits as told by your health care provider. This is important.  How is this prevented?  · Warm up before exercising. This helps to prevent future muscle strains.  Contact a health care provider if:  · You have more pain or swelling in the injured area.  Get help right away if:  · You have numbness or tingling or lose a lot of strength in the injured area.  Summary  · A muscle strain is an injury that occurs when a muscle is stretched beyond its normal length.  · This condition is caused when a sudden, violent force is placed on a muscle and stretches it too far.  · This condition is initially treated with PRICE therapy,  which involves protecting, resting, icing, compressing, and elevating.  · Gentle movements may be allowed. If physical therapy was prescribed, do exercises as told by your health care provider.  This information is not intended to replace advice given to you by your health care provider. Make sure you discuss any questions you have with your health care provider.  Document Released: 12/18/2006 Document Revised: 11/30/2018 Document Reviewed: 01/24/2018  [employer] Patient Education © 2020 [employer]     Get the ultrasound  RICE  Try the meloxicam  Continue the acetaminophen  Call for persisting/worsening symptoms

## 2021-07-22 PROBLEM — M79.605 LEFT LEG PAIN: Status: ACTIVE | Noted: 2021-07-22

## 2021-07-22 ASSESSMENT — ENCOUNTER SYMPTOMS
CONSTITUTIONAL NEGATIVE: 1
MUSCULOSKELETAL NEGATIVE: 1
EYES NEGATIVE: 1
RESPIRATORY NEGATIVE: 1
ENDOCRINE NEGATIVE: 1
GASTROINTESTINAL NEGATIVE: 1
NEUROLOGICAL NEGATIVE: 1
PSYCHIATRIC NEGATIVE: 1
CARDIOVASCULAR NEGATIVE: 1
ALLERGIC/IMMUNOLOGIC NEGATIVE: 1
HEMATOLOGIC/LYMPHATIC NEGATIVE: 1

## 2021-07-22 NOTE — ASSESSMENT & PLAN NOTE
--low suspicion for DVT, suspect muscle strain or pull  --RICE  --try meloxicam-discussed dosage, indications, side effects  --can check US of the leg to evaluate for tear  --for persisting symptoms will consider further imaging, PT, ortho if needed  --call with questions/concerns

## 2021-08-04 ENCOUNTER — TELEMEDICINE (OUTPATIENT)
Dept: BARIATRICS/WEIGHT MGMT | Facility: CLINIC | Age: 41
End: 2021-08-04
Payer: COMMERCIAL

## 2021-08-04 VITALS — WEIGHT: 203 LBS | BODY MASS INDEX: 31.79 KG/M2

## 2021-08-04 DIAGNOSIS — E66.811 CLASS 1 OBESITY WITH SERIOUS COMORBIDITY AND BODY MASS INDEX (BMI) OF 33.0 TO 33.9 IN ADULT, UNSPECIFIED OBESITY TYPE: Primary | ICD-10-CM

## 2021-08-04 PROBLEM — R60.9 LIPEDEMA: Status: ACTIVE | Noted: 2021-08-04

## 2021-08-04 PROCEDURE — 3008F BODY MASS INDEX DOCD: CPT | Performed by: FAMILY MEDICINE

## 2021-08-04 PROCEDURE — 99215 OFFICE O/P EST HI 40 MIN: CPT | Mod: 95 | Performed by: FAMILY MEDICINE

## 2021-08-04 RX ORDER — METFORMIN HYDROCHLORIDE 500 MG/1
1000 TABLET, EXTENDED RELEASE ORAL
Qty: 60 TABLET | Refills: 0 | Status: SHIPPED | OUTPATIENT
Start: 2021-08-04 | End: 2021-08-30

## 2021-08-04 NOTE — ASSESSMENT & PLAN NOTE
Discussion today mainly around the possibility of lipedema for on you.  She has history of worsening with pregnancy, has body fat distribution historically in the upper arms disproportionate to the lower arms and in the upper legs disproportionate to the lower legs.  She also history of care to conus as well as varicose veins which are known to be coexisting conditions with lipedema.    I provided her information on lipedema.  I have asked that she consider abandoning the concept of prioritizing food calories as most important, and look at food quality and additives instead.    This would be looking at a paleo style eating plan, which is best outlined in the book the whole 30.  I am not opposed to incorporating legumes and lentils into the diet, but I am opposed to estrogenic potentials of foods and environmental toxins including plastics, some sunscreens, and some beauty products.    We discussed visiting with the dietitian to get some reeducation on how to look at food differently.    I have encouraged her to begin looking at ways to do lymphatic decompression through physical activity including yoga, swimming, or vibration plate.    We discussed the possibility of using Metformin off label as an anti-inflammatory in lipedema.    I have also suggested the possibility of using a diosmin supplement.

## 2021-08-04 NOTE — PATIENT INSTRUCTIONS
"klLipedema is a lymphatic disorder which causes is disproportionate fat accumulation in the hips, buttocks, and upper thighs.  Disproportionate fat accumulation can also occur in the upper arm in some subtypes of Lipedema.  The waist is generally spared as are the wrists and ankles.  This condition occurs exclusively in women and is thought to have a genetic component, but all details of how and why Lipedema develops are still under investigation. The current thinking is that changes in estrogen such as puberty, pregnancy, and menopause play a major role in lipedema progression.     Lipedema often creates easy bruising and pain in the effected areas due to swelling of the fat organ and eventual lymphatic compression, but some women experience no pain or discomfort. There is an overlap for many women with Lipedema to other structural issues like varicose veins, spider veins, Mona-danlos syndrome, and being \"hyper flexible.\"     Women with lipedema often have great difficulty losing weight despite great effort to get appropriate nutrition, routine exercise, and excellent self-care and stress management.  When weight loss does occur it is often significantly slower than would be expected based on a traditional calories and calories out model.      Treatment options for Lipedema:  Lipedema cannot be cured, but it can be managed.     Supplements: Vitamin D 2000 IU per day with food, Diosmin 500 mg or more per day, and magnesium glycinate 250-500 mg a day can be helpful     Manual Techniques: Manual Lymphatic drainage, Deep tissue massage, compression stockings, belly breathing, and vibration plates (look up [email or web address]) can help move the stuck lymph tissue out of the lower body.     Nutrition: Eating a \"clean\" diet that avoids or greatly reduces toxins such as sugars, artificial sweeteners, dyes, preservatives, and pesticides is best.  It is also wise to avoid estrogen mimics in foods.  This includes all soy, flax, " "milk products (unless grass fed and organic) and the mycotoxins that are found in grains such as wheat, oats, rice, quinoa, barley, teff, and millet.  There is recent evidence that a ketogenic diet can be an effective strategy for lipedema management.     Physical activity:  Weight lifting and water activities are most effective strategies for lipedema as the muscle contraction with lifting helps the lymphatic flow.  The pressure from being in water also acts to compress and mobilize lymph.     Medications: Phentermine and metformin can help reduce pain associated with lipedema.  It is thought that metformin also acts as an \"anti-inflammatory\" for this condition.  No medications are FDA approved for Lipedema to date.     There is more information available at the Lipedema foundation.       Metformin is not a \"weight loss\" medication, but helps to manage the most common risk factor for weight gain --- \"insulin resistance.\"  When we are able to decrease the amouunt of insulin in our body, we can reverse the \"store fat\" command that insulin delivers to our liver.     Start with 1 tab daily for at least 2 weeks before the evening meal.  After 2 weeks, increase to 2 tabs daily with the evening meal if you are tolerating it well.     Side Effects: Most common side effects are upset stomach or loose bowels.  If this side effect occurs, it will generally get better in 1 to 2 weeks.      Metformin comes in 2 formulations, immediate release and extended release.  Most people tolerate extended release better, but this is not always the case.    If you have any side effects to the extended release, we can initiate immediate release to see if that is better tolerated.     In the event of a \"GI\" bug or if you need contrast dye for any reason, you will need to stop metformin for 3-4 days.     "

## 2021-08-04 NOTE — PROGRESS NOTES
DETAILS OF VISIT   Start of visit: 10:50   End of visit: 11:27  charting 5 minutes  Total time: 42 minutes on this date of service performing the following activities: obtaining history, documenting, preparing for visit, obtaining / reviewing records and providing counseling and education    State:[place]    Telemedicine visit: Audio and Video Encounter   Hello, my name is Gloria Donato MD.  Before we proceed, can you please verify your identification by telling me your full name and date of birth?  Can you tell me who is in the room with you?    You and I are about to have a telemedicine check-in or visit because you have requested it.  This is a live video-conference.  I am a real person, speaking to you in real time.  There is no one else with me on the video-conference.  However, when we use ([employer], [employer], etc) it is important for you to know that the video-conference may not be secure or private.  I am not recording this conversation and I am asking you not to record it.  This telemedicine visit will be billed to your health insurance or you, if you are self-insured.  You understand you will be responsible for any copayments or coinsurances that apply to your telemedicine visit.  Communication platform used for this encounter:  [employer] Video Visit (no Zoom)     Before starting our telemedicine visit, I am required to get your consent for this virtual check-in or visit by telemedicine. Do you consent?    Consent statement Read: You and I are about to have a Telemedicine visit.  This is allowed because you are already my patient, and you have requested it.  This telemedicine visit will be billed to your health insurance or to you, if you are self-insured. You understand that you ill be responsible for any co-payments or co-insurances that apply to your telemedicine visit.  Before starting our telemedicine visit, I am required to get your consent for this virtual check-in.  Do you  consent?  Patient reply : Yes    PCP: Mao Bridges CRNP   HISTORY OF PRESENT ILLNESS        Andressa Baker is a 41 y.o. female following up on lifestyle management and weight loss as adjunctive treatment strategies for Anxiety, insomnia, .      Medications used to support lifestyle modifications:None    Interval history: Chart reviewed since last visit.  Patient has seen the dietitian 0 times.  Reviewed notes, labs and imaging tabs and noted the following updates: No new labs to review    Goldie was able to wean off Zoloft, generally feels quite well.  Feeling less numb than she used to which she feels is a good thing.  Insomnia continues to be an issue and she takes trazodone 50 mg just as needed.    Concerns or questions: Despite running high mileage and eating a relatively balanced diet most days of the week, her weight has continued to increase instead of decrease.    Nutrition goals:  She is no longer tracking her foods, she tries to reduce fat as well as calories, but does not necessarily focused on quality or food type.  Food total has been more of a focus then hormonal influence or inflammatory possibilities.    We reviewed her fat distribution today which has always been in the lower extremities, but recently also in the abdomen.  She also reports that she is always hated her upper arms including changing poses and covering up the upper arms due to hanging fat noted there.  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      Consistently meeting hourly movement goals: Yes  Current exercise regimen: Had been running high mileage, but due to muscle strain had to reduce running, considering yoga or swimming as alternatives.    Insights on emotional wellness, awareness, and sleep: Struggling to maintain a particular nutrition or exercise path due to frustration with lack of weight loss as well as injuries.      PAST MEDICAL History         Past Medical History:   Diagnosis Date   • Abnormal ECG     PVCs   • Arrhythmia 02/2018    PVCs, status post ablation, partially successful.   • Back pain    • GERD (gastroesophageal reflux disease)    • Heartburn    • History of fetal demise, not currently pregnant     Twin pregnancy, 1 fetal demise, 1 live birth   • Joint pain    • Migraine headache    • Ovarian cyst    • Palpitations    • Pericarditis 03/06/2018    After john ablation       MEDICATIONS        Current Outpatient Medications on File Prior to Visit   Medication Sig Dispense Refill   • azelastine (ASTELIN) 137 mcg (0.1 %) nasal spray ADMINISTER 1 SPRAY INTO EACH NOSTRIL 2 (TWO) TIMES A DAY. USE IN EACH NOSTRIL AS DIRECTED. 30 mL 5   • B-complex with vitamin C tablet Take 1 tablet by mouth daily.     • cholecalciferol (VITAMIN D3) 10,000 unit capsule Take 10,000 Units by mouth once a week.       • docosahexaenoic acid-epa 120-180 mg capsule Take 1,000 mg by mouth.     • fexofenadine (ALLEGRA) 180 mg tablet Take 180 mg by mouth daily.     • glucosamine-chondroitin 500-400 mg tablet Take 1 tablet by mouth 3 (three) times a day.     • LASTACAFT 0.25 % drops      • LORazepam (ATIVAN) 0.5 mg tablet Take 1 tablet (0.5 mg total) by mouth every 8 (eight) hours as needed for anxiety. 20 tablet 1   • magnesium 250 mg tablet Take 500 mg by mouth daily.     • meloxicam (MOBIC) 15 mg tablet Take 1 tablet (15 mg total) by mouth daily for 15 days. 15 tablet 0   • traZODone (DESYREL) 100 mg tablet Take 1 tablet (100 mg total) by mouth nightly. (Patient taking differently: Take 50 mg by mouth nightly.  ) 90 tablet 1     No current facility-administered medications on file prior to visit.       ALLERGIES        Patient has no known allergies.           REVIEW OF SYSTEMS      Review of Systems    Review of Systems     PHYSICAL EXAMINATION      Visit Vitals  Wt 92.1 kg (203 lb)   BMI 31.79 kg/m²      Body mass index is 31.79 kg/m².    BP Readings from Last 3  "Encounters:   07/21/21 104/60   06/22/21 118/60   01/25/21 120/70     Wt Readings from Last 3 Encounters:   08/04/21 92.1 kg (203 lb)   07/21/21 92.5 kg (204 lb)   06/22/21 91.2 kg (201 lb)       Physical Exam      LABS / IMAGING / EKG        Reviewed the following Labs:    Lab Results   Component Value Date    HGBA1C 5.2 08/22/2020    HGBA1C 5.1 10/01/2019    HGBA1C 4.9 01/29/2016     Lab Results   Component Value Date    CHOL 183 08/22/2020    CHOL 196 01/29/2016     Lab Results   Component Value Date    HDL 67 08/22/2020    HDL 67 01/29/2016     Lab Results   Component Value Date    LDLCALC 108 (H) 08/22/2020    LDLCALC 117 (H) 01/29/2016     Lab Results   Component Value Date    TRIG 40 08/22/2020    TRIG 58 01/29/2016     No results found for: CHOLHDL      ASSESSMENT AND PLAN         Lipedema  klLipedema is a lymphatic disorder which causes is disproportionate fat accumulation in the hips, buttocks, and upper thighs.  Disproportionate fat accumulation can also occur in the upper arm in some subtypes of Lipedema.  The waist is generally spared as are the wrists and ankles.  This condition occurs exclusively in women and is thought to have a genetic component, but all details of how and why Lipedema develops are still under investigation. The current thinking is that changes in estrogen such as puberty, pregnancy, and menopause play a major role in lipedema progression.     Lipedema often creates easy bruising and pain in the effected areas due to swelling of the fat organ and eventual lymphatic compression, but some women experience no pain or discomfort. There is an overlap for many women with Lipedema to other structural issues like varicose veins, spider veins, Mona-danlos syndrome, and being \"hyper flexible.\"     Women with lipedema often have great difficulty losing weight despite great effort to get appropriate nutrition, routine exercise, and excellent self-care and stress management.  When weight loss " "does occur it is often significantly slower than would be expected based on a traditional calories and calories out model.      Treatment options for Lipedema:  Lipedema cannot be cured, but it can be managed.     Supplements: Vitamin D 2000 IU per day with food, Diosmin 500 mg or more per day, and magnesium glycinate 250-500 mg a day can be helpful     Manual Techniques: Manual Lymphatic drainage, Deep tissue massage, compression stockings, belly breathing, and vibration plates (look up [employer].com) can help move the stuck lymph tissue out of the lower body.     Nutrition: Eating a \"clean\" diet that avoids or greatly reduces toxins such as sugars, artificial sweeteners, dyes, preservatives, and pesticides is best.  It is also wise to avoid estrogen mimics in foods.  This includes all soy, flax, milk products (unless grass fed and organic) and the mycotoxins that are found in grains such as wheat, oats, rice, quinoa, barley, teff, and millet.  There is recent evidence that a ketogenic diet can be an effective strategy for lipedema management.     Physical activity:  Weight lifting and water activities are most effective strategies for lipedema as the muscle contraction with lifting helps the lymphatic flow.  The pressure from being in water also acts to compress and mobilize lymph.     Medications: Phentermine and metformin can help reduce pain associated with lipedema.  It is thought that metformin also acts as an \"anti-inflammatory\" for this condition.  No medications are FDA approved for Lipedema to date.     There is more information available at the Lipedema foundation.           Class 1 obesity with serious comorbidity and body mass index (BMI) of 33.0 to 33.9 in adult  Discussion today mainly around the possibility of lipedema for on you.  She has history of worsening with pregnancy, has body fat distribution historically in the upper arms disproportionate to the lower arms and in the upper legs " disproportionate to the lower legs.  She also history of care to conus as well as varicose veins which are known to be coexisting conditions with lipedema.    I provided her information on lipedema.  I have asked that she consider abandoning the concept of prioritizing food calories as most important, and look at food quality and additives instead.    This would be looking at a paleo style eating plan, which is best outlined in the book the whole 30.  I am not opposed to incorporating legumes and lentils into the diet, but I am opposed to estrogenic potentials of foods and environmental toxins including plastics, some sunscreens, and some beauty products.    We discussed visiting with the dietitian to get some reeducation on how to look at food differently.    I have encouraged her to begin looking at ways to do lymphatic decompression through physical activity including yoga, swimming, or vibration plate.    We discussed the possibility of using Metformin off label as an anti-inflammatory in lipedema.    I have also suggested the possibility of using a diosmin supplement.          Thank you very much for allowing us to participate in the care of your patient. Please do not hesitate to call or email if there are any questions.

## 2021-08-04 NOTE — ASSESSMENT & PLAN NOTE
"klLipedema is a lymphatic disorder which causes is disproportionate fat accumulation in the hips, buttocks, and upper thighs.  Disproportionate fat accumulation can also occur in the upper arm in some subtypes of Lipedema.  The waist is generally spared as are the wrists and ankles.  This condition occurs exclusively in women and is thought to have a genetic component, but all details of how and why Lipedema develops are still under investigation. The current thinking is that changes in estrogen such as puberty, pregnancy, and menopause play a major role in lipedema progression.     Lipedema often creates easy bruising and pain in the effected areas due to swelling of the fat organ and eventual lymphatic compression, but some women experience no pain or discomfort. There is an overlap for many women with Lipedema to other structural issues like varicose veins, spider veins, Mona-danlos syndrome, and being \"hyper flexible.\"     Women with lipedema often have great difficulty losing weight despite great effort to get appropriate nutrition, routine exercise, and excellent self-care and stress management.  When weight loss does occur it is often significantly slower than would be expected based on a traditional calories and calories out model.      Treatment options for Lipedema:  Lipedema cannot be cured, but it can be managed.     Supplements: Vitamin D 2000 IU per day with food, Diosmin 500 mg or more per day, and magnesium glycinate 250-500 mg a day can be helpful     Manual Techniques: Manual Lymphatic drainage, Deep tissue massage, compression stockings, belly breathing, and vibration plates (look up [email or web address]) can help move the stuck lymph tissue out of the lower body.     Nutrition: Eating a \"clean\" diet that avoids or greatly reduces toxins such as sugars, artificial sweeteners, dyes, preservatives, and pesticides is best.  It is also wise to avoid estrogen mimics in foods.  This includes all soy, flax, " "milk products (unless grass fed and organic) and the mycotoxins that are found in grains such as wheat, oats, rice, quinoa, barley, teff, and millet.  There is recent evidence that a ketogenic diet can be an effective strategy for lipedema management.     Physical activity:  Weight lifting and water activities are most effective strategies for lipedema as the muscle contraction with lifting helps the lymphatic flow.  The pressure from being in water also acts to compress and mobilize lymph.     Medications: Phentermine and metformin can help reduce pain associated with lipedema.  It is thought that metformin also acts as an \"anti-inflammatory\" for this condition.  No medications are FDA approved for Lipedema to date.     There is more information available at the Lipedema foundation.         "

## 2021-08-10 ENCOUNTER — OFFICE VISIT (OUTPATIENT)
Dept: BARIATRICS/WEIGHT MGMT | Facility: CLINIC | Age: 41
End: 2021-08-10
Payer: COMMERCIAL

## 2021-08-10 VITALS
RESPIRATION RATE: 20 BRPM | DIASTOLIC BLOOD PRESSURE: 76 MMHG | HEART RATE: 70 BPM | BODY MASS INDEX: 32.5 KG/M2 | OXYGEN SATURATION: 98 % | WEIGHT: 207.5 LBS | SYSTOLIC BLOOD PRESSURE: 100 MMHG

## 2021-08-10 DIAGNOSIS — Z71.3 DIETARY COUNSELING: Primary | ICD-10-CM

## 2021-08-10 DIAGNOSIS — E66.811 CLASS 1 OBESITY WITH SERIOUS COMORBIDITY AND BODY MASS INDEX (BMI) OF 33.0 TO 33.9 IN ADULT, UNSPECIFIED OBESITY TYPE: ICD-10-CM

## 2021-08-10 PROCEDURE — 99999 PR OFFICE/OUTPT VISIT,PROCEDURE ONLY: CPT | Performed by: DIETITIAN, REGISTERED

## 2021-08-10 PROCEDURE — 97803 MED NUTRITION INDIV SUBSEQ: CPT | Performed by: DIETITIAN, REGISTERED

## 2021-08-10 NOTE — ASSESSMENT & PLAN NOTE
"1. The best type of exercise for weight loss is the one you can do consistently!     Cardiovascular (\"cardio\") exercises are beneficial for protecting your heart, as well as promoting nutrient and oxygen circulation throughout the body for energy. Examples of cardiovascular training include:  - Walking or hiking  - Jogging or running  - Indoor or outdoor cycling  - Swimming  - Rowing  - Dancing or hooping  - Kickboxing  - Climbing  - Jumping rope  - Most organized sports such as basketball, hockey, or soccer    Resistance exercises are beneficial for preventing loss of lean body mass and building muscle tissue to increase your basal metabolic rate over time. Examples of resistance training include:  - Weights: dumbbells, barbells, kettlebells, weight machines, etc.  - Resistance bands  - Bodyweight exercises: yoga, pilates, planking, etc.    Strategies for breaking down barriers to exercise...    Barrier Action(s)   History of injury or illness - Speak with your medical team about physical therapy [PT] or cardiac rehab to improve deconditioning  - Meet with our on-staff exercise physiologist to formulate an exercise plan that is safe for you  - Start slow and work your way up to more intense or more frequent exercise   Lack of intrinsic motivation - Find an accountability partner to share your exercise journey with  - Make a list of external motivators  o Example: “I want to work on improved flexibility so that I can play on the playground with my grandchildren”  - Try to link your exercise with an activity that you already include in your daily routine so that your brain is engaged elsewhere  o Example: walk on the treadmill while you watch your favorite television show   Lack of time - Break exercise down into smaller time blocks (10-15 minutes at a time) and try to squeeze them in more frequently  - Schedule your exercise the same way you would schedule appointments or other commitments so that it becomes part of " your daily routine  - Multitask by walking in place while on a phone call or using an under-the-desk elliptical while working at the computer   Lack of energy - Discuss your nutrition plan with your dietitian as under-fueling or inappropriate timing of nutrients may affect athletic performance  - Consider addressing sleep quality or quantity to ensure you're well rested enough to participate in exercise  - Opt for lower intensity exercises such as walking, yoga, or pilates   Lack of access to a gym - Walk in a safe outdoor space like public park, neighborhood sidewalk, or shopping mall parking lot  - Utilize free workout videos on YouTube for guided at-home, equipment-free workouts     YouTube channels recommended for free workouts:  1. Tone It Up  2. POPZestFinance Fitness  3. Blogilates  4. Hang Tight with Jaci  5. [email or web address]  6. Jazzercise  7. 305Fitness  8. Yoga with Marge  9. Livestrong Women  10. Christine Lucretia Workouts  11. Dorota Dolke  12. Radha Ting  13. Mad Fit  14. Andie Oceane  15. Charito Jim  16. Orangetheory  17. Zuzka Light  18. Body Project  19. Be Inspired  20. Yoga with Mariluz  21. P.katie  22. Walk at Home with Juliane Cruz  23. Senior Fitness with Katerin  24. The Fitness MarrachaelNewsWhip  25. HasFit  26. Misti Kapadia Yoga on Lululemon  27. Sparrow Fitness  28. Fitness     Your exercise goal: focus on low impact strength training and body weight exercise to decrease inflammation and promote metabolic health     2. Utilize our PFF [Protein-Fat-Fiber] Framework to ensure proper fueling throughout the day. This meal planning framework helps to provide you with stable blood sugars, sustained energy, and well-controlled appetite when utilized every 3-5 hours within a 12 hour or less eating interval.     3. Focus on hydration, primarily from plain water. An appropriate goal for most patients is 64 fluid ounces daily to support metabolic processes. Beware that caffeinated drinks do not count  "towards your fluid intake since they have a mild diuretic effect. Avoid beverages sweetened with sugar or artificial sweeteners such as sucralose or aspartame.     If you do not like water, consider supplementing with the following dietitian-approved beverage options that are unsweetened or sweetened with stevia, monk fruit, or erythritol:   - Herb or fruit infused water: homemade, Hint, Wegmans Wonder Water  - Flavored or unflavored seltzer water  - Decaf herbal tea (hot or iced)  - Water drops: SweetLeaf, Stur, Clear Theory  - Powdered drink mix: Crystal Light Pure, True Citrus  - Bottled drinks: Vitamin Water Zero, Roar Organic Electrolyte Infusion, Berna, Body Armor Lyte, Lemon Perfect  - Soda: Zevia, Blue Sylvester  - Electrolyte repletion powder: Liquid IV, Nuun, Ultima, Bita Brinda Hydration Drink Mix, Cure    Other strategies to help increase fluid intake:  - Set a timer on your phone, watch, or work calendar to cue you to drink  - Carry a reusable water bottle with you while working or running errands  - Hydrate before, during, and after physical activity  - Drink from a straw [unless you are a post-op bariatric surgery patient]  - Purchase an insulated cup or bottle to keep your beverage at your preferred drinking temperature  - Choose cold liquids when the weather is warm and warm beverages when the weather is cold  - Commit to drinking 8-16 ounces of water with each meal or snack [unless you are a post-op bariatric surgery patient]    4. Ideally, plan for a balanced snack when there are 4-5+ hours between meals or when you need to fuel for intense dedicated exercise. For weight loss and blood sugar management, it is not beneficial to graze throughout the day on unplanned items, especially carbohydrate foods.     Snacks should include a combination of protein, fat, and fiber. Whole foods are preferred and small portions of \"meal foods\" may also be appropriate to control appetite. Some examples of whole foods snacks " "are outlined below:    Low-sugar Greek yogurt or cottage cheese with fruit  String cheese with whole grain crackers  Hummus with raw veggies  Nuts and seeds    Utilize harm reduction for processed snack foods. Look for products with few ingredients and minimal added sugar/salt. The following list includes product/brand recommendations for processed snack foods:    Sweet - Built Bar, RX Bar, IQ Bar, Schoolyard Snacks (cereal), Magic Spoon, Devotion Nutrition, Quest Nutrition (bars)    Savory - Enlightened Bada Stoll Bada Boom, Schoolyard Snacks (cheese puffs), Shrewd Food, Twin Peaks, Quest Nutrition (chips), Moon Cheese, The Good Stoll, Saffron Road, Lesser Evil, Biena, Hippeas, Pizootz, SuperSeedz, Go Raw, Nataliia's Gone Crackers, Seapoint Farms, Brami, Simple Rueda, Beyond Chipz, Jaguar    5. Commit to daily food journaling on the BagThat rashid. Electronic food journaling is used for accountability and to identify patterns in intake that may contribute to your appetite, energy level, and mood. Your prescribed nutrient goals, to be entered in your account settings, are as follows: 1500 calories, 30% carbs, 35% protein, 35% fat    Per Dr. Vallejo's documentation -      Lipedema often creates easy bruising and pain in the effected areas due to swelling of the fat organ and eventual lymphatic compression, but some women experience no pain or discomfort. There is an overlap for many women with Lipedema to other structural issues like varicose veins, spider veins, Mona-danlos syndrome, and being \"hyper flexible.\"      Women with lipedema often have great difficulty losing weight despite great effort to get appropriate nutrition, routine exercise, and excellent self-care and stress management.  When weight loss does occur it is often significantly slower than would be expected based on a traditional calories and calories out model.       Treatment options for Lipedema:  Lipedema cannot be cured, but it can be managed. " "     Supplements: Vitamin D 2000 IU per day with food, Diosmin 500 mg or more per day, and magnesium glycinate 250-500 mg a day can be helpful      Manual Techniques: Manual Lymphatic drainage, Deep tissue massage, compression stockings, belly breathing, and vibration plates (look up [email or web address]) can help move the stuck lymph tissue out of the lower body.      Nutrition: Eating a \"clean\" diet that avoids or greatly reduces toxins such as sugars, artificial sweeteners, dyes, preservatives, and pesticides is best.  It is also wise to avoid estrogen mimics in foods.  This includes all soy, flax, milk products (unless grass fed and organic) and the mycotoxins that are found in grains such as wheat, oats, rice, quinoa, barley, teff, and millet.  There is recent evidence that a ketogenic diet can be an effective strategy for lipedema management.      Physical activity:  Weight lifting and water activities are most effective strategies for lipedema as the muscle contraction with lifting helps the lymphatic flow.  The pressure from being in water also acts to compress and mobilize lymph.      Medications: Phentermine and metformin can help reduce pain associated with lipedema.  It is thought that metformin also acts as an \"anti-inflammatory\" for this condition.  No medications are FDA approved for Lipedema to date.      There is more information available at the Lipedema foundation.   "

## 2021-08-10 NOTE — PROGRESS NOTES
"DETAILS OF VISIT     Consulting Service:  [facility] - Dietitian    Referring Physician: Mao Bridges CRNP    PCP: Mao Bridges CRNP    Reason for Consultation:   Chief Complaint   Patient presents with   • Nutrition Counseling        VISIT DESCRIPTION         Andressa Baker is a 41 y.o. female here for nutrition follow-up.    I met with Andressa Baker today as part of the Comprehensive Weight and Wellness Program for Dietary Counseling [Z71.3] and nutrition education related to the following:    E66.9- Obesity, unspecified - obesity NOS     This patient's entire medical history is comprised of the following active problems:    Patient Active Problem List   Diagnosis   • Palpitations   • PVC (premature ventricular contraction)   • Keratoconus   • Anxiety   • Thrombocytopenia (CMS/HCC)   • History of    • Ventricular premature beats   • Mild tricuspid regurgitation by prior echocardiogram   • Class 1 obesity with serious comorbidity and body mass index (BMI) of 33.0 to 33.9 in adult   • Encounter for general adult medical examination without abnormal findings   • Deltoid tendonitis of right shoulder   • Difficulty voiding   • Chronic bilateral low back pain without sciatica   • Tension type headache   • Insomnia   • Left leg pain   • Lipedema   • Dietary counseling        Their most recent vital signs are as follows:    Current Weight:   Wt Readings from Last 1 Encounters:   08/10/21 94.1 kg (207 lb 8 oz)     Height:   Ht Readings from Last 1 Encounters:   21 1.702 m (5' 7\")        BMI: Body mass index is 32.5 kg/m².    Weight Trends:    Wt Readings from Last 3 Encounters:   08/10/21 94.1 kg (207 lb 8 oz)   21 92.1 kg (203 lb)   21 92.5 kg (204 lb)       BMI Readings from Last 3 Encounters:   08/10/21 32.50 kg/m²   21 31.79 kg/m²   21 31.95 kg/m²       During today's visit, we discussed the following:    Nutrition, Physical " Activity, & Emotional Wellness:   Recommended Nutrition Plan  LGI diet + metformin   Meal Structure  5 or less eating events recommended   Eating Interval  Less than 12 hours recommended   Intake Logging  Healthy Lifestyles paper food journal or electronic (Baritastic, MFP, LoseIt, Carb Manager, etc)    Successes  Less impulsive with food decisions since starting with metformin  Strength training once per week with a  and has started with yoga more consistently   Tracking food on MFP -current goal is 1400 jamia, open to increasing to 1500 given increased exercise and BMI of over 1600   Challenges Diarrhea with metformin - willing to continue with this   Recent running injury -trying to find a new form of exercise that she enjoys  Mindless eating or not anticipating hunger which leads to meal skipping and poor food choices later in the day  Feeling overwhelmed by recent lipedema diagnosis   Education Provided Navigating obstacles listed above and outlined in goals below     The following hand-outs and/or educational materials were given: PFF    A patient-centered approach using motivational interviewing was used to develop the plan and recommendations outlined below.     Time Spent with Patient for face to face office visit: 30 minutes      MEDICATIONS, LABS, AND ALLERGIES     Current Outpatient Medications on File Prior to Visit   Medication Sig Dispense Refill   • azelastine (ASTELIN) 137 mcg (0.1 %) nasal spray ADMINISTER 1 SPRAY INTO EACH NOSTRIL 2 (TWO) TIMES A DAY. USE IN EACH NOSTRIL AS DIRECTED. 30 mL 5   • B-complex with vitamin C tablet Take 1 tablet by mouth daily.     • cholecalciferol (VITAMIN D3) 10,000 unit capsule Take 10,000 Units by mouth once a week.       • docosahexaenoic acid-epa 120-180 mg capsule Take 1,000 mg by mouth.     • fexofenadine (ALLEGRA) 180 mg tablet Take 180 mg by mouth daily.     • glucosamine-chondroitin 500-400 mg tablet Take 1 tablet by mouth 3 (three) times a day.     •  "LASTACAFT 0.25 % drops      • LORazepam (ATIVAN) 0.5 mg tablet Take 1 tablet (0.5 mg total) by mouth every 8 (eight) hours as needed for anxiety. 20 tablet 1   • magnesium 250 mg tablet Take 500 mg by mouth daily.     • meloxicam (MOBIC) 15 mg tablet Take 1 tablet (15 mg total) by mouth daily for 15 days. 15 tablet 0   • metFORMIN XR (GLUCOPHAGE XR) 500 mg 24 hr tablet Take 2 tablets (1,000 mg total) by mouth daily with dinner. Weeks 1 and 2: Start with 1 tab daily with the evening meal. 60 tablet 0   • traZODone (DESYREL) 100 mg tablet Take 1 tablet (100 mg total) by mouth nightly. (Patient taking differently: Take 50 mg by mouth nightly.  ) 90 tablet 1     No current facility-administered medications on file prior to visit.         Patient has no known allergies.       PATIENT-LED GOALS     Class 1 obesity with serious comorbidity and body mass index (BMI) of 33.0 to 33.9 in adult  1. The best type of exercise for weight loss is the one you can do consistently!     Cardiovascular (\"cardio\") exercises are beneficial for protecting your heart, as well as promoting nutrient and oxygen circulation throughout the body for energy. Examples of cardiovascular training include:  - Walking or hiking  - Jogging or running  - Indoor or outdoor cycling  - Swimming  - Rowing  - Dancing or hooping  - Kickboxing  - Climbing  - Jumping rope  - Most organized sports such as basketball, hockey, or soccer    Resistance exercises are beneficial for preventing loss of lean body mass and building muscle tissue to increase your basal metabolic rate over time. Examples of resistance training include:  - Weights: dumbbells, barbells, kettlebells, weight machines, etc.  - Resistance bands  - Bodyweight exercises: yoga, pilates, planking, etc.    Strategies for breaking down barriers to exercise...    Barrier Action(s)   History of injury or illness - Speak with your medical team about physical therapy [PT] or cardiac rehab to improve " deconditioning  - Meet with our on-staff exercise physiologist to formulate an exercise plan that is safe for you  - Start slow and work your way up to more intense or more frequent exercise   Lack of intrinsic motivation - Find an accountability partner to share your exercise journey with  - Make a list of external motivators  o Example: “I want to work on improved flexibility so that I can play on the playground with my grandchildren”  - Try to link your exercise with an activity that you already include in your daily routine so that your brain is engaged elsewhere  o Example: walk on the treadmill while you watch your favorite television show   Lack of time - Break exercise down into smaller time blocks (10-15 minutes at a time) and try to squeeze them in more frequently  - Schedule your exercise the same way you would schedule appointments or other commitments so that it becomes part of your daily routine  - Multitask by walking in place while on a phone call or using an under-the-desk elliptical while working at the computer   Lack of energy - Discuss your nutrition plan with your dietitian as under-fueling or inappropriate timing of nutrients may affect athletic performance  - Consider addressing sleep quality or quantity to ensure you're well rested enough to participate in exercise  - Opt for lower intensity exercises such as walking, yoga, or pilates   Lack of access to a gym - Walk in a safe outdoor space like public park, neighborhood sidewalk, or shopping mall parking lot  - Utilize free workout videos on YouTube for guided at-home, equipment-free workouts     YouTube channels recommended for free workouts:  1. Tone It Up  2. POPSUWhite Cheetah Fitness  3. Blogilates  4. Hang Tight with Jaci  5. [email or web address]  6. Jazzercise  7. 305Fitness  8. Yoga with Marge  9. Livestrong Women  10. Christine Lucretia Workouts  11. Dorota Dolke  12. Radha Ting  13. Mad Fit  14. Andie Oceane  15. Charito  Jim  16. Orangetheory  17. Zuzka Light  18. Body Project  19. Be Inspired  20. Yoga with Mariluz  21Kirk Burgos  22. Walk at Home with Juliane Cruz  23. Senior Fitness with Katerin  24. The Fitness Vicky  25. HasFit  26. Misti Kapadia Yoga on Lululemon  27. Pet360 Fitness  28. Fitness     Your exercise goal: focus on low impact strength training and body weight exercise to decrease inflammation and promote metabolic health     2. Utilize our PFF [Protein-Fat-Fiber] Framework to ensure proper fueling throughout the day. This meal planning framework helps to provide you with stable blood sugars, sustained energy, and well-controlled appetite when utilized every 3-5 hours within a 12 hour or less eating interval.     3. Focus on hydration, primarily from plain water. An appropriate goal for most patients is 64 fluid ounces daily to support metabolic processes. Beware that caffeinated drinks do not count towards your fluid intake since they have a mild diuretic effect. Avoid beverages sweetened with sugar or artificial sweeteners such as sucralose or aspartame.     If you do not like water, consider supplementing with the following dietitian-approved beverage options that are unsweetened or sweetened with stevia, monk fruit, or erythritol:   - Herb or fruit infused water: homemade, Hint, Wegmans Wonder Water  - Flavored or unflavored seltzer water  - Decaf herbal tea (hot or iced)  - Water drops: SweetLeaf, Stur, Clear Theory  - Powdered drink mix: Crystal Light Pure, True Citrus  - Bottled drinks: Vitamin Water Zero, Roar Organic Electrolyte Infusion, Berna, Body Armor Lyte, Lemon Perfect  - Soda: Zevia, Blue Sylvester  - Electrolyte repletion powder: Liquid IV, Nuun, Ultima, Bita Brinda Hydration Drink Mix, Cure    Other strategies to help increase fluid intake:  - Set a timer on your phone, watch, or work calendar to cue you to drink  - Carry a reusable water bottle with you while working or running errands  -  "Hydrate before, during, and after physical activity  - Drink from a straw [unless you are a post-op bariatric surgery patient]  - Purchase an insulated cup or bottle to keep your beverage at your preferred drinking temperature  - Choose cold liquids when the weather is warm and warm beverages when the weather is cold  - Commit to drinking 8-16 ounces of water with each meal or snack [unless you are a post-op bariatric surgery patient]    4. Ideally, plan for a balanced snack when there are 4-5+ hours between meals or when you need to fuel for intense dedicated exercise. For weight loss and blood sugar management, it is not beneficial to graze throughout the day on unplanned items, especially carbohydrate foods.     Snacks should include a combination of protein, fat, and fiber. Whole foods are preferred and small portions of \"meal foods\" may also be appropriate to control appetite. Some examples of whole foods snacks are outlined below:    Low-sugar Greek yogurt or cottage cheese with fruit  String cheese with whole grain crackers  Hummus with raw veggies  Nuts and seeds    Utilize harm reduction for processed snack foods. Look for products with few ingredients and minimal added sugar/salt. The following list includes product/brand recommendations for processed snack foods:    Sweet - Built Bar, RX Bar, IQ Bar, Schoolyard Snacks (cereal), Magic Spoon, Devotion Nutrition, Quest Nutrition (bars)    Savory - Enlightened Bada Stoll Bada Boom, Schoolyard Snacks (cheese puffs), Shrewd Food, Twin Peaks, Quest Nutrition (chips), Moon Cheese, The Good Stoll, Saffron Road, Lesser Evil, Biena, Hippeas, Pizootz, SuperSeedz, Go Raw, Nataliia's Gone Crackers, Seapoint Farms, Brami, Simple Rueda, Beyond Jersonz, Jaguar    5. Commit to daily food journaling on the [employer] rashid. Electronic food journaling is used for accountability and to identify patterns in intake that may contribute to your appetite, energy level, and mood. Your " "prescribed nutrient goals, to be entered in your account settings, are as follows: 1500 calories, 30% carbs, 35% protein, 35% fat    Per Dr. Vallejo's documentation -      Lipedema often creates easy bruising and pain in the effected areas due to swelling of the fat organ and eventual lymphatic compression, but some women experience no pain or discomfort. There is an overlap for many women with Lipedema to other structural issues like varicose veins, spider veins, Mona-danlos syndrome, and being \"hyper flexible.\"      Women with lipedema often have great difficulty losing weight despite great effort to get appropriate nutrition, routine exercise, and excellent self-care and stress management.  When weight loss does occur it is often significantly slower than would be expected based on a traditional calories and calories out model.       Treatment options for Lipedema:  Lipedema cannot be cured, but it can be managed.      Supplements: Vitamin D 2000 IU per day with food, Diosmin 500 mg or more per day, and magnesium glycinate 250-500 mg a day can be helpful      Manual Techniques: Manual Lymphatic drainage, Deep tissue massage, compression stockings, belly breathing, and vibration plates (look up [email or web address]) can help move the stuck lymph tissue out of the lower body.      Nutrition: Eating a \"clean\" diet that avoids or greatly reduces toxins such as sugars, artificial sweeteners, dyes, preservatives, and pesticides is best.  It is also wise to avoid estrogen mimics in foods.  This includes all soy, flax, milk products (unless grass fed and organic) and the mycotoxins that are found in grains such as wheat, oats, rice, quinoa, barley, teff, and millet.  There is recent evidence that a ketogenic diet can be an effective strategy for lipedema management.      Physical activity:  Weight lifting and water activities are most effective strategies for lipedema as the muscle contraction with lifting helps the lymphatic " "flow.  The pressure from being in water also acts to compress and mobilize lymph.      Medications: Phentermine and metformin can help reduce pain associated with lipedema.  It is thought that metformin also acts as an \"anti-inflammatory\" for this condition.  No medications are FDA approved for Lipedema to date.      There is more information available at the Lipedema foundation.         Freda Holder, MS, RD, LDN  Registered Dietitian for [facility]  8/10/2021     Thank you very much for allowing us to participate in the care of your patient. Please do not hesitate to call or email if there are any questions.       "

## 2021-08-10 NOTE — PATIENT INSTRUCTIONS
"Problem List Items Addressed This Visit        Endocrine/Metabolic    Class 1 obesity with serious comorbidity and body mass index (BMI) of 33.0 to 33.9 in adult     1. The best type of exercise for weight loss is the one you can do consistently!     Cardiovascular (\"cardio\") exercises are beneficial for protecting your heart, as well as promoting nutrient and oxygen circulation throughout the body for energy. Examples of cardiovascular training include:  - Walking or hiking  - Jogging or running  - Indoor or outdoor cycling  - Swimming  - Rowing  - Dancing or hooping  - Kickboxing  - Climbing  - Jumping rope  - Most organized sports such as basketball, hockey, or soccer    Resistance exercises are beneficial for preventing loss of lean body mass and building muscle tissue to increase your basal metabolic rate over time. Examples of resistance training include:  - Weights: dumbbells, barbells, kettlebells, weight machines, etc.  - Resistance bands  - Bodyweight exercises: yoga, pilates, planking, etc.    Strategies for breaking down barriers to exercise...    Barrier Action(s)   History of injury or illness - Speak with your medical team about physical therapy [PT] or cardiac rehab to improve deconditioning  - Meet with our on-staff exercise physiologist to formulate an exercise plan that is safe for you  - Start slow and work your way up to more intense or more frequent exercise   Lack of intrinsic motivation - Find an accountability partner to share your exercise journey with  - Make a list of external motivators  o Example: “I want to work on improved flexibility so that I can play on the playground with my grandchildren”  - Try to link your exercise with an activity that you already include in your daily routine so that your brain is engaged elsewhere  o Example: walk on the treadmill while you watch your favorite television show   Lack of time - Break exercise down into smaller time blocks (10-15 minutes at a " time) and try to squeeze them in more frequently  - Schedule your exercise the same way you would schedule appointments or other commitments so that it becomes part of your daily routine  - Multitask by walking in place while on a phone call or using an under-the-desk elliptical while working at the computer   Lack of energy - Discuss your nutrition plan with your dietitian as under-fueling or inappropriate timing of nutrients may affect athletic performance  - Consider addressing sleep quality or quantity to ensure you're well rested enough to participate in exercise  - Opt for lower intensity exercises such as walking, yoga, or pilates   Lack of access to a gym - Walk in a safe outdoor space like public park, neighborhood sidewalk, or shopping mall parking lot  - Utilize free workout videos on YouTube for guided at-home, equipment-free workouts     YouTube channels recommended for free workouts:  1. Tone It Up  2. POPpath intelligence Fitness  3. Blogilates  4. Hang Tight with Jaci  5. Bodybuilding.com  6. Jazzercise  7. 305Fitness  8. Yoga with Marge  9. Livestrong Women  10. Christine Lucretia Workouts  11. Dorota Dolke  12. Radha Ting  13. Mad Fit  14. Andie Oceane  15. Charito Jim  16. Orangetheory  17. Zuzka Light  18. Body Project  19. Be Inspired  20. Yoga with Mariluz  21. P.volve  22. Walk at Home with Juliane Cruz  23. Senior Fitness with Katerin  24. The Fitness MarrachaelBeloorBayir [employer]otech  25. HasFit  26. Misti Kapadia Yoga on Lululemon  27. Intacct Fitness  28. Fitness     Your exercise goal: focus on low impact strength training and body weight exercise to decrease inflammation and promote metabolic health     2. Utilize our PFF [Protein-Fat-Fiber] Framework to ensure proper fueling throughout the day. This meal planning framework helps to provide you with stable blood sugars, sustained energy, and well-controlled appetite when utilized every 3-5 hours within a 12 hour or less eating interval.     3. Focus on hydration,  primarily from plain water. An appropriate goal for most patients is 64 fluid ounces daily to support metabolic processes. Beware that caffeinated drinks do not count towards your fluid intake since they have a mild diuretic effect. Avoid beverages sweetened with sugar or artificial sweeteners such as sucralose or aspartame.     If you do not like water, consider supplementing with the following dietitian-approved beverage options that are unsweetened or sweetened with stevia, monk fruit, or erythritol:   - Herb or fruit infused water: homemade, Hint, Wegmans Wonder Water  - Flavored or unflavored seltzer water  - Decaf herbal tea (hot or iced)  - Water drops: SweetLeaf, Stur, Clear Theory  - Powdered drink mix: Crystal Light Pure, True Citrus  - Bottled drinks: Vitamin Water Zero, Roar Organic Electrolyte Infusion, Berna, Body Armor Lyte, Lemon Perfect  - Soda: Zevia, Blue Sylvester  - Electrolyte repletion powder: Liquid IV, Nuun, Ultima, Bita Brinda Hydration Drink Mix, Cure    Other strategies to help increase fluid intake:  - Set a timer on your phone, watch, or work calendar to cue you to drink  - Carry a reusable water bottle with you while working or running errands  - Hydrate before, during, and after physical activity  - Drink from a straw [unless you are a post-op bariatric surgery patient]  - Purchase an insulated cup or bottle to keep your beverage at your preferred drinking temperature  - Choose cold liquids when the weather is warm and warm beverages when the weather is cold  - Commit to drinking 8-16 ounces of water with each meal or snack [unless you are a post-op bariatric surgery patient]    4. Ideally, plan for a balanced snack when there are 4-5+ hours between meals or when you need to fuel for intense dedicated exercise. For weight loss and blood sugar management, it is not beneficial to graze throughout the day on unplanned items, especially carbohydrate foods.     Snacks should include a combination of  "protein, fat, and fiber. Whole foods are preferred and small portions of \"meal foods\" may also be appropriate to control appetite. Some examples of whole foods snacks are outlined below:    Low-sugar Greek yogurt or cottage cheese with fruit  String cheese with whole grain crackers  Hummus with raw veggies  Nuts and seeds    Utilize harm reduction for processed snack foods. Look for products with few ingredients and minimal added sugar/salt. The following list includes product/brand recommendations for processed snack foods:    Sweet - Built Bar, RX Bar, IQ Bar, Schoolyard Snacks (cereal), Magic Spoon, Devotion Nutrition, Quest Nutrition (bars)    Savory - Enlightened Bada Stoll Bada Boom, Schoolyard Snacks (cheese puffs), Shrewd Food, Twin Peaks, Quest Nutrition (chips), Moon Cheese, The Good Stoll, Saffron Road, Lesser Evil, Biena, Hippeas, Pizootz, SuperSeedz, Go Raw, Nataliia's Gone Crackers, Seapoint Farms, Brami, Simple Rueda, Beyond Chipz, Jaguar    5. Commit to daily food journaling on the [employer] rashid. Electronic food journaling is used for accountability and to identify patterns in intake that may contribute to your appetite, energy level, and mood. Your prescribed nutrient goals, to be entered in your account settings, are as follows: 1500 calories, 30% carbs, 35% protein, 35% fat    Per Dr. Vallejo's documentation -      Lipedema often creates easy bruising and pain in the effected areas due to swelling of the fat organ and eventual lymphatic compression, but some women experience no pain or discomfort. There is an overlap for many women with Lipedema to other structural issues like varicose veins, spider veins, Mona-danlos syndrome, and being \"hyper flexible.\"      Women with lipedema often have great difficulty losing weight despite great effort to get appropriate nutrition, routine exercise, and excellent self-care and stress management.  When weight loss does occur it is often significantly slower " "than would be expected based on a traditional calories and calories out model.       Treatment options for Lipedema:  Lipedema cannot be cured, but it can be managed.      Supplements: Vitamin D 2000 IU per day with food, Diosmin 500 mg or more per day, and magnesium glycinate 250-500 mg a day can be helpful      Manual Techniques: Manual Lymphatic drainage, Deep tissue massage, compression stockings, belly breathing, and vibration plates (look up [email or web address].[email or web address]) can help move the stuck lymph tissue out of the lower body.      Nutrition: Eating a \"clean\" diet that avoids or greatly reduces toxins such as sugars, artificial sweeteners, dyes, preservatives, and pesticides is best.  It is also wise to avoid estrogen mimics in foods.  This includes all soy, flax, milk products (unless grass fed and organic) and the mycotoxins that are found in grains such as wheat, oats, rice, quinoa, barley, teff, and millet.  There is recent evidence that a ketogenic diet can be an effective strategy for lipedema management.      Physical activity:  Weight lifting and water activities are most effective strategies for lipedema as the muscle contraction with lifting helps the lymphatic flow.  The pressure from being in water also acts to compress and mobilize lymph.      Medications: Phentermine and metformin can help reduce pain associated with lipedema.  It is thought that metformin also acts as an \"anti-inflammatory\" for this condition.  No medications are FDA approved for Lipedema to date.      There is more information available at the Lipedema foundation.             Other    Dietary counseling - Primary          "

## 2021-08-14 ENCOUNTER — APPOINTMENT (OUTPATIENT)
Dept: LAB | Facility: HOSPITAL | Age: 41
End: 2021-08-14
Attending: FAMILY MEDICINE
Payer: COMMERCIAL

## 2021-08-14 DIAGNOSIS — F41.9 ANXIETY: ICD-10-CM

## 2021-08-14 DIAGNOSIS — E55.9 VITAMIN D DEFICIENCY: ICD-10-CM

## 2021-08-14 DIAGNOSIS — Z13.0 SCREENING FOR DEFICIENCY ANEMIA: ICD-10-CM

## 2021-08-14 DIAGNOSIS — Z13.29 SCREENING FOR THYROID DISORDER: ICD-10-CM

## 2021-08-14 DIAGNOSIS — Z13.1 SCREENING FOR DIABETES MELLITUS: ICD-10-CM

## 2021-08-14 LAB
25(OH)D3 SERPL-MCNC: 26 NG/ML (ref 30–100)
ALBUMIN SERPL-MCNC: 3.7 G/DL (ref 3.4–5)
ALP SERPL-CCNC: 47 IU/L (ref 35–126)
ALT SERPL-CCNC: 15 IU/L (ref 11–54)
ANION GAP SERPL CALC-SCNC: 7 MEQ/L (ref 3–15)
AST SERPL-CCNC: 16 IU/L (ref 15–41)
BASOPHILS # BLD: 0.07 K/UL (ref 0.01–0.1)
BASOPHILS NFR BLD: 1.6 %
BILIRUB SERPL-MCNC: 0.6 MG/DL (ref 0.3–1.2)
BUN SERPL-MCNC: 8 MG/DL (ref 8–20)
CALCIUM SERPL-MCNC: 9.1 MG/DL (ref 8.9–10.3)
CHLORIDE SERPL-SCNC: 105 MEQ/L (ref 98–109)
CO2 SERPL-SCNC: 26 MEQ/L (ref 22–32)
CREAT SERPL-MCNC: 0.6 MG/DL (ref 0.6–1.1)
DIFFERENTIAL METHOD BLD: NORMAL
EOSINOPHIL # BLD: 0.17 K/UL (ref 0.04–0.36)
EOSINOPHIL NFR BLD: 4 %
ERYTHROCYTE [DISTWIDTH] IN BLOOD BY AUTOMATED COUNT: 13.1 % (ref 11.7–14.4)
EST. AVERAGE GLUCOSE BLD GHB EST-MCNC: 97 MG/DL
GFR SERPL CREATININE-BSD FRML MDRD: >60 ML/MIN/1.73M*2
GLUCOSE SERPL-MCNC: 86 MG/DL (ref 70–99)
HBA1C MFR BLD HPLC: 5 %
HCT VFR BLDCO AUTO: 42.3 % (ref 35–45)
HGB BLD-MCNC: 14 G/DL (ref 11.8–15.7)
IMM GRANULOCYTES # BLD AUTO: 0.01 K/UL (ref 0–0.08)
IMM GRANULOCYTES NFR BLD AUTO: 0.2 %
LYMPHOCYTES # BLD: 1.62 K/UL (ref 1.2–3.5)
LYMPHOCYTES NFR BLD: 37.9 %
MCH RBC QN AUTO: 29 PG (ref 28–33.2)
MCHC RBC AUTO-ENTMCNC: 33.1 G/DL (ref 32.2–35.5)
MCV RBC AUTO: 87.8 FL (ref 83–98)
MONOCYTES # BLD: 0.3 K/UL (ref 0.28–0.8)
MONOCYTES NFR BLD: 7 %
NEUTROPHILS # BLD: 2.11 K/UL (ref 1.7–7)
NEUTS SEG NFR BLD: 49.3 %
NRBC BLD-RTO: 0 %
PDW BLD AUTO: 11.7 FL (ref 9.4–12.3)
PLATELET # BLD AUTO: 154 K/UL (ref 150–369)
POTASSIUM SERPL-SCNC: 4.1 MEQ/L (ref 3.6–5.1)
PROT SERPL-MCNC: 6.6 G/DL (ref 6–8.2)
RBC # BLD AUTO: 4.82 M/UL (ref 3.93–5.22)
SODIUM SERPL-SCNC: 138 MEQ/L (ref 136–144)
TSH SERPL DL<=0.05 MIU/L-ACNC: 1.43 MIU/L (ref 0.34–5.6)
VIT B12 SERPL-MCNC: 450 PG/ML (ref 180–914)
WBC # BLD AUTO: 4.28 K/UL (ref 3.8–10.5)

## 2021-08-14 PROCEDURE — 84443 ASSAY THYROID STIM HORMONE: CPT

## 2021-08-14 PROCEDURE — 85025 COMPLETE CBC W/AUTO DIFF WBC: CPT

## 2021-08-14 PROCEDURE — 82607 VITAMIN B-12: CPT

## 2021-08-14 PROCEDURE — 82306 VITAMIN D 25 HYDROXY: CPT

## 2021-08-14 PROCEDURE — 80053 COMPREHEN METABOLIC PANEL: CPT

## 2021-08-14 PROCEDURE — 83036 HEMOGLOBIN GLYCOSYLATED A1C: CPT

## 2021-08-14 PROCEDURE — 36415 COLL VENOUS BLD VENIPUNCTURE: CPT

## 2021-08-19 RX ORDER — LORAZEPAM 0.5 MG/1
0.5 TABLET ORAL EVERY 8 HOURS PRN
Qty: 20 TABLET | Refills: 0 | Status: SHIPPED | OUTPATIENT
Start: 2021-08-19 | End: 2021-12-29 | Stop reason: SDUPTHER

## 2021-08-19 NOTE — TELEPHONE ENCOUNTER
Medicine Refill Request    Last Office Visit: 7/21/2021  Last Telemedicine Visit: 4/28/2020 Iesha Domingo MD    Next Office Visit: Visit date not found  Next Telemedicine Visit: Visit date not found         Current Outpatient Medications:   •  azelastine (ASTELIN) 137 mcg (0.1 %) nasal spray, ADMINISTER 1 SPRAY INTO EACH NOSTRIL 2 (TWO) TIMES A DAY. USE IN EACH NOSTRIL AS DIRECTED., Disp: 30 mL, Rfl: 5  •  B-complex with vitamin C tablet, Take 1 tablet by mouth daily., Disp: , Rfl:   •  cholecalciferol (VITAMIN D3) 10,000 unit capsule, Take 10,000 Units by mouth once a week.  , Disp: , Rfl:   •  docosahexaenoic acid-epa 120-180 mg capsule, Take 1,000 mg by mouth., Disp: , Rfl:   •  fexofenadine (ALLEGRA) 180 mg tablet, Take 180 mg by mouth daily., Disp: , Rfl:   •  glucosamine-chondroitin 500-400 mg tablet, Take 1 tablet by mouth 3 (three) times a day., Disp: , Rfl:   •  LASTACAFT 0.25 % drops, , Disp: , Rfl:   •  LORazepam (ATIVAN) 0.5 mg tablet, Take 1 tablet (0.5 mg total) by mouth every 8 (eight) hours as needed for anxiety., Disp: 20 tablet, Rfl: 1  •  magnesium 250 mg tablet, Take 500 mg by mouth daily., Disp: , Rfl:   •  meloxicam (MOBIC) 15 mg tablet, Take 1 tablet (15 mg total) by mouth daily for 15 days., Disp: 15 tablet, Rfl: 0  •  metFORMIN XR (GLUCOPHAGE XR) 500 mg 24 hr tablet, Take 2 tablets (1,000 mg total) by mouth daily with dinner. Weeks 1 and 2: Start with 1 tab daily with the evening meal., Disp: 60 tablet, Rfl: 0  •  traZODone (DESYREL) 100 mg tablet, Take 1 tablet (100 mg total) by mouth nightly. (Patient taking differently: Take 50 mg by mouth nightly.  ), Disp: 90 tablet, Rfl: 1      BP Readings from Last 3 Encounters:   08/10/21 100/76   07/21/21 104/60   06/22/21 118/60       Recent Lab results:  Lab Results   Component Value Date    CHOL 183 08/22/2020   ,   Lab Results   Component Value Date    HDL 67 08/22/2020   ,   Lab Results   Component Value Date    LDLCALC 108 (H) 08/22/2020    ,   Lab Results   Component Value Date    TRIG 40 08/22/2020        Lab Results   Component Value Date    GLUCOSE 86 08/14/2021   ,   Lab Results   Component Value Date    HGBA1C 5.0 08/14/2021         Lab Results   Component Value Date    CREATININE 0.6 08/14/2021       Lab Results   Component Value Date    TSH 1.43 08/14/2021

## 2021-08-30 RX ORDER — METFORMIN HYDROCHLORIDE 500 MG/1
1000 TABLET, EXTENDED RELEASE ORAL
Qty: 60 TABLET | Refills: 0 | Status: SHIPPED | OUTPATIENT
Start: 2021-08-30 | End: 2021-09-30

## 2021-09-15 NOTE — PROGRESS NOTES
"DETAILS OF VISIT     Consulting Service:  [facility] Comprehensive Weight and Wellness Program - Dietitian    Referring Physician: No ref. provider found    PCP: Mao Bridges CRNP    Reason for Consultation:   Chief Complaint   Patient presents with   • Nutrition Counseling       I met with Andressa Baker today for Nutrition Counseling [Z71.3] and dietary education related to the following:    E66.9- Obesity, unspecified - obesity NOS, E78.5- Hyperlipidemia, unspecified  and Z68.32- BMI 32.0-32.9, adult       VISIT DESCRIPTION         Andressa Baker is a 41 y.o. female here for nutrition follow-up.    Current Weight:   Wt Readings from Last 1 Encounters:   09/16/21 93.9 kg (207 lb 1.6 oz)     Height:   Ht Readings from Last 1 Encounters:   07/21/21 1.702 m (5' 7\")        BMI: Body mass index is 32.44 kg/m².    Weight Trends:    Wt Readings from Last 3 Encounters:   09/16/21 93.9 kg (207 lb 1.6 oz)   08/10/21 94.1 kg (207 lb 8 oz)   08/04/21 92.1 kg (203 lb)       BMI Readings from Last 3 Encounters:   09/16/21 32.44 kg/m²   08/10/21 32.50 kg/m²   08/04/21 31.79 kg/m²       Patient seen by this RD for evaluation in the Comprehensive Weight & Wellness Program. During today's visit, we discussed the following:    Nutrition, Physical Activity, & Emotional Wellness:   Recommended Nutrition Plan  LGI diet + Metformin   Meal Structure  5 or less eating events recommended   Eating Interval  Less than 12 hours recommended   Intake Logging  Healthy Lifestyles paper food journal or electronic (Baritastic, MFP, LoseIt, Carb Manager, etc)    Successes  Food team work -does not eat a naked carb, discussed PFF framework in length.     Food logging -logging on MFP (35% / 35% / 30%)    Challenges Counting calories -discussed hitting macro goals     Food timing -discussed IF. Her eating window is 3 meals + 1 snack between 7:30am-7:30 pm.      Food label reading -discussed servings sizes of foods     Grazing in between meals " -recommend a snack like an RX bar between lunch and dinner to decrease snacking before dinner.    Education Provided Navigating obstacles listed above and outlined in goals below     The following hand-outs and/or educational materials were given: 3 pillars print out     A patient-centered approach using motivational interviewing was used to develop the plan and recommendations outlined below.     Time Spent with Patient for face to face office visit: 30 minutes      MEDICATIONS, LABS, AND ALLERGIES     Current Outpatient Medications on File Prior to Visit   Medication Sig Dispense Refill   • azelastine (ASTELIN) 137 mcg (0.1 %) nasal spray ADMINISTER 1 SPRAY INTO EACH NOSTRIL 2 (TWO) TIMES A DAY. USE IN EACH NOSTRIL AS DIRECTED. 30 mL 5   • B-complex with vitamin C tablet Take 1 tablet by mouth daily.     • cholecalciferol (VITAMIN D3) 10,000 unit capsule Take 10,000 Units by mouth once a week.       • docosahexaenoic acid-epa 120-180 mg capsule Take 1,000 mg by mouth.     • fexofenadine (ALLEGRA) 180 mg tablet Take 180 mg by mouth daily.     • glucosamine-chondroitin 500-400 mg tablet Take 1 tablet by mouth 3 (three) times a day.     • LASTACAFT 0.25 % drops      • LORazepam (ATIVAN) 0.5 mg tablet Take 1 tablet (0.5 mg total) by mouth every 8 (eight) hours as needed for anxiety. 20 tablet 0   • magnesium 250 mg tablet Take 500 mg by mouth daily.     • meloxicam (MOBIC) 15 mg tablet Take 1 tablet (15 mg total) by mouth daily for 15 days. 15 tablet 0   • metFORMIN XR (GLUCOPHAGE-XR) 500 mg 24 hr tablet TAKE 2 TABLETS (1,000 MG TOTAL) BY MOUTH DAILY WITH DINNER. WEEKS 1 AND 2: START WITH 1 TAB DAILY WITH THE EVENING MEAL. 60 tablet 0   • traZODone (DESYREL) 100 mg tablet Take 1 tablet (100 mg total) by mouth nightly. (Patient taking differently: Take 50 mg by mouth nightly.  ) 90 tablet 1     No current facility-administered medications on file prior to visit.         Patient has no known allergies.       PATIENT-LED  GOALS     Class 1 obesity with serious comorbidity and body mass index (BMI) of 33.0 to 33.9 in adult  1. Eating Window between 7:30 am =7:30 pm   2. Utilize our PFF [Protein-Fat-Fiber] Framework to ensure proper fueling throughout the day. This meal planning framework helps to provide you with stable blood sugars, sustained energy, and well-controlled appetite when utilized every 3-5 hours within a 12 hour or less eating interval.     Each meal should have 1 protein choice, 1 fat choice, and 1 fiber choice to ensure appetite and blood sugar control.  Prioritize protein with each meal and snack. Protein is needed to keep you feeling full and satisfied between meals, so it tends to help you snack less and avoid cravings for carbohydrate foods. This nutrient also helps with muscle recovery after exercise which can help increase your metabolic rate over time. Common protein options include beef, pork, chicken, turkey, lamb, fish, shellfish, eggs, soy (tofu, tempeh, edamame), beans, hummus, lentils, low-sugar Greek yogurt, cottage cheese, and protein supplements (shakes/bars). There is also a small amount of protein in foods such as cheese, nuts, seeds, whole grains, and vegetables. Including a variety of different protein sources is best!    The following brands are recommended for protein supplements...    Whey protein isolate powders:  - Earth Fed Muscle [discount code '[id number]' gets you an additional 10% off your online purchase]  - Isopure (Infusions, Unflavored, Natural Flavor, or Naturally Sweetened varieties)  - [facility]  - [facility] Nutrition  - About Time   - Biochem  - Innosupps  - Clean Simple Eats  - Isopure Infusions (whey isolate clear protein powder)    Plant-based powders:  - Tone it Up   - Martinez  - Garden of Life  - Orgain (Vegan or Simple varieties)  - Evolve  - Epicure  - Innosupps Vegan  - Aloha  - Ripple  - Truvani  - Clean Simple Eats Vegan  - Kaai Organic Coconut Protein  Blend  - MyProtein Clear Vegan Isolate (plant-based clear protein powder)    Dairy-based ready-to-drink beverages:  - Fairlife milk  - Slate chocolate milk  - Fairlife protein shakes (CorePower or Nutrition Plan varieties)  - [id number] (whey isolate clear protein drink)  - Isopure Infusions (whey isolate clear protein drink)  - Bariatric Advantage (whey isolate clear protein drink)  - Ascent Protein Recovery Water (whey isolate clear protein drink)  - Orgain  - Pillars drinkable yogurt  - Chobani Complete drinkable yogurt  - Powerful Drink drinkable yogurt    Plant-based ready-to-drink beverages:  - Evolve  - Apres  - Tone it Up  - Orgain  - Aloha  - Garden of Life  - OWYN  - Ripple  - Koia  - [id number] (plant-based clear protein drink)    Protein bars:  - Built bar [discount code 'CANDICE' gets you an additional 10% off your online purchase]  - RX bar  - IQ bar  - Perfect bar  - RawRev Maeve bar  - Simply Protein bar (Crispy variety)  - Quest bar (Treadwell variety from [employer] also approved)  - Garden of Life High Protein Weight Loss bar  - Aloha Plant-Based Protein bar  - Bulletproof Collagen Protein bar    Low-sugar Greek yogurt:  - Siggi's  - Siggi's Plant-Based  - Chobani Less Sugar  - Chobani Complete  - Two Good  - Dannon Oikos Triple Zero   - YQ by [facility]  - [facility] Provisions  - Wallaby Organic  - Mellissa Quark    Other supplements:  - Collagen peptides powder  - Vital Proteins Collagen Water  - Bone broth  - Devotion Nutrition baking powder  - Simply Protein chips  - Quest Nutrition chips  - Twin Peaks or Shrewd Food protein puffs  - Magic Spoon or Schoolyard Snacks protein cereal  3. Commit to daily food journaling on the [place] rashid. Electronic food journaling is used for accountability and to identify patterns in intake that may contribute to your appetite, energy level, and mood.           Gloria Quintero RD, LDN  Registered Dietitian for [facility] Comprehensive Weight and Wellness  Program  9/16/2021     Thank you very much for allowing us to participate in the care of your patient. Please do not hesitate to call or email if there are any questions.

## 2021-09-15 NOTE — PATIENT INSTRUCTIONS
Problem List Items Addressed This Visit        Endocrine/Metabolic    Class 1 obesity with serious comorbidity and body mass index (BMI) of 33.0 to 33.9 in adult - Primary     1. Eating Window between 7:30 am =7:30 pm   2. Utilize our PFF [Protein-Fat-Fiber] Framework to ensure proper fueling throughout the day. This meal planning framework helps to provide you with stable blood sugars, sustained energy, and well-controlled appetite when utilized every 3-5 hours within a 12 hour or less eating interval.     Each meal should have 1 protein choice, 1 fat choice, and 1 fiber choice to ensure appetite and blood sugar control.  Prioritize protein with each meal and snack. Protein is needed to keep you feeling full and satisfied between meals, so it tends to help you snack less and avoid cravings for carbohydrate foods. This nutrient also helps with muscle recovery after exercise which can help increase your metabolic rate over time. Common protein options include beef, pork, chicken, turkey, lamb, fish, shellfish, eggs, soy (tofu, tempeh, edamame), beans, hummus, lentils, low-sugar Greek yogurt, cottage cheese, and protein supplements (shakes/bars). There is also a small amount of protein in foods such as cheese, nuts, seeds, whole grains, and vegetables. Including a variety of different protein sources is best!    The following brands are recommended for protein supplements...    Whey protein isolate powders:  - Earth Fed Muscle [discount code '[id number]' gets you an additional 10% off your online purchase]  - Isopure (Infusions, Unflavored, Natural Flavor, or Naturally Sweetened varieties)  - [facility] Nutrition  - About Time   - Biochem  - Innosupps  - Clean Simple Eats  - Isopure Infusions (whey isolate clear protein powder)    Plant-based powders:  - Tone it Up   - Martinez  - Garden of Life  - Orgain (Vegan or Simple varieties)  - Evolve  - Epicure  - Innosupps Vegan  - Aloha  - Ripple  -  Truvani  - Clean Simple Eats Vegan  - [place] Organic Coconut Protein Blend  - MyProtein Clear Vegan Isolate (plant-based clear protein powder)    Dairy-based ready-to-drink beverages:  - Fairlife milk  - Slate chocolate milk  - Fairlife protein shakes (CorePower or Nutrition Plan varieties)  - [id number] (whey isolate clear protein drink)  - Isopure Infusions (whey isolate clear protein drink)  - Bariatric Advantage (whey isolate clear protein drink)  - Ascent Protein Recovery Water (whey isolate clear protein drink)  - Orgain  - Pillars drinkable yogurt  - Chobani Complete drinkable yogurt  - Powerful Drink drinkable yogurt    Plant-based ready-to-drink beverages:  - Evolve  - Apres  - Tone it Up  - Orgain  - Aloha  - Garden of Life  - OWYN  - Ripple  - Koia  - [id number] (plant-based clear protein drink)    Protein bars:  - Built bar [discount code 'CANDICE' gets you an additional 10% off your online purchase]  - RX bar  - IQ bar  - Perfect bar  - RawRev Maeve bar  - Simply Protein bar (Crispy variety)  - Quest bar (Treadwell variety from [email or web address] also approved)  - Garden of Life High Protein Weight Loss bar  - Aloha Plant-Based Protein bar  - Bulletproof Collagen Protein bar    Low-sugar Greek yogurt:  - Siggi's  - Siggi's Plant-Based  - Chobani Less Sugar  - Chobani Complete  - Two Good  - Dannon Oikos Triple Zero   - YQ by [facility]  - Liebe  - [facility] Provisions  - Wallaby Organic  - Mellissa Quark    Other supplements:  - Collagen peptides powder  - Vital Proteins Collagen Water  - Bone broth  - Devotion Nutrition baking powder  - Simply Protein chips  - Quest Nutrition chips  - Twin Peaks or Shrewd Food protein puffs  - Magic Spoon or Schoolyard Snacks protein cereal  3. Commit to daily food journaling on the [employer] rashid. Electronic food journaling is used for accountability and to identify patterns in intake that may contribute to your appetite, energy level, and mood.               Other     Dietary counseling

## 2021-09-16 ENCOUNTER — OFFICE VISIT (OUTPATIENT)
Dept: BARIATRICS/WEIGHT MGMT | Facility: CLINIC | Age: 41
End: 2021-09-16
Payer: COMMERCIAL

## 2021-09-16 VITALS
BODY MASS INDEX: 32.44 KG/M2 | HEART RATE: 69 BPM | OXYGEN SATURATION: 99 % | RESPIRATION RATE: 20 BRPM | WEIGHT: 207.1 LBS | DIASTOLIC BLOOD PRESSURE: 78 MMHG | SYSTOLIC BLOOD PRESSURE: 118 MMHG

## 2021-09-16 DIAGNOSIS — Z71.3 DIETARY COUNSELING: ICD-10-CM

## 2021-09-16 DIAGNOSIS — E66.811 CLASS 1 OBESITY WITH SERIOUS COMORBIDITY AND BODY MASS INDEX (BMI) OF 33.0 TO 33.9 IN ADULT, UNSPECIFIED OBESITY TYPE: Primary | ICD-10-CM

## 2021-09-16 PROCEDURE — 99999 PR OFFICE/OUTPT VISIT,PROCEDURE ONLY: CPT | Performed by: DIETITIAN, REGISTERED

## 2021-09-16 PROCEDURE — 97803 MED NUTRITION INDIV SUBSEQ: CPT | Performed by: DIETITIAN, REGISTERED

## 2021-09-16 NOTE — ASSESSMENT & PLAN NOTE
1. Eating Window between 7:30 am =7:30 pm   2. Utilize our PFF [Protein-Fat-Fiber] Framework to ensure proper fueling throughout the day. This meal planning framework helps to provide you with stable blood sugars, sustained energy, and well-controlled appetite when utilized every 3-5 hours within a 12 hour or less eating interval.     Each meal should have 1 protein choice, 1 fat choice, and 1 fiber choice to ensure appetite and blood sugar control.  Prioritize protein with each meal and snack. Protein is needed to keep you feeling full and satisfied between meals, so it tends to help you snack less and avoid cravings for carbohydrate foods. This nutrient also helps with muscle recovery after exercise which can help increase your metabolic rate over time. Common protein options include beef, pork, chicken, turkey, lamb, fish, shellfish, eggs, soy (tofu, tempeh, edamame), beans, hummus, lentils, low-sugar Greek yogurt, cottage cheese, and protein supplements (shakes/bars). There is also a small amount of protein in foods such as cheese, nuts, seeds, whole grains, and vegetables. Including a variety of different protein sources is best!    The following brands are recommended for protein supplements...    Whey protein isolate powders:  - Earth Fed Muscle [discount code '[id number]' gets you an additional 10% off your online purchase]  - Isopure (Infusions, Unflavored, Natural Flavor, or Naturally Sweetened varieties)  - [facility]  - [facility] Nutrition  - About Time   - Biochem  - Innosupps  - Clean Simple Eats  - Isopure Infusions (whey isolate clear protein powder)    Plant-based powders:  - Tone it Up   - Martinez  - Garden of Life  - Orgain (Vegan or Simple varieties)  - Evolve  - Epicure  - Innosupps Vegan  - Aloha  - Ripple  - Truvani  - Clean Simple Eats Vegan  - [employer] Organic Coconut Protein Blend  - MyProtein Clear Vegan Isolate (plant-based clear protein powder)    Dairy-based ready-to-drink  beverages:  - Fairlife milk  - Slate chocolate milk  - Fairlife protein shakes (CorePower or Nutrition Plan varieties)  - [id number] (whey isolate clear protein drink)  - Isopure Infusions (whey isolate clear protein drink)  - Bariatric Advantage (whey isolate clear protein drink)  - Ascent Protein Recovery Water (whey isolate clear protein drink)  - Orgain  - Pillars drinkable yogurt  - Chobani Complete drinkable yogurt  - Powerful Drink drinkable yogurt    Plant-based ready-to-drink beverages:  - Evolve  - Apres  - Tone it Up  - Orgain  - Aloha  - Garden of Life  - OWYN  - Ripple  - Koia  - [id number] (plant-based clear protein drink)    Protein bars:  - Built bar [discount code 'CANDICE' gets you an additional 10% off your online purchase]  - RX bar  - IQ bar  - Perfect bar  - RawRev Maeve bar  - Simply Protein bar (Crispy variety)  - Quest bar (Treadwell variety from [employer] also approved)  - Garden of Life High Protein Weight Loss bar  - Aloha Plant-Based Protein bar  - Bulletproof Collagen Protein bar    Low-sugar Greek yogurt:  - Siggi's  - Siggi's Plant-Based  - Chobani Less Sugar  - Chobani Complete  - Two Good  - Dannon Oikos Triple Zero   - YQ by [employer]  - Lielisa  - American Provisions  - Wallaby Organic  - Mellissa Quark    Other supplements:  - Collagen peptides powder  - Vital Proteins Collagen Water  - Bone broth  - Devotion Nutrition baking powder  - Simply Protein chips  - Quest Nutrition chips  - Twin Peaks or Shrewd Food protein puffs  - Magic Spoon or Schoolyard Snacks protein cereal  3. Commit to daily food journaling on the [facility] rashid. Electronic food journaling is used for accountability and to identify patterns in intake that may contribute to your appetite, energy level, and mood.

## 2021-09-27 ENCOUNTER — TELEPHONE (OUTPATIENT)
Dept: INTERNAL MEDICINE | Facility: CLINIC | Age: 41
End: 2021-09-27

## 2021-09-27 ENCOUNTER — OFFICE VISIT (OUTPATIENT)
Dept: INTERNAL MEDICINE | Facility: CLINIC | Age: 41
End: 2021-09-27
Payer: COMMERCIAL

## 2021-09-27 VITALS
TEMPERATURE: 97.7 F | HEIGHT: 67 IN | BODY MASS INDEX: 32.65 KG/M2 | OXYGEN SATURATION: 97 % | HEART RATE: 61 BPM | RESPIRATION RATE: 16 BRPM | DIASTOLIC BLOOD PRESSURE: 70 MMHG | SYSTOLIC BLOOD PRESSURE: 110 MMHG | WEIGHT: 208 LBS

## 2021-09-27 DIAGNOSIS — M54.50 BILATERAL LOW BACK PAIN WITHOUT SCIATICA, UNSPECIFIED CHRONICITY: ICD-10-CM

## 2021-09-27 DIAGNOSIS — Z23 INFLUENZA VACCINE NEEDED: ICD-10-CM

## 2021-09-27 DIAGNOSIS — R35.0 URINARY FREQUENCY: Primary | ICD-10-CM

## 2021-09-27 DIAGNOSIS — M62.89 PELVIC FLOOR DYSFUNCTION: ICD-10-CM

## 2021-09-27 DIAGNOSIS — F33.1 MODERATE EPISODE OF RECURRENT MAJOR DEPRESSIVE DISORDER (CMS/HCC): ICD-10-CM

## 2021-09-27 DIAGNOSIS — F41.9 ANXIETY: ICD-10-CM

## 2021-09-27 LAB
BILIRUBIN, POC: NEGATIVE
BLOOD URINE, POC: POSITIVE
CLARITY, POC: CLEAR
COLOR, POC: YELLOW
EXPIRATION DATE: ABNORMAL
GLUCOSE URINE, POC: NEGATIVE
KETONES, POC: NEGATIVE
LEUKOCYTE EST, POC: NEGATIVE
Lab: ABNORMAL
NITRITE, POC: NEGATIVE
PH, POC: 8
POCT MANUFACTURER: ABNORMAL
PROTEIN, POC: ABNORMAL
SPECIFIC GRAVITY, POC: 1
UROBILINOGEN, POC: 0.2

## 2021-09-27 PROCEDURE — 3008F BODY MASS INDEX DOCD: CPT | Performed by: NURSE PRACTITIONER

## 2021-09-27 PROCEDURE — 90471 IMMUNIZATION ADMIN: CPT | Performed by: NURSE PRACTITIONER

## 2021-09-27 PROCEDURE — 81002 URINALYSIS NONAUTO W/O SCOPE: CPT | Performed by: NURSE PRACTITIONER

## 2021-09-27 PROCEDURE — 99214 OFFICE O/P EST MOD 30 MIN: CPT | Mod: 25 | Performed by: NURSE PRACTITIONER

## 2021-09-27 PROCEDURE — 90686 IIV4 VACC NO PRSV 0.5 ML IM: CPT | Performed by: NURSE PRACTITIONER

## 2021-09-27 RX ORDER — SERTRALINE HYDROCHLORIDE 100 MG/1
100 TABLET, FILM COATED ORAL DAILY
COMMUNITY
Start: 2021-09-17 | End: 2022-10-18

## 2021-09-27 RX ORDER — BUPROPION HYDROCHLORIDE 150 MG/1
150 TABLET ORAL DAILY
Qty: 90 TABLET | Refills: 0 | Status: SHIPPED | OUTPATIENT
Start: 2021-09-27 | End: 2022-03-17

## 2021-09-27 ASSESSMENT — PATIENT HEALTH QUESTIONNAIRE - PHQ9
SUM OF ALL RESPONSES TO PHQ9 QUESTIONS 1 & 2: 2
SUM OF ALL RESPONSES TO PHQ QUESTIONS 1-9: 8

## 2021-09-27 NOTE — PATIENT INSTRUCTIONS
Start wellbutrin 150 mg XR daily  Continue the zoloft and CBT  Follow up 4 weeks  Schedule physical therapy for the back  Schedule pelvic floor therapy

## 2021-09-27 NOTE — PROGRESS NOTES
[facility]  [facility]        CHIEF COMPLAINT   Follow up     HISTORY OF PRESENT ILLNESS      This is a 41 y.o. female who presents for a follow up.  She has a few concerns to address today.  She had been weaning off the zoloft but was noticed she was feeling more sad/upset so resumed the 25 mg daily and tapered up to 50 mg.  She typically has more anxiety and depression this time of the year, hx of twin pregnancy with 1 fetal demise and 1 live birth, birthday in November.  Has been using trazadone to sleep and requiring more lorazpam, having feelings of sadness and worthlessness.  She does have a good relationship with her therapist who she has seen for years, doing CBT.  Had previously taken Trintellix with good response but she is still occasionally nursing her son.  Wondering about increasing the sertraline, previously had taken 75 mg but noticed more irritability at this dose.       Back pain, has had episodes of back pain since giving birth 2 years ago.  Core feels weak.  Prone to injury.  Did PT which helped.  Worse in the past month.    Also reports frequency/urgency at night, occasional vaginal cramping worse in the last 2 weeks, wondering about a UTI.    PAST MEDICAL AND SURGICAL HISTORY      PMHx:  Past Medical History:   Diagnosis Date   • Abnormal ECG     PVCs   • Arrhythmia 2018    PVCs, status post ablation, partially successful.   • Back pain    • GERD (gastroesophageal reflux disease)    • Heartburn    • History of fetal demise, not currently pregnant     Twin pregnancy, 1 fetal demise, 1 live birth   • Joint pain    • Migraine headache    • Ovarian cyst    • Palpitations    • Pericarditis 2018    After john ablation       PSHx:  Past Surgical History:   Procedure Laterality Date   • ABLATION OF DYSRHYTHMIC FOCUS  2018   •  SECTION         Social History     Tobacco Use   • Smoking status: Never Smoker   • Smokeless tobacco: Never Used   Vaping Use   •  Vaping Use: Never used   Substance Use Topics   • Alcohol use: Yes     Comment: occasionally   • Drug use: No       MEDICATIONS      Current Outpatient Medications on File Prior to Visit   Medication Sig Dispense Refill   • azelastine (ASTELIN) 137 mcg (0.1 %) nasal spray ADMINISTER 1 SPRAY INTO EACH NOSTRIL 2 (TWO) TIMES A DAY. USE IN EACH NOSTRIL AS DIRECTED. 30 mL 5   • B-complex with vitamin C tablet Take 1 tablet by mouth daily.     • cholecalciferol (VITAMIN D3) 10,000 unit capsule Take 10,000 Units by mouth once a week.       • docosahexaenoic acid-epa 120-180 mg capsule Take 1,000 mg by mouth.     • fexofenadine (ALLEGRA) 180 mg tablet Take 180 mg by mouth daily.     • glucosamine-chondroitin 500-400 mg tablet Take 1 tablet by mouth 3 (three) times a day.     • LORazepam (ATIVAN) 0.5 mg tablet Take 1 tablet (0.5 mg total) by mouth every 8 (eight) hours as needed for anxiety. 20 tablet 0   • magnesium 250 mg tablet Take 500 mg by mouth daily.     • metFORMIN XR (GLUCOPHAGE-XR) 500 mg 24 hr tablet TAKE 2 TABLETS (1,000 MG TOTAL) BY MOUTH DAILY WITH DINNER. WEEKS 1 AND 2: START WITH 1 TAB DAILY WITH THE EVENING MEAL. 60 tablet 0   • sertraline (ZOLOFT) 50 mg tablet      • traZODone (DESYREL) 100 mg tablet Take 1 tablet (100 mg total) by mouth nightly. (Patient taking differently: Take 50 mg by mouth nightly.  ) 90 tablet 1     No current facility-administered medications on file prior to visit.       Home medications were personally reviewed.    ALLERGIES      Patient has no known allergies.    FAMILY HISTORY      Family History   Problem Relation Age of Onset   • Hyperlipidemia Biological Mother    • Hyperlipidemia Biological Father    • Hypertension Biological Father    • Prostate cancer Biological Father 78   • Dementia Biological Father    • Hyperlipidemia Maternal Grandmother    • Clotting disorder Maternal Grandfather         developed in 60's - required tansfusions/platelets   • Hyperlipidemia Paternal  Grandmother    • Dementia Paternal Grandmother    • Diabetes Paternal Grandfather        REVIEW OF SYSTEMS      Review of Systems   Constitutional: Negative.    HENT: Negative.    Eyes: Negative.    Respiratory: Negative.    Cardiovascular: Negative.    Gastrointestinal: Negative.    Endocrine: Negative.    Genitourinary: Positive for frequency, urgency and vaginal pain.   Musculoskeletal: Positive for back pain.   Skin: Negative.    Allergic/Immunologic: Negative.    Neurological: Negative.    Hematological: Negative.    Psychiatric/Behavioral: Negative.        PHYSICAL EXAMINATION      Vitals:    09/27/21 1028   BP: 110/70   Pulse: 61   Resp: 16   Temp: 36.5 °C (97.7 °F)   SpO2: 97%       Body mass index is 32.58 kg/m².    Physical Exam  Constitutional:       Appearance: She is well-developed.   HENT:      Head: Normocephalic and atraumatic.   Eyes:      Conjunctiva/sclera: Conjunctivae normal.      Pupils: Pupils are equal, round, and reactive to light.   Cardiovascular:      Rate and Rhythm: Normal rate and regular rhythm.   Pulmonary:      Effort: Pulmonary effort is normal.      Breath sounds: Normal breath sounds.   Abdominal:      General: Bowel sounds are normal.      Palpations: Abdomen is soft.   Musculoskeletal:         General: Normal range of motion.      Cervical back: Normal range of motion and neck supple.   Skin:     General: Skin is warm and dry.      Capillary Refill: Capillary refill takes less than 2 seconds.   Neurological:      Mental Status: She is alert and oriented to person, place, and time.   Psychiatric:         Behavior: Behavior normal.         LABS / IMAGING / STUDIES        Labs  Labs are pending.    Imaging  N/A      ASSESSMENT AND PLAN           Problem List Items Addressed This Visit        Nervous    Bilateral low back pain without sciatica     --PT eval         Relevant Orders    Ambulatory referral to Physical Therapy       Genitourinary    Urinary frequency - Primary     Relevant Orders    POCT urinalysis dipstick (Completed)    Urinalysis with Reflex Culture (Completed)       Musculoskeletal    Pelvic floor dysfunction    Relevant Orders    Ambulatory referral to Physical Therapy       Other    Anxiety     --continue sertraline          Depression     --continue zoloft  --trial addition of wellbutrin-discussed dosage, indications, side effects  --f/u 4 weeks  --continue therapy           Relevant Medications    sertraline (ZOLOFT) 50 mg tablet    buPROPion XL (WELLBUTRIN XL) 150 mg 24 hr tablet      Other Visit Diagnoses     Influenza vaccine needed        Relevant Orders    Influenza vaccine quadrivalent preservative free 6 mon and older IM (FluLaval) (Completed)

## 2021-09-27 NOTE — TELEPHONE ENCOUNTER
Patient was seen today by Mao. She requested to get a physical with you. Can I switch PCP? And schedule it to see you??

## 2021-09-28 PROBLEM — F32.A DEPRESSION: Status: ACTIVE | Noted: 2021-09-28

## 2021-09-28 PROBLEM — M62.89 PELVIC FLOOR DYSFUNCTION: Status: ACTIVE | Noted: 2021-09-28

## 2021-09-28 PROBLEM — R35.0 URINARY FREQUENCY: Status: ACTIVE | Noted: 2021-09-28

## 2021-09-28 LAB
APPEARANCE UR: CLEAR
BACTERIA #/AREA URNS HPF: NORMAL /[HPF]
BILIRUB UR QL STRIP: NEGATIVE
CASTS URNS QL MICRO: NORMAL /LPF
COLOR UR: YELLOW
EPI CELLS #/AREA URNS HPF: NORMAL /HPF (ref 0–10)
GLUCOSE UR QL: NEGATIVE
HGB UR QL STRIP: ABNORMAL
KETONES UR QL STRIP: NEGATIVE
LAB CORP URINALYSIS REFLEX: ABNORMAL
LEUKOCYTE ESTERASE UR QL STRIP: NEGATIVE
MICRO URNS: ABNORMAL
MUCOUS THREADS URNS QL MICRO: PRESENT
NITRITE UR QL STRIP: NEGATIVE
PH UR STRIP: 7.5 [PH] (ref 5–7.5)
PROT UR QL STRIP: NEGATIVE
RBC #/AREA URNS HPF: NORMAL /HPF (ref 0–2)
SP GR UR: <=1.005 (ref 1–1.03)
UROBILINOGEN UR STRIP-MCNC: 0.2 MG/DL (ref 0.2–1)
WBC #/AREA URNS HPF: NORMAL /HPF (ref 0–5)

## 2021-09-28 ASSESSMENT — ENCOUNTER SYMPTOMS
NEUROLOGICAL NEGATIVE: 1
CONSTITUTIONAL NEGATIVE: 1
RESPIRATORY NEGATIVE: 1
HEMATOLOGIC/LYMPHATIC NEGATIVE: 1
BACK PAIN: 1
FREQUENCY: 1
ENDOCRINE NEGATIVE: 1
GASTROINTESTINAL NEGATIVE: 1
ALLERGIC/IMMUNOLOGIC NEGATIVE: 1
EYES NEGATIVE: 1
PSYCHIATRIC NEGATIVE: 1
CARDIOVASCULAR NEGATIVE: 1

## 2021-09-29 NOTE — ASSESSMENT & PLAN NOTE
--continue zoloft  --trial addition of wellbutrin-discussed dosage, indications, side effects  --f/u 4 weeks  --continue therapy

## 2021-09-30 RX ORDER — METFORMIN HYDROCHLORIDE 500 MG/1
1000 TABLET, EXTENDED RELEASE ORAL
Qty: 60 TABLET | Refills: 0 | Status: SHIPPED | OUTPATIENT
Start: 2021-09-30 | End: 2021-10-15 | Stop reason: SDUPTHER

## 2021-09-30 NOTE — TELEPHONE ENCOUNTER
Medicine Refill Request    Last Office Visit: 9/16/2021  Last Telemedicine Visit: 8/4/2021 Gloria Vallejo MD    Next Office Visit: 10/20/2021  Next Telemedicine Visit: Visit date not found         Current Outpatient Medications:   •  azelastine (ASTELIN) 137 mcg (0.1 %) nasal spray, ADMINISTER 1 SPRAY INTO EACH NOSTRIL 2 (TWO) TIMES A DAY. USE IN EACH NOSTRIL AS DIRECTED., Disp: 30 mL, Rfl: 5  •  B-complex with vitamin C tablet, Take 1 tablet by mouth daily., Disp: , Rfl:   •  buPROPion XL (WELLBUTRIN XL) 150 mg 24 hr tablet, Take 1 tablet (150 mg total) by mouth daily., Disp: 90 tablet, Rfl: 0  •  cholecalciferol (VITAMIN D3) 10,000 unit capsule, Take 10,000 Units by mouth once a week.  , Disp: , Rfl:   •  docosahexaenoic acid-epa 120-180 mg capsule, Take 1,000 mg by mouth., Disp: , Rfl:   •  fexofenadine (ALLEGRA) 180 mg tablet, Take 180 mg by mouth daily., Disp: , Rfl:   •  glucosamine-chondroitin 500-400 mg tablet, Take 1 tablet by mouth 3 (three) times a day., Disp: , Rfl:   •  LORazepam (ATIVAN) 0.5 mg tablet, Take 1 tablet (0.5 mg total) by mouth every 8 (eight) hours as needed for anxiety., Disp: 20 tablet, Rfl: 0  •  magnesium 250 mg tablet, Take 500 mg by mouth daily., Disp: , Rfl:   •  metFORMIN XR (GLUCOPHAGE-XR) 500 mg 24 hr tablet, TAKE 2 TABLETS (1,000 MG TOTAL) BY MOUTH DAILY WITH DINNER. WEEKS 1 AND 2: START WITH 1 TAB DAILY WITH THE EVENING MEAL., Disp: 60 tablet, Rfl: 0  •  sertraline (ZOLOFT) 50 mg tablet, , Disp: , Rfl:   •  traZODone (DESYREL) 100 mg tablet, Take 1 tablet (100 mg total) by mouth nightly. (Patient taking differently: Take 50 mg by mouth nightly.  ), Disp: 90 tablet, Rfl: 1      BP Readings from Last 3 Encounters:   09/27/21 110/70   09/16/21 118/78   08/10/21 100/76       Recent Lab results:  Lab Results   Component Value Date    CHOL 183 08/22/2020   ,   Lab Results   Component Value Date    HDL 67 08/22/2020   ,   Lab Results   Component Value Date    LDLCALC 108 (H)  08/22/2020   ,   Lab Results   Component Value Date    TRIG 40 08/22/2020        Lab Results   Component Value Date    GLUCOSE 86 08/14/2021   ,   Lab Results   Component Value Date    HGBA1C 5.0 08/14/2021         Lab Results   Component Value Date    CREATININE 0.6 08/14/2021       Lab Results   Component Value Date    TSH 1.43 08/14/2021

## 2021-10-15 RX ORDER — METFORMIN HYDROCHLORIDE 500 MG/1
1000 TABLET, EXTENDED RELEASE ORAL
Qty: 90 TABLET | Refills: 0 | Status: SHIPPED | OUTPATIENT
Start: 2021-10-15 | End: 2021-12-12

## 2021-10-20 ENCOUNTER — TELEMEDICINE (OUTPATIENT)
Dept: BARIATRICS/WEIGHT MGMT | Facility: CLINIC | Age: 41
End: 2021-10-20
Payer: COMMERCIAL

## 2021-10-20 DIAGNOSIS — E66.811 CLASS 1 OBESITY WITH SERIOUS COMORBIDITY AND BODY MASS INDEX (BMI) OF 33.0 TO 33.9 IN ADULT, UNSPECIFIED OBESITY TYPE: ICD-10-CM

## 2021-10-20 DIAGNOSIS — F41.9 ANXIETY: Primary | ICD-10-CM

## 2021-10-20 PROCEDURE — 99213 OFFICE O/P EST LOW 20 MIN: CPT | Mod: GT,95 | Performed by: FAMILY MEDICINE

## 2021-10-20 NOTE — PROGRESS NOTES
DETAILS OF VISIT   Start of visit: 9:05   End of visit: 9:25  Total time: 20 minutes on this date of service performing the following activities: obtaining history, entering orders, documenting, preparing for visit, obtaining / reviewing records and providing counseling and education    State:[place]    Telemedicine visit: Audio and Video Encounter   Hello, my name is Gloria Donato MD.  Before we proceed, can you please verify your identification by telling me your full name and date of birth?  Can you tell me who is in the room with you?    You and I are about to have a telemedicine check-in or visit because you have requested it.  This is a live video-conference.  I am a real person, speaking to you in real time.  There is no one else with me on the video-conference.  However, when we use ([employer], [employer], etc) it is important for you to know that the video-conference may not be secure or private.  I am not recording this conversation and I am asking you not to record it.  This telemedicine visit will be billed to your health insurance or you, if you are self-insured.  You understand you will be responsible for any copayments or coinsurances that apply to your telemedicine visit.  Communication platform used for this encounter:  1-4 All Video Visit (no Zoom)     Before starting our telemedicine visit, I am required to get your consent for this virtual check-in or visit by telemedicine. Do you consent?    Consent statement Read: You and I are about to have a Telemedicine visit.  This is allowed because you are already my patient, and you have requested it.  This telemedicine visit will be billed to your health insurance or to you, if you are self-insured. You understand that you ill be responsible for any co-payments or co-insurances that apply to your telemedicine visit.  Before starting our telemedicine visit, I am required to get your consent for this virtual check-in.  Do you consent?  Patient  reply : Yes    PCP: Gisell Costa, DO   HISTORY OF PRESENT ILLNESS        Andressa Baker is a 41 y.o. female following up on lifestyle management and weight loss as adjunctive treatment strategies for Lipedema.      Medications used to support lifestyle modifications:none, not taking the metformin consistently.     Interval history:   Chart review done since last visit.    Reviewed notes, labs and imaging tabs and noted the following updates: None, reviewed changes to medications through psychiatry.    Nutrition goals:  Struggling with hunger:No  Struggling with non-hunger eating:Yes, and not focusing on nutrition.   Meeting protein requirements:No  Getting adequate water intake:Yes    Consistently meeting hourly movement goals:Yes  Current exercise regimen:Inconsistent    Insights on emotional wellness, awareness, and sleep:Spending more time outside and kayaking   She is doing yoga 1 time per week.  Looking for a walking/running sara.    Started to focus more on the phone again, and realizing it.        PAST MEDICAL History        Past Medical History:   Diagnosis Date   • Abnormal ECG     PVCs   • Arrhythmia 02/2018    PVCs, status post ablation, partially successful.   • Back pain    • GERD (gastroesophageal reflux disease)    • Heartburn    • History of fetal demise, not currently pregnant     Twin pregnancy, 1 fetal demise, 1 live birth   • Joint pain    • Migraine headache    • Ovarian cyst    • Palpitations    • Pericarditis 03/06/2018    After john ablation       MEDICATIONS        Current Outpatient Medications on File Prior to Visit   Medication Sig Dispense Refill   • azelastine (ASTELIN) 137 mcg (0.1 %) nasal spray ADMINISTER 1 SPRAY INTO EACH NOSTRIL 2 (TWO) TIMES A DAY. USE IN EACH NOSTRIL AS DIRECTED. 30 mL 5   • B-complex with vitamin C tablet Take 1 tablet by mouth daily.     • buPROPion XL (WELLBUTRIN XL) 150 mg 24 hr tablet Take 1 tablet (150 mg total) by mouth daily. 90 tablet 0   •  cholecalciferol (VITAMIN D3) 10,000 unit capsule Take 10,000 Units by mouth once a week.       • docosahexaenoic acid-epa 120-180 mg capsule Take 1,000 mg by mouth.     • fexofenadine (ALLEGRA) 180 mg tablet Take 180 mg by mouth daily.     • glucosamine-chondroitin 500-400 mg tablet Take 1 tablet by mouth 3 (three) times a day.     • LORazepam (ATIVAN) 0.5 mg tablet Take 1 tablet (0.5 mg total) by mouth every 8 (eight) hours as needed for anxiety. 20 tablet 0   • magnesium 250 mg tablet Take 500 mg by mouth daily.     • metFORMIN  mg 24 hr tablet Take 2 tablets (1,000 mg total) by mouth daily with dinner. Weeks 1 and 2: Start with 1 tab daily with the evening meal. 90 tablet 0   • sertraline (ZOLOFT) 50 mg tablet      • traZODone (DESYREL) 100 mg tablet Take 1 tablet (100 mg total) by mouth nightly. (Patient taking differently: Take 50 mg by mouth nightly.  ) 90 tablet 1     No current facility-administered medications on file prior to visit.       ALLERGIES        Patient has no known allergies.           REVIEW OF SYSTEMS      Review of Systems    Review of Systems     PHYSICAL EXAMINATION      Visit Vitals  LMP 09/25/2021      There is no height or weight on file to calculate BMI.    BP Readings from Last 3 Encounters:   09/27/21 110/70   09/16/21 118/78   08/10/21 100/76     Wt Readings from Last 3 Encounters:   09/27/21 94.3 kg (208 lb)   09/16/21 93.9 kg (207 lb 1.6 oz)   08/10/21 94.1 kg (207 lb 8 oz)       Physical Exam      LABS / IMAGING / EKG        Reviewed the following Labs:    Lab Results   Component Value Date    HGBA1C 5.0 08/14/2021    HGBA1C 5.2 08/22/2020    HGBA1C 5.1 10/01/2019     Lab Results   Component Value Date    CHOL 183 08/22/2020    CHOL 196 01/29/2016     Lab Results   Component Value Date    HDL 67 08/22/2020    HDL 67 01/29/2016     Lab Results   Component Value Date    LDLCALC 108 (H) 08/22/2020    LDLCALC 117 (H) 01/29/2016     Lab Results   Component Value Date    TRIG 40  08/22/2020    TRIG 58 01/29/2016     No results found for: CHOLHDL      ASSESSMENT AND PLAN         Anxiety  Counseling today on lifestyle modifications for anxiety.    Andressa agrees to make workout buddies 3 times per week, recognizing that physical activity improves anxiety.  She will kayak, run, and do yoga.    She will delete apps on her phone as these have become distracting, and taking away from what is truly meaningful and important.    She will continue following regularly with counseling, has developed a good bond with a psychiatrist who manages medication as well as does therapy sessions.    Continue treatment with lifestyle modifications.  See plan as outlined in Obesity treatment section.       Class 1 obesity with serious comorbidity and body mass index (BMI) of 33.0 to 33.9 in adult  Refocusing goal on health improvement instead of weight loss.  This includes mental as well as physical.    Continue with current psychiatrist, brainstormed ideas for small nutrition upgrades as well as environmental upgrades such as increasing physical activity with friends and decreasing screen time.    We will continue to follow-up approximately every 3 months for check ins on lifestyle modifications.          Thank you very much for allowing us to participate in the care of your patient. Please do not hesitate to call or email if there are any questions.

## 2021-10-20 NOTE — PATIENT INSTRUCTIONS
Nutrition goal: continue with protein with every meal  Work on increasing water: 64 ounces, and have bottle available and around for a visual trigger, use a straw.     Physical activity goal: Connect with 3 friends weekly.    Awareness: delete the scrabble among others.

## 2021-10-21 NOTE — PROGRESS NOTES
Dr. Vallejo,     Hi,  I called the pt 2 times and could not leave a VM for her. I will keep trying.     Thanks,     Sharifa :)

## 2021-10-24 NOTE — ASSESSMENT & PLAN NOTE
Counseling today on lifestyle modifications for anxiety.    Andressa agrees to make workout buddies 3 times per week, recognizing that physical activity improves anxiety.  She will kayak, run, and do yoga.    She will delete apps on her phone as these have become distracting, and taking away from what is truly meaningful and important.    She will continue following regularly with counseling, has developed a good bond with a psychiatrist who manages medication as well as does therapy sessions.    Continue treatment with lifestyle modifications.  See plan as outlined in Obesity treatment section.

## 2021-10-24 NOTE — ASSESSMENT & PLAN NOTE
Refocusing goal on health improvement instead of weight loss.  This includes mental as well as physical.    Continue with current psychiatrist, brainstormed ideas for small nutrition upgrades as well as environmental upgrades such as increasing physical activity with friends and decreasing screen time.    We will continue to follow-up approximately every 3 months for check ins on lifestyle modifications.

## 2021-12-01 ENCOUNTER — TELEPHONE (OUTPATIENT)
Dept: SCHEDULING | Facility: CLINIC | Age: 41
End: 2021-12-01
Payer: COMMERCIAL

## 2021-12-01 NOTE — TELEPHONE ENCOUNTER
Please do not more patients for tomorrow. Please block any open times.  I will need more than 20 minutes since I have not seen her in 2 years.

## 2021-12-01 NOTE — TELEPHONE ENCOUNTER
Andressa called requesting an appt with , she was last seen 10/2019    She is hoping to get an eval and also discuss a new medication    She can be reached @[phone]

## 2021-12-10 ENCOUNTER — TELEMEDICINE (OUTPATIENT)
Dept: BARIATRICS/WEIGHT MGMT | Facility: CLINIC | Age: 41
End: 2021-12-10
Payer: COMMERCIAL

## 2021-12-10 DIAGNOSIS — F90.0 ATTENTION DEFICIT HYPERACTIVITY DISORDER (ADHD), PREDOMINANTLY INATTENTIVE TYPE: Primary | ICD-10-CM

## 2021-12-10 DIAGNOSIS — E66.811 CLASS 1 OBESITY WITH SERIOUS COMORBIDITY AND BODY MASS INDEX (BMI) OF 33.0 TO 33.9 IN ADULT, UNSPECIFIED OBESITY TYPE: ICD-10-CM

## 2021-12-10 PROCEDURE — 99213 OFFICE O/P EST LOW 20 MIN: CPT | Mod: GT,95 | Performed by: FAMILY MEDICINE

## 2021-12-10 NOTE — PROGRESS NOTES
DETAILS OF VISIT   Start of visit: 10:25   End of visit: 10:45  Total time: 20 minutes on this date of service performing the following activities: obtaining history, entering orders, documenting, preparing for visit, obtaining / reviewing records and providing counseling and education    State:[place]    Telemedicine visit: Audio and Video Encounter   Hello, my name is Gloria Donato MD.  Before we proceed, can you please verify your identification by telling me your full name and date of birth?  Can you tell me who is in the room with you?    You and I are about to have a telemedicine check-in or visit because you have requested it.  This is a live video-conference.  I am a real person, speaking to you in real time.  There is no one else with me on the video-conference.  However, when we use (HipChat, Siterrae, etc) it is important for you to know that the video-conference may not be secure or private.  I am not recording this conversation and I am asking you not to record it.  This telemedicine visit will be billed to your health insurance or you, if you are self-insured.  You understand you will be responsible for any copayments or coinsurances that apply to your telemedicine visit.  Communication platform used for this encounter:  [employer] Video Visit (no Zoom)     Before starting our telemedicine visit, I am required to get your consent for this virtual check-in or visit by telemedicine. Do you consent?    Consent statement Read: You and I are about to have a Telemedicine visit.  This is allowed because you are already my patient, and you have requested it.  This telemedicine visit will be billed to your health insurance or to you, if you are self-insured. You understand that you ill be responsible for any co-payments or co-insurances that apply to your telemedicine visit.  Before starting our telemedicine visit, I am required to get your consent for this virtual check-in.  Do you consent?  Patient  reply : Yes    PCP: Gisell Costa, DO   HISTORY OF PRESENT ILLNESS        Andressa Baker is a 41 y.o. female following up on lifestyle management and weight loss as adjunctive treatment strategies for lipedema, depression.      Medications used to support lifestyle modifications: Zoloft per psychiatry, recently added Wellbutrin.  Noted that Wellbutrin caused increased anxiety.  She stopped after 3 days.    Interval history:   Chart review done since last visit.    Reviewed notes, labs and imaging tabs and noted the following updates: None    Concerns or questions: Still having difficulty with following any plan, constantly feels disorganized, late to events, easily distracted by technology, misplaces common items frequently.    Nutrition goals: When in a good place she is able to follow p/f/f  Struggling with hunger:Constant struggles with hunger  Struggling with non-hunger eating:Yes  Meeting protein requirements:Yes  Getting adequate water intake:yes    Consistently meeting hourly movement goals:yes  Current exercise regimen: Inconsistent    Insights on emotional wellness, awareness, and sleep: Still struggles with sleep.      PAST MEDICAL History        Past Medical History:   Diagnosis Date   • Abnormal ECG     PVCs   • Arrhythmia 02/2018    PVCs, status post ablation, partially successful.   • Back pain    • GERD (gastroesophageal reflux disease)    • Heartburn    • History of fetal demise, not currently pregnant     Twin pregnancy, 1 fetal demise, 1 live birth   • Joint pain    • Migraine headache    • Ovarian cyst    • Palpitations    • Pericarditis 03/06/2018    After john ablation       MEDICATIONS        Current Outpatient Medications on File Prior to Visit   Medication Sig Dispense Refill   • azelastine (ASTELIN) 137 mcg (0.1 %) nasal spray ADMINISTER 1 SPRAY INTO EACH NOSTRIL 2 (TWO) TIMES A DAY. USE IN EACH NOSTRIL AS DIRECTED. 30 mL 5   • B-complex with vitamin C tablet Take 1 tablet by mouth  daily.     • buPROPion XL (WELLBUTRIN XL) 150 mg 24 hr tablet Take 1 tablet (150 mg total) by mouth daily. 90 tablet 0   • cholecalciferol (VITAMIN D3) 10,000 unit capsule Take 10,000 Units by mouth once a week.       • docosahexaenoic acid-epa 120-180 mg capsule Take 1,000 mg by mouth.     • fexofenadine (ALLEGRA) 180 mg tablet Take 180 mg by mouth daily.     • glucosamine-chondroitin 500-400 mg tablet Take 1 tablet by mouth 3 (three) times a day.     • LORazepam (ATIVAN) 0.5 mg tablet Take 1 tablet (0.5 mg total) by mouth every 8 (eight) hours as needed for anxiety. 20 tablet 0   • magnesium 250 mg tablet Take 500 mg by mouth daily.     • metFORMIN  mg 24 hr tablet Take 2 tablets (1,000 mg total) by mouth daily with dinner. Weeks 1 and 2: Start with 1 tab daily with the evening meal. 90 tablet 0   • sertraline (ZOLOFT) 50 mg tablet      • traZODone (DESYREL) 100 mg tablet Take 1 tablet (100 mg total) by mouth nightly. (Patient taking differently: Take 50 mg by mouth nightly.  ) 90 tablet 1     No current facility-administered medications on file prior to visit.       ALLERGIES        Patient has no known allergies.           REVIEW OF SYSTEMS      Review of Systems    Review of Systems     PHYSICAL EXAMINATION      There were no vitals taken for this visit.   There is no height or weight on file to calculate BMI.    BP Readings from Last 3 Encounters:   09/27/21 110/70   09/16/21 118/78   08/10/21 100/76     Wt Readings from Last 3 Encounters:   09/27/21 94.3 kg (208 lb)   09/16/21 93.9 kg (207 lb 1.6 oz)   08/10/21 94.1 kg (207 lb 8 oz)       Physical Exam      LABS / IMAGING / EKG        Reviewed the following Labs:    Lab Results   Component Value Date    HGBA1C 5.0 08/14/2021    HGBA1C 5.2 08/22/2020    HGBA1C 5.1 10/01/2019     Lab Results   Component Value Date    CHOL 183 08/22/2020    CHOL 196 01/29/2016     Lab Results   Component Value Date    HDL 67 08/22/2020    HDL 67 01/29/2016     Lab Results    Component Value Date    LDLCALC 108 (H) 08/22/2020    LDLCALC 117 (H) 01/29/2016     Lab Results   Component Value Date    TRIG 40 08/22/2020    TRIG 58 01/29/2016     No results found for: CHOLHDL      ASSESSMENT AND PLAN         Attention deficit hyperactivity disorder (ADHD), predominantly inattentive type  Both psychiatrist and myself agree that Andressa has attention deficit disorder.  She is currently being treated with Wellbutrin, she is inconsistent with dosing as the first 3 days produced increased anxiety.    I have reassured her that anxiety generally goes away after the first week, that restarting it would be wise as this may have a trickle down effect to her anxiety ultimately.  Often anxiety is compounded by ADHD as the inability to complete tasks and stay on time can be highly stressful.    Reduction in anxiety as executive functioning and task completion improves can lead to better sleep.    After cardiac clearance, she and her psychiatrist will discuss stimulant therapy if appropriate.  I have suggested that Vyvanse has the best outcome for binge eating disorder and impulsive eating.    Class 1 obesity with serious comorbidity and body mass index (BMI) of 33.0 to 33.9 in adult  Having ADHD can make following a plan and organizing a schedule that will accommodate good nutrition, physical activity, relaxation, and adequate sleep difficult.  We will provide accountability and support frequently until patient can formulate a reasonable plan that he/she is capable of maintaining as a new lifestyle.    See discussion as outlined in ADHD section.            Thank you very much for allowing us to participate in the care of your patient. Please do not hesitate to call or email if there are any questions.

## 2021-12-12 PROBLEM — F90.0 ATTENTION DEFICIT HYPERACTIVITY DISORDER (ADHD), PREDOMINANTLY INATTENTIVE TYPE: Status: ACTIVE | Noted: 2021-12-12

## 2021-12-12 NOTE — PATIENT INSTRUCTIONS
Problem List Items Addressed This Visit        Endocrine/Metabolic    Class 1 obesity with serious comorbidity and body mass index (BMI) of 33.0 to 33.9 in adult     Having ADHD can make following a plan and organizing a schedule that will accommodate good nutrition, physical activity, relaxation, and adequate sleep difficult.  We will provide accountability and support frequently until patient can formulate a reasonable plan that he/she is capable of maintaining as a new lifestyle.    See discussion as outlined in ADHD section.              Other    Attention deficit hyperactivity disorder (ADHD), predominantly inattentive type - Primary     Both psychiatrist and myself agree that Andressa has attention deficit disorder.  She is currently being treated with Wellbutrin, she is inconsistent with dosing as the first 3 days produced increased anxiety.    I have reassured her that anxiety generally goes away after the first week, that restarting it would be wise as this may have a trickle down effect to her anxiety ultimately.  Often anxiety is compounded by ADHD as the inability to complete tasks and stay on time can be highly stressful.    Reduction in anxiety as executive functioning and task completion improves can lead to better sleep.    After cardiac clearance, she and her psychiatrist will discuss stimulant therapy if appropriate.  I have suggested that Vyvanse has the best outcome for binge eating disorder and impulsive eating.

## 2021-12-12 NOTE — ASSESSMENT & PLAN NOTE
Both psychiatrist and myself agree that Andressa has attention deficit disorder.  She is currently being treated with Wellbutrin, she is inconsistent with dosing as the first 3 days produced increased anxiety.    I have reassured her that anxiety generally goes away after the first week, that restarting it would be wise as this may have a trickle down effect to her anxiety ultimately.  Often anxiety is compounded by ADHD as the inability to complete tasks and stay on time can be highly stressful.    Reduction in anxiety as executive functioning and task completion improves can lead to better sleep.    After cardiac clearance, she and her psychiatrist will discuss stimulant therapy if appropriate.  I have suggested that Vyvanse has the best outcome for binge eating disorder and impulsive eating.

## 2021-12-12 NOTE — ASSESSMENT & PLAN NOTE
Having ADHD can make following a plan and organizing a schedule that will accommodate good nutrition, physical activity, relaxation, and adequate sleep difficult.  We will provide accountability and support frequently until patient can formulate a reasonable plan that he/she is capable of maintaining as a new lifestyle.    See discussion as outlined in ADHD section.

## 2022-01-04 ENCOUNTER — TELEPHONE (OUTPATIENT)
Dept: SCHEDULING | Facility: CLINIC | Age: 42
End: 2022-01-04
Payer: COMMERCIAL

## 2022-01-04 NOTE — TELEPHONE ENCOUNTER
Pt calling to Cancel her Appt Today with Dr. Jane @ 2:40pm at \[facility]\"".     Pt's Children are home Sick.     Pt is re-scheduled for 2/24/2022 @4pm at [facility].     TY

## 2022-01-27 ENCOUNTER — OFFICE VISIT (OUTPATIENT)
Dept: BARIATRICS/WEIGHT MGMT | Facility: CLINIC | Age: 42
End: 2022-01-27
Payer: COMMERCIAL

## 2022-01-27 VITALS
WEIGHT: 209.7 LBS | BODY MASS INDEX: 32.91 KG/M2 | OXYGEN SATURATION: 97 % | RESPIRATION RATE: 18 BRPM | HEIGHT: 67 IN | SYSTOLIC BLOOD PRESSURE: 122 MMHG | DIASTOLIC BLOOD PRESSURE: 80 MMHG | HEART RATE: 76 BPM

## 2022-01-27 DIAGNOSIS — F90.0 ATTENTION DEFICIT HYPERACTIVITY DISORDER (ADHD), PREDOMINANTLY INATTENTIVE TYPE: Primary | ICD-10-CM

## 2022-01-27 DIAGNOSIS — R60.9 LIPEDEMA: ICD-10-CM

## 2022-01-27 DIAGNOSIS — E66.811 CLASS 1 OBESITY WITH SERIOUS COMORBIDITY AND BODY MASS INDEX (BMI) OF 33.0 TO 33.9 IN ADULT, UNSPECIFIED OBESITY TYPE: ICD-10-CM

## 2022-01-27 PROCEDURE — 3008F BODY MASS INDEX DOCD: CPT | Performed by: FAMILY MEDICINE

## 2022-01-27 PROCEDURE — 99213 OFFICE O/P EST LOW 20 MIN: CPT | Performed by: FAMILY MEDICINE

## 2022-01-27 NOTE — ASSESSMENT & PLAN NOTE
Great transformation: executive functioning is improving.      Continue to follow with MD weight loss for nutrition support.      Plan to get up in the morning to exercise, which means to bed by 10 Pm.   No snoozing to prevent the sleep inertia.      Goal for exercise: Bike, strength, and running (but with a deliberated increase schedule).    Tack in vibration plate 5 minutes at level 10 to start.      I spent 20 minutes on this date of service performing the following activities: obtaining history, entering orders, documenting, preparing for visit, obtaining / reviewing records and providing counseling and education.

## 2022-01-27 NOTE — PATIENT INSTRUCTIONS
Problem List Items Addressed This Visit        Endocrine/Metabolic    Class 1 obesity with serious comorbidity and body mass index (BMI) of 33.0 to 33.9 in adult     Great transformation: executive functioning is improving.      Continue to follow with MD weight loss for nutrition support.      Plan to get up in the morning to exercise, which means to bed by 10 Pm.   No snoozing to prevent the sleep inertia.      Goal for exercise: Bike, strength, and running (but with a deliberated increase schedule).    Tack in vibration plate 5 minutes at level 10 to start.      I spent 20 minutes on this date of service performing the following activities: obtaining history, entering orders, documenting, preparing for visit, obtaining / reviewing records and providing counseling and education.              Other    Lipedema     Continue with weight loss as a treatment strategy for lipedema.    Discussed incorporating lymphatic drainage techniques today.  She has obtained a vibration plate, and will begin using it for 5 to 10 minutes daily on a setting of 30 after her morning physical activity.         Attention deficit hyperactivity disorder (ADHD), predominantly inattentive type - Primary     Currently finding great improvement in executive functioning, task completion, and organizational skills.  She will continue on Wellbutrin at current dose and continues to follow with psychiatry.         Relevant Medications    traZODone (DESYREL) 50 mg tablet

## 2022-01-27 NOTE — PROGRESS NOTES
HISTORY OF PRESENT ILLNESS        Andressa LEE Olivia is a 41 y.o. female following up on lifestyle management and weight loss as adjunctive treatment strategies for Lipedema, depression.       Medications used to support lifestyle modifications: Wellbutrin, just changed to XL and tolerating it without difficulty.     Interval history:   Chart reviewed since our last visit.    New notes,  labs or studies since last visit: None    Updates/Concerns: Medications updated, change Wellbutrin to XL formulation    Nutrition goals:  Food tracking for the past few weeks and learning that needs more water, a better schedule, and     Consistently meeting hourly movement goals: Yes, naturally  Current exercise regimen:Planning to wake early and exercise, doing pelvic PT. obtained vibration plate but has not yet unpacked adjusted or set it up.    Insights on emotional wellness, awareness, and sleep:Doing better with sleep and wake times.  Noticing that improving executive functioning is a boost to self-esteem and mood overall.         Current Outpatient Medications on File Prior to Visit   Medication Sig Dispense Refill   • azelastine (ASTELIN) 137 mcg (0.1 %) nasal spray ADMINISTER 1 SPRAY INTO EACH NOSTRIL 2 (TWO) TIMES A DAY. USE IN EACH NOSTRIL AS DIRECTED. 30 mL 5   • B-complex with vitamin C tablet Take 1 tablet by mouth daily.     • buPROPion XL (WELLBUTRIN XL) 150 mg 24 hr tablet Take 1 tablet (150 mg total) by mouth daily. 90 tablet 0   • cholecalciferol (VITAMIN D3) 10,000 unit capsule Take 10,000 Units by mouth once a week.       • docosahexaenoic acid-epa 120-180 mg capsule Take 1,000 mg by mouth.     • fexofenadine (ALLEGRA) 180 mg tablet Take 180 mg by mouth daily.     • glucosamine-chondroitin 500-400 mg tablet Take 1 tablet by mouth 3 (three) times a day.     • LORazepam (ATIVAN) 0.5 mg tablet Take 1 tablet (0.5 mg total) by mouth every 8 (eight) hours as needed for anxiety. 20 tablet 0   • magnesium 250 mg  "tablet Take 500 mg by mouth daily.     • sertraline (ZOLOFT) 50 mg tablet      • traZODone (DESYREL) 50 mg tablet Take 1 tablet (50 mg total) by mouth nightly.       No current facility-administered medications on file prior to visit.          Patient has no known allergies.         PHYSICAL EXAMINATION      Visit Vitals  /80 (BP Location: Left upper arm, Patient Position: Sitting)   Pulse 76   Resp 18   Ht 1.702 m (5' 7\")   Wt 95.1 kg (209 lb 11.2 oz)   SpO2 97%   BMI 32.84 kg/m²      Body mass index is 32.84 kg/m².    Physical Exam           ASSESSMENT AND PLAN         Class 1 obesity with serious comorbidity and body mass index (BMI) of 33.0 to 33.9 in adult  Great transformation: executive functioning is improving.      Continue to follow with MD weight loss for nutrition support.      Plan to get up in the morning to exercise, which means to bed by 10 Pm.   No snoozing to prevent the sleep inertia.      Goal for exercise: Bike, strength, and running (but with a deliberated increase schedule).    Tack in vibration plate 5 minutes at level 10 to start.      I spent 20 minutes on this date of service performing the following activities: obtaining history, entering orders, documenting, preparing for visit, obtaining / reviewing records and providing counseling and education.      Lipedema  Continue with weight loss as a treatment strategy for lipedema.    Discussed incorporating lymphatic drainage techniques today.  She has obtained a vibration plate, and will begin using it for 5 to 10 minutes daily on a setting of 30 after her morning physical activity.    Attention deficit hyperactivity disorder (ADHD), predominantly inattentive type  Currently finding great improvement in executive functioning, task completion, and organizational skills.  She will continue on Wellbutrin at current dose and continues to follow with psychiatry.          Thank you very much for allowing us to participate in the care of your " patient. Please do not hesitate to call or email if there are any questions.

## 2022-01-28 RX ORDER — TRAZODONE HYDROCHLORIDE 50 MG/1
25-50 TABLET ORAL NIGHTLY PRN
COMMUNITY
Start: 2022-01-28 | End: 2024-07-11 | Stop reason: ALTCHOICE

## 2022-01-28 NOTE — ASSESSMENT & PLAN NOTE
Continue with weight loss as a treatment strategy for lipedema.    Discussed incorporating lymphatic drainage techniques today.  She has obtained a vibration plate, and will begin using it for 5 to 10 minutes daily on a setting of 30 after her morning physical activity.

## 2022-01-28 NOTE — ASSESSMENT & PLAN NOTE
Currently finding great improvement in executive functioning, task completion, and organizational skills.  She will continue on Wellbutrin at current dose and continues to follow with psychiatry.

## 2022-02-15 ENCOUNTER — TELEPHONE (OUTPATIENT)
Dept: CARDIOLOGY | Facility: CLINIC | Age: 42
End: 2022-02-15
Payer: COMMERCIAL

## 2022-02-17 ENCOUNTER — OFFICE VISIT (OUTPATIENT)
Dept: INTERNAL MEDICINE | Facility: CLINIC | Age: 42
End: 2022-02-17
Payer: COMMERCIAL

## 2022-02-17 VITALS
WEIGHT: 210 LBS | DIASTOLIC BLOOD PRESSURE: 70 MMHG | HEART RATE: 74 BPM | SYSTOLIC BLOOD PRESSURE: 100 MMHG | HEIGHT: 67 IN | BODY MASS INDEX: 32.96 KG/M2 | RESPIRATION RATE: 16 BRPM | TEMPERATURE: 97.7 F | OXYGEN SATURATION: 98 %

## 2022-02-17 DIAGNOSIS — F41.9 ANXIETY: ICD-10-CM

## 2022-02-17 DIAGNOSIS — F90.0 ATTENTION DEFICIT HYPERACTIVITY DISORDER (ADHD), PREDOMINANTLY INATTENTIVE TYPE: ICD-10-CM

## 2022-02-17 DIAGNOSIS — Z11.59 NEED FOR HEPATITIS C SCREENING TEST: ICD-10-CM

## 2022-02-17 DIAGNOSIS — Z98.891 HISTORY OF C-SECTION: ICD-10-CM

## 2022-02-17 DIAGNOSIS — Z00.00 ROUTINE ADULT HEALTH MAINTENANCE: Primary | ICD-10-CM

## 2022-02-17 DIAGNOSIS — E66.811 CLASS 1 OBESITY WITH SERIOUS COMORBIDITY AND BODY MASS INDEX (BMI) OF 32.0 TO 32.9 IN ADULT, UNSPECIFIED OBESITY TYPE: ICD-10-CM

## 2022-02-17 DIAGNOSIS — G43.909 MIGRAINE WITHOUT STATUS MIGRAINOSUS, NOT INTRACTABLE, UNSPECIFIED MIGRAINE TYPE: ICD-10-CM

## 2022-02-17 DIAGNOSIS — F51.01 PRIMARY INSOMNIA: ICD-10-CM

## 2022-02-17 PROBLEM — M54.50 BILATERAL LOW BACK PAIN WITHOUT SCIATICA: Status: RESOLVED | Noted: 2019-12-09 | Resolved: 2022-02-17

## 2022-02-17 PROBLEM — I49.3 PVC (PREMATURE VENTRICULAR CONTRACTION): Status: RESOLVED | Noted: 2018-03-06 | Resolved: 2022-02-17

## 2022-02-17 PROBLEM — H18.609 KERATOCONUS: Status: RESOLVED | Noted: 2018-05-17 | Resolved: 2022-02-17

## 2022-02-17 PROBLEM — M79.605 LEFT LEG PAIN: Status: RESOLVED | Noted: 2021-07-22 | Resolved: 2022-02-17

## 2022-02-17 PROBLEM — M77.8 DELTOID TENDONITIS OF RIGHT SHOULDER: Status: RESOLVED | Noted: 2019-12-09 | Resolved: 2022-02-17

## 2022-02-17 PROBLEM — R00.2 PALPITATIONS: Status: RESOLVED | Noted: 2017-09-14 | Resolved: 2022-02-17

## 2022-02-17 PROBLEM — D69.6 THROMBOCYTOPENIA (CMS/HCC): Status: RESOLVED | Noted: 2018-06-29 | Resolved: 2022-02-17

## 2022-02-17 PROBLEM — R60.9 LIPEDEMA: Status: RESOLVED | Noted: 2021-08-04 | Resolved: 2022-02-17

## 2022-02-17 PROBLEM — I49.3 VENTRICULAR PREMATURE BEATS: Status: RESOLVED | Noted: 2018-11-20 | Resolved: 2022-02-17

## 2022-02-17 PROBLEM — Z71.3 DIETARY COUNSELING: Status: RESOLVED | Noted: 2021-08-10 | Resolved: 2022-02-17

## 2022-02-17 PROBLEM — Z86.69 HISTORY OF MIGRAINE: Status: ACTIVE | Noted: 2022-02-17

## 2022-02-17 PROCEDURE — 3008F BODY MASS INDEX DOCD: CPT | Performed by: INTERNAL MEDICINE

## 2022-02-17 PROCEDURE — 99396 PREV VISIT EST AGE 40-64: CPT | Performed by: INTERNAL MEDICINE

## 2022-02-17 RX ORDER — SEMAGLUTIDE 0.25 MG/.5ML
0.25 INJECTION, SOLUTION SUBCUTANEOUS
Qty: 2 ML | Refills: 1 | Status: SHIPPED | OUTPATIENT
Start: 2022-02-17 | End: 2022-02-23

## 2022-02-17 RX ORDER — ERGOCALCIFEROL 1.25 MG/1
50000 CAPSULE ORAL WEEKLY
COMMUNITY

## 2022-02-17 ASSESSMENT — ENCOUNTER SYMPTOMS
VOMITING: 0
DIZZINESS: 0
UNEXPECTED WEIGHT CHANGE: 0
HEADACHES: 0
CONSTIPATION: 0
FATIGUE: 0
ABDOMINAL PAIN: 0
NERVOUS/ANXIOUS: 0
POLYDIPSIA: 0
BLOOD IN STOOL: 0
POLYPHAGIA: 0
DIARRHEA: 0
DYSPHORIC MOOD: 0
SHORTNESS OF BREATH: 0
NAUSEA: 0
SLEEP DISTURBANCE: 0
DIFFICULTY URINATING: 0
APPETITE CHANGE: 0
COUGH: 0
ACTIVITY CHANGE: 0
FREQUENCY: 0
CHEST TIGHTNESS: 0
PALPITATIONS: 0

## 2022-02-17 ASSESSMENT — PATIENT HEALTH QUESTIONNAIRE - PHQ9
SUM OF ALL RESPONSES TO PHQ9 QUESTIONS 1 & 2: 2
SUM OF ALL RESPONSES TO PHQ QUESTIONS 1-9: 10

## 2022-02-17 NOTE — PATIENT INSTRUCTIONS
Problem List Items Addressed This Visit     Anxiety    Relevant Orders    TSH    History of     Class 1 obesity with serious comorbidity and body mass index (BMI) of 32.0 to 32.9 in adult    Relevant Orders    CBC and differential    Comprehensive metabolic panel    Hemoglobin A1c    Lipid panel    Insomnia    Relevant Orders    TSH    Attention deficit hyperactivity disorder (ADHD), predominantly inattentive type    Relevant Medications    WEGOVY 0.25 mg/0.5 mL pen injector    Routine adult health maintenance - Primary     · Weight:  Achieve and maintain a healthy weight (Body Mass Index 18-25).  · Physical activity: Avoid inactivity and return to normal activities as soon as possible following diagnosis. Aim to exercise at least 150 minutes per week. Include strength-training exercises at least 2 days per week.  · Nutrition: Consume a healthy plant-based diet. Avoid sugary drinks and limit consumption of energy-dense foods. Eat at least five portions a day of a variety of vegetables, fruits, whole grains, and legumes such as beans. Limit consumption of red meats and avoid processed meats.  · Alcohol: Limit alcoholic drinks to no more than two a day for men and one a day for women.  · Sun Exposure: Limit direct sun exposure, use sunscreen, get annual skin exam. Choose a sunscreen that is broad spectrum protecting against UVA and UVB rays, at least SPF 30, water resistant or very water resistant for up to 40 or 80 mins.   · Tobacco: If you use tobacco products, try to quit. If someone in your household smokes, encourage them to quit.  · Supplements:  Try to obtain nutrients from healthy foods; generally, you should not need a supplement except for specific health problems.  · Immunizations: Annual influenza vaccine/ other vaccines as we discussed today (pneumococcal and shingles).  Please let me know if you have any questions about these.    · General: Please follow up with your gyn, your dermatologist and  your dentist, if you have not already done so. Do not text and drive.            I do recommend the covid 19 vaccine, if you have not had it. I am ready to discuss the benefits if you would like. You may be due for a booster vaccine 5-6 months after your second dose.            Please continue to wear a tight fitting mask when in public settings especially when you are indoors and to socially distance (at least 3-6 feet).  These measures may still give you extra protection against covid-19.           Migraine headache      Other Visit Diagnoses     Need for hepatitis C screening test        Relevant Orders    Hepatitis C antibody

## 2022-02-17 NOTE — PROGRESS NOTES
Weight Check (Gaining weight, but trying to lose weight.), Headache, and Heart Follow-up       HPI    Review of Systems   Constitutional: Negative for activity change, appetite change, fatigue and unexpected weight change.   HENT: Negative for dental problem and hearing loss.    Eyes: Negative for visual disturbance.   Respiratory: Negative for cough, chest tightness and shortness of breath.    Cardiovascular: Negative for chest pain, palpitations and leg swelling.   Gastrointestinal: Negative for abdominal pain, blood in stool, constipation, diarrhea, nausea and vomiting.   Endocrine: Negative for cold intolerance, heat intolerance, polydipsia, polyphagia and polyuria.   Genitourinary: Negative for difficulty urinating, frequency and urgency.   Skin: Negative for rash.   Neurological: Negative for dizziness and headaches.   Psychiatric/Behavioral: Negative for dysphoric mood and sleep disturbance. The patient is not nervous/anxious.          Current Outpatient Medications:   •  B-complex with vitamin C tablet, Take 1 tablet by mouth daily., Disp: , Rfl:   •  buPROPion XL (WELLBUTRIN XL) 150 mg 24 hr tablet, Take 1 tablet (150 mg total) by mouth daily., Disp: 90 tablet, Rfl: 0  •  docosahexaenoic acid-epa 120-180 mg capsule, Take 1,000 mg by mouth., Disp: , Rfl:   •  ergocalciferol (VITAMIN D2) 50,000 unit(1250 mcg) capsule, Take 50,000 Units by mouth once a week., Disp: , Rfl:   •  fexofenadine (ALLEGRA) 180 mg tablet, Take 180 mg by mouth daily., Disp: , Rfl:   •  glucosamine-chondroitin 500-400 mg tablet, Take 1 tablet by mouth daily.  , Disp: , Rfl:   •  herbal drugs (CALMME ORAL), Take by mouth., Disp: , Rfl:   •  LORazepam (ATIVAN) 0.5 mg tablet, Take 1 tablet (0.5 mg total) by mouth every 8 (eight) hours as needed for anxiety. (Patient taking differently: Take 0.5 mg by mouth daily as needed for anxiety.  ), Disp: 20 tablet, Rfl: 0  •  prenatal vit no.130-iron-folic 27 mg iron- 800 mcg tablet tablet, Take 1  tablet by mouth daily as needed., Disp: , Rfl:   •  sertraline (ZOLOFT) 100 mg tablet, 100 mg.  , Disp: , Rfl:   •  traZODone (DESYREL) 50 mg tablet, Take 25 mg by mouth nightly as needed.  , Disp: , Rfl:   •  WEGOVY 0.25 mg/0.5 mL pen injector, Inject 0.5 mL (0.25 mg total) under the skin every (seven) 7 days., Disp: 2 mL, Rfl: 1    No Known Allergies      Past Medical History:   Diagnosis Date   • Abnormal ECG     PVCs   • Arrhythmia 2018    PVCs, status post ablation, partially successful.   • Back pain    • GERD (gastroesophageal reflux disease)    • Heartburn    • History of fetal demise, not currently pregnant     Twin pregnancy, 1 fetal demise, 1 live birth   • Joint pain    • Migraine headache    • Ovarian cyst    • Palpitations    • Pericarditis 2018    After john ablation       Past Surgical History:   Procedure Laterality Date   • ABLATION OF DYSRHYTHMIC FOCUS  2018   •  SECTION         Family History   Problem Relation Age of Onset   • Hyperlipidemia Biological Mother    • Hyperlipidemia Biological Father    • Hypertension Biological Father    • Prostate cancer Biological Father 78   • Dementia Biological Father    • Other Biological Father         dief from covid 19   • Hyperlipidemia Maternal Grandmother    • Clotting disorder Maternal Grandfather         developed in 60's - required tansfusions/platelets   • Hyperlipidemia Paternal Grandmother    • Dementia Paternal Grandmother    • Diabetes Paternal Grandfather    • No Known Problems Biological Child        Social History     Socioeconomic History   • Marital status:      Spouse name: Shashank RamosOlivia   • Number of children: None   • Years of education: None   • Highest education level: None   Occupational History   • Occupation: Homemaker   Tobacco Use   • Smoking status: Never Smoker   • Smokeless tobacco: Never Used   Vaping Use   • Vaping Use: Never used   Substance and Sexual Activity   • Alcohol use: Yes      Comment: occasionally   • Drug use: No   • Sexual activity: Yes     Birth control/protection: None     Comment:  with 5children, NFP   Other Topics Concern   • None   Social History Narrative    Exercises 2-3 times per week -  and she is trying to run again    Sleep sometimes interrupted    Eating plan: Patient attempts low glycemic index eating    No history of domestic violence    Lives with  and 5 children      [employer] -- medical legal work / part-time    Has a dog     Social Determinants of Health     Financial Resource Strain: Not on file   Food Insecurity: Not on file   Transportation Needs: Not on file   Physical Activity: Not on file   Stress: Not on file   Social Connections: Not on file   Intimate Partner Violence: Not on file   Housing Stability: Not on file       Vitals:    02/17/22 1406   BP: 100/70   Pulse: 74   Resp: 16   Temp: 36.5 °C (97.7 °F)   SpO2: 98%     Body mass index is 32.89 kg/m².      Physical Exam  Vitals reviewed.   HENT:      Head: Normocephalic and atraumatic.      Right Ear: External ear normal.      Left Ear: External ear normal.   Eyes:      Conjunctiva/sclera: Conjunctivae normal.      Pupils: Pupils are equal, round, and reactive to light.   Neck:      Thyroid: No thyroid mass or thyromegaly.      Vascular: No carotid bruit or JVD.      Trachea: No tracheal deviation.   Cardiovascular:      Rate and Rhythm: Normal rate and regular rhythm.      Heart sounds: Normal heart sounds. No murmur heard.    No friction rub. No gallop.   Pulmonary:      Effort: Pulmonary effort is normal. No respiratory distress.      Breath sounds: Normal breath sounds. No wheezing or rales.   Abdominal:      General: Bowel sounds are normal. There is no distension.      Palpations: Abdomen is soft.      Tenderness: There is no abdominal tenderness.   Musculoskeletal:         General: Normal range of motion.      Cervical back: Normal range of motion and neck supple.    Lymphadenopathy:      Cervical: No cervical adenopathy.   Skin:     General: Skin is warm and dry.   Neurological:      Mental Status: She is alert and oriented to person, place, and time.   Psychiatric:         Behavior: Behavior normal.         Thought Content: Thought content normal.         Assessment/Plan     Problem List Items Addressed This Visit     Anxiety    Relevant Orders    TSH    History of     Class 1 obesity with serious comorbidity and body mass index (BMI) of 32.0 to 32.9 in adult    Relevant Orders    CBC and differential    Comprehensive metabolic panel    Hemoglobin A1c    Lipid panel    Insomnia    Relevant Orders    TSH    Attention deficit hyperactivity disorder (ADHD), predominantly inattentive type    Relevant Medications    WEGOVY 0.25 mg/0.5 mL pen injector    Routine adult health maintenance - Primary     · Weight:  Achieve and maintain a healthy weight (Body Mass Index 18-25).  · Physical activity: Avoid inactivity and return to normal activities as soon as possible following diagnosis. Aim to exercise at least 150 minutes per week. Include strength-training exercises at least 2 days per week.  · Nutrition: Consume a healthy plant-based diet. Avoid sugary drinks and limit consumption of energy-dense foods. Eat at least five portions a day of a variety of vegetables, fruits, whole grains, and legumes such as beans. Limit consumption of red meats and avoid processed meats.  · Alcohol: Limit alcoholic drinks to no more than two a day for men and one a day for women.  · Sun Exposure: Limit direct sun exposure, use sunscreen, get annual skin exam. Choose a sunscreen that is broad spectrum protecting against UVA and UVB rays, at least SPF 30, water resistant or very water resistant for up to 40 or 80 mins.   · Tobacco: If you use tobacco products, try to quit. If someone in your household smokes, encourage them to quit.  · Supplements:  Try to obtain nutrients from healthy foods;  generally, you should not need a supplement except for specific health problems.  · Immunizations: Annual influenza vaccine/ other vaccines as we discussed today (pneumococcal and shingles).  Please let me know if you have any questions about these.    · General: Please follow up with your gyn, your dermatologist and your dentist, if you have not already done so. Do not text and drive.            I do recommend the covid 19 vaccine, if you have not had it. I am ready to discuss the benefits if you would like. You may be due for a booster vaccine 5-6 months after your second dose.            Please continue to wear a tight fitting mask when in public settings especially when you are indoors and to socially distance (at least 3-6 feet).  These measures may still give you extra protection against covid-19.           Migraine headache    Need for hepatitis C screening test    Relevant Orders    Hepatitis C antibody

## 2022-02-18 ENCOUNTER — TELEPHONE (OUTPATIENT)
Dept: CARDIOLOGY | Facility: CLINIC | Age: 42
End: 2022-02-18
Payer: COMMERCIAL

## 2022-02-18 ENCOUNTER — TELEPHONE (OUTPATIENT)
Dept: INTERNAL MEDICINE | Facility: CLINIC | Age: 42
End: 2022-02-18
Payer: COMMERCIAL

## 2022-02-18 NOTE — TELEPHONE ENCOUNTER
Medicine Refill Request    Last Office: 2/17/2022   Last Consult Visit: Visit date not found  Last Telemedicine Visit: 4/28/2020 Iesha Domingo MD    Next Appointment: 3/17/2022      Current Outpatient Medications:   •  B-complex with vitamin C tablet, Take 1 tablet by mouth daily., Disp: , Rfl:   •  buPROPion XL (WELLBUTRIN XL) 150 mg 24 hr tablet, Take 1 tablet (150 mg total) by mouth daily., Disp: 90 tablet, Rfl: 0  •  docosahexaenoic acid-epa 120-180 mg capsule, Take 1,000 mg by mouth., Disp: , Rfl:   •  ergocalciferol (VITAMIN D2) 50,000 unit(1250 mcg) capsule, Take 50,000 Units by mouth once a week., Disp: , Rfl:   •  fexofenadine (ALLEGRA) 180 mg tablet, Take 180 mg by mouth daily., Disp: , Rfl:   •  glucosamine-chondroitin 500-400 mg tablet, Take 1 tablet by mouth daily.  , Disp: , Rfl:   •  herbal drugs (CALMME ORAL), Take by mouth., Disp: , Rfl:   •  LORazepam (ATIVAN) 0.5 mg tablet, Take 1 tablet (0.5 mg total) by mouth every 8 (eight) hours as needed for anxiety. (Patient taking differently: Take 0.5 mg by mouth daily as needed for anxiety.  ), Disp: 20 tablet, Rfl: 0  •  prenatal vit no.130-iron-folic 27 mg iron- 800 mcg tablet tablet, Take 1 tablet by mouth daily as needed., Disp: , Rfl:   •  sertraline (ZOLOFT) 100 mg tablet, 100 mg.  , Disp: , Rfl:   •  traZODone (DESYREL) 50 mg tablet, Take 25 mg by mouth nightly as needed.  , Disp: , Rfl:   •  WEGOVY 0.25 mg/0.5 mL pen injector, Inject 0.5 mL (0.25 mg total) under the skin every (seven) 7 days., Disp: 2 mL, Rfl: 1      BP Readings from Last 3 Encounters:   02/17/22 100/70   01/27/22 122/80   09/27/21 110/70       Recent Lab results:  Lab Results   Component Value Date    CHOL 183 08/22/2020   ,   Lab Results   Component Value Date    HDL 67 08/22/2020   ,   Lab Results   Component Value Date    LDLCALC 108 (H) 08/22/2020   ,   Lab Results   Component Value Date    TRIG 40 08/22/2020        Lab Results   Component Value Date    GLUCOSE 86  08/14/2021   ,   Lab Results   Component Value Date    HGBA1C 5.0 08/14/2021         Lab Results   Component Value Date    CREATININE 0.6 08/14/2021       Lab Results   Component Value Date    TSH 1.43 08/14/2021

## 2022-02-21 ENCOUNTER — TELEPHONE (OUTPATIENT)
Dept: INTERNAL MEDICINE | Facility: CLINIC | Age: 42
End: 2022-02-21
Payer: COMMERCIAL

## 2022-02-22 NOTE — TELEPHONE ENCOUNTER
[facility] [place] call back and reschedule, Dr. Jane is out of the office on 02-.

## 2022-02-23 ENCOUNTER — DOCUMENTATION (OUTPATIENT)
Dept: INTERNAL MEDICINE | Facility: CLINIC | Age: 42
End: 2022-02-23
Payer: COMMERCIAL

## 2022-02-23 ENCOUNTER — TELEPHONE (OUTPATIENT)
Dept: INTERNAL MEDICINE | Facility: CLINIC | Age: 42
End: 2022-02-23
Payer: COMMERCIAL

## 2022-02-23 RX ORDER — SEMAGLUTIDE 1.34 MG/ML
0.25 INJECTION, SOLUTION SUBCUTANEOUS
Qty: 3 ML | Refills: 3 | Status: SHIPPED | OUTPATIENT
Start: 2022-02-23 | End: 2022-03-17

## 2022-02-23 NOTE — TELEPHONE ENCOUNTER
Pt called and states even with the prior auth her medication for Wegovy is still coming out to $1000. Pt says she would like a call back from you to discuss. Pt can be reached at [phone]

## 2022-02-24 ENCOUNTER — OFFICE VISIT (OUTPATIENT)
Dept: CARDIOLOGY | Facility: CLINIC | Age: 42
End: 2022-02-24
Payer: COMMERCIAL

## 2022-02-24 VITALS
HEART RATE: 74 BPM | DIASTOLIC BLOOD PRESSURE: 82 MMHG | WEIGHT: 212.6 LBS | HEIGHT: 67 IN | RESPIRATION RATE: 16 BRPM | BODY MASS INDEX: 33.37 KG/M2 | SYSTOLIC BLOOD PRESSURE: 104 MMHG

## 2022-02-24 DIAGNOSIS — F90.0 ATTENTION DEFICIT HYPERACTIVITY DISORDER (ADHD), PREDOMINANTLY INATTENTIVE TYPE: ICD-10-CM

## 2022-02-24 DIAGNOSIS — I49.3 VENTRICULAR PREMATURE BEATS: Primary | ICD-10-CM

## 2022-02-24 DIAGNOSIS — F41.9 ANXIETY: ICD-10-CM

## 2022-02-24 PROCEDURE — 93000 ELECTROCARDIOGRAM COMPLETE: CPT | Performed by: INTERNAL MEDICINE

## 2022-02-24 PROCEDURE — 99214 OFFICE O/P EST MOD 30 MIN: CPT | Performed by: INTERNAL MEDICINE

## 2022-02-24 PROCEDURE — 3008F BODY MASS INDEX DOCD: CPT | Performed by: INTERNAL MEDICINE

## 2022-02-24 NOTE — PROGRESS NOTES
Leslie Jane MD, [facility]  Cardiac Electrophysiology    [facility]    [facility]  [facility] [address]  [address] [place] [address]    TEL  [phone]  [facility]     Electrophysiology Progress Note   February 2/24/2022  JOSE ANGEL Baker is a 41 y.o. female who comes to the office today for follow up for a history of symptomatic premature ventricular depolarization beats. 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 The procedure was performed by my partner Dr. Patrick Manzanares at [facility]. Her post ablation course was complicated by pericarditis which resolved.      At her last office visit on 10/3/2019 she reported that she had delivered her 5th child on 07/09/2019. She also reported that she had been having increased VPD's during delivery and was monitored on telemetry. She continued to have frequent VPD's with symptoms of palpitations, dizziness, and lightheadedness.     Today, she is feeling generally well. She reports that she is feeling very well from a cardiovascular standpoint. She states that she rarely feels episodes of palpitations associated with her VPD's. She is very busy taking care of her 5 children. She was recently diagnosed with ADHD and is inquiring about starting Adderall. She is following with Gloria Vallejo for weight loss management. Unfortunately, her father passed away from COVID-19 infection at the very beginning of the pandemic. No palpitations, dizziness, near syncope or syncope.  No chest pain. No dyspnea on exertion, PND, orthopnea or pedal edema.    Past Medical History:   Diagnosis Date   • Abnormal ECG     PVCs   • Arrhythmia 02/2018    PVCs, status post ablation, partially successful.   • Back pain    • GERD (gastroesophageal reflux disease)    • Heartburn    • History of  fetal demise, not currently pregnant     Twin pregnancy, 1 fetal demise, 1 live birth   • Joint pain    • Migraine headache    • Ovarian cyst    • Palpitations    • Pericarditis 2018    After john ablation     Past Surgical History:   Procedure Laterality Date   • ABLATION OF DYSRHYTHMIC FOCUS  2018   •  SECTION       Social History     Tobacco Use   • Smoking status: Never Smoker   • Smokeless tobacco: Never Used   Vaping Use   • Vaping Use: Never used   Substance Use Topics   • Alcohol use: Yes     Comment: occasionally   • Drug use: No   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 She has 5 children.    Family History   Problem Relation Age of Onset   • Hyperlipidemia Biological Mother    • Hyperlipidemia Biological Father    • Hypertension Biological Father    • Prostate cancer Biological Father 78   • Dementia Biological Father    • Other Biological Father         dief from covid 19   • Hyperlipidemia Maternal Grandmother    • Clotting disorder Maternal Grandfather         developed in 60's - required tansfusions/platelets   • Hyperlipidemia Paternal Grandmother    • Dementia Paternal Grandmother    • Diabetes Paternal Grandfather    • No Known Problems Biological Child      ALLERGY:  Patient has no known allergies.    MEDICATIONS:   Current Outpatient Medications   Medication Sig Dispense Refill   • B-complex with vitamin C tablet Take 1 tablet by mouth daily.     • buPROPion XL (WELLBUTRIN XL) 150 mg 24 hr tablet Take 1 tablet (150 mg total) by mouth daily. 90 tablet 0   • docosahexaenoic acid-epa 120-180 mg capsule Take 1,000 mg by mouth.     • ergocalciferol (VITAMIN D2) 50,000 unit(1250 mcg) capsule Take 50,000 Units by mouth once a week.     • fexofenadine (ALLEGRA) 180 mg tablet Take 180 mg by mouth daily.     • glucosamine-chondroitin 500-400 mg tablet Take 1 tablet by mouth daily.       • herbal drugs (CALMME ORAL)  "Take by mouth.     • prenatal vit no.130-iron-folic 27 mg iron- 800 mcg tablet tablet Take 1 tablet by mouth daily as needed.     • semaglutide (OZEMPIC) 0.25 mg or 0.5 mg(2 mg/1.5 mL) pen injector Inject 0.25 mg under the skin every (seven) 7 days. For one month.  Increase to 0.5mg every 7 days for 1 month.  Additional orders to follow. 3 mL 3   • sertraline (ZOLOFT) 100 mg tablet 100 mg.       • traZODone (DESYREL) 50 mg tablet Take 25 mg by mouth nightly as needed.       • LORazepam (ATIVAN) 0.5 mg tablet Take 1 tablet (0.5 mg total) by mouth every 8 (eight) hours as needed for anxiety. (Patient taking differently: Take 0.5 mg by mouth daily as needed for anxiety.  ) 20 tablet 0     No current facility-administered medications for this visit.     Review of Systems:   As in HPI.  All other other systems were reviewed and are negative.    Objective   Vitals:    02/24/22 1128   BP: 104/82   BP Location: Left upper arm   Patient Position: Sitting   Pulse: 74   Resp: 16   Weight: 96.4 kg (212 lb 9.6 oz)   Height: 1.689 m (5' 6.5\")     ECG 2/24/2022: I personally reviewed her 12-lead EKG performed today which reveals Sinus rhythm. Normal EKG.    ECG 10/3/2019: I personally reviewed the 12 lead EKG obtained in the office today which reveals normal sinus rhythm with single VPD's.    Lab Results   Component Value Date    HGB 14.0 08/14/2021     08/14/2021    WBC 4.28 08/14/2021    ALT 15 08/14/2021    AST 16 08/14/2021     08/14/2021    BUN 8 08/14/2021    CREATININE 0.6 08/14/2021    K 4.1 08/14/2021    GLUCOSE 86 08/14/2021    TSH 1.43 08/14/2021    CHOL 183 08/22/2020    HDL 67 08/22/2020    TRIG 40 08/22/2020    INR 1.0 09/13/2017    MG 2.0 01/25/2021     ASSESSMENT/PLAN:  Assessment/Plan     Ventricular premature beats  She remains stable without symptoms of premature ventricular beats. She is s/p catheter ablation procedure for VPD's originating from RVOT. She was not able to tolerate beta blockers in " the past due to side effects they were not effective to suppress her ventricular ectopy.  Prior to the ablation procedure she was very symptomatic.  I will continue to monitor.    Attention deficit hyperactivity disorder (ADHD), predominantly inattentive type  No contraindication to starting Adderall since she is stable from her arrhythmia s/p catheter ablation procedure. She will continue follow up with a psychiatrist.     Return in about 1 year (around 2/24/2023).    Thank you for allowing me to participate in the care of your patient.  Please do not hesitate to contact me if you have any questions regarding my recommendations and management.    Sincerely;    Leslie Boyce MD [facility]    By signing my name below, I, Azul Preciado, attest that this documentation has been prepared under the direction and in the presence of Leslie Jane MD Electronically signed: Mary Sy. 2/24/2022 11:17 AM     I, Leslie Jane MD, personally performed the services described in this documentation. All medical record entries made by the scribe were at my direction and in my presence. I have reviewed the chart and agree that the record reflects my personal performance and is accurate and complete. Leslie Jane MD. 2/24/2022. 11:17 AM.

## 2022-02-24 NOTE — LETTER
February 24, 2022     Gisell Costa,   [address]  4th Floor  [facility] [address]    Patient: Andressa Baker  YOB: 1980  Date of Visit: 2/24/2022      Dear Dr. Costa:    Thank you for referring Andressa Baker to me for evaluation. Below are my notes for this consultation.    If you have questions, please do not hesitate to call me. I look forward to following your patient along with you.         Sincerely,        Leslie Jane MD        CC: No Lelsie Godoy MD  3/6/2022  4:48 PM  Signed   Leslie Jane MD, [facility]  Cardiac Electrophysiology    [facility] HEART [facility]  The [facility] Level  [address] [place] [address]    TEL  [phone]  [facility]     Electrophysiology Progress Note   February 2/24/2022  HPI  Andressa Baker is a 41 y.o. female who comes to the office today for follow up for a history of symptomatic premature ventricular depolarization beats. 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 The procedure was performed by my partner Dr. Patrick Manzanares at [facility]. Her post ablation course was complicated by pericarditis which resolved.      At her last office visit on 10/3/2019 she reported that she had delivered her 5th child on 07/09/2019. She also reported that she had been having increased VPD's during delivery and was monitored on telemetry. She continued to have frequent VPD's with symptoms of palpitations, dizziness, and lightheadedness.     Today, she is feeling generally well. She reports that she is feeling very well from a cardiovascular standpoint. She states that she rarely feels episodes of palpitations associated with her VPD's. She is very busy taking care of her 5 children. She was recently diagnosed with ADHD and is inquiring  about starting Adderall. She is following with Gloria Vallejo for weight loss management. Unfortunately, her father passed away from COVID-19 infection at the very beginning of the pandemic. No palpitations, dizziness, near syncope or syncope.  No chest pain. No dyspnea on exertion, PND, orthopnea or pedal edema.    Past Medical History:   Diagnosis Date   • Abnormal ECG     PVCs   • Arrhythmia 2018    PVCs, status post ablation, partially successful.   • Back pain    • GERD (gastroesophageal reflux disease)    • Heartburn    • History of fetal demise, not currently pregnant     Twin pregnancy, 1 fetal demise, 1 live birth   • Joint pain    • Migraine headache    • Ovarian cyst    • Palpitations    • Pericarditis 2018    After john ablation     Past Surgical History:   Procedure Laterality Date   • ABLATION OF DYSRHYTHMIC FOCUS  2018   •  SECTION       Social History     Tobacco Use   • Smoking status: Never Smoker   • Smokeless tobacco: Never Used   Vaping Use   • Vaping Use: Never used   Substance Use Topics   • Alcohol use: Yes     Comment: occasionally   • Drug use: No   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 She has 5 children.    Family History   Problem Relation Age of Onset   • Hyperlipidemia Biological Mother    • Hyperlipidemia Biological Father    • Hypertension Biological Father    • Prostate cancer Biological Father 78   • Dementia Biological Father    • Other Biological Father         dief from covid 19   • Hyperlipidemia Maternal Grandmother    • Clotting disorder Maternal Grandfather         developed in 60's - required tansfusions/platelets   • Hyperlipidemia Paternal Grandmother    • Dementia Paternal Grandmother    • Diabetes Paternal Grandfather    • No Known Problems Biological Child      ALLERGY:  Patient has no known allergies.    MEDICATIONS:   Current Outpatient Medications   Medication Sig  "Dispense Refill   • B-complex with vitamin C tablet Take 1 tablet by mouth daily.     • buPROPion XL (WELLBUTRIN XL) 150 mg 24 hr tablet Take 1 tablet (150 mg total) by mouth daily. 90 tablet 0   • docosahexaenoic acid-epa 120-180 mg capsule Take 1,000 mg by mouth.     • ergocalciferol (VITAMIN D2) 50,000 unit(1250 mcg) capsule Take 50,000 Units by mouth once a week.     • fexofenadine (ALLEGRA) 180 mg tablet Take 180 mg by mouth daily.     • glucosamine-chondroitin 500-400 mg tablet Take 1 tablet by mouth daily.       • herbal drugs (CALMME ORAL) Take by mouth.     • prenatal vit no.130-iron-folic 27 mg iron- 800 mcg tablet tablet Take 1 tablet by mouth daily as needed.     • semaglutide (OZEMPIC) 0.25 mg or 0.5 mg(2 mg/1.5 mL) pen injector Inject 0.25 mg under the skin every (seven) 7 days. For one month.  Increase to 0.5mg every 7 days for 1 month.  Additional orders to follow. 3 mL 3   • sertraline (ZOLOFT) 100 mg tablet 100 mg.       • traZODone (DESYREL) 50 mg tablet Take 25 mg by mouth nightly as needed.       • LORazepam (ATIVAN) 0.5 mg tablet Take 1 tablet (0.5 mg total) by mouth every 8 (eight) hours as needed for anxiety. (Patient taking differently: Take 0.5 mg by mouth daily as needed for anxiety.  ) 20 tablet 0     No current facility-administered medications for this visit.     Review of Systems:   As in HPI.  All other other systems were reviewed and are negative.    Objective   Vitals:    02/24/22 1128   BP: 104/82   BP Location: Left upper arm   Patient Position: Sitting   Pulse: 74   Resp: 16   Weight: 96.4 kg (212 lb 9.6 oz)   Height: 1.689 m (5' 6.5\")     ECG 2/24/2022: I personally reviewed her 12-lead EKG performed today which reveals Sinus rhythm. Normal EKG.    ECG 10/3/2019: I personally reviewed the 12 lead EKG obtained in the office today which reveals normal sinus rhythm with single VPD's.    Lab Results   Component Value Date    HGB 14.0 08/14/2021     08/14/2021    WBC 4.28 " 08/14/2021    ALT 15 08/14/2021    AST 16 08/14/2021     08/14/2021    BUN 8 08/14/2021    CREATININE 0.6 08/14/2021    K 4.1 08/14/2021    GLUCOSE 86 08/14/2021    TSH 1.43 08/14/2021    CHOL 183 08/22/2020    HDL 67 08/22/2020    TRIG 40 08/22/2020    INR 1.0 09/13/2017    MG 2.0 01/25/2021     ASSESSMENT/PLAN:  Assessment/Plan     Ventricular premature beats  She remains stable without symptoms of premature ventricular beats. She is s/p catheter ablation procedure for VPD's originating from RVOT. She was not able to tolerate beta blockers in the past due to side effects they were not effective to suppress her ventricular ectopy.  Prior to the ablation procedure she was very symptomatic.  I will continue to monitor.    Attention deficit hyperactivity disorder (ADHD), predominantly inattentive type  No contraindication to starting Adderall since she is stable from her arrhythmia s/p catheter ablation procedure. She will continue follow up with a psychiatrist.     Return in about 1 year (around 2/24/2023).    Thank you for allowing me to participate in the care of your patient.  Please do not hesitate to contact me if you have any questions regarding my recommendations and management.    Sincerely;    Leslie Boyce MD [facility]    By signing my name below, I, Azul Preciado, attest that this documentation has been prepared under the direction and in the presence of Leslie Jane MD Electronically signed: Mary Sy. 2/24/2022 11:17 AM     I, Leslie Jane MD, personally performed the services described in this documentation. All medical record entries made by the scribe were at my direction and in my presence. I have reviewed the chart and agree that the record reflects my personal performance and is accurate and complete. Leslie Jane MD. 2/24/2022. 11:17 AM.

## 2022-03-06 NOTE — ASSESSMENT & PLAN NOTE
She remains stable without symptoms of premature ventricular beats. She is s/p catheter ablation procedure for VPD's originating from RVOT. She was not able to tolerate beta blockers in the past due to side effects they were not effective to suppress her ventricular ectopy.  Prior to the ablation procedure she was very symptomatic.  I will continue to monitor.

## 2022-03-06 NOTE — ASSESSMENT & PLAN NOTE
No contraindication to starting Adderall since she is stable from her arrhythmia s/p catheter ablation procedure. She will continue follow up with a psychiatrist.

## 2022-03-17 ENCOUNTER — OFFICE VISIT (OUTPATIENT)
Dept: INTERNAL MEDICINE | Facility: CLINIC | Age: 42
End: 2022-03-17
Payer: COMMERCIAL

## 2022-03-17 VITALS
BODY MASS INDEX: 32.96 KG/M2 | RESPIRATION RATE: 16 BRPM | TEMPERATURE: 97.8 F | HEIGHT: 67 IN | HEART RATE: 72 BPM | SYSTOLIC BLOOD PRESSURE: 120 MMHG | OXYGEN SATURATION: 99 % | WEIGHT: 210 LBS | DIASTOLIC BLOOD PRESSURE: 85 MMHG

## 2022-03-17 DIAGNOSIS — I49.3 VENTRICULAR PREMATURE BEATS: ICD-10-CM

## 2022-03-17 DIAGNOSIS — E66.811 CLASS 1 OBESITY WITH SERIOUS COMORBIDITY AND BODY MASS INDEX (BMI) OF 32.0 TO 32.9 IN ADULT, UNSPECIFIED OBESITY TYPE: Primary | ICD-10-CM

## 2022-03-17 DIAGNOSIS — F33.1 MODERATE EPISODE OF RECURRENT MAJOR DEPRESSIVE DISORDER (CMS/HCC): ICD-10-CM

## 2022-03-17 PROCEDURE — 3008F BODY MASS INDEX DOCD: CPT | Performed by: INTERNAL MEDICINE

## 2022-03-17 PROCEDURE — 99213 OFFICE O/P EST LOW 20 MIN: CPT | Performed by: INTERNAL MEDICINE

## 2022-03-17 RX ORDER — DEXTROAMPHETAMINE SACCHARATE, AMPHETAMINE ASPARTATE MONOHYDRATE, DEXTROAMPHETAMINE SULFATE AND AMPHETAMINE SULFATE 3.75; 3.75; 3.75; 3.75 MG/1; MG/1; MG/1; MG/1
15 CAPSULE, EXTENDED RELEASE ORAL EVERY MORNING
COMMUNITY
Start: 2022-02-25 | End: 2022-09-19 | Stop reason: ALTCHOICE

## 2022-03-17 ASSESSMENT — ENCOUNTER SYMPTOMS
CONSTIPATION: 0
FREQUENCY: 0
POLYDIPSIA: 0
UNEXPECTED WEIGHT CHANGE: 0
POLYPHAGIA: 0
SHORTNESS OF BREATH: 0
NAUSEA: 0
FATIGUE: 0
DIZZINESS: 0
HEADACHES: 0
BLOOD IN STOOL: 0
DYSPHORIC MOOD: 0
CHEST TIGHTNESS: 0
SLEEP DISTURBANCE: 0
APPETITE CHANGE: 0
PALPITATIONS: 0
ACTIVITY CHANGE: 0
DIARRHEA: 0
VOMITING: 0
COUGH: 0
NERVOUS/ANXIOUS: 0
DIFFICULTY URINATING: 0
ABDOMINAL PAIN: 0

## 2022-03-17 ASSESSMENT — PATIENT HEALTH QUESTIONNAIRE - PHQ9: SUM OF ALL RESPONSES TO PHQ9 QUESTIONS 1 & 2: 0

## 2022-03-17 NOTE — PATIENT INSTRUCTIONS
Problem List Items Addressed This Visit     Ventricular premature beats     Cleared by cardiology to take Adderall.           Relevant Medications    dextroamphetamine-amphetamine (ADDERALL) 5 mg tablet    Class 1 obesity with serious comorbidity and body mass index (BMI) of 32.0 to 32.9 in adult - Primary     Continue weight management strategies.         Depression     Continue treatment strategies as per psychiatry.          Relevant Medications    dextroamphetamine-amphetamine (ADDERALL) 5 mg tablet

## 2022-03-17 NOTE — PROGRESS NOTES
No chief complaint on file.       She saw psychiatrist and she is weaning off the wellbutrin and will be starting adderall.        Review of Systems   Constitutional: Negative for activity change, appetite change, fatigue and unexpected weight change.   HENT: Negative for dental problem and hearing loss.    Eyes: Negative for visual disturbance.   Respiratory: Negative for cough, chest tightness and shortness of breath.    Cardiovascular: Negative for chest pain, palpitations and leg swelling.   Gastrointestinal: Negative for abdominal pain, blood in stool, constipation, diarrhea, nausea and vomiting.   Endocrine: Negative for cold intolerance, heat intolerance, polydipsia, polyphagia and polyuria.   Genitourinary: Negative for difficulty urinating, frequency and urgency.   Skin: Negative for rash.   Neurological: Negative for dizziness and headaches.   Psychiatric/Behavioral: Negative for dysphoric mood and sleep disturbance. The patient is not nervous/anxious.          Current Outpatient Medications:   •  B-complex with vitamin C tablet, Take 1 tablet by mouth daily., Disp: , Rfl:   •  dextroamphetamine-amphetamine (ADDERALL) 5 mg tablet, Take 5 tablets by mouth See admin instr., Disp: , Rfl:   •  fexofenadine (ALLEGRA) 180 mg tablet, Take 180 mg by mouth daily., Disp: , Rfl:   •  LORazepam (ATIVAN) 0.5 mg tablet, Take 1 tablet (0.5 mg total) by mouth every 8 (eight) hours as needed for anxiety. (Patient taking differently: Take 0.5 mg by mouth daily as needed for anxiety.  ), Disp: 20 tablet, Rfl: 0  •  prenatal vit no.130-iron-folic 27 mg iron- 800 mcg tablet tablet, Take 1 tablet by mouth daily as needed., Disp: , Rfl:   •  sertraline (ZOLOFT) 100 mg tablet, 100 mg.  , Disp: , Rfl:   •  traZODone (DESYREL) 50 mg tablet, Take 25 mg by mouth nightly as needed.  , Disp: , Rfl:   •  docosahexaenoic acid-epa 120-180 mg capsule, Take 1,000 mg by mouth., Disp: , Rfl:   •  ergocalciferol (VITAMIN D2) 50,000 unit(1250  mcg) capsule, Take 50,000 Units by mouth once a week., Disp: , Rfl:   •  glucosamine-chondroitin 500-400 mg tablet, Take 1 tablet by mouth daily.  , Disp: , Rfl:   •  herbal drugs (CALMME ORAL), Take by mouth., Disp: , Rfl:     No Known Allergies      Past Medical History:   Diagnosis Date   • Abnormal ECG     PVCs   • Arrhythmia 2018    PVCs, status post ablation, partially successful.   • Back pain    • GERD (gastroesophageal reflux disease)    • Heartburn    • History of fetal demise, not currently pregnant     Twin pregnancy, 1 fetal demise, 1 live birth   • Joint pain    • Migraine headache    • Ovarian cyst    • Palpitations    • Pericarditis 2018    After john ablation       Past Surgical History:   Procedure Laterality Date   • ABLATION OF DYSRHYTHMIC FOCUS  2018   •  SECTION         Family History   Problem Relation Age of Onset   • Hyperlipidemia Biological Mother    • Hyperlipidemia Biological Father    • Hypertension Biological Father    • Prostate cancer Biological Father 78   • Dementia Biological Father    • Other Biological Father         dief from covid 19   • Hyperlipidemia Maternal Grandmother    • Clotting disorder Maternal Grandfather         developed in 60's - required tansfusions/platelets   • Hyperlipidemia Paternal Grandmother    • Dementia Paternal Grandmother    • Diabetes Paternal Grandfather    • No Known Problems Biological Child        Social History     Socioeconomic History   • Marital status:      Spouse name: Shashank RamosOlivia   • Number of children: None   • Years of education: None   • Highest education level: None   Occupational History   • Occupation: Homemaker   Tobacco Use   • Smoking status: Never Smoker   • Smokeless tobacco: Never Used   Vaping Use   • Vaping Use: Never used   Substance and Sexual Activity   • Alcohol use: Yes     Comment: occasionally   • Drug use: No   • Sexual activity: Yes     Birth control/protection: None     Comment:   with 5chisharlene, PADMAJA   Other Topics Concern   • None   Social History Narrative    Exercises 2-3 times per week -  and she is trying to run again    Sleep sometimes interrupted    Eating plan: Patient attempts low glycemic index eating    No history of domestic violence    Lives with  and 5 children      [employer] -- medical legal work / part-time    Has a dog     Social Determinants of Health     Financial Resource Strain: Not on file   Food Insecurity: Not on file   Transportation Needs: Not on file   Physical Activity: Not on file   Stress: Not on file   Social Connections: Not on file   Intimate Partner Violence: Not on file   Housing Stability: Not on file       Vitals:    03/17/22 1345   BP: 120/85   Pulse: 72   Resp: 16   Temp: 36.6 °C (97.8 °F)   SpO2: 99%     Body mass index is 32.89 kg/m².      Physical Exam  Vitals reviewed.   Constitutional:       General: She is not in acute distress.     Appearance: She is obese. She is not ill-appearing.   HENT:      Head: Normocephalic and atraumatic.      Right Ear: External ear normal.      Left Ear: External ear normal.   Eyes:      Conjunctiva/sclera: Conjunctivae normal.      Pupils: Pupils are equal, round, and reactive to light.   Neck:      Thyroid: No thyroid mass or thyromegaly.      Vascular: No carotid bruit or JVD.      Trachea: No tracheal deviation.   Cardiovascular:      Rate and Rhythm: Normal rate and regular rhythm.      Heart sounds: Normal heart sounds. No murmur heard.    No friction rub. No gallop.   Pulmonary:      Effort: Pulmonary effort is normal. No respiratory distress.      Breath sounds: Normal breath sounds. No wheezing or rales.   Abdominal:      General: Bowel sounds are normal. There is no distension.      Palpations: Abdomen is soft.      Tenderness: There is no abdominal tenderness.   Musculoskeletal:         General: Normal range of motion.      Cervical back: Normal range of motion and neck supple.    Lymphadenopathy:      Cervical: No cervical adenopathy.   Skin:     General: Skin is warm and dry.   Neurological:      Mental Status: She is alert and oriented to person, place, and time.   Psychiatric:         Behavior: Behavior normal.         Thought Content: Thought content normal.         Assessment/Plan     Problem List Items Addressed This Visit     Ventricular premature beats     Cleared by cardiology to take Adderall.           Relevant Medications    dextroamphetamine-amphetamine (ADDERALL) 5 mg tablet    Class 1 obesity with serious comorbidity and body mass index (BMI) of 32.0 to 32.9 in adult - Primary     Continue weight management strategies.         Depression     Continue treatment strategies as per psychiatry.          Relevant Medications    dextroamphetamine-amphetamine (ADDERALL) 5 mg tablet

## 2022-07-12 ENCOUNTER — APPOINTMENT (OUTPATIENT)
Dept: LAB | Facility: HOSPITAL | Age: 42
End: 2022-07-12
Attending: INTERNAL MEDICINE
Payer: COMMERCIAL

## 2022-07-12 DIAGNOSIS — E66.811 CLASS 1 OBESITY WITH SERIOUS COMORBIDITY AND BODY MASS INDEX (BMI) OF 32.0 TO 32.9 IN ADULT, UNSPECIFIED OBESITY TYPE: ICD-10-CM

## 2022-07-12 DIAGNOSIS — F51.01 PRIMARY INSOMNIA: ICD-10-CM

## 2022-07-12 DIAGNOSIS — F41.9 ANXIETY: ICD-10-CM

## 2022-07-12 DIAGNOSIS — Z11.59 NEED FOR HEPATITIS C SCREENING TEST: ICD-10-CM

## 2022-07-12 LAB
ALBUMIN SERPL-MCNC: 3.5 G/DL (ref 3.4–5)
ALP SERPL-CCNC: 40 IU/L (ref 35–126)
ALT SERPL-CCNC: 18 IU/L (ref 11–54)
ANION GAP SERPL CALC-SCNC: 7 MEQ/L (ref 3–15)
AST SERPL-CCNC: 16 IU/L (ref 15–41)
BASOPHILS # BLD: 0.06 K/UL (ref 0.01–0.1)
BASOPHILS NFR BLD: 1.3 %
BILIRUB SERPL-MCNC: 0.5 MG/DL (ref 0.3–1.2)
BUN SERPL-MCNC: 14 MG/DL (ref 8–20)
CALCIUM SERPL-MCNC: 8.8 MG/DL (ref 8.9–10.3)
CHLORIDE SERPL-SCNC: 103 MEQ/L (ref 98–109)
CHOLEST SERPL-MCNC: 208 MG/DL
CO2 SERPL-SCNC: 25 MEQ/L (ref 22–32)
CREAT SERPL-MCNC: 0.7 MG/DL (ref 0.6–1.1)
DIFFERENTIAL METHOD BLD: NORMAL
EOSINOPHIL # BLD: 0.19 K/UL (ref 0.04–0.36)
EOSINOPHIL NFR BLD: 4.3 %
ERYTHROCYTE [DISTWIDTH] IN BLOOD BY AUTOMATED COUNT: 12.6 % (ref 11.7–14.4)
EST. AVERAGE GLUCOSE BLD GHB EST-MCNC: 100 MG/DL
GFR SERPL CREATININE-BSD FRML MDRD: >60 ML/MIN/1.73M*2
GLUCOSE SERPL-MCNC: 92 MG/DL (ref 70–99)
HBA1C MFR BLD HPLC: 5.1 %
HCT VFR BLDCO AUTO: 41.9 % (ref 35–45)
HCV AB SER QL: NONREACTIVE
HDLC SERPL-MCNC: 50 MG/DL
HDLC SERPL: 4.2 {RATIO}
HGB BLD-MCNC: 13.6 G/DL (ref 11.8–15.7)
IMM GRANULOCYTES # BLD AUTO: 0.01 K/UL (ref 0–0.08)
IMM GRANULOCYTES NFR BLD AUTO: 0.2 %
LDLC SERPL CALC-MCNC: 126 MG/DL
LYMPHOCYTES # BLD: 1.63 K/UL (ref 1.2–3.5)
LYMPHOCYTES NFR BLD: 36.5 %
MCH RBC QN AUTO: 28.9 PG (ref 28–33.2)
MCHC RBC AUTO-ENTMCNC: 32.5 G/DL (ref 32.2–35.5)
MCV RBC AUTO: 89 FL (ref 83–98)
MONOCYTES # BLD: 0.32 K/UL (ref 0.28–0.8)
MONOCYTES NFR BLD: 7.2 %
NEUTROPHILS # BLD: 2.26 K/UL (ref 1.7–7)
NEUTS SEG NFR BLD: 50.5 %
NONHDLC SERPL-MCNC: 158 MG/DL
NRBC BLD-RTO: 0 %
PDW BLD AUTO: 12 FL (ref 9.4–12.3)
PLATELET # BLD AUTO: 153 K/UL (ref 150–369)
POTASSIUM SERPL-SCNC: 4.2 MEQ/L (ref 3.6–5.1)
PROT SERPL-MCNC: 6.1 G/DL (ref 6–8.2)
RBC # BLD AUTO: 4.71 M/UL (ref 3.93–5.22)
SODIUM SERPL-SCNC: 135 MEQ/L (ref 136–144)
TRIGL SERPL-MCNC: 159 MG/DL (ref 30–149)
TSH SERPL DL<=0.05 MIU/L-ACNC: 1.47 MIU/L (ref 0.34–5.6)
WBC # BLD AUTO: 4.47 K/UL (ref 3.8–10.5)

## 2022-07-12 PROCEDURE — 83036 HEMOGLOBIN GLYCOSYLATED A1C: CPT

## 2022-07-12 PROCEDURE — 80053 COMPREHEN METABOLIC PANEL: CPT

## 2022-07-12 PROCEDURE — 85025 COMPLETE CBC W/AUTO DIFF WBC: CPT

## 2022-07-12 PROCEDURE — 36415 COLL VENOUS BLD VENIPUNCTURE: CPT

## 2022-07-12 PROCEDURE — 82465 ASSAY BLD/SERUM CHOLESTEROL: CPT

## 2022-07-12 PROCEDURE — 86803 HEPATITIS C AB TEST: CPT

## 2022-07-12 PROCEDURE — 84443 ASSAY THYROID STIM HORMONE: CPT

## 2022-07-13 ENCOUNTER — OFFICE VISIT (OUTPATIENT)
Dept: INTERNAL MEDICINE | Facility: CLINIC | Age: 42
End: 2022-07-13
Payer: COMMERCIAL

## 2022-07-13 ENCOUNTER — HOSPITAL ENCOUNTER (OUTPATIENT)
Dept: RADIOLOGY | Age: 42
Discharge: HOME | End: 2022-07-13
Attending: INTERNAL MEDICINE
Payer: COMMERCIAL

## 2022-07-13 VITALS
DIASTOLIC BLOOD PRESSURE: 82 MMHG | HEART RATE: 78 BPM | SYSTOLIC BLOOD PRESSURE: 124 MMHG | TEMPERATURE: 97.5 F | OXYGEN SATURATION: 98 % | BODY MASS INDEX: 32.83 KG/M2 | WEIGHT: 209.2 LBS | HEIGHT: 67 IN | RESPIRATION RATE: 16 BRPM

## 2022-07-13 DIAGNOSIS — E66.811 CLASS 1 OBESITY WITH SERIOUS COMORBIDITY AND BODY MASS INDEX (BMI) OF 32.0 TO 32.9 IN ADULT, UNSPECIFIED OBESITY TYPE: Primary | ICD-10-CM

## 2022-07-13 DIAGNOSIS — M25.561 CHRONIC PAIN OF RIGHT KNEE: ICD-10-CM

## 2022-07-13 DIAGNOSIS — G43.909 MIGRAINE WITHOUT STATUS MIGRAINOSUS, NOT INTRACTABLE, UNSPECIFIED MIGRAINE TYPE: ICD-10-CM

## 2022-07-13 DIAGNOSIS — G89.29 CHRONIC PAIN OF RIGHT KNEE: ICD-10-CM

## 2022-07-13 PROCEDURE — 3008F BODY MASS INDEX DOCD: CPT | Performed by: INTERNAL MEDICINE

## 2022-07-13 PROCEDURE — 73564 X-RAY EXAM KNEE 4 OR MORE: CPT | Mod: RT

## 2022-07-13 PROCEDURE — 99213 OFFICE O/P EST LOW 20 MIN: CPT | Performed by: INTERNAL MEDICINE

## 2022-07-13 RX ORDER — CYCLOSPORINE 0.5 MG/ML
1 EMULSION OPHTHALMIC 2 TIMES DAILY
COMMUNITY
End: 2022-10-18

## 2022-07-13 RX ORDER — LOTEPREDNOL ETABONATE 3.8 MG/G
GEL OPHTHALMIC
COMMUNITY
Start: 2022-07-05 | End: 2022-09-19 | Stop reason: ALTCHOICE

## 2022-07-13 RX ORDER — RIZATRIPTAN BENZOATE 5 MG/1
TABLET ORAL
Qty: 12 TABLET | Refills: 3 | Status: SHIPPED | OUTPATIENT
Start: 2022-07-13 | End: 2022-09-19 | Stop reason: ALTCHOICE

## 2022-07-13 RX ORDER — RIZATRIPTAN BENZOATE 5 MG/1
TABLET ORAL
COMMUNITY
Start: 2022-07-06 | End: 2022-07-13 | Stop reason: SDUPTHER

## 2022-07-13 RX ORDER — SEMAGLUTIDE 0.25 MG/.5ML
0.25 INJECTION, SOLUTION SUBCUTANEOUS
Qty: 2 ML | Refills: 1 | Status: SHIPPED | OUTPATIENT
Start: 2022-07-13 | End: 2022-09-19

## 2022-07-13 ASSESSMENT — ENCOUNTER SYMPTOMS
FREQUENCY: 0
DIZZINESS: 0
UNEXPECTED WEIGHT CHANGE: 0
VOMITING: 0
DYSPHORIC MOOD: 0
FATIGUE: 0
SLEEP DISTURBANCE: 0
DIARRHEA: 0
COUGH: 0
POLYPHAGIA: 0
HEADACHES: 0
DIFFICULTY URINATING: 0
POLYDIPSIA: 0
NAUSEA: 0
NERVOUS/ANXIOUS: 0
SHORTNESS OF BREATH: 0
ACTIVITY CHANGE: 0
PALPITATIONS: 0
CONSTIPATION: 0
CHEST TIGHTNESS: 0
APPETITE CHANGE: 0
BLOOD IN STOOL: 0
ABDOMINAL PAIN: 0

## 2022-07-13 ASSESSMENT — PATIENT HEALTH QUESTIONNAIRE - PHQ9
SUM OF ALL RESPONSES TO PHQ QUESTIONS 1-9: 4
SUM OF ALL RESPONSES TO PHQ9 QUESTIONS 1 & 2: 1

## 2022-07-13 NOTE — PROGRESS NOTES
Patient ID: Andressa Baker is a 42 y.o. female.      Headache and office visit  (Wants to discuss leg and knee pain and as well as weight )       She has had migraines in college and med school.  She had seen headache specialist and treatment with triptan.  Having headache now every day.  Takes aleve at night, but this does not really help. No nausea and vomiting.  Has sound sensitivity and light sensitivity.  Right knee pain after injury.  No swelling or warmth.  Not unstable.  Varicose veins + pain.      Review of Systems   Constitutional: Negative for activity change, appetite change, fatigue and unexpected weight change.   HENT: Negative for dental problem and hearing loss.    Eyes: Negative for visual disturbance.   Respiratory: Negative for cough, chest tightness and shortness of breath.    Cardiovascular: Negative for chest pain, palpitations and leg swelling.   Gastrointestinal: Negative for abdominal pain, blood in stool, constipation, diarrhea, nausea and vomiting.   Endocrine: Negative for cold intolerance, heat intolerance, polydipsia, polyphagia and polyuria.   Genitourinary: Negative for difficulty urinating, frequency and urgency.   Skin: Negative for rash.   Neurological: Negative for dizziness and headaches.   Psychiatric/Behavioral: Negative for dysphoric mood and sleep disturbance. The patient is not nervous/anxious.          Current Outpatient Medications:   •  amphetamine-dextroamphetamine XR (ADDERALL XR) 15 mg 24 hr capsule, Take 15 tablets by mouth See admin instr., Disp: , Rfl:   •  B-complex with vitamin C tablet, Take 1 tablet by mouth daily., Disp: , Rfl:   •  cycloSPORINE (RESTASIS) 0.05 % ophthalmic emulsion, 1 drop 2 (two) times a day., Disp: , Rfl:   •  docosahexaenoic acid-epa 120-180 mg capsule, Take 1,000 mg by mouth., Disp: , Rfl:   •  ergocalciferol (VITAMIN D2) 50,000 unit(1250 mcg) capsule, Take 50,000 Units by mouth once a week., Disp: , Rfl:   •  fexofenadine  (ALLEGRA) 180 mg tablet, Take 180 mg by mouth daily., Disp: , Rfl:   •  glucosamine-chondroitin 500-400 mg tablet, Take 1 tablet by mouth daily.  , Disp: , Rfl:   •  herbal drugs (CALMME ORAL), Take by mouth., Disp: , Rfl:   •  LORazepam (ATIVAN) 0.5 mg tablet, Take 1 tablet (0.5 mg total) by mouth every 8 (eight) hours as needed for anxiety. (Patient taking differently: Take 0.5 mg by mouth daily as needed for anxiety.), Disp: 20 tablet, Rfl: 0  •  LOTEMAX SM 0.38 % drops,gel opthalmic gel, , Disp: , Rfl:   •  prenatal vit no.130-iron-folic 27 mg iron- 800 mcg tablet tablet, Take 1 tablet by mouth daily as needed., Disp: , Rfl:   •  rizatriptan (MAXALT) 5 mg tablet, TAKE 1 TABLET BY MOUTH EVERY 24 HOURS AS NEEDED FOR MIGRAINE, Disp: 12 tablet, Rfl: 3  •  sertraline (ZOLOFT) 100 mg tablet, 100 mg.  , Disp: , Rfl:   •  traZODone (DESYREL) 50 mg tablet, Take 25 mg by mouth nightly as needed.  , Disp: , Rfl:   •  WEGOVY 0.25 mg/0.5 mL pen injector, Inject 0.5 mL (0.25 mg total) under the skin every (seven) 7 days. For the first 4 weeks., Disp: 2 mL, Rfl: 1    No Known Allergies    Past Medical History:   Diagnosis Date   • Abnormal ECG     PVCs   • Arrhythmia 2018    PVCs, status post ablation, partially successful.   • Back pain    • GERD (gastroesophageal reflux disease)    • Heartburn    • History of fetal demise, not currently pregnant     Twin pregnancy, 1 fetal demise, 1 live birth   • Joint pain    • Migraine headache    • Ovarian cyst    • Palpitations    • Pericarditis 2018    After john ablation       Past Surgical History:   Procedure Laterality Date   • ABLATION OF DYSRHYTHMIC FOCUS  2018   •  SECTION         Family History   Problem Relation Age of Onset   • Hyperlipidemia Biological Mother    • Hyperlipidemia Biological Father    • Hypertension Biological Father    • Prostate cancer Biological Father 78   • Dementia Biological Father    • Other Biological Father         dief from  covid 19   • Hyperlipidemia Maternal Grandmother    • Clotting disorder Maternal Grandfather         developed in 60's - required tansfusions/platelets   • Hyperlipidemia Paternal Grandmother    • Dementia Paternal Grandmother    • Diabetes Paternal Grandfather    • No Known Problems Biological Child        Social History     Socioeconomic History   • Marital status:      Spouse name: Shashank Baker   • Number of children: None   • Years of education: None   • Highest education level: None   Occupational History   • Occupation: Homemaker   Tobacco Use   • Smoking status: Never Smoker   • Smokeless tobacco: Never Used   Vaping Use   • Vaping Use: Never used   Substance and Sexual Activity   • Alcohol use: Yes     Comment: occasionally   • Drug use: No   • Sexual activity: Yes     Birth control/protection: None     Comment:  with 5children, NFP   Social History Narrative    Exercises 2-3 times per week -  and she is trying to run again    Sleep sometimes interrupted    Eating plan: Patient attempts low glycemic index eating    No history of domestic violence    Lives with  and 5 children      [employer] -- medical legal work / part-time    Has a dog       Vitals:    07/13/22 0916   BP: 124/82   Pulse: 78   Resp: 16   Temp: 36.4 °C (97.5 °F)   SpO2: 98%     Body mass index is 32.77 kg/m².      Wt Readings from Last 3 Encounters:   07/13/22 94.9 kg (209 lb 3.2 oz)   03/17/22 95.3 kg (210 lb)   02/24/22 96.4 kg (212 lb 9.6 oz)     BP Readings from Last 3 Encounters:   07/13/22 124/82   03/17/22 120/85   02/24/22 104/82     Pulse Readings from Last 3 Encounters:   07/13/22 78   03/17/22 72   02/24/22 74         Physical Exam  Vitals reviewed.   HENT:      Head: Normocephalic and atraumatic.      Right Ear: External ear normal.      Left Ear: External ear normal.   Eyes:      Conjunctiva/sclera: Conjunctivae normal.      Pupils: Pupils are equal, round, and reactive to light.   Neck:       Thyroid: No thyroid mass or thyromegaly.      Vascular: No carotid bruit or JVD.      Trachea: No tracheal deviation.   Cardiovascular:      Rate and Rhythm: Normal rate and regular rhythm.      Heart sounds: Normal heart sounds. No murmur heard.    No friction rub. No gallop.   Pulmonary:      Effort: Pulmonary effort is normal. No respiratory distress.      Breath sounds: Normal breath sounds. No wheezing or rales.   Abdominal:      General: Bowel sounds are normal. There is no distension.      Palpations: Abdomen is soft.      Tenderness: There is no abdominal tenderness.   Musculoskeletal:         General: Normal range of motion.      Cervical back: Normal range of motion and neck supple.   Lymphadenopathy:      Cervical: No cervical adenopathy.   Skin:     General: Skin is warm and dry.   Neurological:      Mental Status: She is alert and oriented to person, place, and time.   Psychiatric:         Behavior: Behavior normal.         Thought Content: Thought content normal.         Assessment/Plan     Problem List Items Addressed This Visit     Class 1 obesity with serious comorbidity and body mass index (BMI) of 32.0 to 32.9 in adult - Primary    Relevant Medications    WEGOVY 0.25 mg/0.5 mL pen injector    Migraine headache    Relevant Medications    rizatriptan (MAXALT) 5 mg tablet    Other Relevant Orders    Ambulatory referral to Neurology    MRI BRAIN WITHOUT CONTRAST    Chronic pain of right knee    Relevant Orders    X-RAY KNEE RIGHT 4+ VIEWS            Written education and action steps suggested to the patient are documented in After Visit Summary / Patient Instructions sections of this encounter.

## 2022-07-13 NOTE — PATIENT INSTRUCTIONS
Problem List Items Addressed This Visit       Class 1 obesity with serious comorbidity and body mass index (BMI) of 32.0 to 32.9 in adult - Primary    Relevant Medications    WEGOVY 0.25 mg/0.5 mL pen injector    Migraine headache    Relevant Medications    rizatriptan (MAXALT) 5 mg tablet    Other Relevant Orders    Ambulatory referral to Neurology    MRI BRAIN WITHOUT CONTRAST    Chronic pain of right knee    Relevant Orders    X-RAY KNEE RIGHT 4+ VIEWS             Additional Notes: Pt. Is still currently healing from reaction from using Efudex at the beginning of this month. I advised him to RTC in two months to recheck his slip to see if we need to do any further liquid nitrogen.  Patient understands and agrees.

## 2022-07-14 ENCOUNTER — DOCUMENTATION (OUTPATIENT)
Dept: INTERNAL MEDICINE | Facility: CLINIC | Age: 42
End: 2022-07-14
Payer: COMMERCIAL

## 2022-07-14 DIAGNOSIS — M25.561 CHRONIC PAIN OF RIGHT KNEE: Primary | ICD-10-CM

## 2022-07-14 DIAGNOSIS — G89.29 CHRONIC PAIN OF RIGHT KNEE: Primary | ICD-10-CM

## 2022-07-18 ENCOUNTER — TELEPHONE (OUTPATIENT)
Dept: NEUROLOGY | Facility: CLINIC | Age: 42
End: 2022-07-18
Payer: COMMERCIAL

## 2022-07-18 NOTE — TELEPHONE ENCOUNTER
I have received a request from Dr. Costa to see this new patient sooner. She is currently scheduled in February.    Please, offer her an appointment on Wednesday 7/20 at 8:30 am or next Wednesday 7/27 at 8 am, both in [facility] [place].     If those dates don't work for her, I could also see her on Friday 8/5 in [facility] at 2 pm (she will need 60 min or 2 slots in [facility]).    Theresa Lewis MD  [facility]  [phone]

## 2022-07-18 NOTE — TELEPHONE ENCOUNTER
I called the patient to move up her new patient appointment. I left her a detailed message on her voicemail. I asked her to call back and have the call center patch her over to the office.

## 2022-07-19 NOTE — TELEPHONE ENCOUNTER
I called patient yesterday & left her a detailed message to call the office back so we can bump up her appointment. I called again today her phone rang & it cut off so I couldn't leave a message.

## 2022-07-20 ENCOUNTER — DOCUMENTATION (OUTPATIENT)
Dept: INTERNAL MEDICINE | Facility: CLINIC | Age: 42
End: 2022-07-20
Payer: COMMERCIAL

## 2022-07-20 NOTE — TELEPHONE ENCOUNTER
Patient called to confirm her new apt which was arranged by a man, yesterday.  The new date should be 7.27.22 at 8:00 am but it has not been updated in Epic or [employer].  Please make the change as discussed yesterday so she can get a confirmation via [employer].  Thank you
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
## 2022-07-27 ENCOUNTER — OFFICE VISIT (OUTPATIENT)
Dept: NEUROLOGY | Facility: CLINIC | Age: 42
End: 2022-07-27
Payer: COMMERCIAL

## 2022-07-27 VITALS
HEART RATE: 79 BPM | HEIGHT: 67 IN | WEIGHT: 204 LBS | BODY MASS INDEX: 32.02 KG/M2 | DIASTOLIC BLOOD PRESSURE: 80 MMHG | OXYGEN SATURATION: 98 % | RESPIRATION RATE: 16 BRPM | SYSTOLIC BLOOD PRESSURE: 114 MMHG

## 2022-07-27 DIAGNOSIS — G43.711 INTRACTABLE CHRONIC MIGRAINE WITHOUT AURA AND WITH STATUS MIGRAINOSUS: Primary | ICD-10-CM

## 2022-07-27 DIAGNOSIS — M54.2 CERVICALGIA: ICD-10-CM

## 2022-07-27 PROCEDURE — 99205 OFFICE O/P NEW HI 60 MIN: CPT | Performed by: PSYCHIATRY & NEUROLOGY

## 2022-07-27 PROCEDURE — 3008F BODY MASS INDEX DOCD: CPT | Performed by: PSYCHIATRY & NEUROLOGY

## 2022-07-27 RX ORDER — ELETRIPTAN HYDROBROMIDE 40 MG/1
TABLET, FILM COATED ORAL
Qty: 12 TABLET | Refills: 2 | Status: SHIPPED | OUTPATIENT
Start: 2022-07-27 | End: 2022-10-24

## 2022-07-27 RX ORDER — NAPROXEN 500 MG/1
TABLET ORAL
Qty: 60 TABLET | Refills: 0 | Status: SHIPPED | OUTPATIENT
Start: 2022-07-27 | End: 2022-12-27 | Stop reason: ALTCHOICE

## 2022-07-27 NOTE — PATIENT INSTRUCTIONS
"I strongly recommend that you call your insurance and check your out of pocket costs.    Below is the information you will need to provide your insurance company:      - Botox 200 unit vial  22990 - administration of Botox     ICD-10 code: G43.719     The procedure is done in the office setting and the drug botox is ordered by our office as \"buy and bill\", unless your insurance requires that we order the botox from their specialty pharmacy.     Theresa Dalton NPI: [id number]     Also, with most commercial insurances you would qualify for the Botox Savings Program. They reimburse up to $1,500 per injection visit with a max of $4,000 per year and you DO NOT have to pay prior to services rendered.     Call: [phone]  Text: \"SAVE\" to 09006  Visit: [email or web address]     If you have any questions please feel free to contact us.      Diagnosis:  Chronic migraine without aura     Evaluation:  No additional tests are needed at this time. If there are new symptoms or changes in the characteristics of the headaches, I will re-evaluate Andressa and consider if diagnostic tests are needed.  Brain MRI ordered already by Dr. Costa.     Treatment plan:      1. Healthy Habits: The following recommendations can greatly reduce the number and severity of headaches.  Maintain regular sleep hours and get sufficient sleep (8-9 hours)  Do not skip meals, especially breakfast  Drink at least 64 oz or 8 cups of water daily - enough to urinate 5-6 times a day  Get at least 30 minutes of daily aerobic exercise (enough to increase your heart rate and sweat)    Keep track of headaches and use of acute medication (prescription or over-the-counter) in a calendar or notebook and bring that to your clinic visits.    2. Acute Treatment (limited to 2 days per week, in general):     Recommend to stop all the over the counters for now as well as rizatriptan.    Trial of Ubrelvy 100 mg. Take half or one tablet (50 - 100 mg) as needed " at onset of moderate to severe headache. May repeat 50 - 100 mg x 1 after 2 hours. Maximum 2 doses in 24 hours.     Ubrelvy copay savings card information  Two ways to activate a card:   - Text UBRELVY to 41590  - [place] online at [email or web address]    For rescue treatment if Ubrelvy doesn't help or as alternative to Ubrelvy: Trial of eletriptan 40 mg 1 tab as needed for moderate to severe headache. May repeat 1 tablet 2 hours later. Maximum 2 tablets in 24 hr. Limit to 2 days/week.     Future consideration: Nurtec or other triptans.    Celecoxib oral solution, Lasmiditan, Sprix nasal spray, or Dihydroergotamine nasal spray can be tried for rescue treatment if needed in the future.     We may add an antinausea medication if needed for nausea or as co-adjuvant if monotherapy is not sufficient.      3. Preventive Treatment (when headaches occur more than 1 day/week or interfere with functioning):     Plan to start botox for prevention.    Recommend naproxen 500 mg twice a day with breakfast and dinner for 30 days only, then stop naproxen. Do not take any other NSAIDs (ibuprofen, Motrin, Advil, or Aleve) while on naproxen.    Continue with magnesium glycinate 240 mg daily. May increase the dose in the future.    Future consideration: increased dose of magnesium, add riboflavin, or Atogepant to limit rist of weight gain.    Other nutraceutical options include: riboflavin, melatonin, feverfew, and coenzyme Q10.     Other options for oral preventive medications include: amitriptyline or nortriptyline, zonisamide, atogepant, rimegepant, valproate, verapamil, gabapentin, pregabaline, duloxetine, candesartan, namenda, escitalopram, venlafaxine, and levetiracetam. Want to avoid weight gain.    Other procedures that can be used to prevent headaches include:  nerve blocks and trigger point injections. We recommend to obtain prior authorization from insurance when considering these.     Anti CGRP monoclonal  antibodies (Erenumab or Aimovig, Fremanezumab or Ajovy, Galcanezumab or Emgality, and Eptinezumab or Vyepti) are a group of biological injectable treatments approved for prevention of migraine. These have to be stopped 6 months prior to conception.     Several non-invasive neuromodulation devices (GammaCore, Cefaly and Nerivio) are available to treat headaches preventively and/or acutely. These are typically not covered by insurance, but the companies have a trial period and will refund you if the device is ineffective and returned within that period.     Follow-up in the Headache Clinic when botox is approved.      Please schedule your next office visit before leaving today.     Please sign up for [employer] at the check out desk if you don't have access already. This is our preferred method of communication. If you do not have internet access, you can call the Headache Program at [phone] if you have any questions or problems before your next visit. Experienced and highly trained medical assistants and nurses handle most of my outpatient calls during office hours. If you experience a medical emergency and cannot wait for a phone call during office hours, please call 911 or go to the nearest emergency room or urgent care center.     It has been a pleasure seeing you today in clinic and look forward to your next visit.
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
## 2022-07-28 NOTE — PROGRESS NOTES
Botox auth submitted and in clinical review  Case# [id number] rep [employer] faxed clinicals to [phone]

## 2022-08-04 ENCOUNTER — TELEPHONE (OUTPATIENT)
Dept: NEUROLOGY | Facility: CLINIC | Age: 42
End: 2022-08-04
Payer: COMMERCIAL

## 2022-08-04 NOTE — TELEPHONE ENCOUNTER
Please, call patient to offer her an appointment for her first botox treatment next Wednesday with me at 11:30 am.    Theresa Lewis MD  [facility]  [phone]

## 2022-08-04 NOTE — TELEPHONE ENCOUNTER
----- Message from Paz Rose sent at 8/4/2022  3:51 PM EDT -----  Regarding: botox approval  Botox 200 units every 10-12 weeks  Auth # [id number]  Valid 7/28/2022-7/28/2023  Approved as buy & bill                      ----- Message -----  From: Paz Rose  Sent: 7/28/2022   4:09 PM EDT  To: Vi Lynn MA, Theresa Dalton MD, #        ----- Message -----  From: Theresa Dalton MD  Sent: 7/27/2022   9:11 AM EDT  To: Vi Lynn MA, Theresa Dalton MD, #    New botox request.  Thanks  Theresa

## 2022-08-10 ENCOUNTER — OFFICE VISIT (OUTPATIENT)
Dept: NEUROLOGY | Facility: CLINIC | Age: 42
End: 2022-08-10
Payer: COMMERCIAL

## 2022-08-10 VITALS
WEIGHT: 204 LBS | BODY MASS INDEX: 32.02 KG/M2 | DIASTOLIC BLOOD PRESSURE: 74 MMHG | OXYGEN SATURATION: 99 % | HEIGHT: 67 IN | SYSTOLIC BLOOD PRESSURE: 114 MMHG | HEART RATE: 70 BPM | RESPIRATION RATE: 16 BRPM

## 2022-08-10 DIAGNOSIS — G43.711 INTRACTABLE CHRONIC MIGRAINE WITHOUT AURA AND WITH STATUS MIGRAINOSUS: Primary | ICD-10-CM

## 2022-08-10 DIAGNOSIS — M54.2 CERVICALGIA: ICD-10-CM

## 2022-08-10 PROCEDURE — 64615 CHEMODENERV MUSC MIGRAINE: CPT | Performed by: PSYCHIATRY & NEUROLOGY

## 2022-08-10 PROCEDURE — 3008F BODY MASS INDEX DOCD: CPT | Performed by: PSYCHIATRY & NEUROLOGY

## 2022-08-10 PROCEDURE — 99213 OFFICE O/P EST LOW 20 MIN: CPT | Mod: 25 | Performed by: PSYCHIATRY & NEUROLOGY

## 2022-08-10 NOTE — PROCEDURES
Procedures  Procedure Note for Botox Injections for Headache:    Indication for procedure:    Diagnosis Plan   1. Intractable chronic migraine without aura and with status migrainosus  onabotulinumtoxinA (BOTOX) injection 200 Units       I explained the risks and benefits and obtained consent from Andressa.  Onabotulinumtoxin A 200 U were diluted in 4 mL of saline.    Lot number and expiration date documented in informed consent and MAR.  I performed a time-out, including 2 unique patient identifiers with Andressa.  Using a 30 gauge 1/2 inch needle, I injected 0.1mL = 5 U into each site according to the Chronic Migraine Protocol (PREEMPT Studies).   The following table reports the number of sites injected in each muscle.     Muscle Midline Right Left   Procerus 1          1 1   Frontalis   2 2   Temporalis   4  4    Occipitalis   3  3    Cervical Paraspinal   2 2   Trapezius   3  3    Other:            155 Units were injected and 45 Units were wasted.     Andressa tolerated the procedure well, without complications.  She was instructed that she could experience increased pain in the next 2-3 days, which should be treated with ice and anti-inflammatory medications.      We appreciate the opportunity to participate in the care of Andressa LEE Olivia.     Please, do not hesitate to call me at [phone] if you have any questions or concerns.        Theresa Lewis MD  [facility]  [phone]
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
## 2022-08-10 NOTE — PATIENT INSTRUCTIONS
Diagnosis:  Chronic migraine without aura  Cervicalgia     Evaluation:  Brain MRI ordered already by Dr. Costa (scheduled in September).  No additional tests are needed at this time. If there are new symptoms or changes in the characteristics of the headaches, I will re-evaluate Andressa and consider if diagnostic tests are needed.     Treatment plan:      1. Healthy Habits: The following recommendations can greatly reduce the number and severity of headaches.  Maintain regular sleep hours and get sufficient sleep (8-9 hours)  Do not skip meals, especially breakfast  Drink at least 64 oz or 8 cups of water daily - enough to urinate 5-6 times a day  Get at least 30 minutes of daily aerobic exercise (enough to increase your heart rate and sweat)    Keep track of headaches and use of acute medication (prescription or over-the-counter) in a calendar or notebook and bring that to your clinic visits.    2. Acute Treatment (limited to 2 days per week, in general):     Continue to avoid all the over the counters for now as well as rizatriptan.    Continue with Ubrelvy 100 mg as needed at onset of moderate to severe headache. May repeat 100 mg x 1 after 2 hours. Maximum 2 doses in 24 hours. This will have to be stopped prior to trying to conceive again. I discussed this with Andressa.    For rescue treatment if Ubrelvy doesn't help or as alternative to Ubrelvy: Eletriptan 40 mg 1 tab as needed at onset of moderate to severe headache. May repeat 1 tablet 2 hours later. Maximum 2 tablets in 24 hr. Limit to 2 days/week.     Future consideration: Nurtec or other triptans.    Celecoxib oral solution, Lasmiditan, Sprix nasal spray, or Dihydroergotamine nasal spray can be tried for rescue treatment if needed in the future.     We may add an antinausea medication if needed for nausea or as co-adjuvant if monotherapy is not sufficient.      3. Preventive Treatment (when headaches occur more than 1 day/week or interfere with functioning):      First dose of botox 155 U administered today.     Continue with naproxen 500 mg twice a day with breakfast and dinner for 30 days only, then stop naproxen. Do not take any other NSAIDs (ibuprofen, Motrin, Advil, or Aleve) while on naproxen.    Continue with magnesium glycinate 240 mg daily. May increase the dose in the future.    Future consideration: increased dose of magnesium, add riboflavin, or Atogepant to limit rist of weight gain.    Other nutraceutical options include: riboflavin, melatonin, feverfew, and coenzyme Q10.     Other options for oral preventive medications include: amitriptyline or nortriptyline, zonisamide, atogepant, rimegepant, valproate, verapamil, gabapentin, pregabaline, duloxetine, candesartan, namenda, escitalopram, venlafaxine, and levetiracetam. Want to avoid weight gain.    Other procedures that can be used to prevent headaches include:  nerve blocks and trigger point injections. We recommend to obtain prior authorization from insurance when considering these.     Anti CGRP monoclonal antibodies (Erenumab or Aimovig, Fremanezumab or Ajovy, Galcanezumab or Emgality, and Eptinezumab or Vyepti) are a group of biological injectable treatments approved for prevention of migraine. These have to be stopped 6 months prior to conception.     Several non-invasive neuromodulation devices (GammaCore, Cefaly and Nerivio) are available to treat headaches preventively and/or acutely. These are typically not covered by insurance, but the companies have a trial period and will refund you if the device is ineffective and returned within that period.     Follow-up in the Headache Clinic in 6 weeks for FUV and in 12 weeks for the next botox treatment.

## 2022-09-12 ENCOUNTER — HOSPITAL ENCOUNTER (OUTPATIENT)
Dept: RADIOLOGY | Facility: HOSPITAL | Age: 42
Discharge: HOME | End: 2022-09-12
Attending: INTERNAL MEDICINE
Payer: COMMERCIAL

## 2022-09-12 ENCOUNTER — TELEPHONE (OUTPATIENT)
Dept: PRIMARY CARE | Facility: CLINIC | Age: 42
End: 2022-09-12
Payer: COMMERCIAL

## 2022-09-12 DIAGNOSIS — M25.561 CHRONIC PAIN OF RIGHT KNEE: ICD-10-CM

## 2022-09-12 DIAGNOSIS — G43.909 MIGRAINE WITHOUT STATUS MIGRAINOSUS, NOT INTRACTABLE, UNSPECIFIED MIGRAINE TYPE: ICD-10-CM

## 2022-09-12 DIAGNOSIS — E66.811 CLASS 1 OBESITY WITH SERIOUS COMORBIDITY AND BODY MASS INDEX (BMI) OF 32.0 TO 32.9 IN ADULT, UNSPECIFIED OBESITY TYPE: ICD-10-CM

## 2022-09-12 DIAGNOSIS — G89.29 CHRONIC PAIN OF RIGHT KNEE: ICD-10-CM

## 2022-09-12 PROCEDURE — G1004 CDSM NDSC: HCPCS

## 2022-09-12 PROCEDURE — 73721 MRI JNT OF LWR EXTRE W/O DYE: CPT | Mod: RT,ME

## 2022-09-12 PROCEDURE — 70551 MRI BRAIN STEM W/O DYE: CPT | Mod: ME

## 2022-09-12 NOTE — TELEPHONE ENCOUNTER
----- Message from Gisell Costa, DO sent at 9/12/2022 10:32 AM EDT -----  Please let pt know that her MRI was fine.

## 2022-09-13 ENCOUNTER — DOCUMENTATION (OUTPATIENT)
Dept: INTERNAL MEDICINE | Facility: CLINIC | Age: 42
End: 2022-09-13
Payer: COMMERCIAL

## 2022-09-19 ENCOUNTER — INFUSION (OUTPATIENT)
Dept: NEUROLOGY | Facility: CLINIC | Age: 42
End: 2022-09-19
Attending: NURSE PRACTITIONER
Payer: COMMERCIAL

## 2022-09-19 ENCOUNTER — OFFICE VISIT (OUTPATIENT)
Dept: NEUROLOGY | Facility: CLINIC | Age: 42
End: 2022-09-19
Payer: COMMERCIAL

## 2022-09-19 ENCOUNTER — DOCUMENTATION (OUTPATIENT)
Dept: INTERNAL MEDICINE | Facility: CLINIC | Age: 42
End: 2022-09-19
Payer: COMMERCIAL

## 2022-09-19 VITALS
HEART RATE: 71 BPM | RESPIRATION RATE: 16 BRPM | SYSTOLIC BLOOD PRESSURE: 141 MMHG | OXYGEN SATURATION: 98 % | DIASTOLIC BLOOD PRESSURE: 96 MMHG

## 2022-09-19 VITALS
OXYGEN SATURATION: 98 % | SYSTOLIC BLOOD PRESSURE: 141 MMHG | RESPIRATION RATE: 16 BRPM | BODY MASS INDEX: 30.92 KG/M2 | WEIGHT: 197 LBS | HEART RATE: 71 BPM | DIASTOLIC BLOOD PRESSURE: 96 MMHG | HEIGHT: 67 IN

## 2022-09-19 DIAGNOSIS — G43.901 STATUS MIGRAINOSUS: ICD-10-CM

## 2022-09-19 DIAGNOSIS — G43.901 STATUS MIGRAINOSUS: Primary | ICD-10-CM

## 2022-09-19 PROCEDURE — 96375 TX/PRO/DX INJ NEW DRUG ADDON: CPT | Performed by: NURSE PRACTITIONER

## 2022-09-19 PROCEDURE — 99213 OFFICE O/P EST LOW 20 MIN: CPT | Mod: 25 | Performed by: NURSE PRACTITIONER

## 2022-09-19 PROCEDURE — 3008F BODY MASS INDEX DOCD: CPT | Performed by: NURSE PRACTITIONER

## 2022-09-19 PROCEDURE — 96365 THER/PROPH/DIAG IV INF INIT: CPT | Performed by: NURSE PRACTITIONER

## 2022-09-19 PROCEDURE — 96366 THER/PROPH/DIAG IV INF ADDON: CPT | Performed by: NURSE PRACTITIONER

## 2022-09-19 PROCEDURE — 96376 TX/PRO/DX INJ SAME DRUG ADON: CPT | Performed by: NURSE PRACTITIONER

## 2022-09-19 PROCEDURE — 96374 THER/PROPH/DIAG INJ IV PUSH: CPT | Mod: XU | Performed by: NURSE PRACTITIONER

## 2022-09-19 RX ORDER — DIPHENHYDRAMINE HCL 50 MG/ML
25 VIAL (ML) INJECTION
Status: CANCELLED | OUTPATIENT
Start: 2022-09-19 | End: 2022-09-19

## 2022-09-19 RX ORDER — DEXAMETHASONE SODIUM PHOSPHATE 10 MG/ML
10 INJECTION INTRAMUSCULAR; INTRAVENOUS ONCE
Status: CANCELLED | OUTPATIENT
Start: 2022-09-19 | End: 2022-09-19

## 2022-09-19 RX ORDER — KETOROLAC TROMETHAMINE 30 MG/ML
30 INJECTION, SOLUTION INTRAMUSCULAR; INTRAVENOUS ONCE
Status: COMPLETED | OUTPATIENT
Start: 2022-09-19 | End: 2022-09-19

## 2022-09-19 RX ORDER — SEMAGLUTIDE 0.25 MG/.5ML
0.25 INJECTION, SOLUTION SUBCUTANEOUS
Qty: 2 ML | Refills: 1 | OUTPATIENT
Start: 2022-09-19

## 2022-09-19 RX ORDER — SODIUM CHLORIDE 9 MG/ML
1000 INJECTION, SOLUTION INTRAVENOUS ONCE
Status: COMPLETED | OUTPATIENT
Start: 2022-09-19 | End: 2022-09-19

## 2022-09-19 RX ORDER — OLOPATADINE HYDROCHLORIDE 2 MG/ML
1 SOLUTION/ DROPS OPHTHALMIC DAILY
COMMUNITY
Start: 2022-08-15

## 2022-09-19 RX ORDER — ONDANSETRON HYDROCHLORIDE 2 MG/ML
4 INJECTION, SOLUTION INTRAVENOUS ONCE
Status: SHIPPED | OUTPATIENT
Start: 2022-09-19 | End: 2022-09-20

## 2022-09-19 RX ORDER — ONDANSETRON HYDROCHLORIDE 2 MG/ML
4 INJECTION, SOLUTION INTRAVENOUS ONCE AS NEEDED
Status: CANCELLED | OUTPATIENT
Start: 2022-09-19

## 2022-09-19 RX ORDER — METOCLOPRAMIDE HYDROCHLORIDE 5 MG/ML
10 INJECTION INTRAMUSCULAR; INTRAVENOUS
Status: CANCELLED | OUTPATIENT
Start: 2022-09-19 | End: 2022-09-19

## 2022-09-19 RX ORDER — ONDANSETRON HYDROCHLORIDE 2 MG/ML
4 INJECTION, SOLUTION INTRAVENOUS ONCE
Status: CANCELLED | OUTPATIENT
Start: 2022-09-19 | End: 2022-09-19

## 2022-09-19 RX ORDER — METOCLOPRAMIDE HYDROCHLORIDE 5 MG/ML
10 INJECTION INTRAMUSCULAR; INTRAVENOUS
Status: COMPLETED | OUTPATIENT
Start: 2022-09-19 | End: 2022-09-19

## 2022-09-19 RX ORDER — ONDANSETRON HYDROCHLORIDE 2 MG/ML
4 INJECTION, SOLUTION INTRAVENOUS ONCE AS NEEDED
Status: DISCONTINUED | OUTPATIENT
Start: 2022-09-19 | End: 2022-09-26 | Stop reason: ALTCHOICE

## 2022-09-19 RX ORDER — DEXAMETHASONE 2 MG/1
2 TABLET ORAL
Qty: 3 TABLET | Refills: 0 | Status: SHIPPED | OUTPATIENT
Start: 2022-09-19 | End: 2022-09-21

## 2022-09-19 RX ORDER — SODIUM CHLORIDE 9 MG/ML
1000 INJECTION, SOLUTION INTRAVENOUS ONCE
Status: CANCELLED | OUTPATIENT
Start: 2022-09-19 | End: 2022-09-19

## 2022-09-19 RX ORDER — DEXAMETHASONE SODIUM PHOSPHATE 10 MG/ML
10 INJECTION INTRAMUSCULAR; INTRAVENOUS ONCE
Status: COMPLETED | OUTPATIENT
Start: 2022-09-19 | End: 2022-09-19

## 2022-09-19 RX ORDER — DIPHENHYDRAMINE HCL 50 MG/ML
25 VIAL (ML) INJECTION
Status: COMPLETED | OUTPATIENT
Start: 2022-09-19 | End: 2022-09-19

## 2022-09-19 RX ORDER — KETOROLAC TROMETHAMINE 30 MG/ML
30 INJECTION, SOLUTION INTRAMUSCULAR; INTRAVENOUS ONCE
Status: CANCELLED | OUTPATIENT
Start: 2022-09-19 | End: 2022-09-19

## 2022-09-19 RX ADMIN — SODIUM CHLORIDE 1000 ML: 9 INJECTION, SOLUTION INTRAVENOUS at 11:27

## 2022-09-19 RX ADMIN — METOCLOPRAMIDE HYDROCHLORIDE 10 MG: 5 INJECTION INTRAMUSCULAR; INTRAVENOUS at 13:55

## 2022-09-19 RX ADMIN — KETOROLAC TROMETHAMINE 30 MG: 30 INJECTION, SOLUTION INTRAMUSCULAR; INTRAVENOUS at 12:37

## 2022-09-19 RX ADMIN — METOCLOPRAMIDE HYDROCHLORIDE 10 MG: 5 INJECTION INTRAMUSCULAR; INTRAVENOUS at 11:43

## 2022-09-19 RX ADMIN — Medication 25 MG: at 12:40

## 2022-09-19 RX ADMIN — METOCLOPRAMIDE HYDROCHLORIDE 10 MG: 5 INJECTION INTRAMUSCULAR; INTRAVENOUS at 13:00

## 2022-09-19 RX ADMIN — Medication 25 MG: at 11:25

## 2022-09-19 RX ADMIN — METOCLOPRAMIDE HYDROCHLORIDE 10 MG: 5 INJECTION INTRAMUSCULAR; INTRAVENOUS at 12:26

## 2022-09-19 RX ADMIN — DEXAMETHASONE SODIUM PHOSPHATE 10 MG: 10 INJECTION INTRAMUSCULAR; INTRAVENOUS at 14:10

## 2022-09-19 NOTE — PROGRESS NOTES
Patient was escorted to the exam room. Complaining of the headache being located right sided around eye and into neckand described as a dull feeling. Other side effects arelight and noise with right weakness Pain was assessed to be on a scale of 0/10 5/10 with any medication and without any medication.    Vital signs were checked and bp was elevated.  Iv was placed #22 in the LH on first attempt  no redness or swelling noted at site.  1000 cc 0.9 NSS infusing at 200cc per hour.  Reviewed allergies with patient Urine pregnancy was obtained, and results were negative.     Patient was instructed that they should have a ride home. Patient was accompanied by her friend monae rice. Patient was given the first dose of  benadryl patient tolerated well.   Reglan 10 mg iv was given slowly via IV. Patient tolerated well.    Patient stated that pain level went to a   5/10 from 3/10. Appears to feel a lot more comfortable.    Continues to have pain and Toradol was given through the iv. The iv site continues to be without any redness or swelling. Flushes well.     Second does of benadryl and 3rd dose of Reglan was administered.    Patient remains nausea free and pain is now controlled. Dexamethasone was administered to prevent recurrence of the headache. Patient's pain level was a 1/10. Other symptoms resolved and patient was d/c tp home. Iv was dc and site was CDI no bruise noted pressure held for 3 min's.
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
## 2022-09-19 NOTE — PATIENT INSTRUCTIONS
Take dexamethasone 2 mg daily with breakfast x 3 days, then discontinue.     **      [facility]    Discharge Instructions: Post Intravenous Headache Treatment      The goal of intravenous treatments is to reduce your pain level and to complement your long term preventive treatment.    You may continue to experience pain or headaches after discharge from the clinic, but steady improvement in the control of your symptoms is expected over the coming months.    Make a follow-up appointment in 1 week.  If you encounter any problems before your next clinic appointment, please contact the [facility] .[phone] between 8:30 am and 4:30 pm.    Visit emergency department or urgent care if:   You have a headache that seems different or much worse than your usual headache.   You have a severe headache with fever or stiff neck.   You have new problems with speech, vision, balance, weakness or numbness.   You feel like you are going to faint, you become confused, or you have a seizure.   You have elevated temperature 101 F or higher.   You notice redness or discharge at the I.V. site.     For the first 24 hours after the Intravenous migraine infusion:   Do not drive or use machinery.   Do not sign important documents.   Do your daily activities more slowly than normal.   Take medicines (including over-the-counter and prescriptions) only as instructed by your healthcare provider.      Follow up with your healthcare provider or neurologist as directed:   We recommend that you keep a record or diary of your headaches in order to observe improvements and evaluate for factors contributing to your headaches. Please bring your findings to each office visit.    Write down your questions so you remember to ask them during your visit.   If you encounter any problems with your headache treatments, please contact the headache clinic.    The following general measures are an important part of your  treatment:  Do not drink alcohol. Alcohol can trigger headaches. It can also interact with medicines used to treat headaches.   Do not smoke/vape. Nicotine and other chemicals in cigarettes and cigars can trigger headaches or make it worse. Ask your healthcare provider for information if you currently smoke and need help to quit. E-cigarettes or smokeless tobacco still contain nicotine. Talk to your healthcare provider before you use these products.  Get regular exercise. Exercise helps prevent headaches. Talk to your healthcare provider about the best exercise plan for you. Try to get at least 30 minutes of exercise on most days.   Manage stress. Stress is one of the most common headache triggers. Learn new ways to relax, such as deep breathing and progressive muscle relaxation.   Keep a regular sleep schedule. Ask your health care provider if you have difficulty falling asleep or wake up frequently at night. You may need additional evaluation and treatment if you have sleep problems.   Eat regular meals. We recommend a balanced diet and limited caffeine. Avoid foods or drinks that trigger your headaches.      MD Edyta Ugalde CRNP Lynda Dolan RN    [facility]  [phone]

## 2022-09-19 NOTE — TELEPHONE ENCOUNTER
----- Message from Andressa Baker sent at 9/19/2022  8:34 AM EDT -----  Regarding: Ozempic dose increase?  Hi! I took 0.5 mg of the ozempic in the (0.25-.5 mg pen) on Saturday.     My pharmacy is [facility] on [address] [facility]. 3 [phone].     Thanks

## 2022-09-19 NOTE — TELEPHONE ENCOUNTER
----- Message from Andressa Baker sent at 9/19/2022  8:34 AM EDT -----  Regarding: Ozempic dose increase?  Hi! I took 0.5 mg of the ozempic in the (0.25-.5 mg pen) on Saturday.     My pharmacy is [facility] on [address] [facility]. 5 [phone].     Thanks

## 2022-09-19 NOTE — PROGRESS NOTES
Infusion complete pain 1/10. Plan: dexamethasone 2 mg daily with breakfast x 3 days, then discontinue. Patient is scheduled for a FUV next week.

## 2022-09-22 ENCOUNTER — OFFICE VISIT (OUTPATIENT)
Dept: INTERNAL MEDICINE | Facility: CLINIC | Age: 42
End: 2022-09-22
Payer: COMMERCIAL

## 2022-09-22 VITALS
WEIGHT: 203.6 LBS | TEMPERATURE: 98.3 F | RESPIRATION RATE: 16 BRPM | DIASTOLIC BLOOD PRESSURE: 80 MMHG | HEART RATE: 77 BPM | SYSTOLIC BLOOD PRESSURE: 120 MMHG | HEIGHT: 67 IN | OXYGEN SATURATION: 99 % | BODY MASS INDEX: 31.96 KG/M2

## 2022-09-22 DIAGNOSIS — G43.711 INTRACTABLE CHRONIC MIGRAINE WITHOUT AURA AND WITH STATUS MIGRAINOSUS: Primary | ICD-10-CM

## 2022-09-22 DIAGNOSIS — E66.811 CLASS 1 OBESITY WITH SERIOUS COMORBIDITY AND BODY MASS INDEX (BMI) OF 31.0 TO 31.9 IN ADULT, UNSPECIFIED OBESITY TYPE: ICD-10-CM

## 2022-09-22 PROCEDURE — 99213 OFFICE O/P EST LOW 20 MIN: CPT | Performed by: INTERNAL MEDICINE

## 2022-09-22 PROCEDURE — 3008F BODY MASS INDEX DOCD: CPT | Performed by: INTERNAL MEDICINE

## 2022-09-22 RX ORDER — ONDANSETRON 4 MG/1
4 TABLET, ORALLY DISINTEGRATING ORAL EVERY 8 HOURS PRN
COMMUNITY
End: 2023-12-22 | Stop reason: ALTCHOICE

## 2022-09-22 ASSESSMENT — ENCOUNTER SYMPTOMS
POLYPHAGIA: 0
DIFFICULTY URINATING: 0
ACTIVITY CHANGE: 0
HEADACHES: 1
APPETITE CHANGE: 0
VOMITING: 0
ABDOMINAL PAIN: 0
ARTHRALGIAS: 1
NAUSEA: 0
UNEXPECTED WEIGHT CHANGE: 0
NERVOUS/ANXIOUS: 0
POLYDIPSIA: 0
PALPITATIONS: 0
CONSTIPATION: 0
BLOOD IN STOOL: 0
SHORTNESS OF BREATH: 0
FREQUENCY: 0
DYSPHORIC MOOD: 0
FATIGUE: 0
DIARRHEA: 0
CHEST TIGHTNESS: 0
DIZZINESS: 0
SLEEP DISTURBANCE: 0
COUGH: 0

## 2022-09-22 ASSESSMENT — PATIENT HEALTH QUESTIONNAIRE - PHQ9: SUM OF ALL RESPONSES TO PHQ9 QUESTIONS 1 & 2: 0
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
## 2022-09-22 NOTE — ASSESSMENT & PLAN NOTE
Hold off on ozempic for 1 more day to discuss with neurology.  You can choose to stay on the 0.5mg or you can increase to 1.0mg.  Keep me posted.

## 2022-09-22 NOTE — PROGRESS NOTES
Patient ID: Andressa Baker is a 42 y.o. female.      Office visit (Ozempic follow up.)       Pt started with intractable headache on Friday.  Had covid booster and flu vaccine the day before.  She was also concerned that ozempic may be triggering HA.  Had infusion and is currently on steroids.  Has appt with ortho for her right knee.        Review of Systems   Constitutional: Negative for activity change, appetite change, fatigue and unexpected weight change.   HENT: Negative for dental problem and hearing loss.    Eyes: Negative for visual disturbance.   Respiratory: Negative for cough, chest tightness and shortness of breath.    Cardiovascular: Negative for chest pain, palpitations and leg swelling.   Gastrointestinal: Negative for abdominal pain, blood in stool, constipation, diarrhea, nausea and vomiting.   Endocrine: Negative for cold intolerance, heat intolerance, polydipsia, polyphagia and polyuria.   Genitourinary: Negative for difficulty urinating, frequency and urgency.   Musculoskeletal: Positive for arthralgias.   Skin: Negative for rash.   Neurological: Positive for headaches. Negative for dizziness.   Psychiatric/Behavioral: Negative for dysphoric mood and sleep disturbance. The patient is not nervous/anxious.          Current Outpatient Medications:     docosahexaenoic acid-epa 120-180 mg capsule, Take 1,000 mg by mouth., Disp: , Rfl:     ergocalciferol (VITAMIN D2) 50,000 unit(1250 mcg) capsule, Take 50,000 Units by mouth once a week., Disp: , Rfl:     fexofenadine (ALLEGRA) 180 mg tablet, Take 180 mg by mouth daily., Disp: , Rfl:     herbal drugs (CALMME ORAL), Take by mouth 3 (three) times a week (Mon, Wed, Fri)., Disp: , Rfl:     LORazepam (ATIVAN) 0.5 mg tablet, Take 1 tablet (0.5 mg total) by mouth every 8 (eight) hours as needed for anxiety. (Patient taking differently: Take 0.5 mg by mouth daily as needed for anxiety.), Disp: 20 tablet, Rfl: 0    naproxen (NAPROSYN) 500 mg  tablet, Take twice a day with meals for one month, then stop. (Patient not taking: Reported on 10/18/2022), Disp: 60 tablet, Rfl: 0    olopatadine (PATADAY) 0.2 % ophthalmic solution, Administer 1 drop into affected eye(s) daily., Disp: , Rfl:     semaglutide (OZEMPIC) 2 mg/dose (8 mg/3 mL), Inject 2 mg under the skin every (seven) 7 days. (Patient not taking: Reported on 11/3/2022), Disp: 3 mL, Rfl: 6    traZODone (DESYREL) 50 mg tablet, Take 25 mg by mouth nightly as needed.  , Disp: , Rfl:     ubrogepant (UBRELVY) 100 mg tablet tablet, Take 1 tablet (100 mg total) by mouth as needed for migraine.  mg at onset of migraine. May repeat  mg once 2 hours later. Max 200 mg/day., Disp: 16 tablet, Rfl: 11    dihydroergotamine (TRUDHESA) 0.725 mg/pump act. (4 mg/mL) spray,non-aerosol, Administer 1 spray into each nostril as needed (migraine). The dose may be repeated, if needed, a minimum of 1 hour after the first dose. Do not use more than 2 doses within a 24-hour period or 3 doses within 7 days., Disp: 4 mL, Rfl: 5    eletriptan (RELPAX) 40 mg tablet, 1 TAB AT ONSET OF SEVERE HEADACHE AS NEEDED. MAY REPEAT ONCE 2 HOURS LATER. MAXIMUM 2 TAB IN 24 HR., Disp: 12 tablet, Rfl: 11    metoclopramide (REGLAN) 10 mg tablet, Take 1 tablet (10 mg total) by mouth 3 (three) times a day as needed (nausea). (Patient not taking: Reported on 10/18/2022), Disp: 20 tablet, Rfl: 2    ondansetron ODT (ZOFRAN-ODT) 4 mg disintegrating tablet, Take 4 mg by mouth every 8 (eight) hours as needed for nausea or vomiting., Disp: , Rfl:     venlafaxine XR (EFFEXOR-XR) 37.5 mg 24 hr capsule, TAKE 1 CAPSULE BY MOUTH DAILY WITH FOOD FOR 2 WEEKS, THEN INCREASE TO 2 CAPSULE DAILY WITH FOOD, Disp: , Rfl:     No Known Allergies    Past Medical History:   Diagnosis Date    Abnormal ECG     PVCs    Arrhythmia 02/2018    PVCs, status post ablation, partially successful.    Back pain     GERD (gastroesophageal reflux disease)      Heartburn     History of fetal demise, not currently pregnant     Twin pregnancy, 1 fetal demise, 1 live birth    Joint pain     Migraine headache     Ovarian cyst     Palpitations     Pericarditis 2018    After john ablation       Past Surgical History:   Procedure Laterality Date    ABLATION OF DYSRHYTHMIC FOCUS  2018     SECTION         Family History   Problem Relation Age of Onset    Hyperlipidemia Biological Mother     Hyperlipidemia Biological Father     Hypertension Biological Father     Prostate cancer Biological Father 78    Dementia Biological Father     Other Biological Father         dief from covid 19    Hyperlipidemia Maternal Grandmother     Clotting disorder Maternal Grandfather         developed in 60's - required tansfusions/platelets    Hyperlipidemia Paternal Grandmother     Dementia Paternal Grandmother     Diabetes Paternal Grandfather     No Known Problems Biological Child        Social History     Socioeconomic History    Marital status:      Spouse name: Shashank RamosOlivia    Number of children: None    Years of education: None    Highest education level: None   Occupational History    Occupation: Homemaker   Tobacco Use    Smoking status: Never    Smokeless tobacco: Never   Vaping Use    Vaping Use: Never used   Substance and Sexual Activity    Alcohol use: Yes     Comment: occasionally    Drug use: No    Sexual activity: Yes     Birth control/protection: None     Comment:  with 5cPADMAJA azar   Social History Narrative    Exercises 2-3 times per week -  and she is trying to run again    Sleep sometimes interrupted    Eating plan: Patient attempts low glycemic index eating    No history of domestic violence    Lives with  and 5 children      [employer] -- medical legal work / part-time    Has a dog       Vitals:    22 0931   BP: 120/80   Pulse: 77   Resp: 16   Temp: 36.8 °C (98.3 °F)   SpO2: 99%      Body mass index is 31.89 kg/m².      Wt Readings from Last 3 Encounters:   11/03/22 93 kg (205 lb)   10/18/22 93 kg (205 lb)   09/26/22 92.1 kg (203 lb)     BP Readings from Last 3 Encounters:   11/03/22 114/74   10/18/22 100/60   09/22/22 120/80     Pulse Readings from Last 3 Encounters:   11/03/22 75   10/18/22 84   09/22/22 77         Physical Exam  Vitals reviewed.   HENT:      Head: Normocephalic and atraumatic.      Right Ear: External ear normal.      Left Ear: External ear normal.   Eyes:      Conjunctiva/sclera: Conjunctivae normal.      Pupils: Pupils are equal, round, and reactive to light.   Neck:      Thyroid: No thyroid mass or thyromegaly.      Vascular: No carotid bruit or JVD.      Trachea: No tracheal deviation.   Cardiovascular:      Rate and Rhythm: Normal rate and regular rhythm.      Heart sounds: Normal heart sounds. No murmur heard.    No friction rub. No gallop.   Pulmonary:      Effort: Pulmonary effort is normal. No respiratory distress.      Breath sounds: Normal breath sounds. No wheezing or rales.   Abdominal:      General: Bowel sounds are normal. There is no distension.      Palpations: Abdomen is soft.      Tenderness: There is no abdominal tenderness.   Musculoskeletal:         General: Normal range of motion.      Cervical back: Normal range of motion and neck supple.   Lymphadenopathy:      Cervical: No cervical adenopathy.   Skin:     General: Skin is warm and dry.   Neurological:      Mental Status: She is alert and oriented to person, place, and time.   Psychiatric:         Behavior: Behavior normal.         Thought Content: Thought content normal.         Assessment/Plan     Problem List Items Addressed This Visit     Class 1 obesity with serious comorbidity and body mass index (BMI) of 31.0 to 31.9 in adult     Hold off on ozempic for 1 more day to discuss with neurology.  You can choose to stay on the 0.5mg or you can increase to 1.0mg.  Keep me posted.          Intractable chronic migraine without aura and with status migrainosus - Primary     Continue current meds and follow up with neurology.              Written education and action steps suggested to the patient are documented in After Visit Summary / Patient Instructions sections of this encounter.

## 2022-09-23 ENCOUNTER — TELEMEDICINE (OUTPATIENT)
Dept: NEUROLOGY | Facility: CLINIC | Age: 42
End: 2022-09-23
Payer: COMMERCIAL

## 2022-09-23 DIAGNOSIS — G43.901 STATUS MIGRAINOSUS: Primary | ICD-10-CM

## 2022-09-23 PROCEDURE — 99213 OFFICE O/P EST LOW 20 MIN: CPT | Mod: 95 | Performed by: NURSE PRACTITIONER

## 2022-09-23 RX ORDER — DIVALPROEX SODIUM 250 MG/1
TABLET, DELAYED RELEASE ORAL
Qty: 20 TABLET | Refills: 1 | Status: SHIPPED | OUTPATIENT
Start: 2022-09-23 | End: 2022-10-18

## 2022-09-23 NOTE — PROGRESS NOTES
Verification of Patient Location:  The patient affirms they are currently located in the following state: [place]    Request for Consent:    Audio and Video Encounter   Laura, my name is BEKA Up.  Before we proceed, can you please verify your identification by telling me your full name and date of birth?  Can you tell me who is in the room with you?    You and I are about to have a telemedicine check-in or visit because you have requested it.  This is a live video-conference.  I am a real person, speaking to you in real time.  There is no one else with me on the video-conference.  However, when we use ([employer], yeppt, etc) it is important for you to know that the video-conference may not be secure or private.  I am not recording this conversation and I am asking you not to record it.  This telemedicine visit will be billed to your health insurance or you, if you are self-insured.  You understand you will be responsible for any copayments or coinsurances that apply to your telemedicine visit.  Communication platform used for this encounter:  [employer] Video Visit (no Zoom)     Before starting our telemedicine visit, I am required to get your consent for this virtual check-in or visit by telemedicine. Do you consent?      Patient Response to Request for Consent:  Yes

## 2022-09-26 ENCOUNTER — OFFICE VISIT (OUTPATIENT)
Dept: NEUROLOGY | Facility: CLINIC | Age: 42
End: 2022-09-26
Payer: COMMERCIAL

## 2022-09-26 VITALS — BODY MASS INDEX: 31.86 KG/M2 | HEIGHT: 67 IN | WEIGHT: 203 LBS

## 2022-09-26 DIAGNOSIS — G89.29 OTHER CHRONIC PAIN: ICD-10-CM

## 2022-09-26 DIAGNOSIS — G43.901 STATUS MIGRAINOSUS: Primary | ICD-10-CM

## 2022-09-26 PROCEDURE — 99999 PR OFFICE/OUTPT VISIT,PROCEDURE ONLY: CPT | Performed by: NURSE PRACTITIONER

## 2022-09-26 PROCEDURE — 200200 PR NO CHARGE: Performed by: NURSE PRACTITIONER

## 2022-09-26 PROCEDURE — 64450 NJX AA&/STRD OTHER PN/BRANCH: CPT | Mod: 50,XU | Performed by: NURSE PRACTITIONER

## 2022-09-26 PROCEDURE — 64400 NJX AA&/STRD TRIGEMINAL NRV: CPT | Mod: 50,XU | Performed by: NURSE PRACTITIONER

## 2022-09-26 PROCEDURE — 20553 NJX 1/MLT TRIGGER POINTS 3/>: CPT | Performed by: NURSE PRACTITIONER

## 2022-09-26 PROCEDURE — 64405 NJX AA&/STRD GR OCPL NRV: CPT | Mod: 50,XU | Performed by: NURSE PRACTITIONER

## 2022-09-26 RX ORDER — LIDOCAINE HYDROCHLORIDE 20 MG/ML
11 INJECTION, SOLUTION INFILTRATION; PERINEURAL ONCE
Status: COMPLETED | OUTPATIENT
Start: 2022-09-26 | End: 2022-09-26

## 2022-09-26 RX ADMIN — LIDOCAINE HYDROCHLORIDE 11 ML: 20 INJECTION, SOLUTION INFILTRATION; PERINEURAL at 12:46

## 2022-09-26 NOTE — PROGRESS NOTES
Her headache remains refractory.     Medications tried since her telemed visit:   - Saturday: dexamethasone 2 mg x 2, 2 Depakote  - Sunday: dexamethasone 2 mg x 3, 2 Depakote, Ubrelvy   - This morning: dexamethasone 2 mg.     She underwent lidocaine injections today.     See procedure note.

## 2022-09-26 NOTE — PROCEDURES
Procedures     Nerve Blocks and Trigger Points Procedure Note:  Indication:    Diagnosis Plan   1. Status migrainosus  lidocaine (XYLOCAINE) 20 mg/mL (2 %) injection 11 mL   2. Other chronic pain          I explained the risks and benefits and obtained written consent from Andressa Baker. Signed consent form will be scanned to patient's electronic medical records.     Lidocaine 2% without epinephrine was drawn in a sterile syringe.     Lot number and expiration date documented in informed consent.     I performed a time-out, including 2 unique patient identifiers.     I located the areas of maximal tenderness corresponding to each nerve or trigger point, and cleaned with alcohol swabs.     I used a 30 gauge 1/2 inch needle to inject lidocaine over the following:        Right Greater Occipital Nerve 2 cc   Left Greater Occipital Nerve 2 cc   Right Lesser Occipital Nerve 1 cc   Left Lesser Occipital Nerve 1 cc     Right Auriculotemporal Nerve 1 cc    Left Auriculotemporal Nerve 1 cc    Right Supraorbital Nerve 0.25 cc   Left Supraorbital Nerve 0.25 cc   Right Supratrochlear Nerve 0.25 cc   Left Supratrochlear Nerve 0.25 cc     Right paraspinal muscle trigger point injections 0.5 cc   Left paraspinal muscle trigger point injections 0.5 cc   Right trapezius muscle trigger point injections 0.5 cc   Left trapezius muscle trigger point injections 0.5 cc     Total of 11 cc injected.      The procedure was well tolerated and there were no complications.         BEKA Up  [facility]

## 2022-10-18 ENCOUNTER — OFFICE VISIT (OUTPATIENT)
Dept: INTERNAL MEDICINE | Facility: CLINIC | Age: 42
End: 2022-10-18
Payer: COMMERCIAL

## 2022-10-18 VITALS
RESPIRATION RATE: 16 BRPM | OXYGEN SATURATION: 99 % | HEART RATE: 84 BPM | SYSTOLIC BLOOD PRESSURE: 100 MMHG | WEIGHT: 205 LBS | HEIGHT: 67 IN | DIASTOLIC BLOOD PRESSURE: 60 MMHG | TEMPERATURE: 98.4 F | BODY MASS INDEX: 32.18 KG/M2

## 2022-10-18 DIAGNOSIS — M25.561 CHRONIC PAIN OF RIGHT KNEE: Primary | ICD-10-CM

## 2022-10-18 DIAGNOSIS — G89.29 CHRONIC PAIN OF RIGHT KNEE: Primary | ICD-10-CM

## 2022-10-18 DIAGNOSIS — F41.9 ANXIETY: ICD-10-CM

## 2022-10-18 DIAGNOSIS — G43.909 MIGRAINE WITHOUT STATUS MIGRAINOSUS, NOT INTRACTABLE, UNSPECIFIED MIGRAINE TYPE: ICD-10-CM

## 2022-10-18 PROCEDURE — 99213 OFFICE O/P EST LOW 20 MIN: CPT | Performed by: INTERNAL MEDICINE

## 2022-10-18 PROCEDURE — 3008F BODY MASS INDEX DOCD: CPT | Performed by: INTERNAL MEDICINE

## 2022-10-18 RX ORDER — VENLAFAXINE HYDROCHLORIDE 37.5 MG/1
37.5 CAPSULE, EXTENDED RELEASE ORAL DAILY
COMMUNITY
Start: 2022-10-11 | End: 2023-05-10

## 2022-10-18 ASSESSMENT — ENCOUNTER SYMPTOMS
FATIGUE: 0
UNEXPECTED WEIGHT CHANGE: 0
NERVOUS/ANXIOUS: 0
DIFFICULTY URINATING: 0
COUGH: 0
DIZZINESS: 0
VOMITING: 0
CONSTIPATION: 0
POLYDIPSIA: 0
ABDOMINAL PAIN: 0
APPETITE CHANGE: 0
HEADACHES: 1
ACTIVITY CHANGE: 0
DYSPHORIC MOOD: 0
SHORTNESS OF BREATH: 0
SLEEP DISTURBANCE: 0
NAUSEA: 0
BLOOD IN STOOL: 0
PALPITATIONS: 0
DIARRHEA: 0
FREQUENCY: 0
POLYPHAGIA: 0
CHEST TIGHTNESS: 0

## 2022-10-18 ASSESSMENT — PATIENT HEALTH QUESTIONNAIRE - PHQ9: SUM OF ALL RESPONSES TO PHQ9 QUESTIONS 1 & 2: 0

## 2022-10-18 ASSESSMENT — PAIN SCALES - GENERAL: PAINLEVEL: 2

## 2022-10-18 NOTE — PATIENT INSTRUCTIONS
Problem List Items Addressed This Visit       Anxiety     Mental Health Providers for Therapy & Psychiatry     -- [facility] [phone]  -- [facility] [phone]  -- [facility] [phone]  -- [facility] [phone]   -- [facility] [phone]  -- [facility] [phone]  -- [facility] [phone]              Migraine headache     Follow up with Dr. Lewis before your knee procedure.         Relevant Medications    venlafaxine XR (EFFEXOR-XR) 37.5 mg 24 hr capsule    Chronic pain of right knee - Primary     Acceptable risk for this procedure.

## 2022-10-18 NOTE — ASSESSMENT & PLAN NOTE
Mental Health Providers for Therapy & Psychiatry     -- [facility] [phone]  -- [facility] [phone]  -- [facility] [phone]  -- [facility] [phone]   -- [facility] [phone]  -- [facility] [phone]  -- [facility] [phone]

## 2022-10-18 NOTE — PROGRESS NOTES
Patient ID: Andressa Baker is a 42 y.o. female.      Follow-up (3 Month.)       HPI    Review of Systems   Constitutional: Negative for activity change, appetite change, fatigue and unexpected weight change.   HENT: Negative for dental problem and hearing loss.    Eyes: Negative for visual disturbance.   Respiratory: Negative for cough, chest tightness and shortness of breath.    Cardiovascular: Negative for chest pain, palpitations and leg swelling.   Gastrointestinal: Negative for abdominal pain, blood in stool, constipation, diarrhea, nausea and vomiting.   Endocrine: Negative for cold intolerance, heat intolerance, polydipsia, polyphagia and polyuria.   Genitourinary: Negative for difficulty urinating, frequency and urgency.   Skin: Negative for rash.   Neurological: Positive for headaches. Negative for dizziness.   Psychiatric/Behavioral: Negative for dysphoric mood and sleep disturbance. The patient is not nervous/anxious.          Current Outpatient Medications:     dihydroergotamine (TRUDHESA) 0.725 mg/pump act. (4 mg/mL) spray,non-aerosol, Administer 1 spray into each nostril as needed (migraine). The dose may be repeated, if needed, a minimum of 1 hour after the first dose. Do not use more than 2 doses within a 24-hour period or 3 doses within 7 days., Disp: 4 mL, Rfl: 5    eletriptan (RELPAX) 40 mg tablet, 1 tab at onset of severe headache as needed. May repeat once 2 hours later. Maximum 2 tab in 24 hr., Disp: 12 tablet, Rfl: 2    fexofenadine (ALLEGRA) 180 mg tablet, Take 180 mg by mouth daily., Disp: , Rfl:     herbal drugs (CALMME ORAL), Take by mouth 3 (three) times a week (Mon, Wed, Fri)., Disp: , Rfl:     LORazepam (ATIVAN) 0.5 mg tablet, Take 1 tablet (0.5 mg total) by mouth every 8 (eight) hours as needed for anxiety. (Patient taking differently: Take 0.5 mg by mouth daily as needed for anxiety.), Disp: 20 tablet, Rfl: 0    olopatadine (PATADAY) 0.2 % ophthalmic solution,  Administer 1 drop into affected eye(s) daily., Disp: , Rfl:     ondansetron ODT (ZOFRAN-ODT) 4 mg disintegrating tablet, Take 4 mg by mouth every 8 (eight) hours as needed for nausea or vomiting., Disp: , Rfl:     semaglutide, Inject 1 mg under the skin every (seven) 7 days. Increase to 2mg after 4 weeks., Disp: 3 mL, Rfl: 3    traZODone (DESYREL) 50 mg tablet, Take 25 mg by mouth nightly as needed.  , Disp: , Rfl:     ubrogepant (UBRELVY) 100 mg tablet tablet, Take 1 tablet (100 mg total) by mouth as needed for migraine.  mg at onset of migraine. May repeat  mg once 2 hours later. Max 200 mg/day., Disp: 16 tablet, Rfl: 11    venlafaxine XR (EFFEXOR-XR) 37.5 mg 24 hr capsule, TAKE 1 CAPSULE BY MOUTH DAILY WITH FOOD FOR 2 WEEKS, THEN INCREASE TO 2 CAPSULE DAILY WITH FOOD, Disp: , Rfl:     docosahexaenoic acid-epa 120-180 mg capsule, Take 1,000 mg by mouth., Disp: , Rfl:     ergocalciferol (VITAMIN D2) 50,000 unit(1250 mcg) capsule, Take 50,000 Units by mouth once a week., Disp: , Rfl:     metoclopramide (REGLAN) 10 mg tablet, Take 1 tablet (10 mg total) by mouth 3 (three) times a day as needed (nausea). (Patient not taking: Reported on 10/18/2022), Disp: 20 tablet, Rfl: 2    naproxen (NAPROSYN) 500 mg tablet, Take twice a day with meals for one month, then stop. (Patient not taking: Reported on 10/18/2022), Disp: 60 tablet, Rfl: 0    No Known Allergies    Past Medical History:   Diagnosis Date    Abnormal ECG     PVCs    Arrhythmia 02/2018    PVCs, status post ablation, partially successful.    Back pain     GERD (gastroesophageal reflux disease)     Heartburn     History of fetal demise, not currently pregnant     Twin pregnancy, 1 fetal demise, 1 live birth    Joint pain     Migraine headache     Ovarian cyst     Palpitations     Pericarditis 03/06/2018    After john ablation       Past Surgical History:   Procedure Laterality Date    ABLATION OF DYSRHYTHMIC FOCUS  03/2018      SECTION         Family History   Problem Relation Age of Onset    Hyperlipidemia Biological Mother     Hyperlipidemia Biological Father     Hypertension Biological Father     Prostate cancer Biological Father 78    Dementia Biological Father     Other Biological Father         dief from covid 19    Hyperlipidemia Maternal Grandmother     Clotting disorder Maternal Grandfather         developed in 60's - required tansfusions/platelets    Hyperlipidemia Paternal Grandmother     Dementia Paternal Grandmother     Diabetes Paternal Grandfather     No Known Problems Biological Child        Social History     Socioeconomic History    Marital status:      Spouse name: Shashank Baker    Number of children: None    Years of education: None    Highest education level: None   Occupational History    Occupation: Homemaker   Tobacco Use    Smoking status: Never    Smokeless tobacco: Never   Vaping Use    Vaping Use: Never used   Substance and Sexual Activity    Alcohol use: Yes     Comment: occasionally    Drug use: No    Sexual activity: Yes     Birth control/protection: None     Comment:  with 5cPADMAJA azar   Social History Narrative    Exercises 2-3 times per week -  and she is trying to run again    Sleep sometimes interrupted    Eating plan: Patient attempts low glycemic index eating    No history of domestic violence    Lives with  and 5 children      [employer] -- medical legal work / part-time    Has a dog       Vitals:    10/18/22 0948   BP: 100/60   Pulse: 84   Resp: 16   Temp: 36.9 °C (98.4 °F)   SpO2: 99%     Body mass index is 32.11 kg/m².      Wt Readings from Last 3 Encounters:   10/18/22 93 kg (205 lb)   22 92.1 kg (203 lb)   22 92.4 kg (203 lb 9.6 oz)     BP Readings from Last 3 Encounters:   10/18/22 100/60   22 120/80   22 (!) 141/96     Pulse Readings from Last 3 Encounters:   10/18/22 84   22 77   22 71          Physical Exam  Vitals reviewed.   HENT:      Head: Normocephalic and atraumatic.      Right Ear: External ear normal.      Left Ear: External ear normal.   Eyes:      Conjunctiva/sclera: Conjunctivae normal.      Pupils: Pupils are equal, round, and reactive to light.   Neck:      Thyroid: No thyroid mass or thyromegaly.      Vascular: No carotid bruit or JVD.      Trachea: No tracheal deviation.   Cardiovascular:      Rate and Rhythm: Normal rate and regular rhythm.      Heart sounds: Normal heart sounds. No murmur heard.    No friction rub. No gallop.   Pulmonary:      Effort: Pulmonary effort is normal. No respiratory distress.      Breath sounds: Normal breath sounds. No wheezing or rales.   Abdominal:      General: Bowel sounds are normal. There is no distension.      Palpations: Abdomen is soft.      Tenderness: There is no abdominal tenderness.   Musculoskeletal:         General: Normal range of motion.      Cervical back: Normal range of motion and neck supple.   Lymphadenopathy:      Cervical: No cervical adenopathy.   Skin:     General: Skin is warm and dry.   Neurological:      Mental Status: She is alert and oriented to person, place, and time.   Psychiatric:         Behavior: Behavior normal.         Thought Content: Thought content normal.         Assessment/Plan     Problem List Items Addressed This Visit     Anxiety     Mental Health Providers for Therapy & Psychiatry     -- [facility] [phone]  -- [facility] [phone]  -- [facility] [phone]  -- [facility] [phone]   -- [facility] [phone]  -- [facility] [phone]  -- [facility] [phone]              Migraine headache     Follow up with Dr. Lewis before your knee procedure.         Relevant Medications    venlafaxine XR (EFFEXOR-XR) 37.5 mg 24 hr capsule    Chronic pain of right knee -  Primary     Acceptable risk for this procedure.              Written education and action steps suggested to the patient are documented in After Visit Summary / Patient Instructions sections of this encounter.

## 2022-10-19 ENCOUNTER — DOCUMENTATION (OUTPATIENT)
Dept: INTERNAL MEDICINE | Facility: CLINIC | Age: 42
End: 2022-10-19
Payer: COMMERCIAL

## 2022-10-24 ENCOUNTER — DOCUMENTATION (OUTPATIENT)
Dept: INTERNAL MEDICINE | Facility: CLINIC | Age: 42
End: 2022-10-24
Payer: COMMERCIAL

## 2022-10-31 ENCOUNTER — TELEPHONE (OUTPATIENT)
Dept: SCHEDULING | Facility: CLINIC | Age: 42
End: 2022-10-31
Payer: COMMERCIAL

## 2022-10-31 NOTE — TELEPHONE ENCOUNTER
Renee laws from [facility] requesting cc addendum    Surgery: Knee R scope  Surgery date: 11/4  Surgeon:Dr. Kang Kelly  Tel: [phone]  Fax:[phone]    Pt last seen 2/22

## 2022-10-31 NOTE — TELEPHONE ENCOUNTER
EP Provider team:  Please call the patient. She has history of VPD's , s/p post ablation. Ok to clear her if she remains stable.

## 2022-10-31 NOTE — TELEPHONE ENCOUNTER
I spoke with patient.  She states that her health has been stable with no new cardiac complaints.  I will send cardiac clearance.

## 2022-11-02 ENCOUNTER — TRANSCRIBE ORDERS (OUTPATIENT)
Dept: LAB | Age: 42
End: 2022-11-02

## 2022-11-02 ENCOUNTER — APPOINTMENT (OUTPATIENT)
Dept: LAB | Age: 42
End: 2022-11-02
Attending: SPECIALIST
Payer: COMMERCIAL

## 2022-11-02 DIAGNOSIS — Z01.818 ENCOUNTER FOR OTHER PREPROCEDURAL EXAMINATION: Primary | ICD-10-CM

## 2022-11-02 DIAGNOSIS — Z01.818 ENCOUNTER FOR OTHER PREPROCEDURAL EXAMINATION: ICD-10-CM

## 2022-11-02 LAB
ANION GAP SERPL CALC-SCNC: 7 MEQ/L (ref 3–15)
BUN SERPL-MCNC: 11 MG/DL (ref 8–20)
CALCIUM SERPL-MCNC: 9.5 MG/DL (ref 8.9–10.3)
CHLORIDE SERPL-SCNC: 101 MEQ/L (ref 98–109)
CO2 SERPL-SCNC: 26 MEQ/L (ref 22–32)
CREAT SERPL-MCNC: 0.8 MG/DL (ref 0.6–1.1)
ERYTHROCYTE [DISTWIDTH] IN BLOOD BY AUTOMATED COUNT: 13.3 % (ref 11.7–14.4)
GFR SERPL CREATININE-BSD FRML MDRD: >60 ML/MIN/1.73M*2
GLUCOSE SERPL-MCNC: 84 MG/DL (ref 70–99)
HCG UR QL: NEGATIVE
HCT VFR BLDCO AUTO: 42.9 % (ref 35–45)
HGB BLD-MCNC: 14 G/DL (ref 11.8–15.7)
MCH RBC QN AUTO: 29.3 PG (ref 28–33.2)
MCHC RBC AUTO-ENTMCNC: 32.6 G/DL (ref 32.2–35.5)
MCV RBC AUTO: 89.7 FL (ref 83–98)
PDW BLD AUTO: 12.4 FL (ref 9.4–12.3)
PLATELET # BLD AUTO: 180 K/UL (ref 150–369)
POTASSIUM SERPL-SCNC: 4.3 MEQ/L (ref 3.6–5.1)
RBC # BLD AUTO: 4.78 M/UL (ref 3.93–5.22)
SODIUM SERPL-SCNC: 134 MEQ/L (ref 136–144)
WBC # BLD AUTO: 6.45 K/UL (ref 3.8–10.5)

## 2022-11-02 PROCEDURE — U0003 INFECTIOUS AGENT DETECTION BY NUCLEIC ACID (DNA OR RNA); SEVERE ACUTE RESPIRATORY SYNDROME CORONAVIRUS 2 (SARS-COV-2) (CORONAVIRUS DISEASE [COVID-19]), AMPLIFIED PROBE TECHNIQUE, MAKING USE OF HIGH THROUGHPUT TECHNOLOGIES AS DESCRIBED BY CMS-2020-01-R: HCPCS

## 2022-11-02 PROCEDURE — 84703 CHORIONIC GONADOTROPIN ASSAY: CPT

## 2022-11-02 PROCEDURE — 85027 COMPLETE CBC AUTOMATED: CPT

## 2022-11-02 PROCEDURE — 36415 COLL VENOUS BLD VENIPUNCTURE: CPT

## 2022-11-02 PROCEDURE — 80048 BASIC METABOLIC PNL TOTAL CA: CPT

## 2022-11-03 ENCOUNTER — TELEPHONE (OUTPATIENT)
Dept: SCHEDULING | Facility: CLINIC | Age: 42
End: 2022-11-03
Payer: COMMERCIAL

## 2022-11-03 ENCOUNTER — OFFICE VISIT (OUTPATIENT)
Dept: NEUROLOGY | Facility: CLINIC | Age: 42
End: 2022-11-03
Payer: COMMERCIAL

## 2022-11-03 ENCOUNTER — DOCUMENTATION (OUTPATIENT)
Dept: INTERNAL MEDICINE | Facility: CLINIC | Age: 42
End: 2022-11-03

## 2022-11-03 ENCOUNTER — APPOINTMENT (OUTPATIENT)
Dept: INTERNAL MEDICINE | Facility: CLINIC | Age: 42
End: 2022-11-03
Payer: COMMERCIAL

## 2022-11-03 VITALS
DIASTOLIC BLOOD PRESSURE: 74 MMHG | SYSTOLIC BLOOD PRESSURE: 114 MMHG | HEART RATE: 75 BPM | OXYGEN SATURATION: 98 % | RESPIRATION RATE: 16 BRPM | WEIGHT: 205 LBS | HEIGHT: 67 IN | BODY MASS INDEX: 32.18 KG/M2

## 2022-11-03 DIAGNOSIS — Z01.818 PREOPERATIVE CLEARANCE: Primary | ICD-10-CM

## 2022-11-03 DIAGNOSIS — G43.711 INTRACTABLE CHRONIC MIGRAINE WITHOUT AURA AND WITH STATUS MIGRAINOSUS: Primary | ICD-10-CM

## 2022-11-03 DIAGNOSIS — M54.2 CERVICALGIA: ICD-10-CM

## 2022-11-03 DIAGNOSIS — Z01.818 PRE-OP TESTING: ICD-10-CM

## 2022-11-03 DIAGNOSIS — Z01.818 PRE-OPERATIVE CLEARANCE: Primary | ICD-10-CM

## 2022-11-03 DIAGNOSIS — Z01.818 PRE-OP EXAM: Primary | ICD-10-CM

## 2022-11-03 DIAGNOSIS — Z01.818 PREOP EXAMINATION: Primary | ICD-10-CM

## 2022-11-03 LAB — SARS-COV-2 RNA RESP QL NAA+PROBE: NEGATIVE

## 2022-11-03 PROCEDURE — 64615 CHEMODENERV MUSC MIGRAINE: CPT | Performed by: PSYCHIATRY & NEUROLOGY

## 2022-11-03 PROCEDURE — 3008F BODY MASS INDEX DOCD: CPT | Performed by: PSYCHIATRY & NEUROLOGY

## 2022-11-03 PROCEDURE — 99215 OFFICE O/P EST HI 40 MIN: CPT | Mod: 25 | Performed by: PSYCHIATRY & NEUROLOGY
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
## 2022-11-03 NOTE — PATIENT INSTRUCTIONS
Treatment plan:      1. Healthy Habits: The following recommendations can greatly reduce the number and severity of headaches.  Maintain regular sleep hours and get sufficient sleep (8-9 hours)  Do not skip meals, especially breakfast  Drink at least 64 oz or 8 cups of water daily - enough to urinate 5-6 times a day  Get at least 30 minutes of daily aerobic exercise (enough to increase your heart rate and sweat)    Keep track of headaches and use of acute medication (prescription or over-the-counter) in a calendar or notebook and bring that to your clinic visits.    2. Acute Treatment:   Continue with Ubrelvy 100 mg as needed at onset of moderate to severe headache. May repeat 100 mg x 1 after 2 hours. Maximum 2 doses in 24 hours. This will have to be stopped prior to trying to conceive again. I discussed this with Andressa.    For rescue treatment if Ubrelvy doesn't help or as alternative to Ubrelvy: Eletriptan 40 mg 1 tab as needed at onset of moderate to severe headache. May repeat 1 tablet 2 hours later. Maximum 2 tablets in 24 hr. Limit to 2 days/week.     Third line acute or rescue treatment: Dose: TRUDHESA 1.45 mg (administered as one metered spray of 0.725 mg into each nostril). The dose may be repeated, if needed, a minimum of 1 hour after the first dose. Do not use more than 2 doses within a 24-hour period or 3 doses within 7 days. Do not use within 24 hours of eletriptan.  Directions: TRUDHESA is for nasal administration only. Assemble and prime (i.e., pumped 4 times) before use. Use TRUDHESA immediately after priming and then discard. https://www.trudhesahcp.com/how-to-use/    Take Reglan 10 mg 15-30 min before the nasal spray to prevent nausea.    Future consideration: Nurtec or other triptans.    Celecoxib oral solution, Lasmiditan, or Sprix nasal spray can be tried for rescue treatment if needed in the future.     We may add an antinausea medication if needed for nausea or as co-adjuvant if monotherapy  is not sufficient.      3. Preventive Treatment:     Increased the dose Botox 195 units every 12 weeks.      Continue with magnesium glycinate 400 mg daily. May increase the dose in the future.    Continue with Effexor 75 mg from psychiatrist.     Future consideration: add riboflavin or atogepant to limit risk of weight gain with other medications.    Other nutraceutical options include: riboflavin, melatonin, feverfew, and coenzyme Q10.     Other options for oral preventive medications include: amitriptyline or nortriptyline, zonisamide, atogepant, rimegepant, valproate, verapamil, gabapentin, pregabalin, duloxetine, candesartan, Namenda, escitalopram, venlafaxine, and levetiracetam. Want to avoid weight gain.    Other procedures that can be used to prevent headaches include: nerve blocks and trigger point injections. We recommend to obtain prior authorization from insurance when considering these.     Anti CGRP monoclonal antibodies (Aimovig, Ajovy, Emgality, and Vyepti) are a group of biological injectable treatments approved for prevention of migraine. These have to be stopped 6 months prior to conception.     Several non-invasive neuromodulation devices (gammaCore, Cefaly and Nerivio) are available to treat headaches preventively and/or acutely. These are typically not covered by insurance, but the companies have a trial period and will refund you if the device is ineffective and returned within that period.     Follow-up in the Headache Clinic in 12 weeks for the next Botox treatment.
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
## 2022-11-03 NOTE — PROCEDURES
Procedures  Procedure Note for Botox Injections for Headache:    Indication for procedure:    Diagnosis Plan   1. Intractable chronic migraine without aura and with status migrainosus  onabotulinumtoxinA (BOTOX) injection 200 Units          I explained the risks and benefits and obtained consent from Andressa.  Onabotulinumtoxin A 200 U were diluted in 4 mL of saline.    Lot number and expiration date documented in informed consent and MAR.  I performed a time-out, including 2 unique patient identifiers with Andressa.  Using a 30 gauge 1/2 inch needle, I injected 0.1mL = 5 U into each site according to the Chronic Migraine Protocol (PREEMPT Studies).   The following table reports the number of sites injected in each muscle.     Muscle Midline Right Left   Procerus 1          1 1   Frontalis   2 2   Temporalis   4 (10+5+5+5 U) 4 (10+5+5+5 U)   Occipitalis   3 (10+5+5 U) 3 (10+5+5 U)   Cervical Paraspinal   2 2   Trapezius   3 (10+10+5 U) 3 (10+10+5 U)   Other:            195 Units were injected and 5 Units were wasted.     Andressa tolerated the procedure well, without complications. She was instructed that she could experience increased pain in the next 2-3 days, which should be treated with ice and anti-inflammatory medications.      We appreciate the opportunity to participate in the care of Andressa ROSA Baker.     Please, do not hesitate to call me at [phone] if you have any questions or concerns.        Theresa Lewis MD  [facility]  [phone]

## 2022-11-03 NOTE — TELEPHONE ENCOUNTER
Pt requesting to have an EKG script generated for he as soon as possible.    Pt stated that she is currently on her way to [facility] to complete the EKG needed for her procedure tomorrow.    Best contact: [phone] (home)   ]

## 2022-12-01 ENCOUNTER — DOCUMENTATION (OUTPATIENT)
Dept: INTERNAL MEDICINE | Facility: CLINIC | Age: 42
End: 2022-12-01
Payer: COMMERCIAL

## 2022-12-01 DIAGNOSIS — G43.909 MIGRAINE WITHOUT STATUS MIGRAINOSUS, NOT INTRACTABLE, UNSPECIFIED MIGRAINE TYPE: Primary | ICD-10-CM

## 2022-12-27 ENCOUNTER — OFFICE VISIT (OUTPATIENT)
Dept: NEUROLOGY | Facility: CLINIC | Age: 42
End: 2022-12-27
Payer: COMMERCIAL

## 2022-12-27 VITALS
RESPIRATION RATE: 16 BRPM | BODY MASS INDEX: 32.18 KG/M2 | WEIGHT: 205 LBS | HEART RATE: 83 BPM | DIASTOLIC BLOOD PRESSURE: 78 MMHG | SYSTOLIC BLOOD PRESSURE: 112 MMHG | HEIGHT: 67 IN | OXYGEN SATURATION: 98 %

## 2022-12-27 DIAGNOSIS — G89.29 OTHER CHRONIC PAIN: ICD-10-CM

## 2022-12-27 DIAGNOSIS — G43.901 STATUS MIGRAINOSUS: Primary | ICD-10-CM

## 2022-12-27 PROCEDURE — 20553 NJX 1/MLT TRIGGER POINTS 3/>: CPT | Performed by: NURSE PRACTITIONER

## 2022-12-27 PROCEDURE — 64450 NJX AA&/STRD OTHER PN/BRANCH: CPT | Mod: 50,XU | Performed by: NURSE PRACTITIONER

## 2022-12-27 PROCEDURE — 64405 NJX AA&/STRD GR OCPL NRV: CPT | Mod: 50,XU | Performed by: NURSE PRACTITIONER

## 2022-12-27 PROCEDURE — 64400 NJX AA&/STRD TRIGEMINAL NRV: CPT | Mod: XU | Performed by: NURSE PRACTITIONER

## 2022-12-27 PROCEDURE — 3008F BODY MASS INDEX DOCD: CPT | Performed by: NURSE PRACTITIONER

## 2022-12-27 PROCEDURE — 200200 PR NO CHARGE: Performed by: NURSE PRACTITIONER

## 2022-12-27 PROCEDURE — 99213 OFFICE O/P EST LOW 20 MIN: CPT | Mod: 25 | Performed by: NURSE PRACTITIONER

## 2022-12-27 RX ORDER — LIDOCAINE HYDROCHLORIDE 20 MG/ML
10.5 INJECTION, SOLUTION INFILTRATION; PERINEURAL ONCE
Status: COMPLETED | OUTPATIENT
Start: 2022-12-27 | End: 2022-12-27

## 2022-12-27 RX ADMIN — LIDOCAINE HYDROCHLORIDE 10.5 ML: 20 INJECTION, SOLUTION INFILTRATION; PERINEURAL at 16:41

## 2022-12-27 ASSESSMENT — PAIN SCALES - GENERAL: PAINLEVEL: 7

## 2022-12-27 NOTE — PROCEDURES
Procedures     Nerve Blocks and Trigger Points Procedure Note:  Indication:    Diagnosis Plan   1. Status migrainosus  lidocaine (XYLOCAINE) 20 mg/mL (2 %) injection 10.5 mL      2. Other chronic pain             I explained the risks and benefits and obtained written consent from Andressa Baker. Signed consent form will be scanned to patient's electronic medical records.     Lidocaine 2% without epinephrine was drawn in a sterile syringe.     Lot number and expiration date documented in informed consent.     I performed a time-out, including 2 unique patient identifiers.     I located the areas of maximal tenderness corresponding to each nerve or trigger point, and cleaned with alcohol swabs.     I used a 30 gauge 1/2 inch needle to inject lidocaine over the following:        Right Greater Occipital Nerve 2 cc   Left Greater Occipital Nerve 2 cc   Right Lesser Occipital Nerve 1 cc   Left Lesser Occipital Nerve 1 cc     Right Auriculotemporal Nerve 1 cc    Left Auriculotemporal Nerve 1 cc    Right Supraorbital Nerve 0.25 cc   Left Supraorbital Nerve 0.25 cc   Right Supratrochlear Nerve 0.25 cc   Left Supratrochlear Nerve 0.25 cc     Right paraspinal muscle trigger point injections 0.50 cc   Right trapezius muscle trigger point injections 0.50 cc   Left trapezius muscle trigger point injections 0.50 cc     Total of 10.5 cc injected.     The procedure was well tolerated and there were no complications.         BEKA Up  [facility]

## 2023-01-17 NOTE — PROGRESS NOTES
Botox visit     Last Botox: 11/3/2022   Time interval: every 12 weeks     Botox treatment administered today.   195 Units.  See procedure note.

## 2023-01-23 ENCOUNTER — TELEMEDICINE (OUTPATIENT)
Dept: NEUROLOGY | Facility: CLINIC | Age: 43
End: 2023-01-23
Payer: COMMERCIAL

## 2023-01-23 DIAGNOSIS — M54.2 CERVICALGIA: ICD-10-CM

## 2023-01-23 DIAGNOSIS — G43.711 INTRACTABLE CHRONIC MIGRAINE WITHOUT AURA AND WITH STATUS MIGRAINOSUS: Primary | ICD-10-CM

## 2023-01-23 DIAGNOSIS — G43.901 STATUS MIGRAINOSUS: ICD-10-CM

## 2023-01-23 PROCEDURE — 99215 OFFICE O/P EST HI 40 MIN: CPT | Mod: 95 | Performed by: PSYCHIATRY & NEUROLOGY

## 2023-01-23 RX ORDER — ATOGEPANT 30 MG/1
30 TABLET ORAL DAILY
Qty: 60 TABLET | Refills: 3 | Status: SHIPPED | OUTPATIENT
Start: 2023-01-23 | End: 2023-02-13
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
## 2023-01-23 NOTE — PATIENT INSTRUCTIONS
Treatment plan:      1. Healthy Habits: The following recommendations can greatly reduce the number and severity of headaches.  Maintain regular sleep hours and get sufficient sleep (8-9 hours)  Do not skip meals, especially breakfast  Drink at least 64 oz or 8 cups of water daily - enough to urinate 5-6 times a day  Get at least 30 minutes of daily aerobic exercise (enough to increase your heart rate and sweat)    Keep track of headaches and use of acute medication (prescription or over-the-counter) in a calendar or notebook and bring that to your clinic visits.    2. Acute Treatment:   Continue with Ubrelvy 100 mg as needed at onset of moderate to severe headache. May repeat 100 mg x 1 after 2 hours. Maximum 2 doses in 24 hours. This will have to be stopped prior to trying to conceive again. I discussed this with Andressa.    For rescue treatment if Ubrelvy doesn't help or as alternative to Ubrelvy: Eletriptan 40 mg 1 tab as needed at onset of moderate to severe headache. May repeat 1 tablet 2 hours later. Maximum 2 tablets in 24 hr. Limit to 2 days/week.     Third line acute or rescue treatment: Dose: TRUDHESA 1.45 mg (administered as one metered spray of 0.725 mg into each nostril). The dose may be repeated, if needed, a minimum of 1 hour after the first dose. Do not use more than 2 doses within a 24-hour period or 3 doses within 7 days. Do not use within 24 hours of eletriptan.  Directions: TRUDHESA is for nasal administration only. Assemble and prime (i.e., pumped 4 times) before use. Use TRUDHESA immediately after priming and then discard. https://www.trudhesahcp.com/how-to-use/    Take Reglan 10 mg 15-30 min before the nasal spray to prevent nausea.    Future consideration: Nurtec or other triptans.    Lasmiditan, or Sprix nasal spray can be tried for rescue treatment if needed in the future.     We may add an antinausea medication if needed for nausea or as co-adjuvant if monotherapy is not sufficient.      3.  Preventive Treatment:     Start Qulipta 30 mg daily for 1-2 weeks, then 60 mg daily.     Continue Botox 195 units every 12 weeks.      Continue with magnesium glycinate 400 mg daily. May increase the dose in the future.    Continue with Effexor from psychiatrist.     Future consideration: add riboflavin or Nurtec QOD or anti-CGRP antibodies in place of Qulipta to limit risk of weight gain with other medications.    Other nutraceutical options include: riboflavin, melatonin, feverfew, and coenzyme Q10.     Other options for oral preventive medications include: amitriptyline or nortriptyline, zonisamide, rimegepant, valproate, verapamil, gabapentin, pregabalin, duloxetine, candesartan, Namenda, escitalopram, and levetiracetam. We want to avoid weight gain.    Other procedures that can be used to prevent headaches include: nerve blocks and trigger point injections. We recommend to obtain prior authorization from insurance when considering these.     Anti CGRP monoclonal antibodies (Aimovig, Ajovy, Emgality, and Vyepti) are a group of biological injectable treatments approved for prevention of migraine. These have to be stopped 6 months prior to conception.     Several non-invasive neuromodulation devices (gammaCore, Cefaly and Nerivio) are available to treat headaches preventively and/or acutely. These are typically not covered by insurance, but the companies have a trial period and will refund you if the device is ineffective and returned within that period.     [facility] as scheduled tomorrow for the next Botox treatment.

## 2023-01-23 NOTE — LETTER
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 counseling and education.

## 2023-01-24 ENCOUNTER — OFFICE VISIT (OUTPATIENT)
Dept: NEUROLOGY | Facility: CLINIC | Age: 43
End: 2023-01-24
Payer: COMMERCIAL

## 2023-01-24 VITALS — SYSTOLIC BLOOD PRESSURE: 110 MMHG | RESPIRATION RATE: 16 BRPM | DIASTOLIC BLOOD PRESSURE: 70 MMHG | HEART RATE: 72 BPM

## 2023-01-24 DIAGNOSIS — G43.711 INTRACTABLE CHRONIC MIGRAINE WITHOUT AURA AND WITH STATUS MIGRAINOSUS: Primary | ICD-10-CM

## 2023-01-24 PROCEDURE — 99999 PR OFFICE/OUTPT VISIT,PROCEDURE ONLY: CPT | Performed by: NURSE PRACTITIONER

## 2023-01-24 PROCEDURE — 64615 CHEMODENERV MUSC MIGRAINE: CPT | Performed by: NURSE PRACTITIONER

## 2023-01-24 NOTE — Clinical Note
Patient was under the impression that you wanted to try Nurtec PRN at this time, instead of the Ubrelvy.

## 2023-01-24 NOTE — PROCEDURES
Procedures    Procedure Note for Botox Injections for Headache:    Indication for procedure:    Diagnosis Plan   1. Intractable chronic migraine without aura and with status migrainosus  onabotulinumtoxinA (BOTOX) injection 200 Units    Injection          I explained the risks and benefits and obtained consent from Andressa.  Onabotulinumtoxin A 200 U were diluted in 4 mL of saline.    Lot number and expiration date documented in informed consent and MAR.  I performed a time-out, including 2 unique patient identifiers with Andressa.  Using a 30 gauge 1/2 inch needle, I injected 0.1mL = 5 U into each site according to the Chronic Migraine Protocol (PREEMPT Studies).   The following table reports the number of sites injected in each muscle.     Muscle Midline Right Left   Procerus 1          1 1   Frontalis   2 2   Temporalis   4 (10+5+5+5 U) 4 (10+5+5+5 U)   Occipitalis   3 (10+5+5 U) 3 (10+5+5 U)   Cervical Paraspinal   2 2   Trapezius   3 (10+10+5 U) 3 (10+10+5 U)   Other:            195 Units were injected and 5 Units were wasted.     Andressa tolerated the procedure well, without complications.      We appreciate the opportunity to participate in the care of Andressa ROSA Baker.     Please, do not hesitate to call me at [phone] if you have any questions or concerns.          [facility]

## 2023-02-15 ENCOUNTER — DOCUMENTATION (OUTPATIENT)
Dept: INTERNAL MEDICINE | Facility: CLINIC | Age: 43
End: 2023-02-15
Payer: COMMERCIAL

## 2023-02-15 RX ORDER — LORAZEPAM 0.5 MG/1
0.5 TABLET ORAL EVERY 8 HOURS PRN
Qty: 20 TABLET | Refills: 0 | Status: SHIPPED | OUTPATIENT
Start: 2023-02-15 | End: 2023-04-29 | Stop reason: SDUPTHER

## 2023-02-20 ENCOUNTER — TELEPHONE (OUTPATIENT)
Dept: CARDIOLOGY | Facility: CLINIC | Age: 43
End: 2023-02-20
Payer: COMMERCIAL

## 2023-03-01 NOTE — PROGRESS NOTES
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 this date of service performing the following activities: obtaining history, performing examination, entering orders, documenting and obtaining / reviewing records.

## 2023-03-07 ENCOUNTER — TELEMEDICINE (OUTPATIENT)
Dept: NEUROLOGY | Facility: CLINIC | Age: 43
End: 2023-03-07
Payer: COMMERCIAL

## 2023-03-07 ENCOUNTER — APPOINTMENT (OUTPATIENT)
Dept: LAB | Age: 43
End: 2023-03-07
Attending: NURSE PRACTITIONER
Payer: COMMERCIAL

## 2023-03-07 DIAGNOSIS — E55.9 VITAMIN D DEFICIENCY: Primary | ICD-10-CM

## 2023-03-07 DIAGNOSIS — E55.9 VITAMIN D DEFICIENCY: ICD-10-CM

## 2023-03-07 PROCEDURE — 82652 VIT D 1 25-DIHYDROXY: CPT

## 2023-03-07 PROCEDURE — 36415 COLL VENOUS BLD VENIPUNCTURE: CPT

## 2023-03-07 PROCEDURE — 99213 OFFICE O/P EST LOW 20 MIN: CPT | Mod: 95 | Performed by: NURSE PRACTITIONER

## 2023-03-07 RX ORDER — NAPROXEN 500 MG/1
500 TABLET ORAL 2 TIMES DAILY PRN
Qty: 60 TABLET | Refills: 1 | Status: SHIPPED | OUTPATIENT
Start: 2023-03-07 | End: 2023-03-16

## 2023-03-11 LAB
1,25(OH)2D SERPL-MCNC: 32 PG/ML (ref 18–72)
1,25(OH)2D2 SERPL-MCNC: <8 PG/ML
1,25(OH)2D3 SERPL-MCNC: 32 PG/ML

## 2023-03-14 ENCOUNTER — TELEPHONE (OUTPATIENT)
Dept: SCHEDULING | Facility: CLINIC | Age: 43
End: 2023-03-14
Payer: COMMERCIAL

## 2023-03-14 NOTE — TELEPHONE ENCOUNTER
Pt called and cancelled today's appt. 3/14/23 states that she's not feeling well and would like to ranjana her appt.     Pt can be reached at [phone]

## 2023-03-15 NOTE — PROGRESS NOTES
Leslie Jane MD, [facility]  Cardiac Electrophysiology    [facility]    [facility]  [facility] [address]  [address] [place] [address]    TEL  [phone]  [facility]     Electrophysiology Progress Note   2023  JOSE ANGEL Baker is a 43 y.o. female who comes to the office today for follow up for a history of symptomatic premature ventricular depolarization beats. 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 The procedure was performed by my partner Dr. Patrick Manzanares at [facility]. Her post ablation course was complicated by pericarditis which resolved.      Today, she reports that she has been feeling well overall. She was started on Adderall for ADHD, however, started having increased PVC's, so she discontinued it. Her PVC's have resolved. She remains active with exercising and has no chest pain, shortness of breath, dizziness, or palpitations. Unfortunately, her migraine headaches have worsened over the past couple months. She is on several medications and receiving Botox injections. She follows closely with Dr. Dalton.    Past Medical History:   Diagnosis Date    Abnormal ECG     PVCs    Arrhythmia 2018    PVCs, status post ablation, partially successful.    Back pain     GERD (gastroesophageal reflux disease)     Heartburn     History of fetal demise, not currently pregnant     Twin pregnancy, 1 fetal demise, 1 live birth    Joint pain     Migraine headache     Ovarian cyst     Palpitations     Pericarditis 2018    After john ablation    Status migrainosus 2023     Past Surgical History:   Procedure Laterality Date    ABLATION OF DYSRHYTHMIC FOCUS  2018     SECTION      KNEE ARTHROSCOPY W/ MENISCAL REPAIR Right     2022    WISDOM TOOTH EXTRACTION        Social History     Tobacco Use    Smoking status: Never    Smokeless tobacco: Never   Vaping Use    Vaping Use: Never used   Substance Use Topics    Alcohol use: Yes     Comment: occasionally    Drug use: No   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 She has 5 children.    Family History   Problem Relation Age of Onset    Hyperlipidemia Biological Mother     Hyperlipidemia Biological Father     Hypertension Biological Father     Prostate cancer Biological Father 78    Dementia Biological Father     Other Biological Father         dief from covid 19    Hyperlipidemia Maternal Grandmother     Clotting disorder Maternal Grandfather         developed in 60's - required tansfusions/platelets    Hyperlipidemia Paternal Grandmother     Dementia Paternal Grandmother     Diabetes Paternal Grandfather     No Known Problems Biological Child     Colon cancer Mother's Brother      ALLERGY:  Patient has no known allergies.    MEDICATIONS:   Current Outpatient Medications   Medication Sig Dispense Refill    docosahexaenoic acid-epa 120-180 mg capsule Take 1,000 mg by mouth daily.      ergocalciferol (VITAMIN D2) 50,000 unit(1250 mcg) capsule Take 50,000 Units by mouth once a week.      herbal drugs (CALMME ORAL) Take by mouth daily.      metoclopramide (REGLAN) 10 mg tablet Take 1 tablet (10 mg total) by mouth 3 (three) times a day as needed (nausea). 20 tablet 2    olopatadine (PATADAY) 0.2 % ophthalmic solution Administer 1 drop into affected eye(s) daily.      ondansetron ODT (ZOFRAN-ODT) 4 mg disintegrating tablet Take 4 mg by mouth every 8 (eight) hours as needed for nausea or vomiting.      traZODone (DESYREL) 50 mg tablet Take 25-50 mg by mouth nightly as needed.      TURMERIC ORAL Take by mouth daily.      atomoxetine (STRATTERA) 40 mg capsule Take 40 mg by mouth 2 (two) times a day.      b complex vitamins capsule Take 1  capsule by mouth daily.      botulinum toxin Type A (BOTOX)       dihydroergotamine (TRUDHESA) 0.725 mg/pump act. (4 mg/mL) spray,non-aerosol Administer 1 spray into each nostril as needed (migraine). The dose may be repeated, if needed, a minimum of 1 hour after the first dose. Do not use more than 2 doses within a 24-hour period or 3 doses within 7 days. 4 mL 11    DULoxetine (CYMBALTA) 20 mg capsule Take 40 mg by mouth 2 (two) times a day.      DULoxetine (CYMBALTA) 30 mg capsule Take 40 mg by mouth 2 (two) times a day.      EMGALITY  mg/mL pen injector subcutaneous pen Inject 1 mL (120 mg total) under the skin every 30 (thirty) days. 1 mL 11    frovatriptan (FROVA) 2.5 mg tablet Take 1 tablet (2.5 mg total) by mouth once as needed for migraine Indications: a migraine headache. 12 tablet 11    lamoTRIgine (LaMICtal) 25 mg tablet TAKE 1 TAB ORALLY DAILY FOR TWO WEEKS THEN TAKE 2 TABS ORALLY DAILY THEREAFTER      LORazepam (ATIVAN) 0.5 mg tablet Take 1 tablet (0.5 mg total) by mouth every 8 (eight) hours as needed for anxiety. 20 tablet 0    naproxen sodium (ALEVE ORAL) Take by mouth as needed.      progesterone (PROMETRIUM) 200 mg capsule TAKE 1 CAPSULE BY MOUTH EVERY DAY FOR 10 DAYS      rimegepant (NURTEC ODT) 75 mg tablet,disintegrating Take 1 tablet (75 mg total) by mouth as needed (migraine headache). Dissolve 1 tablet under the tongue. One dose per day as needed. 16 tablet 11    rizatriptan MLT (MAXALT-MLT) 10 mg disintegrating tablet 1 tab at onset of severe headache. May repeat in 2 hours if needed. Maximum 2 tab/day and 2 days/week. (Patient not taking: Reported on 10/5/2023) 12 tablet 3    selenium sulfide (SELSUN BLUE) 1 % shampoo Apply topically daily for 7 days. 207 mL 0    semaglutide (OZEMPIC) 2 mg/dose (8 mg/3 mL) subcutaneous injection Inject 2 mg under the skin every (seven) 7 days. (Patient not taking: Reported on 10/27/2023) 3 mL 2    ubrogepant (UBRELVY) 100 mg tablet  "tablet Take 1 tablet (100 mg total) by mouth as needed for migraine.  mg at onset of migraine. May repeat  mg once 2 hours later. Max 200 mg/day. (Patient not taking: Reported on 10/27/2023) 16 tablet 11     No current facility-administered medications for this visit.     Review of Systems:   As in HPI.  All other other systems were reviewed and are negative.    Objective   Vitals:    03/16/23 0906   BP: 116/76   BP Location: Left upper arm   Patient Position: Sitting   Pulse: 79   Weight: 97.5 kg (215 lb)   Height: 1.702 m (5' 7\")        ECG 03/16/2023: I personally reviewed her 12-lead EKG performed today which reveals normal sinus rhythm at 79 bpm.    Lab Results   Component Value Date    HGB 14.3 07/11/2023     07/11/2023    WBC 5.71 07/11/2023    ALT 16 07/17/2023    AST 14 07/17/2023     07/17/2023    BUN 15 07/17/2023    CREATININE 0.8 07/17/2023    K 4.2 07/17/2023    GLUCOSE 91 07/17/2023    TSH 0.83 08/04/2023    CHOL 208 (H) 07/12/2022    HDL 50 (L) 07/12/2022    TRIG 159 (H) 07/12/2022    INR 1.0 07/11/2023    MG 2.0 01/25/2021    LDLCALC 126 (H) 07/12/2022       ASSESSMENT/PLAN:    Ventricular premature beats  Patient denies any recent episodes of palpitations or PVC's. She did note an increase in PVC's when she tried Adderall, therefore, she discontinued the medication. Her PVC's resolved. No PVC's noted on her EKG today. 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  I will see him for follow-up in the office in 1 year or earlier as needed.       Return in about 1 year (around 3/16/2024).    Mao BOLANOS PA-C, am scribing for, and in the presence of, Leslie Boyce MD.    ILeslie MD, personally performed the services described in this documentation as described by Mao Lee in my presence, and it is both accurate and complete. I have seen and examined the patient.  I have reviewed the PA note and supporting documentation. The note has been " amended as appropriate.

## 2023-03-16 ENCOUNTER — TELEPHONE (OUTPATIENT)
Dept: NEUROLOGY | Facility: CLINIC | Age: 43
End: 2023-03-16

## 2023-03-16 ENCOUNTER — OFFICE VISIT (OUTPATIENT)
Dept: NEUROLOGY | Facility: CLINIC | Age: 43
End: 2023-03-16
Payer: COMMERCIAL

## 2023-03-16 ENCOUNTER — OFFICE VISIT (OUTPATIENT)
Dept: CARDIOLOGY | Facility: CLINIC | Age: 43
End: 2023-03-16
Payer: COMMERCIAL

## 2023-03-16 VITALS
HEART RATE: 88 BPM | SYSTOLIC BLOOD PRESSURE: 124 MMHG | WEIGHT: 205 LBS | DIASTOLIC BLOOD PRESSURE: 84 MMHG | BODY MASS INDEX: 32.18 KG/M2 | OXYGEN SATURATION: 98 % | HEIGHT: 67 IN | RESPIRATION RATE: 16 BRPM

## 2023-03-16 VITALS
HEART RATE: 79 BPM | HEIGHT: 67 IN | BODY MASS INDEX: 33.74 KG/M2 | SYSTOLIC BLOOD PRESSURE: 116 MMHG | DIASTOLIC BLOOD PRESSURE: 76 MMHG | WEIGHT: 215 LBS

## 2023-03-16 DIAGNOSIS — I49.3 VENTRICULAR PREMATURE BEATS: Primary | ICD-10-CM

## 2023-03-16 DIAGNOSIS — G89.29 OTHER CHRONIC PAIN: ICD-10-CM

## 2023-03-16 DIAGNOSIS — G43.901 STATUS MIGRAINOSUS: ICD-10-CM

## 2023-03-16 DIAGNOSIS — M54.2 CERVICALGIA: ICD-10-CM

## 2023-03-16 DIAGNOSIS — G43.711 INTRACTABLE CHRONIC MIGRAINE WITHOUT AURA AND WITH STATUS MIGRAINOSUS: Primary | ICD-10-CM

## 2023-03-16 PROCEDURE — 99213 OFFICE O/P EST LOW 20 MIN: CPT | Performed by: INTERNAL MEDICINE

## 2023-03-16 PROCEDURE — 64450 NJX AA&/STRD OTHER PN/BRANCH: CPT | Mod: 50 | Performed by: PSYCHIATRY & NEUROLOGY

## 2023-03-16 PROCEDURE — 3008F BODY MASS INDEX DOCD: CPT | Performed by: INTERNAL MEDICINE

## 2023-03-16 PROCEDURE — 3008F BODY MASS INDEX DOCD: CPT | Performed by: PSYCHIATRY & NEUROLOGY

## 2023-03-16 PROCEDURE — 200200 PR NO CHARGE: Performed by: PSYCHIATRY & NEUROLOGY

## 2023-03-16 PROCEDURE — 99214 OFFICE O/P EST MOD 30 MIN: CPT | Mod: 25 | Performed by: PSYCHIATRY & NEUROLOGY

## 2023-03-16 PROCEDURE — 64405 NJX AA&/STRD GR OCPL NRV: CPT | Mod: 50 | Performed by: PSYCHIATRY & NEUROLOGY

## 2023-03-16 PROCEDURE — 93000 ELECTROCARDIOGRAM COMPLETE: CPT | Performed by: INTERNAL MEDICINE

## 2023-03-16 PROCEDURE — 64400 NJX AA&/STRD TRIGEMINAL NRV: CPT | Performed by: PSYCHIATRY & NEUROLOGY

## 2023-03-16 RX ORDER — LIDOCAINE HYDROCHLORIDE 20 MG/ML
9 INJECTION, SOLUTION INFILTRATION; PERINEURAL ONCE
Status: COMPLETED | OUTPATIENT
Start: 2023-03-16 | End: 2023-03-16

## 2023-03-16 RX ADMIN — LIDOCAINE HYDROCHLORIDE 9 ML: 20 INJECTION, SOLUTION INFILTRATION; PERINEURAL at 12:40

## 2023-03-16 ASSESSMENT — PAIN SCALES - GENERAL: PAINLEVEL: 8

## 2023-03-16 NOTE — PROCEDURES
Procedures  Nerve Blocks and Trigger Points Procedure Note:  Indication:    Diagnosis Plan   1. Intractable chronic migraine without aura and with status migrainosus        2. Status migrainosus        3. Cervicalgia        4. Other chronic pain  lidocaine (XYLOCAINE) 20 mg/mL (2 %) injection 9 mL           I explained the risks and benefits and obtained written consent from Andressa Baker. Signed consent form will be scanned to patient's electronic medical records.     Lidocaine 2% without epinephrine was drawn in a sterile syringe.     Lot number and expiration date documented in informed consent.     I performed a time-out, including 2 unique patient identifiers.     I located the areas of maximal tenderness corresponding to each nerve or trigger point, and cleaned with alcohol swabs.     I used a 30 gauge 1/2 inch needle to inject lidocaine over the following:        Right Greater Occipital Nerve 2 cc   Left Greater Occipital Nerve 2 cc   Right Lesser Occipital Nerve 1 cc   Left Lesser Occipital Nerve 1 cc     Right Auriculotemporal Nerve 1 cc    Left Auriculotemporal Nerve 1 cc    Right Supraorbital Nerve 0.25 cc   Left Supraorbital Nerve 0.25 cc   Right Supratrochlear Nerve 0.25 cc   Left Supratrochlear Nerve 0.25 cc     Total of 9 cc injected.      Pain level before procedure was 8/10, and after procedure Andressa reported 50% improvement. The procedure was well tolerated and there were no complications.         Theresa Lewis MD  [facility]  [phone]

## 2023-03-16 NOTE — PATIENT INSTRUCTIONS
Treatment plan:      1. Healthy Habits: The following recommendations can greatly reduce the number and severity of headaches.  Maintain regular sleep hours and get sufficient sleep (8-9 hours)  Do not skip meals, especially breakfast  Drink at least 64 oz or 8 cups of water daily - enough to urinate 5-6 times a day  Get at least 30 minutes of daily aerobic exercise (enough to increase your heart rate and sweat)    Keep track of headaches and use of acute medication (prescription or over-the-counter) in a calendar or notebook and bring that to your clinic visits.    2. Acute Treatment:     Continue with Ubrelvy 100 mg as needed at onset of moderate to severe headache. May repeat 100 mg x 1 after 2 hours. Maximum 2 doses in 24 hours. This will have to be stopped prior to trying to conceive again.     For rescue treatment if Ubrelvy doesn't help or as alternative to Ubrelvy: Eletriptan 40 mg 1 tab as needed at onset of moderate to severe headache. May repeat 1 tablet 2 hours later. Maximum 2 tablets in 24 hr. Limit to 2 days/week.     Third line acute or rescue treatment: Dose: TRUDHESA 1.45 mg (administered as one metered spray of 0.725 mg into each nostril). The dose may be repeated, if needed, a minimum of 1 hour after the first dose. Do not use more than 2 doses within a 24-hour period or 3 doses within 7 days. Do not use within 24 hours of eletriptan.  Directions: TRUDHESA is for nasal administration only. Assemble and prime (i.e., pumped 4 times) before use. Use TRUDHESA immediately after priming and then discard. https://www.trudhesahcp.com/how-to-use/    Take Reglan 10 mg 15-30 min before the nasal spray to prevent nausea.    Future consideration: Nurtec or other triptans.    Lasmiditan, or Sprix nasal spray can be tried for rescue treatment if needed in the future.     We may add an antinausea medication if needed for nausea or as co-adjuvant if monotherapy is not sufficient.      3. Preventive Treatment:      Stop nabumetone for now.      Continue Qulipta 60 mg daily. May consider stopping in the near future as it is unclear if it is helping.    Continue Botox 195 units every 12 weeks.      Continue with magnesium glycinate 400 mg daily. May increase the dose in the future.    Continue with Effexor from psychiatrist.     Future consideration: add riboflavin or Nurtec QOD or anti-CGRP antibodies in place of Qulipta to limit risk of weight gain with other medications.    Other nutraceutical options include: riboflavin, melatonin, feverfew, and coenzyme Q10.     Other options for oral preventive medications include: amitriptyline or nortriptyline, zonisamide, rimegepant, valproate, verapamil, gabapentin, pregabalin, duloxetine, candesartan, Namenda, escitalopram, and levetiracetam. We want to avoid weight gain.    Other procedures that can be used to prevent headaches include: nerve blocks and trigger point injections. We recommend to obtain prior authorization from insurance when considering these.     Anti CGRP monoclonal antibodies (Aimovig, Ajovy, Emgality, and Vyepti) are a group of biological injectable treatments approved for prevention of migraine. These have to be stopped 6 months prior to conception.     Several non-invasive neuromodulation devices (gammaCore, Cefaly and Nerivio) are available to treat headaches preventively and/or acutely. These are typically not covered by insurance, but the companies have a trial period and will refund you if the device is ineffective and returned within that period.     Follow-up in the Headache Clinic as scheduled for the next Botox treatment or tomorrow for IV infusions if needed.

## 2023-03-16 NOTE — LETTER
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 levetiracetam. We want to avoid weight gain.    Other procedures that can be used to prevent headaches include: nerve blocks and trigger point injections. We recommend to obtain prior authorization from insurance when considering these.     Anti CGRP monoclonal antibodies (Aimovig, Ajovy, Emgality, and Vyepti) are a group of biological injectable treatments approved for prevention of migraine. These have to be stopped 6 months prior to conception.     Several non-invasive neuromodulation devices (gammaCore, Cefaly and Nerivio) are available to treat headaches preventively and/or acutely. These are typically not covered by insurance, but the companies have a trial period and will refund you if the device is ineffective and returned within that period.     Follow-up in the [facility] as scheduled for the next Botox treatment or tomorrow for IV infusions if needed.      We appreciate the opportunity to participate in the care of Andressa.     Please, do not hesitate to call our office at [phone] if you have any questions or concerns.       Theresa Lewis MD  [facility]  [phone]      I spent 32 minutes on this date of service performing the following activities:  obtaining history, performing examination, entering orders, documenting, preparing for visit and providing counseling and education.    This was in addition to the time dedicated to the procedure.

## 2023-03-16 NOTE — TELEPHONE ENCOUNTER
Spoke with [employer] and they approved her ubrelvy for 16 pills for 30 days. Spoke with Blanka [id number] EXP 7/28/23

## 2023-03-16 NOTE — PROGRESS NOTES
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 and Nerivio) are available to treat headaches preventively and/or acutely. These are typically not covered by insurance, but the companies have a trial period and will refund you if the device is ineffective and returned within that period.     Follow-up in the [facility] as scheduled for the next Botox treatment or tomorrow for IV infusions if needed.      We appreciate the opportunity to participate in the care of Andressa.     Please, do not hesitate to call our office at [phone] if you have any questions or concerns.       Theresa Lewis MD  [facility]  [phone]      I spent 32 minutes on this date of service performing the following activities: obtaining history, performing examination, entering orders, documenting, preparing for visit and providing counseling and education.    This was in addition to the time dedicated to the procedure.

## 2023-03-16 NOTE — ASSESSMENT & PLAN NOTE
Patient denies any recent episodes of palpitations or PVC's. She did note an increase in PVC's when she tried Adderall, therefore, she discontinued the medication. Her PVC's resolved. No PVC's noted on her EKG today. 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  I will see him for follow-up in the office in 1 year or earlier as needed.

## 2023-03-22 ENCOUNTER — OFFICE VISIT (OUTPATIENT)
Dept: INTERNAL MEDICINE | Facility: CLINIC | Age: 43
End: 2023-03-22
Payer: COMMERCIAL

## 2023-03-22 VITALS
SYSTOLIC BLOOD PRESSURE: 110 MMHG | HEIGHT: 67 IN | WEIGHT: 216 LBS | HEART RATE: 92 BPM | TEMPERATURE: 97.8 F | BODY MASS INDEX: 33.9 KG/M2 | OXYGEN SATURATION: 99 % | DIASTOLIC BLOOD PRESSURE: 70 MMHG | RESPIRATION RATE: 18 BRPM

## 2023-03-22 DIAGNOSIS — E66.811 CLASS 1 OBESITY WITH SERIOUS COMORBIDITY AND BODY MASS INDEX (BMI) OF 31.0 TO 31.9 IN ADULT, UNSPECIFIED OBESITY TYPE: ICD-10-CM

## 2023-03-22 DIAGNOSIS — F41.9 ANXIETY: ICD-10-CM

## 2023-03-22 DIAGNOSIS — G43.901 STATUS MIGRAINOSUS: Primary | ICD-10-CM

## 2023-03-22 PROCEDURE — 99213 OFFICE O/P EST LOW 20 MIN: CPT | Performed by: INTERNAL MEDICINE

## 2023-03-22 PROCEDURE — 3008F BODY MASS INDEX DOCD: CPT | Performed by: INTERNAL MEDICINE

## 2023-03-22 RX ORDER — VITAMIN B COMPLEX
1 CAPSULE ORAL DAILY
COMMUNITY
End: 2025-01-21

## 2023-03-22 ASSESSMENT — ENCOUNTER SYMPTOMS
BLOOD IN STOOL: 0
FREQUENCY: 0
ACTIVITY CHANGE: 0
CONSTIPATION: 0
UNEXPECTED WEIGHT CHANGE: 0
VOMITING: 0
SHORTNESS OF BREATH: 0
DYSPHORIC MOOD: 0
SLEEP DISTURBANCE: 0
ABDOMINAL PAIN: 0
FATIGUE: 0
DIFFICULTY URINATING: 0
POLYDIPSIA: 0
POLYPHAGIA: 0
DIZZINESS: 0
NAUSEA: 0
CHEST TIGHTNESS: 0
COUGH: 0
HEADACHES: 0
DIARRHEA: 0
NERVOUS/ANXIOUS: 0
PALPITATIONS: 0
APPETITE CHANGE: 0

## 2023-03-22 NOTE — PROGRESS NOTES
Patient ID: Andressa Baker is a 42 y.o. female.      Weight Check and Migraine       HPI    Review of Systems   Constitutional: Negative for activity change, appetite change, fatigue and unexpected weight change.   HENT: Negative for dental problem and hearing loss.    Eyes: Negative for visual disturbance.   Respiratory: Negative for cough, chest tightness and shortness of breath.    Cardiovascular: Negative for chest pain, palpitations and leg swelling.   Gastrointestinal: Negative for abdominal pain, blood in stool, constipation, diarrhea, nausea and vomiting.   Endocrine: Negative for cold intolerance, heat intolerance, polydipsia, polyphagia and polyuria.   Genitourinary: Negative for difficulty urinating, frequency and urgency.   Skin: Negative for rash.   Neurological: Negative for dizziness and headaches.   Psychiatric/Behavioral: Negative for dysphoric mood and sleep disturbance. The patient is not nervous/anxious.          Current Outpatient Medications:   •  atogepant (QULIPTA) 60 mg tablet, Take 60 mg by mouth daily., Disp: 90 tablet, Rfl: 3  •  b complex vitamins capsule, Take 1 capsule by mouth daily., Disp: , Rfl:   •  dihydroergotamine (TRUDHESA) 0.725 mg/pump act. (4 mg/mL) spray,non-aerosol, Administer 1 spray into each nostril as needed (migraine). The dose may be repeated, if needed, a minimum of 1 hour after the first dose. Do not use more than 2 doses within a 24-hour period or 3 doses within 7 days., Disp: 4 mL, Rfl: 5  •  docosahexaenoic acid-epa 120-180 mg capsule, Take 1,000 mg by mouth daily., Disp: , Rfl:   •  eletriptan (RELPAX) 40 mg tablet, 1 TAB AT ONSET OF SEVERE HEADACHE AS NEEDED. MAY REPEAT ONCE 2 HOURS LATER. MAXIMUM 2 TAB IN 24 HR., Disp: 12 tablet, Rfl: 11  •  ergocalciferol (VITAMIN D2) 50,000 unit(1250 mcg) capsule, Take 50,000 Units by mouth once a week., Disp: , Rfl:   •  herbal drugs (CALMME ORAL), Take by mouth daily., Disp: , Rfl:   •  LORazepam (ATIVAN) 0.5  mg tablet, Take 1 tablet (0.5 mg total) by mouth every 8 (eight) hours as needed for anxiety., Disp: 20 tablet, Rfl: 0  •  metoclopramide (REGLAN) 10 mg tablet, Take 1 tablet (10 mg total) by mouth 3 (three) times a day as needed (nausea)., Disp: 20 tablet, Rfl: 2  •  olopatadine (PATADAY) 0.2 % ophthalmic solution, Administer 1 drop into affected eye(s) daily., Disp: , Rfl:   •  ondansetron ODT (ZOFRAN-ODT) 4 mg disintegrating tablet, Take 4 mg by mouth every 8 (eight) hours as needed for nausea or vomiting., Disp: , Rfl:   •  semaglutide 0.25 mg or 0.5 mg(2 mg/1.5 mL) pen injector, Inject 0.25 mg under the skin every (seven) 7 days., Disp: , Rfl:   •  TURMERIC ORAL, Take by mouth daily., Disp: , Rfl:   •  ubrogepant (UBRELVY) 100 mg tablet tablet, Take 1 tablet (100 mg total) by mouth as needed for migraine.  mg at onset of migraine. May repeat  mg once 2 hours later. Max 200 mg/day., Disp: 16 tablet, Rfl: 11  •  venlafaxine XR (EFFEXOR-XR) 37.5 mg 24 hr capsule, Take 112.5 mg by mouth daily., Disp: , Rfl:   •  traZODone (DESYREL) 50 mg tablet, Take 25-50 mg by mouth nightly as needed., Disp: , Rfl:     No Known Allergies    Past Medical History:   Diagnosis Date   • Abnormal ECG     PVCs   • Arrhythmia 2018    PVCs, status post ablation, partially successful.   • Back pain    • GERD (gastroesophageal reflux disease)    • Heartburn    • History of fetal demise, not currently pregnant     Twin pregnancy, 1 fetal demise, 1 live birth   • Joint pain    • Migraine headache    • Ovarian cyst    • Palpitations    • Pericarditis 2018    After john ablation       Past Surgical History:   Procedure Laterality Date   • ABLATION OF DYSRHYTHMIC FOCUS  2018   •  SECTION     • KNEE ARTHROSCOPY W/ MENISCAL REPAIR Right     2022       Family History   Problem Relation Age of Onset   • Hyperlipidemia Biological Mother    • Hyperlipidemia Biological Father    • Hypertension Biological Father     • Prostate cancer Biological Father 78   • Dementia Biological Father    • Other Biological Father         dief from covid 19   • Hyperlipidemia Maternal Grandmother    • Clotting disorder Maternal Grandfather         developed in 60's - required tansfusions/platelets   • Hyperlipidemia Paternal Grandmother    • Dementia Paternal Grandmother    • Diabetes Paternal Grandfather    • No Known Problems Biological Child        Social History     Socioeconomic History   • Marital status:      Spouse name: Shashank Baker   • Number of children: None   • Years of education: None   • Highest education level: None   Occupational History   • Occupation: Homemaker   Tobacco Use   • Smoking status: Never   • Smokeless tobacco: Never   Vaping Use   • Vaping status: Never Used     Passive vaping exposure: Yes   Substance and Sexual Activity   • Alcohol use: Yes     Comment: occasionally   • Drug use: No   • Sexual activity: Yes     Birth control/protection: None     Comment:  with [id number] azar   Social History Narrative    Exercises 2-3 times per week -  and she is trying to run again    Sleep sometimes interrupted    Eating plan: Patient attempts low glycemic index eating    No history of domestic violence    Lives with  and 5 children      [employer] -- medical legal work / part-time    Has a dog       Vitals:    03/22/23 0954   BP: 110/70   Pulse: 92   Resp: 18   Temp: 36.6 °C (97.8 °F)   SpO2: 99%     Body mass index is 33.83 kg/m².      Wt Readings from Last 3 Encounters:   03/22/23 98 kg (216 lb)   03/16/23 93 kg (205 lb)   03/16/23 97.5 kg (215 lb)     BP Readings from Last 3 Encounters:   03/22/23 110/70   03/16/23 124/84   03/16/23 116/76     Pulse Readings from Last 3 Encounters:   03/22/23 92   03/16/23 88   03/16/23 79         Physical Exam  Vitals reviewed.   Constitutional:       Appearance: She is obese.   HENT:      Head: Normocephalic and atraumatic.      Right Ear:  External ear normal.      Left Ear: External ear normal.   Eyes:      Conjunctiva/sclera: Conjunctivae normal.      Pupils: Pupils are equal, round, and reactive to light.   Neck:      Thyroid: No thyroid mass or thyromegaly.      Vascular: No carotid bruit or JVD.      Trachea: No tracheal deviation.   Cardiovascular:      Rate and Rhythm: Normal rate and regular rhythm.      Heart sounds: Normal heart sounds. No murmur heard.     No friction rub. No gallop.   Pulmonary:      Effort: Pulmonary effort is normal. No respiratory distress.      Breath sounds: Normal breath sounds. No wheezing or rales.   Abdominal:      General: Bowel sounds are normal. There is no distension.      Palpations: Abdomen is soft.      Tenderness: There is no abdominal tenderness.   Musculoskeletal:         General: Normal range of motion.      Cervical back: Normal range of motion and neck supple.   Lymphadenopathy:      Cervical: No cervical adenopathy.   Skin:     General: Skin is warm and dry.   Neurological:      Mental Status: She is alert and oriented to person, place, and time.   Psychiatric:         Behavior: Behavior normal.         Thought Content: Thought content normal.         Assessment/Plan     Problem List Items Addressed This Visit     Anxiety     Continue medications as per psychiatry.           Class 1 obesity with serious comorbidity and body mass index (BMI) of 31.0 to 31.9 in adult     Resumed semaglutide.  Continue with increases.           Status migrainosus - Primary     Continue strategies.              Written education and action steps suggested to the patient are documented in After Visit Summary / Patient Instructions sections of this encounter.

## 2023-03-22 NOTE — PATIENT INSTRUCTIONS
Problem List Items Addressed This Visit       Anxiety     Continue medications as per psychiatry.           Class 1 obesity with serious comorbidity and body mass index (BMI) of 31.0 to 31.9 in adult     Resumed semaglutide.  Continue with increases.           Status migrainosus - Primary     Continue strategies.

## 2023-03-27 ENCOUNTER — OFFICE VISIT (OUTPATIENT)
Dept: NEUROLOGY | Facility: CLINIC | Age: 43
End: 2023-03-27
Payer: COMMERCIAL

## 2023-03-27 VITALS
WEIGHT: 216 LBS | HEART RATE: 84 BPM | OXYGEN SATURATION: 97 % | RESPIRATION RATE: 16 BRPM | DIASTOLIC BLOOD PRESSURE: 76 MMHG | SYSTOLIC BLOOD PRESSURE: 116 MMHG | HEIGHT: 67 IN | BODY MASS INDEX: 33.9 KG/M2

## 2023-03-27 DIAGNOSIS — G43.711 INTRACTABLE CHRONIC MIGRAINE WITHOUT AURA AND WITH STATUS MIGRAINOSUS: ICD-10-CM

## 2023-03-27 DIAGNOSIS — G43.901 STATUS MIGRAINOSUS: ICD-10-CM

## 2023-03-27 DIAGNOSIS — G89.29 OTHER CHRONIC PAIN: Primary | ICD-10-CM

## 2023-03-27 PROCEDURE — 64450 NJX AA&/STRD OTHER PN/BRANCH: CPT | Mod: 50 | Performed by: PSYCHIATRY & NEUROLOGY

## 2023-03-27 PROCEDURE — 3008F BODY MASS INDEX DOCD: CPT | Performed by: PSYCHIATRY & NEUROLOGY

## 2023-03-27 PROCEDURE — 64400 NJX AA&/STRD TRIGEMINAL NRV: CPT | Mod: 50 | Performed by: PSYCHIATRY & NEUROLOGY

## 2023-03-27 PROCEDURE — 99215 OFFICE O/P EST HI 40 MIN: CPT | Mod: 25 | Performed by: PSYCHIATRY & NEUROLOGY

## 2023-03-27 PROCEDURE — 64405 NJX AA&/STRD GR OCPL NRV: CPT | Mod: 50 | Performed by: PSYCHIATRY & NEUROLOGY

## 2023-03-27 RX ORDER — GALCANEZUMAB 120 MG/ML
240 INJECTION, SOLUTION SUBCUTANEOUS ONCE
Qty: 2 ML | Refills: 0 | Status: SHIPPED | OUTPATIENT
Start: 2023-03-27 | End: 2023-03-27

## 2023-03-27 RX ORDER — LIDOCAINE HYDROCHLORIDE 20 MG/ML
9 INJECTION, SOLUTION INFILTRATION; PERINEURAL ONCE
Status: COMPLETED | OUTPATIENT
Start: 2023-03-27 | End: 2023-03-27

## 2023-03-27 RX ADMIN — LIDOCAINE HYDROCHLORIDE 9 ML: 20 INJECTION, SOLUTION INFILTRATION; PERINEURAL at 13:01

## 2023-03-27 ASSESSMENT — PAIN SCALES - GENERAL: PAINLEVEL: 4

## 2023-03-27 NOTE — PROGRESS NOTES
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 Aimovig. We could do nerve blocks every 12 weeks between Botox treatments. We could add riboflavin or use Nurtec QOD in place of the anti-CGRP antibodies.    Continue Botox 195 units every 12 weeks.      Continue with magnesium glycinate 400 mg daily. May increase the dose in the future.    Continue with Effexor or other antidepressant from psychiatrist. Cymbalta may help more as migraine preventive than Lamictal, but either one may help.    Other nutraceutical options include: riboflavin, melatonin, feverfew, and coenzyme Q10.     Other options for oral preventive medications include: amitriptyline or nortriptyline, zonisamide, rimegepant, valproate, verapamil, gabapentin, pregabalin, duloxetine, candesartan, Namenda, escitalopram, and levetiracetam. We want to avoid weight gain.    Other procedures that can be used to prevent headaches include: nerve blocks and trigger point injections. We recommend to obtain prior authorization from insurance when considering these.     Anti CGRP monoclonal antibodies (Aimovig, Ajovy, Emgality, and Vyepti) are a group of biological injectable treatments approved for prevention of migraine. These have to be stopped 6 months prior to conception.     Several non-invasive neuromodulation  devices (gammaCore, Cefaly and Nerivio) are available to treat headaches preventively and/or acutely. These are typically not covered by insurance, but the companies have a trial period and will refund you if the device is ineffective and returned within that period.     [facility]-up in [facility] as scheduled for the next Botox treatment.     We appreciate the opportunity to participate in the care of Andressa.     Please, do not hesitate to call our office at [phone] if you have any questions or concerns.       Theresa Lewis MD  [facility]  [phone]      I spent 42 minutes on this date of service performing the following activities: obtaining history, performing examination, entering orders, documenting, preparing for visit and providing counseling and education.    This was in addition to the time dedicated to the procedure.

## 2023-03-27 NOTE — LETTER
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 nausea.    Future consideration: Nurtec or other triptans (naratriptan, almotriptan or sumatriptan).    Lasmiditan, or Sprix nasal spray can be tried for rescue treatment if needed in the future.     We may add an antinausea medication if needed for nausea or as co-adjuvant if monotherapy is not sufficient.      3. Preventive Treatment:     Recommend to start Emgality monthly injections. Loading dose prescribed today.    Stop Qulipta the day before starting Emgality.    Future considerations: Ajovy or Aimovig. We could do nerve blocks every 12 weeks between Botox treatments. We could add riboflavin or use Nurtec QOD in place of the anti-CGRP antibodies.    Continue Botox 195 units every 12 weeks.      Continue with magnesium glycinate 400 mg daily. May increase the dose in the future.    Continue with Effexor or other antidepressant from psychiatrist. Cymbalta may help more as migraine preventive than Lamictal, but either one may help.    Other nutraceutical options include: riboflavin, melatonin, feverfew, and coenzyme Q10.     Other options for oral preventive medications include: amitriptyline or nortriptyline, zonisamide, rimegepant, valproate,  verapamil, gabapentin, pregabalin, duloxetine, candesartan, Namenda, escitalopram, and levetiracetam. We want to avoid weight gain.    Other procedures that can be used to prevent headaches include: nerve blocks and trigger point injections. We recommend to obtain prior authorization from insurance when considering these.     Anti CGRP monoclonal antibodies (Aimovig, Ajovy, Emgality, and Vyepti) are a group of biological injectable treatments approved for prevention of migraine. These have to be stopped 6 months prior to conception.     Several non-invasive neuromodulation devices (gammaCore, Cefaly and Nerivio) are available to treat headaches preventively and/or acutely. These are typically not covered by insurance, but the companies have a trial period and will refund you if the device is ineffective and returned within that period.     Follow-up in the [facility] as scheduled for the next Botox treatment.     We appreciate the opportunity to participate in the care of Andressa.     Please, do not hesitate to call our office at [phone] if you have any questions or concerns.       Theresa Lewis MD  [facility]  [phone]      I spent 42 minutes on this date of service performing the following activities: obtaining history, performing examination, entering orders, documenting, preparing for visit and providing counseling and education.    This was in addition to the time dedicated to the procedure.     Theresa Dalton MD  3/27/2023  1:06 PM  Signed  Procedures  Nerve Blocks and Trigger Points Procedure Note:  Indication:    Diagnosis Plan   1. Other chronic pain  lidocaine (XYLOCAINE) 20 mg/mL (2 %) injection 9 mL           I explained the risks and benefits and obtained written consent from Andressa Baker. Signed consent form will be scanned to patient's electronic medical records.     Lidocaine 2% without epinephrine was drawn in a sterile syringe.     Lot number and expiration  date documented in informed consent.     I performed a time-out, including 2 unique patient identifiers.     I located the areas of maximal tenderness corresponding to each nerve or trigger point, and cleaned with alcohol swabs.     I used a 30 gauge 1/2 inch needle to inject lidocaine over the following:        Right Greater Occipital Nerve 2 cc   Left Greater Occipital Nerve 2 cc   Right Lesser Occipital Nerve 1 cc   Left Lesser Occipital Nerve 1 cc     Right Auriculotemporal Nerve 1 cc    Left Auriculotemporal Nerve 1 cc    Right Supraorbital Nerve 0.25 cc   Left Supraorbital Nerve 0.25 cc   Right Supratrochlear Nerve 0.25 cc   Left Supratrochlear Nerve 0.25 cc     Total of 9 cc injected.      The procedure was well tolerated and there were no complications.         Theresa Lewis MD  [facility]  [phone]

## 2023-03-27 NOTE — PATIENT INSTRUCTIONS
Treatment plan:      1. Healthy Habits: The following recommendations can greatly reduce the number and severity of headaches.  Maintain regular sleep hours and get sufficient sleep (8-9 hours)  Do not skip meals, especially breakfast  Drink at least 64 oz or 8 cups of water daily - enough to urinate 5-6 times a day  Get at least 30 minutes of daily aerobic exercise (enough to increase your heart rate and sweat)    Keep track of headaches and use of acute medication (prescription or over-the-counter) in a calendar or notebook and bring that to your clinic visits.    2. Acute Treatment:     Continue with Ubrelvy 100 mg as needed at onset of moderate to severe headache. May repeat 100 mg x 1 after 2 hours. Maximum 2 doses in 24 hours. This will have to be stopped prior to trying to conceive again.     For rescue treatment if Ubrelvy doesn't help or as alternative to Ubrelvy: Trial of Frovatriptan 2.5 mg 1 tab as needed at onset of moderate to severe headache. May repeat 1 tablet 2 hours later. Maximum 2 tablets in 24 hr. Limit to 2 days/week.     Third line acute or rescue treatment: Dose: TRUDHESA 1.45 mg (administered as one metered spray of 0.725 mg into each nostril). The dose may be repeated, if needed, a minimum of 1 hour after the first dose. Do not use more than 2 doses within a 24-hour period or 3 doses within 7 days. Do not use within 24 hours of eletriptan.  Directions: TRUDHESA is for nasal administration only. Assemble and prime (i.e., pumped 4 times) before use. Use TRUDHESA immediately after priming and then discard. https://www.trudhesahcp.com/how-to-use/    Take Reglan 10 mg 15-30 min before the nasal spray to prevent nausea.    Future consideration: Nurtec or other triptans (naratriptan, almotriptan or sumatriptan).    Lasmiditan, or Sprix nasal spray can be tried for rescue treatment if needed in the future.     We may add an antinausea medication if needed for nausea or as co-adjuvant if  monotherapy is not sufficient.      3. Preventive Treatment:     Recommend to start Emgality monthly injections. Loading dose prescribed today.    Stop Qulipta the day before starting Emgality.    Future considerations: Ajovy or Aimovig. We could do nerve blocks every 12 weeks between Botox treatments. We could add riboflavin or use Nurtec QOD in place of the anti-CGRP antibodies.    Continue Botox 195 units every 12 weeks.      Continue with magnesium glycinate 400 mg daily. May increase the dose in the future.    Continue with Effexor or other antidepressant from psychiatrist. Cymbalta may help more as migraine preventive than Lamictal, but either one may help.    Other nutraceutical options include: riboflavin, melatonin, feverfew, and coenzyme Q10.     Other options for oral preventive medications include: amitriptyline or nortriptyline, zonisamide, rimegepant, valproate, verapamil, gabapentin, pregabalin, duloxetine, candesartan, Namenda, escitalopram, and levetiracetam. We want to avoid weight gain.    Other procedures that can be used to prevent headaches include: nerve blocks and trigger point injections. We recommend to obtain prior authorization from insurance when considering these.     Anti CGRP monoclonal antibodies (Aimovig, Ajovy, Emgality, and Vyepti) are a group of biological injectable treatments approved for prevention of migraine. These have to be stopped 6 months prior to conception.     Several non-invasive neuromodulation devices (gammaCore, Cefaly and Nerivio) are available to treat headaches preventively and/or acutely. These are typically not covered by insurance, but the companies have a trial period and will refund you if the device is ineffective and returned within that period.     Follow-up in the Headache Clinic as scheduled for the next Botox treatment.

## 2023-03-27 NOTE — PROCEDURES
Procedures  Nerve Blocks and Trigger Points Procedure Note:  Indication:    Diagnosis Plan   1. Other chronic pain  lidocaine (XYLOCAINE) 20 mg/mL (2 %) injection 9 mL           I explained the risks and benefits and obtained written consent from Andressa Baker. Signed consent form will be scanned to patient's electronic medical records.     Lidocaine 2% without epinephrine was drawn in a sterile syringe.     Lot number and expiration date documented in informed consent.     I performed a time-out, including 2 unique patient identifiers.     I located the areas of maximal tenderness corresponding to each nerve or trigger point, and cleaned with alcohol swabs.     I used a 30 gauge 1/2 inch needle to inject lidocaine over the following:        Right Greater Occipital Nerve 2 cc   Left Greater Occipital Nerve 2 cc   Right Lesser Occipital Nerve 1 cc   Left Lesser Occipital Nerve 1 cc     Right Auriculotemporal Nerve 1 cc    Left Auriculotemporal Nerve 1 cc    Right Supraorbital Nerve 0.25 cc   Left Supraorbital Nerve 0.25 cc   Right Supratrochlear Nerve 0.25 cc   Left Supratrochlear Nerve 0.25 cc     Total of 9 cc injected.      The procedure was well tolerated and there were no complications.         Theresa Lewis MD  [facility]  [phone]

## 2023-04-18 ENCOUNTER — OFFICE VISIT (OUTPATIENT)
Dept: NEUROLOGY | Facility: CLINIC | Age: 43
End: 2023-04-18
Payer: COMMERCIAL

## 2023-04-18 VITALS
WEIGHT: 216 LBS | BODY MASS INDEX: 33.9 KG/M2 | HEIGHT: 67 IN | SYSTOLIC BLOOD PRESSURE: 118 MMHG | OXYGEN SATURATION: 99 % | HEART RATE: 90 BPM | DIASTOLIC BLOOD PRESSURE: 78 MMHG | RESPIRATION RATE: 16 BRPM

## 2023-04-18 DIAGNOSIS — G43.711 INTRACTABLE CHRONIC MIGRAINE WITHOUT AURA AND WITH STATUS MIGRAINOSUS: Primary | ICD-10-CM

## 2023-04-18 PROCEDURE — 99215 OFFICE O/P EST HI 40 MIN: CPT | Mod: 25 | Performed by: PSYCHIATRY & NEUROLOGY

## 2023-04-18 PROCEDURE — 3008F BODY MASS INDEX DOCD: CPT | Performed by: PSYCHIATRY & NEUROLOGY

## 2023-04-18 PROCEDURE — 64615 CHEMODENERV MUSC MIGRAINE: CPT | Performed by: PSYCHIATRY & NEUROLOGY

## 2023-04-18 RX ORDER — GALCANEZUMAB 120 MG/ML
1 INJECTION, SOLUTION SUBCUTANEOUS
COMMUNITY
Start: 2023-03-30 | End: 2023-04-20 | Stop reason: SDUPTHER

## 2023-04-18 RX ORDER — DULOXETIN HYDROCHLORIDE 20 MG/1
1 CAPSULE, DELAYED RELEASE ORAL 2 TIMES DAILY
COMMUNITY
Start: 2023-03-28 | End: 2023-05-10

## 2023-04-18 NOTE — LETTER
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   Cervicalgia  Status migrainosus   Headache secondary to COVID vaccine booster - resolved     Evaluation:  No additional tests are needed at this time. If there are new symptoms or changes in the characteristics of the headaches, I will re-evaluate Andressa and consider if diagnostic tests are needed.     Treatment plan:      1. Healthy Habits: The following recommendations can greatly reduce the number and severity of headaches.  · Maintain regular sleep hours and get sufficient sleep (8-9 hours)  · Do not skip meals, especially breakfast  · Drink at least 64 oz or 8 cups of water daily - enough to urinate 5-6 times a day  · Get at least 30 minutes of daily aerobic exercise (enough to increase your heart rate and sweat)    Keep track of headaches and use of acute medication (prescription or over-the-counter) in a calendar or notebook and bring that to your clinic visits.    2. Acute Treatment:     Continue with Ubrelvy 100 mg as needed at onset of moderate to severe headache. May repeat 100 mg x 1 after 2 hours. Maximum 2 doses in 24 hours.     For rescue treatment if Ubrelvy doesn't help or as alternative to Ubrelvy: Frovatriptan 2.5 mg 1  tab as needed at onset of moderate to severe headache. May repeat 1 tablet 2 hours later. Maximum 2 tablets in 24 hr. Limit to 2 days/week.     Third line acute or rescue treatment: TRUDHESA 1.45 mg (administered as one metered spray of 0.725 mg into each nostril). The dose may be repeated, if needed, a minimum of 1 hour after the first dose. Do not use more than 2 doses within a 24-hour period or 3 doses within 7 days. Do not use within 24 hours of triptan.  Directions: TRUDHESA is for nasal administration only. Assemble and prime (i.e., pumped 4 times) before use. Use TRUDHESA immediately after priming and then discard. https://www.[employer]/how-to-use/    Take Reglan 10 mg 15-30 min before the nasal spray to prevent nausea.    Future consideration: Nurtec or other triptans (naratriptan, almotriptan or sumatriptan).    Lasmiditan, or Sprix nasal spray can be tried for rescue treatment if needed in the future.     We may add an antinausea medication if needed for nausea or as co-adjuvant if monotherapy is not sufficient.      3. Preventive Treatment:     Continue with Emgality monthly injections.     Continue with Botox 195 units every 12 weeks.      Increase the dose of magnesium glycinate to 600 mg daily. This may help with constipation too.    Continue with transition from Effexor to Cymbalta for depression from psychiatrist. Cymbalta may help as migraine preventive.    Future considerations: add riboflavin. Switch Emgality to Ajovy or Aimovig. Nerve blocks every 12 weeks between Botox treatments. Switch Emgality to Nurtec QOD.     Other nutraceutical options include: riboflavin, melatonin, feverfew, and coenzyme Q10.     Other options for oral preventive medications include: amitriptyline or nortriptyline, zonisamide, rimegepant, valproate, verapamil, gabapentin, pregabalin, candesartan, Namenda, escitalopram, and levetiracetam. We want to avoid weight gain.    Several non-invasive neuromodulation devices  (gammaCore, Cefaly and Nerivio) are available to treat headaches preventively and/or acutely. These are typically not covered by insurance, but the companies have a trial period and will refund you if the device is ineffective and returned within that period.     Follow-up in the Headache Clinic in 12 and 24 weeks for the next Botox treatments.     We appreciate the opportunity to participate in the care of Andressa.     Please, do not hesitate to call our office at [phone] if you have any questions or concerns.       Theresa Lewis MD  [facility]  [phone]      I spent 40 minutes on this date of service performing the following activities: obtaining history, performing examination, entering orders, documenting, preparing for visit and providing counseling and education.    This was in addition to the time dedicated to the procedure.     Theresa Dalton MD  4/18/2023 10:09 AM  Signed  Procedures  Procedure Note for Botox Injections for Headache:    Indication for procedure:    Diagnosis Plan   1. Intractable chronic migraine without aura and with status migrainosus  onabotulinumtoxinA (BOTOX) 200 unit injection 200 Units          I explained the risks and benefits and obtained consent from Andressa.  Onabotulinumtoxin A 200 U were diluted in 4 mL of saline.    Lot number and expiration date documented in informed consent and MAR.  I performed a time-out, including 2 unique patient identifiers with Andressa.  Using a 30 gauge 1/2 inch needle, I injected 0.1mL = 5 U into each site according to the Chronic Migraine Protocol (PREEMPT Studies).   The following table reports the number of sites injected in each muscle.     Muscle Midline Right Left   Procerus 1          1 1   Frontalis   2 2   Temporalis   4 (10+5+5+5 U) 4 (10+5+5+5 U)   Occipitalis   3 (10+5+5 U) 3 (10+5+5 U)   Cervical Paraspinal   2 2   Trapezius   3 (10+10+5 U) 3 (10+10+5 U)   Other:            195 Units were injected and 5  Units were wasted.     Andressa tolerated the procedure well, without complications. She was instructed that she could experience increased pain in the next 2-3 days, which should be treated with ice and anti-inflammatory medications.      We appreciate the opportunity to participate in the care of Andressa LEE Olivia.     Please, do not hesitate to call me at [phone] if you have any questions or concerns.        Theresa Lewis MD  [facility]  [phone]

## 2023-04-18 NOTE — PROCEDURES
Procedures  Procedure Note for Botox Injections for Headache:    Indication for procedure:    Diagnosis Plan   1. Intractable chronic migraine without aura and with status migrainosus  onabotulinumtoxinA (BOTOX) 200 unit injection 200 Units          I explained the risks and benefits and obtained consent from Andressa.  Onabotulinumtoxin A 200 U were diluted in 4 mL of saline.    Lot number and expiration date documented in informed consent and MAR.  I performed a time-out, including 2 unique patient identifiers with Andressa.  Using a 30 gauge 1/2 inch needle, I injected 0.1mL = 5 U into each site according to the Chronic Migraine Protocol (PREEMPT Studies).   The following table reports the number of sites injected in each muscle.     Muscle Midline Right Left   Procerus 1          1 1   Frontalis   2 2   Temporalis   4 (10+5+5+5 U) 4 (10+5+5+5 U)   Occipitalis   3 (10+5+5 U) 3 (10+5+5 U)   Cervical Paraspinal   2 2   Trapezius   3 (10+10+5 U) 3 (10+10+5 U)   Other:            195 Units were injected and 5 Units were wasted.     Andressa tolerated the procedure well, without complications. She was instructed that she could experience increased pain in the next 2-3 days, which should be treated with ice and anti-inflammatory medications.      We appreciate the opportunity to participate in the care of Andressa ROSA Baker.     Please, do not hesitate to call me at [phone] if you have any questions or concerns.        Theresa Lewis MD  [facility]  [phone]

## 2023-04-18 NOTE — PATIENT INSTRUCTIONS
Treatment plan:      1. Healthy Habits: The following recommendations can greatly reduce the number and severity of headaches.  Maintain regular sleep hours and get sufficient sleep (8-9 hours)  Do not skip meals, especially breakfast  Drink at least 64 oz or 8 cups of water daily - enough to urinate 5-6 times a day  Get at least 30 minutes of daily aerobic exercise (enough to increase your heart rate and sweat)    Keep track of headaches and use of acute medication (prescription or over-the-counter) in a calendar or notebook and bring that to your clinic visits.    2. Acute Treatment:     Continue with Ubrelvy 100 mg as needed at onset of moderate to severe headache. May repeat 100 mg x 1 after 2 hours. Maximum 2 doses in 24 hours.     For rescue treatment if Ubrelvy doesn't help or as alternative to Ubrelvy: Frovatriptan 2.5 mg 1 tab as needed at onset of moderate to severe headache. May repeat 1 tablet 2 hours later. Maximum 2 tablets in 24 hr. Limit to 2 days/week.     Third line acute or rescue treatment: TRUDHESA 1.45 mg (administered as one metered spray of 0.725 mg into each nostril). The dose may be repeated, if needed, a minimum of 1 hour after the first dose. Do not use more than 2 doses within a 24-hour period or 3 doses within 7 days. Do not use within 24 hours of triptan.  Directions: TRUDHESA is for nasal administration only. Assemble and prime (i.e., pumped 4 times) before use. Use TRUDHESA immediately after priming and then discard. https://www.[employer]com/how-to-use/    Take Reglan 10 mg 15-30 min before the nasal spray to prevent nausea.    Future consideration: Nurtec or other triptans (naratriptan, almotriptan or sumatriptan).    Lasmiditan, or Sprix nasal spray can be tried for rescue treatment if needed in the future.     We may add an antinausea medication if needed for nausea or as co-adjuvant if monotherapy is not sufficient.      3. Preventive Treatment:     Continue with Emgality  monthly injections.     Continue with Botox 195 units every 12 weeks.      Increase the dose of magnesium glycinate to 600 mg daily. This may help with constipation too.    Continue with transition from Effexor to Cymbalta for depression from psychiatrist. Cymbalta may help as migraine preventive.    Future considerations: add riboflavin. Switch Emgality to Ajovy or Aimovig. Nerve blocks every 12 weeks between Botox treatments. Switch Emgality to Nurtec QOD.     Other nutraceutical options include: riboflavin, melatonin, feverfew, and coenzyme Q10.     Other options for oral preventive medications include: amitriptyline or nortriptyline, zonisamide, rimegepant, valproate, verapamil, gabapentin, pregabalin, candesartan, Namenda, escitalopram, and levetiracetam. We want to avoid weight gain.    Several non-invasive neuromodulation devices (gammaCore, Cefaly and Nerivio) are available to treat headaches preventively and/or acutely. These are typically not covered by insurance, but the companies have a trial period and will refund you if the device is ineffective and returned within that period.     Follow-up in the Headache Clinic in 12 and 24 weeks for the next Botox treatments.

## 2023-04-18 NOTE — PROGRESS NOTES
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 meals, especially breakfast  · Drink at least 64 oz or 8 cups of water daily - enough to urinate 5-6 times a day  · Get at least 30 minutes of daily aerobic exercise (enough to increase your heart rate and sweat)    Keep track of headaches and use of acute medication (prescription or over-the-counter) in a calendar or notebook and bring that to your clinic visits.    2. Acute Treatment:     Continue with Ubrelvy 100 mg as needed at onset of moderate to severe headache. May repeat 100 mg x 1 after 2 hours. Maximum 2 doses in 24 hours.     For rescue treatment if Ubrelvy doesn't help or as alternative to Ubrelvy: Frovatriptan 2.5 mg 1 tab as needed at onset of moderate to severe headache. May repeat 1 tablet 2 hours later. Maximum 2 tablets in 24 hr. Limit to 2 days/week.     Third line acute or rescue treatment: TRUDHESA 1.45 mg (administered as one metered spray of 0.725 mg into each nostril). The dose may be repeated, if needed, a minimum of 1 hour after the first dose. Do not use more than 2 doses within a 24-hour period or 3 doses within 7 days. Do not use within 24 hours of triptan.  Directions: TRUDHESA is for nasal administration only. Assemble and prime (i.e., pumped 4 times) before use. Use  TRUDHESA immediately after priming and then discard. https://www.trudhesahcp.com/how-to-use/    Take Reglan 10 mg 15-30 min before the nasal spray to prevent nausea.    Future consideration: Nurtec or other triptans (naratriptan, almotriptan or sumatriptan).    Lasmiditan, or Sprix nasal spray can be tried for rescue treatment if needed in the future.     We may add an antinausea medication if needed for nausea or as co-adjuvant if monotherapy is not sufficient.      3. Preventive Treatment:     Continue with Emgality monthly injections.     Continue with Botox 195 units every 12 weeks.      Increase the dose of magnesium glycinate to 600 mg daily. This may help with constipation too.    Continue with transition from Effexor to Cymbalta for depression from psychiatrist. Cymbalta may help as migraine preventive.    Future considerations: add riboflavin. Switch Emgality to Ajovy or Aimovig. Nerve blocks every 12 weeks between Botox treatments. Switch Emgality to Nurtec QOD.     Other nutraceutical options include: riboflavin, melatonin, feverfew, and coenzyme Q10.     Other options for oral preventive medications include: amitriptyline or nortriptyline, zonisamide, rimegepant, valproate, verapamil, gabapentin, pregabalin, candesartan, Namenda, escitalopram, and levetiracetam. We want to avoid weight gain.    Several non-invasive neuromodulation devices (gammaCore, Cefaly and Nerivio) are available to treat headaches preventively and/or acutely. These are typically not covered by insurance, but the companies have a trial period and will refund you if the device is ineffective and returned within that period.     Follow-up in the [facility] in 12 and 24 weeks for the next Botox treatments.     We appreciate the opportunity to participate in the care of Andressa.     Please, do not hesitate to call our office at [phone] if you have any questions or concerns.       Theresa Lewis MD  [facility]  [phone]      I spent 40 minutes on this date of service performing the following activities: obtaining history, performing examination, entering orders, documenting, preparing for visit and providing counseling and education.    This was in addition to the time dedicated to the procedure.

## 2023-04-20 RX ORDER — GALCANEZUMAB 120 MG/ML
120 INJECTION, SOLUTION SUBCUTANEOUS
Qty: 1 ML | Refills: 3 | Status: SHIPPED | OUTPATIENT
Start: 2023-04-20 | End: 2023-07-31

## 2023-04-20 NOTE — TELEPHONE ENCOUNTER
Medicine Refill Request    Last Office: 3/22/2023   Last Consult Visit: Visit date not found  Last Telemedicine Visit: 4/28/2020 Iesha Domingo MD    Next Appointment: Visit date not found      Current Outpatient Medications:   •  atogepant (QULIPTA) 60 mg tablet, Take 60 mg by mouth daily. (Patient not taking: Reported on 4/18/2023), Disp: 90 tablet, Rfl: 3  •  b complex vitamins capsule, Take 1 capsule by mouth daily., Disp: , Rfl:   •  dihydroergotamine (TRUDHESA) 0.725 mg/pump act. (4 mg/mL) spray,non-aerosol, Administer 1 spray into each nostril as needed (migraine). The dose may be repeated, if needed, a minimum of 1 hour after the first dose. Do not use more than 2 doses within a 24-hour period or 3 doses within 7 days., Disp: 4 mL, Rfl: 5  •  docosahexaenoic acid-epa 120-180 mg capsule, Take 1,000 mg by mouth daily., Disp: , Rfl:   •  DULoxetine (CYMBALTA) 20 mg capsule, Take 1 capsule by mouth 2 (two) times a day., Disp: , Rfl:   •  EMGALITY  mg/mL pen injector subcutaneous pen, Inject 1 mL under the skin every 30 (thirty) days., Disp: , Rfl:   •  ergocalciferol (VITAMIN D2) 50,000 unit(1250 mcg) capsule, Take 50,000 Units by mouth once a week., Disp: , Rfl:   •  frovatriptan (FROVA) 2.5 mg tablet, Take 1 tablet (2.5 mg total) by mouth once as needed for migraine. May repeat in 2 hours if unresolved. Do not exceed 7.5 mg in 24 hours., Disp: 12 tablet, Rfl: 5  •  herbal drugs (CALMME ORAL), Take by mouth daily., Disp: , Rfl:   •  LORazepam (ATIVAN) 0.5 mg tablet, Take 1 tablet (0.5 mg total) by mouth every 8 (eight) hours as needed for anxiety., Disp: 20 tablet, Rfl: 0  •  metoclopramide (REGLAN) 10 mg tablet, Take 1 tablet (10 mg total) by mouth 3 (three) times a day as needed (nausea)., Disp: 20 tablet, Rfl: 2  •  olopatadine (PATADAY) 0.2 % ophthalmic solution, Administer 1 drop into affected eye(s) daily., Disp: , Rfl:   •  ondansetron ODT (ZOFRAN-ODT) 4 mg disintegrating tablet, Take 4 mg by  mouth every 8 (eight) hours as needed for nausea or vomiting., Disp: , Rfl:   •  semaglutide 0.25 mg or 0.5 mg(2 mg/1.5 mL) pen injector, Inject 0.25 mg under the skin every (seven) 7 days., Disp: , Rfl:   •  traZODone (DESYREL) 50 mg tablet, Take 25-50 mg by mouth nightly as needed., Disp: , Rfl:   •  TURMERIC ORAL, Take by mouth daily., Disp: , Rfl:   •  ubrogepant (UBRELVY) 100 mg tablet tablet, Take 1 tablet (100 mg total) by mouth as needed for migraine.  mg at onset of migraine. May repeat  mg once 2 hours later. Max 200 mg/day., Disp: 16 tablet, Rfl: 11  •  venlafaxine XR (EFFEXOR-XR) 37.5 mg 24 hr capsule, Take 37.5 mg by mouth daily., Disp: , Rfl:       BP Readings from Last 3 Encounters:   04/18/23 118/78   03/27/23 116/76   03/22/23 110/70       Recent Lab results:  Lab Results   Component Value Date    CHOL 208 (H) 07/12/2022   ,   Lab Results   Component Value Date    HDL 50 (L) 07/12/2022   ,   Lab Results   Component Value Date    LDLCALC 126 (H) 07/12/2022   ,   Lab Results   Component Value Date    TRIG 159 (H) 07/12/2022        Lab Results   Component Value Date    GLUCOSE 84 11/02/2022   ,   Lab Results   Component Value Date    HGBA1C 5.1 07/12/2022         Lab Results   Component Value Date    CREATININE 0.8 11/02/2022       Lab Results   Component Value Date    TSH 1.47 07/12/2022

## 2023-04-30 ENCOUNTER — DOCUMENTATION (OUTPATIENT)
Dept: INTERNAL MEDICINE | Facility: CLINIC | Age: 43
End: 2023-04-30
Payer: COMMERCIAL

## 2023-04-30 DIAGNOSIS — M25.562 ACUTE BILATERAL KNEE PAIN: Primary | ICD-10-CM

## 2023-04-30 DIAGNOSIS — M25.561 ACUTE BILATERAL KNEE PAIN: Primary | ICD-10-CM

## 2023-04-30 RX ORDER — LORAZEPAM 0.5 MG/1
0.5 TABLET ORAL EVERY 8 HOURS PRN
Qty: 20 TABLET | Refills: 0 | Status: SHIPPED | OUTPATIENT
Start: 2023-04-30 | End: 2025-03-26

## 2023-05-01 ENCOUNTER — APPOINTMENT (OUTPATIENT)
Dept: LAB | Facility: HOSPITAL | Age: 43
End: 2023-05-01
Attending: INTERNAL MEDICINE
Payer: COMMERCIAL

## 2023-05-01 DIAGNOSIS — M25.562 ACUTE BILATERAL KNEE PAIN: ICD-10-CM

## 2023-05-01 DIAGNOSIS — M25.561 ACUTE BILATERAL KNEE PAIN: ICD-10-CM

## 2023-05-01 LAB
ALBUMIN SERPL-MCNC: 3.7 G/DL (ref 3.4–5)
ALP SERPL-CCNC: 54 IU/L (ref 35–126)
ALT SERPL-CCNC: 24 IU/L (ref 11–54)
ANION GAP SERPL CALC-SCNC: 7 MEQ/L (ref 3–15)
AST SERPL-CCNC: 20 IU/L (ref 15–41)
BASOPHILS # BLD: 0.06 K/UL (ref 0.01–0.1)
BASOPHILS NFR BLD: 1.2 %
BILIRUB SERPL-MCNC: 0.6 MG/DL (ref 0.3–1.2)
BUN SERPL-MCNC: 10 MG/DL (ref 8–20)
CALCIUM SERPL-MCNC: 9.4 MG/DL (ref 8.9–10.3)
CHLORIDE SERPL-SCNC: 103 MEQ/L (ref 98–109)
CO2 SERPL-SCNC: 28 MEQ/L (ref 22–32)
CREAT SERPL-MCNC: 0.7 MG/DL (ref 0.6–1.1)
CRP SERPL-MCNC: 6.84 MG/L
DIFFERENTIAL METHOD BLD: ABNORMAL
EOSINOPHIL # BLD: 0.19 K/UL (ref 0.04–0.36)
EOSINOPHIL NFR BLD: 3.7 %
ERYTHROCYTE [DISTWIDTH] IN BLOOD BY AUTOMATED COUNT: 12.8 % (ref 11.7–14.4)
ERYTHROCYTE [SEDIMENTATION RATE] IN BLOOD BY WESTERGREN METHOD: 22 MM/HR
GFR SERPL CREATININE-BSD FRML MDRD: >60 ML/MIN/1.73M*2
GLUCOSE SERPL-MCNC: 75 MG/DL (ref 70–99)
HCT VFR BLDCO AUTO: 43.8 % (ref 35–45)
HGB BLD-MCNC: 14 G/DL (ref 11.8–15.7)
IMM GRANULOCYTES # BLD AUTO: 0.01 K/UL (ref 0–0.08)
IMM GRANULOCYTES NFR BLD AUTO: 0.2 %
LYMPHOCYTES # BLD: 1.48 K/UL (ref 1.2–3.5)
LYMPHOCYTES NFR BLD: 28.6 %
MCH RBC QN AUTO: 28.6 PG (ref 28–33.2)
MCHC RBC AUTO-ENTMCNC: 32 G/DL (ref 32.2–35.5)
MCV RBC AUTO: 89.4 FL (ref 83–98)
MONOCYTES # BLD: 0.38 K/UL (ref 0.28–0.8)
MONOCYTES NFR BLD: 7.3 %
NEUTROPHILS # BLD: 3.06 K/UL (ref 1.7–7)
NEUTS SEG NFR BLD: 59 %
NRBC BLD-RTO: 0 %
PDW BLD AUTO: 12.2 FL (ref 9.4–12.3)
PLATELET # BLD AUTO: 176 K/UL (ref 150–369)
POTASSIUM SERPL-SCNC: 4.3 MEQ/L (ref 3.6–5.1)
PROT SERPL-MCNC: 6.4 G/DL (ref 6–8.2)
RBC # BLD AUTO: 4.9 M/UL (ref 3.93–5.22)
SODIUM SERPL-SCNC: 138 MEQ/L (ref 136–144)
WBC # BLD AUTO: 5.18 K/UL (ref 3.8–10.5)

## 2023-05-01 PROCEDURE — 86140 C-REACTIVE PROTEIN: CPT

## 2023-05-01 PROCEDURE — 85652 RBC SED RATE AUTOMATED: CPT

## 2023-05-01 PROCEDURE — 85025 COMPLETE CBC W/AUTO DIFF WBC: CPT

## 2023-05-01 PROCEDURE — 86747 PARVOVIRUS ANTIBODY: CPT

## 2023-05-01 PROCEDURE — 80053 COMPREHEN METABOLIC PANEL: CPT

## 2023-05-01 PROCEDURE — 36415 COLL VENOUS BLD VENIPUNCTURE: CPT

## 2023-05-01 PROCEDURE — 86200 CCP ANTIBODY: CPT

## 2023-05-01 PROCEDURE — 86618 LYME DISEASE ANTIBODY: CPT

## 2023-05-01 NOTE — TELEPHONE ENCOUNTER
Medicine Refill Request    Last Office Visit: 4/18/2023   Last Telemedicine Visit: 3/7/2023 Edyta Zamora CRNP    Next Office Visit: Visit date not found   Next Telemedicine Visit: Visit date not found     For rescue treatment if Ubrelvy doesn't help or as alternative to Ubrelvy: Frovatriptan 2.5 mg 1 tab as needed at onset of moderate to severe headache. May repeat 1 tablet 2 hours later. Maximum 2 tablets in 24 hr. Limit to 2 days/week.

## 2023-05-02 ENCOUNTER — TELEPHONE (OUTPATIENT)
Dept: BARIATRICS/WEIGHT MGMT | Facility: CLINIC | Age: 43
End: 2023-05-02
Payer: COMMERCIAL

## 2023-05-02 LAB — CCP IGA+IGG SERPL IA-ACNC: <8 UNITS

## 2023-05-02 NOTE — TELEPHONE ENCOUNTER
Patient is requesting to reestablish care with Dr. Vallejo. Last OV with Dr. Vallejo was on 1/27/2022.     Please advise Pt's [phone]

## 2023-05-03 ENCOUNTER — OFFICE VISIT (OUTPATIENT)
Dept: NEUROLOGY | Facility: CLINIC | Age: 43
End: 2023-05-03
Payer: COMMERCIAL

## 2023-05-03 VITALS
HEART RATE: 80 BPM | DIASTOLIC BLOOD PRESSURE: 78 MMHG | OXYGEN SATURATION: 97 % | WEIGHT: 216 LBS | SYSTOLIC BLOOD PRESSURE: 122 MMHG | HEIGHT: 67 IN | BODY MASS INDEX: 33.9 KG/M2 | RESPIRATION RATE: 16 BRPM

## 2023-05-03 DIAGNOSIS — G43.901 STATUS MIGRAINOSUS: ICD-10-CM

## 2023-05-03 DIAGNOSIS — G43.711 INTRACTABLE CHRONIC MIGRAINE WITHOUT AURA AND WITH STATUS MIGRAINOSUS: ICD-10-CM

## 2023-05-03 DIAGNOSIS — G89.29 OTHER CHRONIC PAIN: Primary | ICD-10-CM

## 2023-05-03 LAB
B BURGDOR AB SER IA-ACNC: 0.53 RATIO
B19V IGG SER IA-ACNC: 6.8
B19V IGM SER IA-ACNC: 0.1

## 2023-05-03 PROCEDURE — 64450 NJX AA&/STRD OTHER PN/BRANCH: CPT | Mod: 50 | Performed by: PSYCHIATRY & NEUROLOGY

## 2023-05-03 PROCEDURE — 64405 NJX AA&/STRD GR OCPL NRV: CPT | Mod: 50 | Performed by: PSYCHIATRY & NEUROLOGY

## 2023-05-03 PROCEDURE — 3008F BODY MASS INDEX DOCD: CPT | Performed by: PSYCHIATRY & NEUROLOGY

## 2023-05-03 PROCEDURE — 99214 OFFICE O/P EST MOD 30 MIN: CPT | Mod: 25 | Performed by: PSYCHIATRY & NEUROLOGY

## 2023-05-03 PROCEDURE — 64400 NJX AA&/STRD TRIGEMINAL NRV: CPT | Performed by: PSYCHIATRY & NEUROLOGY

## 2023-05-03 RX ORDER — RIZATRIPTAN BENZOATE 10 MG/1
10 TABLET, ORALLY DISINTEGRATING ORAL ONCE AS NEEDED
Qty: 12 TABLET | Refills: 11 | Status: SHIPPED | OUTPATIENT
Start: 2023-05-03 | End: 2023-07-19

## 2023-05-03 RX ORDER — DULOXETIN HYDROCHLORIDE 30 MG/1
40 CAPSULE, DELAYED RELEASE ORAL 2 TIMES DAILY
COMMUNITY
Start: 2023-04-20 | End: 2023-11-30

## 2023-05-03 RX ORDER — LIDOCAINE HYDROCHLORIDE 20 MG/ML
9 INJECTION, SOLUTION INFILTRATION; PERINEURAL ONCE
Status: COMPLETED | OUTPATIENT
Start: 2023-05-03 | End: 2023-05-03

## 2023-05-03 RX ADMIN — LIDOCAINE HYDROCHLORIDE 9 ML: 20 INJECTION, SOLUTION INFILTRATION; PERINEURAL at 13:47

## 2023-05-03 ASSESSMENT — PAIN SCALES - GENERAL: PAINLEVEL: 4

## 2023-05-03 NOTE — PROGRESS NOTES
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 Rizatriptan). There may be some contribution from myofascial neck pain, but there is no evidence of significant cervical spine disease.    Today, she presents with status migrainosus. I have repeated her nerve blocks and changed her acute regimen. I prescribed a trial of Nurtec and Maxalt-MLT (wants to try brand name to see if this works better than the generic) to use in place of Ubrelvy and Frova. I recommend to continue with Emgality, Botox and magnesium for prevention. See detailed recommendations below.    Diagnosis:  Chronic migraine without aura   Cervicalgia  Status migrainosus   Headache secondary to COVID vaccine booster - resolved     Evaluation:  No additional tests are needed at this time. If there are new symptoms or changes in the characteristics of the headaches, I will re-evaluate Andressa and consider if diagnostic tests are needed.     Treatment plan:      1. Healthy Habits: The following recommendations can greatly reduce the number and severity of headaches.  · Maintain regular sleep hours and get sufficient sleep (8-9 hours)  · Do not skip meals, especially breakfast  · Drink at least 64 oz or 8 cups of water daily - enough to urinate 5-6 times a day  · Get at least 30 minutes of daily  aerobic exercise (enough to increase your heart rate and sweat)    Keep track of headaches and use of acute medication (prescription or over-the-counter) in a calendar or notebook and bring that to your clinic visits.    2. Acute Treatment:     Stop Ubrelvy to try Nurtec.    Nurtec ODT 75 mg as needed at onset of moderate to severe headache. Maximum 1 tablet in 24 hours.     Nurtec ODT copay savings card information   Two ways to activate a card:   - Text DINESH to 267-89  - [place] online at https://www.[employer]/savings-support    For rescue treatment if Nurtec/Ubrelvy doesn't help or as alternative to Nurtec/Ubrelvy: Try Maxalt-MLT 10 mg as needed at onset of moderate to severe headache. May repeat 1 tablet 2 hours later. Maximum 2 tablets in 24 hr. Limit to 2 days/week.     Third line acute or rescue treatment: TRUDHESA 1.45 mg (administered as one metered spray of 0.725 mg into each nostril). The dose may be repeated, if needed, a minimum of 1 hour after the first dose. Do not use more than 2 doses within a 24-hour period or 3 doses within 7 days. Do not use within 24 hours of triptan.  Directions: TRUDHESA is for nasal administration only. Assemble and prime (i.e., pumped 4 times) before use. Use TRUDHESA immediately after priming and then discard. https://www.trudhes"[employer]"cp.com/how-to-use/    Take Reglan 10 mg 15-30 min before the nasal spray to prevent nausea.    Future consideration: Nurtec or other triptans (naratriptan, almotriptan or sumatriptan).    Lasmiditan, or Sprix nasal spray can be tried for rescue treatment if needed in the future.     We may add an antinausea medication if needed for nausea or as co-adjuvant if monotherapy is not sufficient.      3. Preventive Treatment:     Lidocaine cranial nerve blocks done today. Plan to continue with nerve blocks every 6-8 weeks.     Continue with Emgality monthly injections.     Continue with Botox 195 units every 12 weeks.      Increase the dose of  magnesium glycinate to 600 mg daily. This may help with constipation too.    Continue with transition from Effexor to Cymbalta for depression from psychiatrist. Cymbalta may help as migraine preventive.    Future considerations: add riboflavin. Switch Emgality to Ajovy or Aimovig or Switch Emgality to Nurtec QOD.     Other nutraceutical options include: riboflavin, melatonin, feverfew, and coenzyme Q10.     Other options for oral preventive medications include: amitriptyline or nortriptyline, zonisamide, rimegepant, valproate, verapamil, gabapentin, pregabalin, candesartan, Namenda, escitalopram, and levetiracetam. We want to avoid weight gain.    Several non-invasive neuromodulation devices (gammaCore, Cefaly and Nerivio) are available to treat headaches preventively and/or acutely. These are typically not covered by insurance, but the companies have a trial period and will refund you if the device is ineffective and returned within that period.     Follow-up in the [facility] in 12 and 24 weeks for the next Botox treatments.     We appreciate the opportunity to participate in the care of Andressa.     Please, do not hesitate to call our office at [phone] if you have any questions or concerns.       Theresa Lewis MD  [facility]  [phone]    I spent 30 minutes on this date of service performing the following activities: obtaining history, performing examination, entering orders, documenting, preparing for visit and providing counseling and education.    This was in addition to the time dedicated to the procedure.

## 2023-05-03 NOTE — PROCEDURES
Procedures  Nerve Blocks and Trigger Points Procedure Note:  Indication:    Diagnosis Plan   1. Other chronic pain  lidocaine (XYLOCAINE) 20 mg/mL (2 %) injection 9 mL           I explained the risks and benefits and obtained written consent from Andressa Baker. Signed consent form will be scanned to patient's electronic medical records.     Lidocaine 2% without epinephrine was drawn in a sterile syringe.     Lot number and expiration date documented in informed consent.     I performed a time-out, including 2 unique patient identifiers.     I located the areas of maximal tenderness corresponding to each nerve or trigger point, and cleaned with alcohol swabs.     I used a 30 gauge 1/2 inch needle to inject lidocaine over the following:        Right Greater Occipital Nerve 2 cc   Left Greater Occipital Nerve 2 cc   Right Lesser Occipital Nerve 1 cc   Left Lesser Occipital Nerve 1 cc     Right Auriculotemporal Nerve 1 cc    Left Auriculotemporal Nerve 1 cc    Right Supraorbital Nerve 0.25 cc   Left Supraorbital Nerve 0.25 cc   Right Supratrochlear Nerve 0.25 cc   Left Supratrochlear Nerve 0.25 cc     Total of 9 cc injected.      The procedure was well tolerated and there were no complications.         Theresa Lewis MD  [facility]  [phone]

## 2023-05-03 NOTE — LETTER
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 air cells are clear.    IMPRESSION/PLAN:  Andressa Baker is a very pleasant 42 y.o. woman seen initially in July 2022 for chronic severe headaches since her early 20's, worsening in the last few months. Neurological exam was normal. Brain MRI (2014 and 9/2022) were both unremarkable. There are no atypical features or symptoms suggestive of a serious underlying condition or secondary headache. In our opinion, she has chronic migraine exacerbated by hormonal changes and frequent use of acute medications (Excedrin and Rizatriptan). There may be some contribution from myofascial neck pain, but there is no evidence of significant cervical spine disease.    Today, she presents with status migrainosus. I have repeated her nerve blocks and changed her acute regimen. I prescribed a trial of Nurtec and Maxalt-MLT (wants to try brand name to see if this works better than the generic) to use in place of Ubrelvy and Frova. I recommend to continue with Emgality, Botox and magnesium for prevention. See detailed recommendations below.    Diagnosis:  Chronic migraine without aura   Cervicalgia  Status migrainosus   Headache secondary to COVID vaccine booster - resolved     Evaluation:  No  additional tests are needed at this time. If there are new symptoms or changes in the characteristics of the headaches, I will re-evaluate Andressa and consider if diagnostic tests are needed.     Treatment plan:      1. Healthy Habits: The following recommendations can greatly reduce the number and severity of headaches.  · Maintain regular sleep hours and get sufficient sleep (8-9 hours)  · Do not skip meals, especially breakfast  · Drink at least 64 oz or 8 cups of water daily - enough to urinate 5-6 times a day  · Get at least 30 minutes of daily aerobic exercise (enough to increase your heart rate and sweat)    Keep track of headaches and use of acute medication (prescription or over-the-counter) in a calendar or notebook and bring that to your clinic visits.    2. Acute Treatment:     Stop Ubrelvy to try Nurtec.    Nurtec ODT 75 mg as needed at onset of moderate to severe headache. Maximum 1 tablet in 24 hours.     Nurtec ODT copay savings card information   Two ways to activate a card:   - Text NSAVE to 019-70  - [place] online at https://www.[employer]/savings-support    For rescue treatment if Nurtec/Ubrelvy doesn't help or as alternative to Nurtec/Ubrelvy: Try Maxalt-MLT 10 mg as needed at onset of moderate to severe headache. May repeat 1 tablet 2 hours later. Maximum 2 tablets in 24 hr. Limit to 2 days/week.     Third line acute or rescue treatment: TRUDHESA 1.45 mg (administered as one metered spray of 0.725 mg into each nostril). The dose may be repeated, if needed, a minimum of 1 hour after the first dose. Do not use more than 2 doses within a 24-hour period or 3 doses within 7 days. Do not use within 24 hours of triptan.  Directions: TRUDHESA is for nasal administration only. Assemble and prime (i.e., pumped 4 times) before use. Use TRUDHESA immediately after priming and then discard. https://www.[employer].[place]/how-to-use/    Take Reglan 10 mg 15-30 min before the nasal spray to prevent  nausea.    Future consideration: Nurtec or other triptans (naratriptan, almotriptan or sumatriptan).    Lasmiditan, or Sprix nasal spray can be tried for rescue treatment if needed in the future.     We may add an antinausea medication if needed for nausea or as co-adjuvant if monotherapy is not sufficient.      3. Preventive Treatment:     Lidocaine cranial nerve blocks done today. Plan to continue with nerve blocks every 6-8 weeks.     Continue with Emgality monthly injections.     Continue with Botox 195 units every 12 weeks.      Increase the dose of magnesium glycinate to 600 mg daily. This may help with constipation too.    Continue with transition from Effexor to Cymbalta for depression from psychiatrist. Cymbalta may help as migraine preventive.    Future considerations: add riboflavin. Switch Emgality to Ajovy or Aimovig or Switch Emgality to Nurtec QOD.     Other nutraceutical options include: riboflavin, melatonin, feverfew, and coenzyme Q10.     Other options for oral preventive medications include: amitriptyline or nortriptyline, zonisamide, rimegepant, valproate, verapamil, gabapentin, pregabalin, candesartan, Namenda, escitalopram, and levetiracetam. We want to avoid weight gain.    Several non-invasive neuromodulation devices (gammaCore, Cefaly and Nerivio) are available to treat headaches preventively and/or acutely. These are typically not covered by insurance, but the companies have a trial period and will refund you if the device is ineffective and returned within that period.     Follow-up in the Headache Clinic in 12 and 24 weeks for the next Botox treatments.     We appreciate the opportunity to participate in the care of Andressa.     Please, do not hesitate to call our office at [phone] if you have any questions or concerns.       Theresa Lewis MD  [facility]  [phone]    I spent 30 minutes on this date of service performing the following activities: obtaining history,  performing examination, entering orders, documenting, preparing for visit and providing counseling and education.    This was in addition to the time dedicated to the procedure.     Theresa Dalton MD  5/3/2023  3:55 PM  Signed  Procedures  Nerve Blocks and Trigger Points Procedure Note:  Indication:    Diagnosis Plan   1. Other chronic pain  lidocaine (XYLOCAINE) 20 mg/mL (2 %) injection 9 mL           I explained the risks and benefits and obtained written consent from Andressa Baekr. Signed consent form will be scanned to patient's electronic medical records.     Lidocaine 2% without epinephrine was drawn in a sterile syringe.     Lot number and expiration date documented in informed consent.     I performed a time-out, including 2 unique patient identifiers.     I located the areas of maximal tenderness corresponding to each nerve or trigger point, and cleaned with alcohol swabs.     I used a 30 gauge 1/2 inch needle to inject lidocaine over the following:        Right Greater Occipital Nerve 2 cc   Left Greater Occipital Nerve 2 cc   Right Lesser Occipital Nerve 1 cc   Left Lesser Occipital Nerve 1 cc     Right Auriculotemporal Nerve 1 cc    Left Auriculotemporal Nerve 1 cc    Right Supraorbital Nerve 0.25 cc   Left Supraorbital Nerve 0.25 cc   Right Supratrochlear Nerve 0.25 cc   Left Supratrochlear Nerve 0.25 cc     Total of 9 cc injected.      The procedure was well tolerated and there were no complications.         Theresa Lewis MD  [facility]  [phone]

## 2023-05-03 NOTE — PATIENT INSTRUCTIONS
Treatment plan:      1. Healthy Habits: The following recommendations can greatly reduce the number and severity of headaches.  Maintain regular sleep hours and get sufficient sleep (8-9 hours)  Do not skip meals, especially breakfast  Drink at least 64 oz or 8 cups of water daily - enough to urinate 5-6 times a day  Get at least 30 minutes of daily aerobic exercise (enough to increase your heart rate and sweat)    Keep track of headaches and use of acute medication (prescription or over-the-counter) in a calendar or notebook and bring that to your clinic visits.    2. Acute Treatment:     Stop Ubrelvy to try Nurtec.    Nurtec ODT 75 mg as needed at onset of moderate to severe headache. Maximum 1 tablet in 24 hours.     Nurtec ODT copay savings card information   Two ways to activate a card:   - Text NSAVE to 986-89  - [employer] online at https://www.[employer]/savings-support    For rescue treatment if Nurtec/Ubrelvy doesn't help or as alternative to Nurtec/Ubrelvy: Try Maxalt-MLT 10 mg as needed at onset of moderate to severe headache. May repeat 1 tablet 2 hours later. Maximum 2 tablets in 24 hr. Limit to 2 days/week.     Third line acute or rescue treatment: TRUDHESA 1.45 mg (administered as one metered spray of 0.725 mg into each nostril). The dose may be repeated, if needed, a minimum of 1 hour after the first dose. Do not use more than 2 doses within a 24-hour period or 3 doses within 7 days. Do not use within 24 hours of triptan.  Directions: TRUDHESA is for nasal administration only. Assemble and prime (i.e., pumped 4 times) before use. Use TRUDHESA immediately after priming and then discard. https://www.trudhesahcp.com/how-to-use/    Take Reglan 10 mg 15-30 min before the nasal spray to prevent nausea.    Future consideration: Nurtec or other triptans (naratriptan, almotriptan or sumatriptan).    Lasmiditan, or Sprix nasal spray can be tried for rescue treatment if needed in the future.     We may add an  antinausea medication if needed for nausea or as co-adjuvant if monotherapy is not sufficient.      3. Preventive Treatment:     Plan to continue with nerve blocks every 6-8 weeks.    Continue with Emgality monthly injections.     Continue with Botox 195 units every 12 weeks.      Increase the dose of magnesium glycinate to 600 mg daily. This may help with constipation too.    Continue with transition from Effexor to Cymbalta for depression from psychiatrist. Cymbalta may help as migraine preventive.    Future considerations: add riboflavin. Switch Emgality to Ajovy or Aimovig. Nerve blocks every 12 weeks between Botox treatments. Switch Emgality to Nurtec QOD.     Other nutraceutical options include: riboflavin, melatonin, feverfew, and coenzyme Q10.     Other options for oral preventive medications include: amitriptyline or nortriptyline, zonisamide, rimegepant, valproate, verapamil, gabapentin, pregabalin, candesartan, Namenda, escitalopram, and levetiracetam. We want to avoid weight gain.    Several non-invasive neuromodulation devices (gammaCore, Cefaly and Nerivio) are available to treat headaches preventively and/or acutely. These are typically not covered by insurance, but the companies have a trial period and will refund you if the device is ineffective and returned within that period.     Follow-up in the Headache Clinic in 12 and 24 weeks for the next Botox treatments.

## 2023-05-10 ENCOUNTER — OFFICE VISIT (OUTPATIENT)
Dept: INTERNAL MEDICINE | Facility: CLINIC | Age: 43
End: 2023-05-10
Payer: COMMERCIAL

## 2023-05-10 VITALS
HEIGHT: 67 IN | OXYGEN SATURATION: 98 % | BODY MASS INDEX: 33.74 KG/M2 | HEART RATE: 75 BPM | TEMPERATURE: 98.2 F | RESPIRATION RATE: 16 BRPM | SYSTOLIC BLOOD PRESSURE: 118 MMHG | WEIGHT: 215 LBS | DIASTOLIC BLOOD PRESSURE: 78 MMHG

## 2023-05-10 DIAGNOSIS — M25.562 CHRONIC PAIN OF BOTH KNEES: Primary | ICD-10-CM

## 2023-05-10 DIAGNOSIS — G89.29 CHRONIC PAIN OF BOTH KNEES: Primary | ICD-10-CM

## 2023-05-10 DIAGNOSIS — M25.561 CHRONIC PAIN OF BOTH KNEES: Primary | ICD-10-CM

## 2023-05-10 DIAGNOSIS — G43.901 STATUS MIGRAINOSUS: ICD-10-CM

## 2023-05-10 PROCEDURE — 3008F BODY MASS INDEX DOCD: CPT | Performed by: INTERNAL MEDICINE

## 2023-05-10 PROCEDURE — 99213 OFFICE O/P EST LOW 20 MIN: CPT | Performed by: INTERNAL MEDICINE

## 2023-05-10 RX ORDER — DICLOFENAC SODIUM 10 MG/G
GEL TOPICAL 2 TIMES DAILY PRN
Qty: 100 G | Refills: 1 | Status: SHIPPED | OUTPATIENT
Start: 2023-05-10 | End: 2023-08-30

## 2023-05-10 ASSESSMENT — ENCOUNTER SYMPTOMS
VOMITING: 0
ARTHRALGIAS: 1
DYSPHORIC MOOD: 0
ABDOMINAL PAIN: 0
APPETITE CHANGE: 0
DIARRHEA: 0
FATIGUE: 0
COUGH: 0
BLOOD IN STOOL: 0
POLYPHAGIA: 0
SHORTNESS OF BREATH: 0
UNEXPECTED WEIGHT CHANGE: 0
DIFFICULTY URINATING: 0
POLYDIPSIA: 0
NAUSEA: 0
FREQUENCY: 0
DIZZINESS: 0
CONSTIPATION: 0
ACTIVITY CHANGE: 0
SLEEP DISTURBANCE: 0
CHEST TIGHTNESS: 0
HEADACHES: 0
NERVOUS/ANXIOUS: 0
PALPITATIONS: 0

## 2023-05-10 NOTE — PATIENT INSTRUCTIONS
Problem List Items Addressed This Visit       Status migrainosus    Relevant Medications    frovatriptan succinate (FROVA ORAL)    naproxen sodium (ALEVE ORAL)    Chronic pain of both knees - Primary    Relevant Medications    diclofenac sodium (VOLTAREN) 1 % topical gel    Other Relevant Orders    Ambulatory referral to Physical Therapy

## 2023-05-10 NOTE — PROGRESS NOTES
Patient ID: Andressa Baker is a 43 y.o. female.      Knee Pain       Ran about 1 week ago.  Has knee pain when kneeling and trying to get up from the floor.  No swelling or warmth of the joints.  Had arthroscopic surgery right knee about 6 months ago.      Review of Systems   Constitutional: Negative for activity change, appetite change, fatigue and unexpected weight change.   HENT: Negative for dental problem and hearing loss.    Eyes: Negative for visual disturbance.   Respiratory: Negative for cough, chest tightness and shortness of breath.    Cardiovascular: Negative for chest pain, palpitations and leg swelling.   Gastrointestinal: Negative for abdominal pain, blood in stool, constipation, diarrhea, nausea and vomiting.   Endocrine: Negative for cold intolerance, heat intolerance, polydipsia, polyphagia and polyuria.   Genitourinary: Negative for difficulty urinating, frequency and urgency.   Musculoskeletal: Positive for arthralgias.   Skin: Negative for rash.   Neurological: Negative for dizziness and headaches.   Psychiatric/Behavioral: Negative for dysphoric mood and sleep disturbance. The patient is not nervous/anxious.          Current Outpatient Medications:   •  b complex vitamins capsule, Take 1 capsule by mouth daily., Disp: , Rfl:   •  diclofenac sodium (VOLTAREN) 1 % topical gel, Apply topically 2 (two) times a day as needed for mild pain., Disp: 100 g, Rfl: 1  •  dihydroergotamine (TRUDHESA) 0.725 mg/pump act. (4 mg/mL) spray,non-aerosol, Administer 1 spray into each nostril as needed (migraine). The dose may be repeated, if needed, a minimum of 1 hour after the first dose. Do not use more than 2 doses within a 24-hour period or 3 doses within 7 days., Disp: 4 mL, Rfl: 5  •  docosahexaenoic acid-epa 120-180 mg capsule, Take 1,000 mg by mouth daily., Disp: , Rfl:   •  DULoxetine (CYMBALTA) 30 mg capsule, Take 30 mg by mouth 2 (two) times a day., Disp: , Rfl:   •  EMGALITY  mg/mL  pen injector subcutaneous pen, Inject 1 mL (120 mg total) under the skin every 30 (thirty) days., Disp: 1 mL, Rfl: 3  •  ergocalciferol (VITAMIN D2) 50,000 unit(1250 mcg) capsule, Take 50,000 Units by mouth once a week., Disp: , Rfl:   •  frovatriptan succinate (FROVA ORAL), Take by mouth., Disp: , Rfl:   •  herbal drugs (CALMME ORAL), Take by mouth daily., Disp: , Rfl:   •  LORazepam (ATIVAN) 0.5 mg tablet, Take 1 tablet (0.5 mg total) by mouth every 8 (eight) hours as needed for anxiety., Disp: 20 tablet, Rfl: 0  •  metoclopramide (REGLAN) 10 mg tablet, Take 1 tablet (10 mg total) by mouth 3 (three) times a day as needed (nausea)., Disp: 20 tablet, Rfl: 2  •  naproxen sodium (ALEVE ORAL), Take by mouth., Disp: , Rfl:   •  olopatadine (PATADAY) 0.2 % ophthalmic solution, Administer 1 drop into affected eye(s) daily., Disp: , Rfl:   •  ondansetron ODT (ZOFRAN-ODT) 4 mg disintegrating tablet, Take 4 mg by mouth every 8 (eight) hours as needed for nausea or vomiting., Disp: , Rfl:   •  semaglutide 0.25 mg or 0.5 mg(2 mg/1.5 mL) pen injector, Inject 0.5 mg under the skin every (seven) 7 days., Disp: , Rfl:   •  traZODone (DESYREL) 50 mg tablet, Take 25-50 mg by mouth nightly as needed., Disp: , Rfl:   •  TURMERIC ORAL, Take by mouth daily., Disp: , Rfl:   •  ubrogepant (UBRELVY) 100 mg tablet tablet, Take 1 tablet (100 mg total) by mouth as needed for migraine.  mg at onset of migraine. May repeat  mg once 2 hours later. Max 200 mg/day., Disp: 16 tablet, Rfl: 11  •  rimegepant (NURTEC ODT) 75 mg tablet,disintegrating, Take 1 tablet (75 mg total) by mouth as needed (migraine headache). Dissolve 1 tablet under the tongue. One dose per day as needed. (Patient not taking: Reported on 5/10/2023), Disp: 16 tablet, Rfl: 11  •  rizatriptan MLT (MAXALT-MLT) 10 mg disintegrating tablet, Take 1 tablet (10 mg total) by mouth once as needed for migraine. May repeat in 2 hours if unresolved. Do not exceed 30 mg in 24  hours. (Patient not taking: Reported on 5/10/2023), Disp: 12 tablet, Rfl: 11    No Known Allergies    Past Medical History:   Diagnosis Date   • Abnormal ECG     PVCs   • Arrhythmia 2018    PVCs, status post ablation, partially successful.   • Back pain    • GERD (gastroesophageal reflux disease)    • Heartburn    • History of fetal demise, not currently pregnant     Twin pregnancy, 1 fetal demise, 1 live birth   • Joint pain    • Migraine headache    • Ovarian cyst    • Palpitations    • Pericarditis 2018    After john ablation       Past Surgical History:   Procedure Laterality Date   • ABLATION OF DYSRHYTHMIC FOCUS  2018   •  SECTION     • KNEE ARTHROSCOPY W/ MENISCAL REPAIR Right     2022       Family History   Problem Relation Age of Onset   • Hyperlipidemia Biological Mother    • Hyperlipidemia Biological Father    • Hypertension Biological Father    • Prostate cancer Biological Father 78   • Dementia Biological Father    • Other Biological Father         dief from covid 19   • Hyperlipidemia Maternal Grandmother    • Clotting disorder Maternal Grandfather         developed in 60's - required tansfusions/platelets   • Hyperlipidemia Paternal Grandmother    • Dementia Paternal Grandmother    • Diabetes Paternal Grandfather    • No Known Problems Biological Child        Social History     Socioeconomic History   • Marital status:      Spouse name: Shashank Baker   • Number of children: None   • Years of education: None   • Highest education level: None   Occupational History   • Occupation: Homemaker   Tobacco Use   • Smoking status: Never   • Smokeless tobacco: Never   Vaping Use   • Vaping status: Never Used     Passive vaping exposure: Yes   Substance and Sexual Activity   • Alcohol use: Yes     Comment: occasionally   • Drug use: No   • Sexual activity: Yes     Birth control/protection: None     [facility]   Social History Narrative    Exercises  2-3 times per week -  and she is trying to run again    Sleep sometimes interrupted    Eating plan: Patient attempts low glycemic index eating    No history of domestic violence    Lives with  and 5 children      [employer] -- medical legal work / part-time    Has a dog       Vitals:    05/10/23 1105   BP: 118/78   Pulse: 75   Resp: 16   Temp: 36.8 °C (98.2 °F)   SpO2: 98%     Body mass index is 33.67 kg/m².      Wt Readings from Last 3 Encounters:   05/10/23 97.5 kg (215 lb)   05/03/23 98 kg (216 lb)   04/18/23 98 kg (216 lb)     BP Readings from Last 3 Encounters:   05/10/23 118/78   05/03/23 122/78   04/18/23 118/78     Pulse Readings from Last 3 Encounters:   05/10/23 75   05/03/23 80   04/18/23 90         Physical Exam  Vitals reviewed.   HENT:      Head: Normocephalic and atraumatic.      Right Ear: External ear normal.      Left Ear: External ear normal.   Eyes:      Conjunctiva/sclera: Conjunctivae normal.      Pupils: Pupils are equal, round, and reactive to light.   Neck:      Thyroid: No thyroid mass or thyromegaly.      Vascular: No carotid bruit or JVD.      Trachea: No tracheal deviation.   Cardiovascular:      Rate and Rhythm: Normal rate and regular rhythm.      Heart sounds: Normal heart sounds. No murmur heard.     No friction rub. No gallop.   Pulmonary:      Effort: Pulmonary effort is normal. No respiratory distress.      Breath sounds: Normal breath sounds. No wheezing or rales.   Abdominal:      General: Bowel sounds are normal. There is no distension.      Palpations: Abdomen is soft.      Tenderness: There is no abdominal tenderness.   Musculoskeletal:         General: No swelling, tenderness or deformity. Normal range of motion.      Cervical back: Normal range of motion and neck supple.   Lymphadenopathy:      Cervical: No cervical adenopathy.   Skin:     General: Skin is warm and dry.   Neurological:      Mental Status: She is alert and oriented to person, place, and  time.   Psychiatric:         Behavior: Behavior normal.         Thought Content: Thought content normal.         Assessment/Plan     Problem List Items Addressed This Visit     Status migrainosus    Relevant Medications    frovatriptan succinate (FROVA ORAL)    naproxen sodium (ALEVE ORAL)    Chronic pain of both knees - Primary    Relevant Medications    diclofenac sodium (VOLTAREN) 1 % topical gel    Other Relevant Orders    Ambulatory referral to Physical Therapy         Written education and action steps suggested to the patient are documented in After Visit Summary / Patient Instructions sections of this encounter.

## 2023-05-19 DIAGNOSIS — G43.901 STATUS MIGRAINOSUS: Primary | ICD-10-CM

## 2023-05-19 RX ORDER — FROVATRIPTAN SUCCINATE 2.5 MG/1
2.5 TABLET, FILM COATED ORAL ONCE AS NEEDED
Qty: 12 TABLET | Refills: 11 | Status: SHIPPED | OUTPATIENT
Start: 2023-05-19 | End: 2024-03-08

## 2023-05-19 NOTE — TELEPHONE ENCOUNTER
Medicine Refill Request    Last Office Visit: 5/3/2023   Last Telemedicine Visit: 3/7/2023 Edyta Zamora CRNP    Next Office Visit: 7/11/2023   Next Telemedicine Visit: Visit date not found     Future consideration: Nurtec or other triptans (naratriptan, almotriptan or sumatriptan).

## 2023-05-24 ENCOUNTER — TELEMEDICINE (OUTPATIENT)
Dept: BARIATRICS/WEIGHT MGMT | Facility: CLINIC | Age: 43
End: 2023-05-24
Payer: COMMERCIAL

## 2023-05-24 DIAGNOSIS — M25.561 CHRONIC PAIN OF BOTH KNEES: Primary | ICD-10-CM

## 2023-05-24 DIAGNOSIS — G89.29 CHRONIC PAIN OF BOTH KNEES: Primary | ICD-10-CM

## 2023-05-24 DIAGNOSIS — E66.811 CLASS 1 OBESITY WITH SERIOUS COMORBIDITY AND BODY MASS INDEX (BMI) OF 31.0 TO 31.9 IN ADULT, UNSPECIFIED OBESITY TYPE: ICD-10-CM

## 2023-05-24 DIAGNOSIS — M25.562 CHRONIC PAIN OF BOTH KNEES: Primary | ICD-10-CM

## 2023-05-24 DIAGNOSIS — F51.01 PRIMARY INSOMNIA: ICD-10-CM

## 2023-05-24 PROCEDURE — 99214 OFFICE O/P EST MOD 30 MIN: CPT | Mod: GT,95 | Performed by: FAMILY MEDICINE

## 2023-05-24 RX ORDER — SEMAGLUTIDE 2.68 MG/ML
INJECTION, SOLUTION SUBCUTANEOUS
COMMUNITY
Start: 2023-05-10 | End: 2023-07-10

## 2023-05-24 NOTE — PROGRESS NOTES
DETAILS OF VISIT   Start of visit: 11:53  End of visit: 12:23  Total time: 30 minutes on this date of service performing the following activities: obtaining history, entering orders, documenting, preparing for visit, obtaining / reviewing records, and providing counseling and education    State:[place]    Telemedicine visit: Audio and Video Encounter   Hello, my name is Gloria Donato MD.  Before we proceed, can you please verify your identification by telling me your full name and date of birth?  Can you tell me who is in the room with you?    You and I are about to have a telemedicine check-in or visit because you have requested it.  This is a live video-conference.  I am a real person, speaking to you in real time.  There is no one else with me on the video-conference. I am not recording this conversation and I am asking you not to record it.  This telemedicine visit will be billed to your health insurance or you, if you are self-insured.  You understand you will be responsible for any copayments or coinsurances that apply to your telemedicine visit.  Communication platform used for this encounter:  [facility] Video Visit (Epic Video Client)       Before starting our telemedicine visit, I am required to get your consent for this virtual check-in or visit by telemedicine. Do you consent?  Consent statement Read: You and I are about to have a Telemedicine visit.  This is allowed because you are already my patient, and you have requested it.  This telemedicine visit will be billed to your health insurance or to you, if you are self-insured. You understand that you ill be responsible for any co-payments or co-insurances that apply to your telemedicine visit.  Before starting our telemedicine visit, I am required to get your consent for this virtual check-in.  Do you consent?  Patient reply : Yes    PCP: Gisell Costa, DO   HISTORY OF PRESENT ILLNESS        Andressa Baker is a 43 y.o. female  following up on lifestyle management and weight loss as adjunctive treatment strategies for Lipedema and Insomnia      Has been absent to care for 1 year, had difficulty with health this past year.    Many medication changes.     The migraines have been preventing her from focusing on anything other than     She was taking ozempic to keep her weight down to 197 pounds, but had issues acquiring her medication and dosing was spotty.      She has to be careful with her  Knees as they have flares easily with even walking/hiking.    Ozempic is currently at 1 mg and the hunger is better controlled.  She does get nausea as a side effect.      Medication(s) used to support lifestyle modifications:Ozempic  Taking medication as prescribed:Yes   Side effects: Nausea  Medication effect: Decreasing Hunger, Increasing Fullness  Supplements used to support lifestyle modification:Not discussed    Interval history:   Chart review done since last visit.    Reviewed PCP/Specialists' recent notes  Medical update is daily severe migraines.   She is taking emalgalty, cymbalta, and nortriptyline.   Using ubrelvy and frova for abortives.    Has a knee surgery in November.     Concerns/questions: Not eating all day, having trouble executing intentions, eating convenient and processed carbs at night.  Updates on weight related conditions: Knee pain and headaches interfering with intended health goals, not currently meditating, not getting adequate sleep most nights.    Sleep: severe insomnia. Uses trazadone as needed --but not consistently.    Stress: Chronically high        PAST MEDICAL History        Past Medical History:   Diagnosis Date   • Abnormal ECG     PVCs   • Arrhythmia 02/2018    PVCs, status post ablation, partially successful.   • Back pain    • GERD (gastroesophageal reflux disease)    • Heartburn    • History of fetal demise, not currently pregnant     Twin pregnancy, 1 fetal demise, 1 live birth   • Joint pain    • Migraine  headache    • Ovarian cyst    • Palpitations    • Pericarditis 03/06/2018    After john ablation       MEDICATIONS        Current Outpatient Medications on File Prior to Visit   Medication Sig Dispense Refill   • b complex vitamins capsule Take 1 capsule by mouth daily.     • diclofenac sodium (VOLTAREN) 1 % topical gel Apply topically 2 (two) times a day as needed for mild pain. 100 g 1   • dihydroergotamine (TRUDHESA) 0.725 mg/pump act. (4 mg/mL) spray,non-aerosol Administer 1 spray into each nostril as needed (migraine). The dose may be repeated, if needed, a minimum of 1 hour after the first dose. Do not use more than 2 doses within a 24-hour period or 3 doses within 7 days. 4 mL 5   • docosahexaenoic acid-epa 120-180 mg capsule Take 1,000 mg by mouth daily.     • DULoxetine (CYMBALTA) 30 mg capsule Take 30 mg by mouth 2 (two) times a day.     • EMGALITY  mg/mL pen injector subcutaneous pen Inject 1 mL (120 mg total) under the skin every 30 (thirty) days. 1 mL 3   • ergocalciferol (VITAMIN D2) 50,000 unit(1250 mcg) capsule Take 50,000 Units by mouth once a week.     • frovatriptan (FROVA) 2.5 mg tablet Take 1 tablet (2.5 mg total) by mouth once as needed for migraine Indications: a migraine headache. 12 tablet 11   • herbal drugs (CALMME ORAL) Take by mouth daily.     • LORazepam (ATIVAN) 0.5 mg tablet Take 1 tablet (0.5 mg total) by mouth every 8 (eight) hours as needed for anxiety. 20 tablet 0   • metoclopramide (REGLAN) 10 mg tablet Take 1 tablet (10 mg total) by mouth 3 (three) times a day as needed (nausea). 20 tablet 2   • naproxen sodium (ALEVE ORAL) Take by mouth.     • olopatadine (PATADAY) 0.2 % ophthalmic solution Administer 1 drop into affected eye(s) daily.     • ondansetron ODT (ZOFRAN-ODT) 4 mg disintegrating tablet Take 4 mg by mouth every 8 (eight) hours as needed for nausea or vomiting.     • OZEMPIC 2 mg/dose (8 mg/3 mL) subcutaneous injection INJECT 2 MG UNDER THE SKIN EVERY (SEVEN) 7  DAYS.     • rimegepant (NURTEC ODT) 75 mg tablet,disintegrating Take 1 tablet (75 mg total) by mouth as needed (migraine headache). Dissolve 1 tablet under the tongue. One dose per day as needed. (Patient not taking: Reported on 5/10/2023) 16 tablet 11   • rizatriptan MLT (MAXALT-MLT) 10 mg disintegrating tablet Take 1 tablet (10 mg total) by mouth once as needed for migraine. May repeat in 2 hours if unresolved. Do not exceed 30 mg in 24 hours. (Patient not taking: Reported on 5/10/2023) 12 tablet 11   • traZODone (DESYREL) 50 mg tablet Take 25-50 mg by mouth nightly as needed.     • TURMERIC ORAL Take by mouth daily.     • ubrogepant (UBRELVY) 100 mg tablet tablet Take 1 tablet (100 mg total) by mouth as needed for migraine.  mg at onset of migraine. May repeat  mg once 2 hours later. Max 200 mg/day. 16 tablet 11     No current facility-administered medications on file prior to visit.       ALLERGIES        Patient has no known allergies.           REVIEW OF SYSTEMS      Review of Systems    Review of Systems     PHYSICAL EXAMINATION      Visit Vitals  LMP 04/27/2023      There is no height or weight on file to calculate BMI.    BP Readings from Last 3 Encounters:   05/10/23 118/78   05/03/23 122/78   04/18/23 118/78     Wt Readings from Last 3 Encounters:   05/10/23 97.5 kg (215 lb)   05/03/23 98 kg (216 lb)   04/18/23 98 kg (216 lb)       Physical Exam      LABS / IMAGING / EKG        Reviewed the following Labs:    Lab Results   Component Value Date    HGBA1C 5.1 07/12/2022    HGBA1C 5.0 08/14/2021    HGBA1C 5.2 08/22/2020     Lab Results   Component Value Date    CHOL 208 (H) 07/12/2022    CHOL 183 08/22/2020    CHOL 196 01/29/2016     Lab Results   Component Value Date    HDL 50 (L) 07/12/2022    HDL 67 08/22/2020    HDL 67 01/29/2016     Lab Results   Component Value Date    LDLCALC 126 (H) 07/12/2022    LDLCALC 108 (H) 08/22/2020    LDLCALC 117 (H) 01/29/2016     Lab Results   Component Value  Date    TRIG 159 (H) 07/12/2022    TRIG 40 08/22/2020    TRIG 58 01/29/2016     No results found for: CHOLHDL      ASSESSMENT AND PLAN         Chronic pain of both knees  Weight loss is an appropriate treatment strategy and adjunct to conventional medications See plan for weight management and obesity treatment section.      Insomnia  Agrees to try medication at night with addition to consistent use of trazodone.      Class 1 obesity with serious comorbidity and body mass index (BMI) of 31.0 to 31.9 in adult  A patient-centered approach using motivational interviewing was used to develop the plan and recommendations outlined below.    Protein drink for breakfast and yogurt for lunch.      Set alarm for 10:30 pm and take trazodone and do Religion meditation.     Keep ozempic at 1 mg    Follow-up already scheduled          Thank you very much for allowing us to participate in the care of your patient. Please do not hesitate to call or email if there are any questions.

## 2023-05-24 NOTE — PATIENT INSTRUCTIONS
A patient-centered approach using motivational interviewing was used to develop the plan and recommendations outlined below.    Protein drink for breakfast and yogurt for lunch.      Set alarm for 10:30 pm and take trazodone and do Mandaen meditation.     Keep ozempic at 1 mg

## 2023-05-24 NOTE — ASSESSMENT & PLAN NOTE
A patient-centered approach using motivational interviewing was used to develop the plan and recommendations outlined below.    Protein drink for breakfast and yogurt for lunch.      Set alarm for 10:30 pm and take trazodone and do Sikhism meditation.     Keep ozempic at 1 mg    Follow-up already scheduled

## 2023-05-24 NOTE — ASSESSMENT & PLAN NOTE
Weight loss is an appropriate treatment strategy and adjunct to conventional medications See plan for weight management and obesity treatment section.

## 2023-05-25 ENCOUNTER — TRANSCRIBE ORDERS (OUTPATIENT)
Dept: SCHEDULING | Age: 43
End: 2023-05-25

## 2023-05-25 DIAGNOSIS — S83.232A COMPLEX TEAR OF MEDIAL MENISCUS, CURRENT INJURY, LEFT KNEE, INITIAL ENCOUNTER: Primary | ICD-10-CM

## 2023-06-08 ENCOUNTER — HOSPITAL ENCOUNTER (OUTPATIENT)
Dept: RADIOLOGY | Age: 43
Discharge: HOME | End: 2023-06-08
Attending: ORTHOPAEDIC SURGERY
Payer: COMMERCIAL

## 2023-06-08 DIAGNOSIS — S83.232A COMPLEX TEAR OF MEDIAL MENISCUS, CURRENT INJURY, LEFT KNEE, INITIAL ENCOUNTER: ICD-10-CM

## 2023-06-16 ENCOUNTER — OFFICE VISIT (OUTPATIENT)
Dept: NEUROLOGY | Facility: CLINIC | Age: 43
End: 2023-06-16
Payer: COMMERCIAL

## 2023-06-16 VITALS
BODY MASS INDEX: 31.39 KG/M2 | DIASTOLIC BLOOD PRESSURE: 82 MMHG | RESPIRATION RATE: 16 BRPM | HEIGHT: 67 IN | WEIGHT: 200 LBS | OXYGEN SATURATION: 97 % | SYSTOLIC BLOOD PRESSURE: 118 MMHG | HEART RATE: 84 BPM

## 2023-06-16 DIAGNOSIS — G89.29 OTHER CHRONIC PAIN: Primary | ICD-10-CM

## 2023-06-16 PROCEDURE — 64450 NJX AA&/STRD OTHER PN/BRANCH: CPT | Mod: 50 | Performed by: NURSE PRACTITIONER

## 2023-06-16 PROCEDURE — 64400 NJX AA&/STRD TRIGEMINAL NRV: CPT | Performed by: NURSE PRACTITIONER

## 2023-06-16 PROCEDURE — 99999 PR OFFICE/OUTPT VISIT,PROCEDURE ONLY: CPT | Performed by: NURSE PRACTITIONER

## 2023-06-16 PROCEDURE — 64405 NJX AA&/STRD GR OCPL NRV: CPT | Mod: 50 | Performed by: NURSE PRACTITIONER

## 2023-06-16 RX ORDER — LIDOCAINE HYDROCHLORIDE 20 MG/ML
9 INJECTION, SOLUTION INFILTRATION; PERINEURAL ONCE
Status: COMPLETED | OUTPATIENT
Start: 2023-06-16 | End: 2023-06-16

## 2023-06-16 RX ADMIN — LIDOCAINE HYDROCHLORIDE 9 ML: 20 INJECTION, SOLUTION INFILTRATION; PERINEURAL at 14:41

## 2023-06-16 NOTE — PROCEDURES
Procedures    Nerve Blocks and Trigger Points Procedure Note:  Indication:    Diagnosis Plan   1. Other chronic pain  lidocaine (XYLOCAINE) 20 mg/mL (2 %) injection 9 mL           I explained the risks and benefits and obtained written consent from Andressa Baker. Signed consent form will be scanned to patient's electronic medical records.     Lidocaine 2% without epinephrine was drawn in a sterile syringe.     Lot number and expiration date documented in informed consent.     I performed a time-out, including 2 unique patient identifiers.     I located the areas of maximal tenderness corresponding to each nerve or trigger point, and cleaned with alcohol swabs.     I used a 30 gauge 1/2 inch needle to inject lidocaine over the following:        Right Greater Occipital Nerve 2 cc   Left Greater Occipital Nerve 2 cc   Right Lesser Occipital Nerve 1 cc   Left Lesser Occipital Nerve 1 cc     Right Auriculotemporal Nerve 1 cc    Left Auriculotemporal Nerve 1 cc    Right Supraorbital Nerve 0.25 cc   Left Supraorbital Nerve 0.25 cc   Right Supratrochlear Nerve 0.25 cc   Left Supratrochlear Nerve 0.25 cc     Total of 9 cc injected.      The procedure was well tolerated and there were no complications.         BEKA Up  [facility]

## 2023-07-10 RX ORDER — ONABOTULINUMTOXINA 200 [USP'U]/1
200 INJECTION, POWDER, LYOPHILIZED, FOR SOLUTION INTRADERMAL; INTRAMUSCULAR
Status: CANCELLED
Start: 2023-07-10

## 2023-07-11 ENCOUNTER — APPOINTMENT (OUTPATIENT)
Dept: LAB | Facility: HOSPITAL | Age: 43
End: 2023-07-11
Attending: INTERNAL MEDICINE
Payer: COMMERCIAL

## 2023-07-11 ENCOUNTER — TRANSCRIBE ORDERS (OUTPATIENT)
Dept: LAB | Facility: HOSPITAL | Age: 43
End: 2023-07-11

## 2023-07-11 ENCOUNTER — OFFICE VISIT (OUTPATIENT)
Dept: NEUROLOGY | Facility: CLINIC | Age: 43
End: 2023-07-11
Attending: PSYCHIATRY & NEUROLOGY
Payer: COMMERCIAL

## 2023-07-11 VITALS
SYSTOLIC BLOOD PRESSURE: 117 MMHG | HEIGHT: 67 IN | OXYGEN SATURATION: 98 % | RESPIRATION RATE: 16 BRPM | WEIGHT: 200 LBS | HEART RATE: 81 BPM | BODY MASS INDEX: 31.39 KG/M2 | DIASTOLIC BLOOD PRESSURE: 76 MMHG

## 2023-07-11 DIAGNOSIS — R23.3 SPONTANEOUS ECCHYMOSES: Primary | ICD-10-CM

## 2023-07-11 DIAGNOSIS — R23.3 SPONTANEOUS ECCHYMOSES: ICD-10-CM

## 2023-07-11 DIAGNOSIS — G43.711 INTRACTABLE CHRONIC MIGRAINE WITHOUT AURA AND WITH STATUS MIGRAINOSUS: Primary | ICD-10-CM

## 2023-07-11 LAB
APTT PPP: 27 SEC (ref 23–35)
BASOPHILS # BLD: 0.06 K/UL (ref 0.01–0.1)
BASOPHILS NFR BLD: 1.1 %
DIFFERENTIAL METHOD BLD: NORMAL
EOSINOPHIL # BLD: 0.14 K/UL (ref 0.04–0.36)
EOSINOPHIL NFR BLD: 2.5 %
ERYTHROCYTE [DISTWIDTH] IN BLOOD BY AUTOMATED COUNT: 13.6 % (ref 11.7–14.4)
HCT VFR BLDCO AUTO: 43.8 % (ref 35–45)
HGB BLD-MCNC: 14.3 G/DL (ref 11.8–15.7)
IMM GRANULOCYTES # BLD AUTO: 0.01 K/UL (ref 0–0.08)
IMM GRANULOCYTES NFR BLD AUTO: 0.2 %
INR PPP: 1
LYMPHOCYTES # BLD: 1.65 K/UL (ref 1.2–3.5)
LYMPHOCYTES NFR BLD: 28.9 %
MCH RBC QN AUTO: 28.6 PG (ref 28–33.2)
MCHC RBC AUTO-ENTMCNC: 32.6 G/DL (ref 32.2–35.5)
MCV RBC AUTO: 87.6 FL (ref 83–98)
MONOCYTES # BLD: 0.51 K/UL (ref 0.28–0.8)
MONOCYTES NFR BLD: 8.9 %
NEUTROPHILS # BLD: 3.34 K/UL (ref 1.7–7)
NEUTS SEG NFR BLD: 58.4 %
NRBC BLD-RTO: 0 %
PDW BLD AUTO: 11.6 FL (ref 9.4–12.3)
PLATELET # BLD AUTO: 183 K/UL (ref 150–369)
PROTHROMBIN TIME: 12.8 SEC (ref 12.2–14.5)
RBC # BLD AUTO: 5 M/UL (ref 3.93–5.22)
WBC # BLD AUTO: 5.71 K/UL (ref 3.8–10.5)

## 2023-07-11 PROCEDURE — 85610 PROTHROMBIN TIME: CPT

## 2023-07-11 PROCEDURE — 36415 COLL VENOUS BLD VENIPUNCTURE: CPT

## 2023-07-11 PROCEDURE — 85025 COMPLETE CBC W/AUTO DIFF WBC: CPT

## 2023-07-11 PROCEDURE — 85730 THROMBOPLASTIN TIME PARTIAL: CPT

## 2023-07-11 PROCEDURE — 99999 PR OFFICE/OUTPT VISIT,PROCEDURE ONLY: CPT | Performed by: PHYSICIAN ASSISTANT

## 2023-07-11 PROCEDURE — 64615 CHEMODENERV MUSC MIGRAINE: CPT | Performed by: PHYSICIAN ASSISTANT

## 2023-07-11 NOTE — PROCEDURES
Procedures Procedures  Procedure Note for Botox Injections for Headache:    Indication for procedure:    Diagnosis Plan   1. Intractable chronic migraine without aura and with status migrainosus  onabotulinumtoxinA (BOTOX) 200 unit injection 200 Units          I explained the risks and benefits and obtained consent from Andressa.  Onabotulinumtoxin A 200 U were diluted in 4 mL of saline.    Lot number and expiration date documented in informed consent and MAR.  I performed a time-out, including 2 unique patient identifiers with Andressa.  Using a 30 gauge 1/2 inch needle, I injected 0.1mL = 5 U into each site according to the Chronic Migraine Protocol (PREEMPT Studies).   The following table reports the number of sites injected in each muscle.     Muscle Midline Right Left   Procerus 1          1 1   Frontalis   2 2   Temporalis   4 (10+5+5+5 U) 4 (10+5+5+5 U)   Occipitalis   3 (10+5+5 U) 3 (10+5+5 U)   Cervical Paraspinal   2 2   Trapezius   3 (10+10+5 U) 3 (10+10+5 U)   Other:            195 Units were injected and 5 Units were wasted.     Andressa tolerated the procedure well, without complications. She was instructed that she could experience increased pain in the next 2-3 days, which should be treated with ice and anti-inflammatory medications.      We appreciate the opportunity to participate in the care of Andressa ROSA Baker.     Please, do not hesitate to call me at [phone] if you have any questions or concerns.        Odalys Ontiveros PA-C  [facility]         THYROID ASSESSMENT    There were no vitals taken for this visit.  No LMP for male patient.  Recent Weight Change: No  Tremors: Yes but he states he has always had them.  Heat/Cold Intolerance: Cold all the time  Energy: Very low  Bowel: No problems.  Difficulty Swallowing:No but he states he chokes a lot (more in the morning due to phlegm).  Palpitations: No   Hoarseness: No  Skin / Hair: No  Present Thyroid Medication: Levothyroxine 88mcg.  1 daily.

## 2023-07-11 NOTE — PROGRESS NOTES
Last botox: 4/18/23  Last nerve block: 6/16/23, 5/3/23    Time interval: every 12 weeks    Botox treatment administered today.   195 Units.  See procedure note.    Pt has noted increase in migraines over past two weeks as botox has worn off. Nurtec and/or Frova provide relief for 12-24 hours but then migraine returns.   Recommend Toradol injection, 60mg IM.   Pt is currently undergoing coagulation work up due to excess bruising, once labs are back and if they are normal will bring her back in for injection.

## 2023-07-17 ENCOUNTER — TRANSCRIBE ORDERS (OUTPATIENT)
Dept: LAB | Age: 43
End: 2023-07-17

## 2023-07-17 ENCOUNTER — APPOINTMENT (OUTPATIENT)
Dept: LAB | Age: 43
End: 2023-07-17
Attending: NURSE PRACTITIONER
Payer: COMMERCIAL

## 2023-07-17 DIAGNOSIS — I10 ESSENTIAL (PRIMARY) HYPERTENSION: Primary | ICD-10-CM

## 2023-07-17 DIAGNOSIS — N92.6 IRREGULAR MENSTRUATION, UNSPECIFIED: ICD-10-CM

## 2023-07-17 DIAGNOSIS — I10 ESSENTIAL (PRIMARY) HYPERTENSION: ICD-10-CM

## 2023-07-17 LAB
ALBUMIN SERPL-MCNC: 4.4 G/DL (ref 3.5–5.7)
ALP SERPL-CCNC: 50 IU/L (ref 34–104)
ALT SERPL-CCNC: 16 IU/L (ref 7–52)
ANION GAP SERPL CALC-SCNC: 6 MEQ/L (ref 3–15)
AST SERPL-CCNC: 14 IU/L (ref 13–39)
BILIRUB SERPL-MCNC: 0.3 MG/DL (ref 0.3–1)
BUN SERPL-MCNC: 15 MG/DL (ref 7–25)
CALCIUM SERPL-MCNC: 9.3 MG/DL (ref 8.6–10.3)
CHLORIDE SERPL-SCNC: 102 MEQ/L (ref 98–107)
CO2 SERPL-SCNC: 29 MEQ/L (ref 21–31)
CREAT SERPL-MCNC: 0.8 MG/DL (ref 0.6–1.2)
GFR SERPL CREATININE-BSD FRML MDRD: >60 ML/MIN/1.73M*2
GLUCOSE SERPL-MCNC: 91 MG/DL (ref 70–99)
POTASSIUM SERPL-SCNC: 4.2 MEQ/L (ref 3.5–5.1)
PROT SERPL-MCNC: 7.2 G/DL (ref 6.4–8.9)
SODIUM SERPL-SCNC: 137 MEQ/L (ref 136–145)

## 2023-07-17 PROCEDURE — 80053 COMPREHEN METABOLIC PANEL: CPT

## 2023-07-17 PROCEDURE — 83001 ASSAY OF GONADOTROPIN (FSH): CPT

## 2023-07-17 PROCEDURE — 84439 ASSAY OF FREE THYROXINE: CPT

## 2023-07-17 PROCEDURE — 36415 COLL VENOUS BLD VENIPUNCTURE: CPT

## 2023-07-17 PROCEDURE — 84443 ASSAY THYROID STIM HORMONE: CPT

## 2023-07-17 PROCEDURE — 84480 ASSAY TRIIODOTHYRONINE (T3): CPT

## 2023-07-17 PROCEDURE — 84146 ASSAY OF PROLACTIN: CPT

## 2023-07-18 LAB
FSH SERPL-ACNC: 5.8 IU/L
PROLACTIN SERPL-MCNC: 11.5 NG/ML (ref 3.3–26.7)
T3 SERPL-MCNC: 98 NG/DL (ref 87–178)
T4 FREE SERPL-MCNC: 0.78 NG/DL (ref 0.58–1.64)
TSH SERPL DL<=0.05 MIU/L-ACNC: 0.52 MIU/L (ref 0.34–5.6)

## 2023-07-19 ENCOUNTER — OFFICE VISIT (OUTPATIENT)
Dept: NEUROLOGY | Facility: CLINIC | Age: 43
End: 2023-07-19
Payer: COMMERCIAL

## 2023-07-19 VITALS
RESPIRATION RATE: 16 BRPM | HEART RATE: 93 BPM | DIASTOLIC BLOOD PRESSURE: 84 MMHG | BODY MASS INDEX: 31.39 KG/M2 | OXYGEN SATURATION: 97 % | WEIGHT: 200 LBS | HEIGHT: 67 IN | SYSTOLIC BLOOD PRESSURE: 120 MMHG

## 2023-07-19 DIAGNOSIS — G43.711 INTRACTABLE CHRONIC MIGRAINE WITHOUT AURA AND WITH STATUS MIGRAINOSUS: ICD-10-CM

## 2023-07-19 DIAGNOSIS — G89.29 OTHER CHRONIC PAIN: Primary | ICD-10-CM

## 2023-07-19 DIAGNOSIS — G43.901 STATUS MIGRAINOSUS: ICD-10-CM

## 2023-07-19 PROCEDURE — 64405 NJX AA&/STRD GR OCPL NRV: CPT | Mod: 50 | Performed by: PSYCHIATRY & NEUROLOGY

## 2023-07-19 PROCEDURE — 64400 NJX AA&/STRD TRIGEMINAL NRV: CPT | Performed by: PSYCHIATRY & NEUROLOGY

## 2023-07-19 PROCEDURE — 64450 NJX AA&/STRD OTHER PN/BRANCH: CPT | Mod: 50 | Performed by: PSYCHIATRY & NEUROLOGY

## 2023-07-19 PROCEDURE — 3008F BODY MASS INDEX DOCD: CPT | Performed by: PSYCHIATRY & NEUROLOGY

## 2023-07-19 PROCEDURE — 99215 OFFICE O/P EST HI 40 MIN: CPT | Mod: 25 | Performed by: PSYCHIATRY & NEUROLOGY

## 2023-07-19 RX ORDER — RIZATRIPTAN BENZOATE 10 MG/1
TABLET, ORALLY DISINTEGRATING ORAL
Qty: 12 TABLET | Refills: 3 | Status: SHIPPED | OUTPATIENT
Start: 2023-07-19 | End: 2023-11-30

## 2023-07-19 RX ORDER — LIDOCAINE HYDROCHLORIDE 20 MG/ML
9 INJECTION, SOLUTION INFILTRATION; PERINEURAL ONCE
Status: SHIPPED | OUTPATIENT
Start: 2023-07-19 | End: 2023-07-20

## 2023-07-19 NOTE — LETTER
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 her), one brother, and probably in her father too.    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    She has 5 children, last one born in 2019.     Andressa is considering a future pregnancy, but is not trying to conceive at this time, and wants to wait until her headaches are well controlled. I have discussed with her the possible teratogenic effects of some medications and she verbalized understanding.    PHYSICAL EXAMINATION:    Vitals:    23 1321   BP: 120/84   Pulse: 93   Resp: 16   SpO2: 97%      General: Well developed, well nourished, in acute distress.  Alert and oriented. Fluent with normal language and speech. Face symmetric.      Data Reviewed:   Brain MRI WO (2014)  Comparison: MRI brain 2012  Ventricles and sulci are normal in size and configuration. No diffusion evidence of acute infarction. No extra axial  collection, mass-effect, or edema. No intraparenchymal hemorrhage on the gradient echo sequence. The right cerebellar tonsil protrudes approximately 4 mm below the level of the foramen magnum, not meeting criteria for Chiari malformation. The sella appears unremarkable. The major intracranial flow voids at the skull base are normal in appearance. There is a small mucous retention cyst in the right maxillary sinus. Bone marrow signal is within normal limits.  IMPRESSION: No evidence of acute infarction or intracranial mass.     Lab results reviewed.  Pertinent findings include: CMP, CBC, TSH, all within normal limits (7/2022.      Brain MRI WO (9/2022) unremarkable. (Scans independently reviewed by Dr. Lewis)  IMPRESSION: No acute intracranial abnormality. No acute infarct, mass effect or acute intracranial hemorrhage.  Comparison:  6/20/2014.  Findings:  Sulci, ventricles and basal cisterns are within normal limits for patient's stated age.  Minimal periventricular white matter change.  No mass-effect, intracranial hemorrhage, acute segmental infarct or extra-axial fluid collection is seen. Cerebellar tonsils are normal in position.  Expected signal voids are seen in the intracranial vessels at the skull base.  The orbits  and sella are unremarkable.   The paranasal sinuses and mastoid air cells are clear.    IMPRESSION/PLAN:  Andressa Baker is a very pleasant 43 y.o. woman seen initially in July 2022 for chronic severe headaches since her early 20's, worsening in the last few months. Neurological exam was normal. Brain MRI (2014 and 9/2022) were both unremarkable. There are no atypical features or symptoms suggestive of a serious underlying condition or secondary headache. In our opinion, she has chronic migraine exacerbated by hormonal changes and frequent use of acute medications (Excedrin and Rizatriptan). There may be some contribution from myofascial neck pain, but there is no evidence of significant  cervical spine disease.    ***Today, she presents with status migrainosus. I have repeated her nerve blocks and changed her acute regimen. I prescribed a trial of Nurtec and Maxalt-MLT (wants to try brand name to see if this works better than the generic) to use in place of Ubrelvy and Frova. I recommend to continue with Emgality, Botox and magnesium for prevention. See detailed recommendations below.    Diagnosis:  Chronic migraine without aura   Cervicalgia  Status migrainosus   Headache secondary to COVID vaccine booster - resolved     Evaluation:  No additional tests are needed at this time. If there are new symptoms or changes in the characteristics of the headaches, I will re-evaluate Andressa and consider if diagnostic tests are needed.     Treatment plan:      1. Healthy Habits: The following recommendations can greatly reduce the number and severity of headaches.  Maintain regular sleep hours and get sufficient sleep (8-9 hours)  Do not skip meals, especially breakfast  Drink at least 64 oz or 8 cups of water daily - enough to urinate 5-6 times a day  Get at least 30 minutes of daily aerobic exercise (enough to increase your heart rate and sweat)    Keep track of headaches and use of acute medication (prescription or over-the-counter) in a calendar or notebook and bring that to your clinic visits.    2. Acute Treatment:     Continue with Nurtec ODT 75 mg as needed at onset of moderate to severe headache. Maximum 1 tablet in 24 hours.     For rescue treatment if Nurtec/Ubrelvy doesn't help or as alternative to Nurtec/Ubrelvy: Try rizatriptan-MLT 10 mg as needed at onset of moderate to severe headache. May repeat 1 tablet 2 hours later. Maximum 2 tablets in 24 hr. Limit to 2 days/week. Do not take within 24 hours of Frova.    Third line acute or rescue treatment: TRUDHESA 1.45 mg (administered as one metered spray of 0.725 mg into each nostril). The dose may be repeated, if needed, a minimum of 1 hour after the  first dose. Do not use more than 2 doses within a 24-hour period or 3 doses within 7 days. Do not use within 24 hours of triptan.  Directions: TRUDHESA is for nasal administration only. Assemble and prime (i.e., pumped 4 times) before use. Use TRUDHESA immediately after priming and then discard. https://www.[employer].com/how-to-use/    Take Reglan 10 mg 15-30 min before the nasal spray to prevent nausea.    Future consideration: other triptans (naratriptan, almotriptan or sumatriptan).    Lasmiditan or Sprix nasal spray can be tried for rescue treatment if needed in the future.     We may add an antinausea medication if needed for nausea or as co-adjuvant if monotherapy is not sufficient.      3. Preventive Treatment:     Consider neuromodulating devices Cefaly or Gammacore (see below). We will send a prescription of the preferred one.    Lidocaine cranial nerve blocks done today. Plan to continue with nerve blocks every 6-8 weeks.     Continue with Emgality monthly injections.     Continue with Botox 195 units every 12 weeks.      Continue with magnesium glycinate 600 mg daily. This may help with constipation too.    Continue with Cymbalta 40 mg BID (for depression from psychiatrist) as Cymbalta may help as migraine preventive.    Future considerations: add riboflavin. Switch Emgality to Ajovy, Aimovig or Nurtec QOD. Discussed medical marijuana.    Other nutraceutical options include: riboflavin, melatonin, feverfew, and coenzyme Q10.     Other options for oral preventive medications include: amitriptyline or nortriptyline, zonisamide, rimegepant, valproate, verapamil, gabapentin, pregabalin, candesartan, Namenda, escitalopram, and levetiracetam. We want to avoid weight gain.    Several non-invasive neuromodulation devices (gammaCore, Cefaly and Nerivio) are available to treat headaches preventively and/or acutely. These are typically not covered by insurance, but the companies have a trial period and will refund  you if the device is ineffective and returned within that period.     Follow-up in the Headache Clinic as scheduled.      We appreciate the opportunity to participate in the care of Andressa.     Please, do not hesitate to call our office at [phone] if you have any questions or concerns.       Theresa Lewis MD  [facility]  [phone]    I spent 41 minutes on this date of service performing the following activities: obtaining history, performing examination, entering orders, documenting, preparing for visit and providing counseling and education.    This was in addition to the time dedicated to the procedure.         ***        Patient ID: Andressa Baker                              : 1980  MRN: [id number]                                            VISIT DATE: 2023   ENCOUNTER PROVIDER: Theresa Dalton    I had the pleasure of evaluating Andressa Baker in the [facility] on 2023 for a follow-up patient visit and acute symptom management. The history was provided by Andressa Baker and supplemented by her medical records.    CHIEF COMPLAINT: Headache.    HISTORY OF PRESENT ILLNESS:  Andressa Baker is an established patient in the [facility] [place] presenting for acute evaluation and management of a severe headache that has been refractory to treatment.     Current headache started on: ***  Current level of pain: ***  Associated symptoms: ***  ***Patient denies motor deficits, sensory symptoms, and vision loss.     Medications tried at home in the last 24 hours: ***    Past medical history, family history and social history were reviewed from prior visit. Pertinent changes made below.     MEDICATIONS AT START OF VISIT:    Current Outpatient Medications:     b complex vitamins capsule, Take 1 capsule by mouth daily., Disp: , Rfl:     diclofenac sodium (VOLTAREN) 1 % topical gel, Apply topically 2 (two) times a day as needed  for mild pain., Disp: 100 g, Rfl: 1    dihydroergotamine (TRUDHESA) 0.725 mg/pump act. (4 mg/mL) spray,non-aerosol, Administer 1 spray into each nostril as needed (migraine). The dose may be repeated, if needed, a minimum of 1 hour after the first dose. Do not use more than 2 doses within a 24-hour period or 3 doses within 7 days., Disp: 4 mL, Rfl: 5    docosahexaenoic acid-epa 120-180 mg capsule, Take 1,000 mg by mouth daily., Disp: , Rfl:     DULoxetine (CYMBALTA) 30 mg capsule, Take 30 mg by mouth 2 (two) times a day., Disp: , Rfl:     EMGALITY  mg/mL pen injector subcutaneous pen, Inject 1 mL (120 mg total) under the skin every 30 (thirty) days., Disp: 1 mL, Rfl: 3    ergocalciferol (VITAMIN D2) 50,000 unit(1250 mcg) capsule, Take 50,000 Units by mouth once a week., Disp: , Rfl:     frovatriptan (FROVA) 2.5 mg tablet, Take 1 tablet (2.5 mg total) by mouth once as needed for migraine Indications: a migraine headache., Disp: 12 tablet, Rfl: 11    herbal drugs (CALMME ORAL), Take by mouth daily., Disp: , Rfl:     LORazepam (ATIVAN) 0.5 mg tablet, Take 1 tablet (0.5 mg total) by mouth every 8 (eight) hours as needed for anxiety., Disp: 20 tablet, Rfl: 0    methylPREDNISolone (MEDROL, ELIZABETH,) 4 mg tablet, Follow package directions., Disp: 21 tablet, Rfl: 0    metoclopramide (REGLAN) 10 mg tablet, Take 1 tablet (10 mg total) by mouth 3 (three) times a day as needed (nausea)., Disp: 20 tablet, Rfl: 2    naproxen sodium (ALEVE ORAL), Take by mouth as needed., Disp: , Rfl:     olopatadine (PATADAY) 0.2 % ophthalmic solution, Administer 1 drop into affected eye(s) daily., Disp: , Rfl:     ondansetron ODT (ZOFRAN-ODT) 4 mg disintegrating tablet, Take 4 mg by mouth every 8 (eight) hours as needed for nausea or vomiting., Disp: , Rfl:     rimegepant (NURTEC ODT) 75 mg tablet,disintegrating, Take 1 tablet (75 mg total) by mouth as needed (migraine headache). Dissolve 1 tablet under the tongue. One dose per day as  needed., Disp: 16 tablet, Rfl: 11    rizatriptan MLT (MAXALT-MLT) 10 mg disintegrating tablet, Take 1 tablet (10 mg total) by mouth once as needed for migraine. May repeat in 2 hours if unresolved. Do not exceed 30 mg in 24 hours., Disp: 12 tablet, Rfl: 11    traZODone (DESYREL) 50 mg tablet, Take 25-50 mg by mouth nightly as needed., Disp: , Rfl:     TURMERIC ORAL, Take by mouth daily., Disp: , Rfl:     ubrogepant (UBRELVY) 100 mg tablet tablet, Take 1 tablet (100 mg total) by mouth as needed for migraine.  mg at onset of migraine. May repeat  mg once 2 hours later. Max 200 mg/day., Disp: 16 tablet, Rfl: 11    ALLERGIES: has No Known Allergies.     REVIEW OF SYSTEMS: As discussed above. Otherwise, all other ROS were reviewed and negative.    PAST MEDICAL HISTORY:  has a past medical history of Abnormal ECG, Arrhythmia (2018), Back pain, GERD (gastroesophageal reflux disease), Heartburn, History of fetal demise, not currently pregnant, Joint pain, Migraine headache, Ovarian cyst, Palpitations, and Pericarditis (2018).    PAST SURGICAL HISTORY:  has a past surgical history that includes Ablation of dysrhythmic focus (2018);  section; and Knee arthroscopy w/ meniscal repair (Right).    PHYSICAL EXAMINATION:    Vitals:    23 1321   BP: 120/84   Pulse: 93   Resp: 16   SpO2: 97%      General: Well developed, well nourished, in *** acute distress.  Craniofacial examination: ***No trigger points. ***No cranial autonomic symptoms present.    Neck: ***Normal range of motion. ***No trigger points.     NEUROLOGICAL EXAM:  Alert and oriented. Normal level of attention. Normal language and speech.    Cranial nerves:   ***Ophthalmoscopic examination normal with sharp disc margins bilaterally. ***Unable to perform due to severe photophobia.  II-VI: Pupils were equal, round, and reactive to light and accommodation. Extraocular movements were intact without nystagmus.  V: Intact sensation to  light touch in the distribution of the three trigeminal branches.   VII: Face was symmetric with normal strength.   Muscle strength in upper and lower extremities: 5/5 throughout symmetrically. Pronator drift was negative.     IMPRESSION/PLAN:  Andressa Baker was evaluated for a severe headache that has been refractory to treatment.     No diagnosis found.    Discussed treatment options, risks and benefits with patient.     Plan to proceed with Acute Headache Treatment Protocol *** as documented in nurse's note.             Theresa Dalton MD  [facility]        I spent {[facility] Visit Time:6702941421} on this date of service performing the following activities: {Time based coding activities:61626}.        Theresa Dalton MD  7/19/2023  1:51 PM  Sign when Signing Visit  Procedures  Nerve Blocks and Trigger Points Procedure Note:  Indication:    Diagnosis Plan   1. Other chronic pain  lidocaine (XYLOCAINE) 20 mg/mL (2 %) injection 9 mL           I explained the risks and benefits and obtained written consent from Andressa Baker. Signed consent form will be scanned to patient's electronic medical records.     Lidocaine 2% without epinephrine was drawn in a sterile syringe.     Lot number and expiration date documented in informed consent.     I performed a time-out, including 2 unique patient identifiers.     I located the areas of maximal tenderness corresponding to each nerve or trigger point, and cleaned with alcohol swabs.     I used a 30 gauge 1/2 inch needle to inject lidocaine over the following:      Right Greater Occipital Nerve 2 cc   Left Greater Occipital Nerve 2 cc   Right Lesser Occipital Nerve 1 cc   Left Lesser Occipital Nerve 1 cc     Right Auriculotemporal Nerve 1 cc    Left Auriculotemporal Nerve 1 cc    Right Supraorbital Nerve 0.25 cc   Left Supraorbital Nerve 0.25 cc   Right Supratrochlear Nerve 0.25 cc   Left Supratrochlear Nerve 0.25 cc     Total of 9 cc injected.       The procedure was well tolerated and there were no complications.         Theresa Lewis MD  [facility]  [phone]

## 2023-07-19 NOTE — PROGRESS NOTES
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 speech. Face symmetric.      Data Reviewed:   Brain MRI WO (6/2014)  Comparison: MRI brain 01/14/2012  Ventricles and sulci are normal in size and configuration. No diffusion evidence of acute infarction. No extra axial collection, mass-effect, or edema. No intraparenchymal hemorrhage on the gradient echo sequence. The right cerebellar tonsil protrudes approximately 4 mm below the level of the foramen magnum, not meeting criteria for Chiari malformation. The sella appears unremarkable. The major intracranial flow voids at the skull base are normal in appearance. There is a small mucous retention cyst in the right maxillary sinus. Bone marrow signal is within normal limits.  IMPRESSION: No evidence of acute infarction or intracranial mass.     Lab results reviewed.  Pertinent findings include: CMP, CBC, TSH, all within normal limits (7/2022.      Brain MRI WO (9/2022) unremarkable. (Scans independently reviewed by Dr. Lewis)  IMPRESSION: No acute intracranial abnormality. No acute infarct, mass effect or acute intracranial hemorrhage.  Comparison:  6/20/2014.  Findings:  Sulci, ventricles and basal cisterns are within normal limits for patient's stated age.  Minimal periventricular white  matter change.  No mass-effect, intracranial hemorrhage, acute segmental infarct or extra-axial fluid collection is seen. Cerebellar tonsils are normal in position.  Expected signal voids are seen in the intracranial vessels at the skull base.  The orbits  and sella are unremarkable.   The paranasal sinuses and mastoid air cells are clear.    IMPRESSION/PLAN:  Andressa Baker is a very pleasant 43 y.o. woman seen initially in July 2022 for chronic severe headaches since her early 20's, worsening in the last few months. Neurological exam was normal. Brain MRI (2014 and 9/2022) were both unremarkable. There are no atypical features or symptoms suggestive of a serious underlying condition or secondary headache. In our opinion, she has chronic migraine exacerbated by hormonal changes and frequent use of acute medications (Excedrin and Rizatriptan). There may be some contribution from myofascial neck pain, but there is no evidence of significant cervical spine disease.    Today, she presents with status migrainosus again. I have repeated the lidocaine nerve blocks. I recommend continuing with Nurtec for first line acute treatment and prescribed generic rizatriptan-MLT for rescue in place of Frova. I recommend considering Cefaly or GammaCore and continuing with Emgality, Botox and magnesium for prevention. See detailed recommendations below.    Diagnosis:  Chronic migraine without aura   Cervicalgia  Status migrainosus   Headache secondary to COVID vaccine booster - resolved     Evaluation:  No additional tests are needed at this time. If there are new symptoms or changes in the characteristics of the headaches, I will re-evaluate Andressa and consider if diagnostic tests are needed.     Treatment plan:      1. Healthy Habits: The following recommendations can greatly reduce the number and severity of headaches.  · Maintain regular sleep hours and get sufficient sleep (8-9 hours)  · Do not skip meals, especially  breakfast  · Drink at least 64 oz or 8 cups of water daily - enough to urinate 5-6 times a day  · Get at least 30 minutes of daily aerobic exercise (enough to increase your heart rate and sweat)    Keep track of headaches and use of acute medication (prescription or over-the-counter) in a calendar or notebook and bring that to your clinic visits.    2. Acute Treatment:     Continue with Nurtec ODT 75 mg as needed at onset of moderate to severe headache. Maximum 1 tablet in 24 hours.     For rescue treatment if Nurtec/Ubrelvy doesn't help or as alternative to Nurtec/Ubrelvy: Try rizatriptan-MLT 10 mg as needed at onset of moderate to severe headache. May repeat 1 tablet 2 hours later. Maximum 2 tablets in 24 hr. Limit to 2 days/week. Do not take within 24 hours of Frova.    Third line acute or rescue treatment: TRUDHESA 1.45 mg (administered as one metered spray of 0.725 mg into each nostril). The dose may be repeated, if needed, a minimum of 1 hour after the first dose. Do not use more than 2 doses within a 24-hour period or 3 doses within 7 days. Do not use within 24 hours of triptan.  Directions: TRUDHESA is for nasal administration only. Assemble and prime (i.e., pumped 4 times) before use. Use TRUDHESA immediately after priming and then discard. https://www.trudhesahcp.com/how-to-use/    Take Reglan 10 mg 15-30 min before the nasal spray to prevent nausea.    Future consideration: other triptans (naratriptan, almotriptan or sumatriptan).    Lasmiditan or Sprix nasal spray can be tried for rescue treatment if needed in the future.     We may add an antinausea medication if needed for nausea or as co-adjuvant if monotherapy is not sufficient.      3. Preventive Treatment:     Consider neuromodulating devices Cefaly or Gammacore (see below). We will send a prescription of the preferred one.    Lidocaine cranial nerve blocks done today. Plan to continue with nerve blocks every 6-8 weeks.     Continue with Emgality  monthly injections.     Continue with Botox 195 units every 12 weeks.      Continue with magnesium glycinate 600 mg daily. This may help with constipation too.    Continue with Cymbalta 40 mg BID (for depression from psychiatrist) as Cymbalta may help as migraine preventive.    Future considerations: add riboflavin. Switch Emgality to Ajovy, Aimovig or Nurtec QOD. Discussed medical marijuana.    Other nutraceutical options include: riboflavin, melatonin, feverfew, and coenzyme Q10.     Other options for oral preventive medications include: amitriptyline or nortriptyline, zonisamide, rimegepant, valproate, verapamil, gabapentin, pregabalin, candesartan, Namenda, escitalopram, and levetiracetam. We want to avoid weight gain.    Several non-invasive neuromodulation devices (gammaCore, Cefaly and Nerivio) are available to treat headaches preventively and/or acutely. These are typically not covered by insurance, but the companies have a trial period and will refund you if the device is ineffective and returned within that period.     Follow-up in the Headache Clinic as scheduled.      We appreciate the opportunity to participate in the care of Andressa.     Please, do not hesitate to call our office at [phone] if you have any questions or concerns.       Theresa Lewis MD  [facility]  [phone]    I spent 41 minutes on this date of service performing the following activities: obtaining history, performing examination, entering orders, documenting, preparing for visit and providing counseling and education.    This was in addition to the time dedicated to the procedure.

## 2023-07-19 NOTE — PATIENT INSTRUCTIONS
Diagnosis:  Chronic migraine without aura   Cervicalgia  Status migrainosus   Headache secondary to COVID vaccine booster - resolved     Evaluation:  No additional tests are needed at this time. If there are new symptoms or changes in the characteristics of the headaches, I will re-evaluate Andressa and consider if diagnostic tests are needed.     Treatment plan:      1. Healthy Habits: The following recommendations can greatly reduce the number and severity of headaches.  Maintain regular sleep hours and get sufficient sleep (8-9 hours)  Do not skip meals, especially breakfast  Drink at least 64 oz or 8 cups of water daily - enough to urinate 5-6 times a day  Get at least 30 minutes of daily aerobic exercise (enough to increase your heart rate and sweat)    Keep track of headaches and use of acute medication (prescription or over-the-counter) in a calendar or notebook and bring that to your clinic visits.    2. Acute Treatment:     Continue with Nurtec ODT 75 mg as needed at onset of moderate to severe headache. Maximum 1 tablet in 24 hours.     For rescue treatment if Nurtec/Ubrelvy doesn't help or as alternative to Nurtec/Ubrelvy: Try rizatriptan-MLT 10 mg as needed at onset of moderate to severe headache. May repeat 1 tablet 2 hours later. Maximum 2 tablets in 24 hr. Limit to 2 days/week. Do not take within 24 hours of Frova.    Third line acute or rescue treatment: TRUDHESA 1.45 mg (administered as one metered spray of 0.725 mg into each nostril). The dose may be repeated, if needed, a minimum of 1 hour after the first dose. Do not use more than 2 doses within a 24-hour period or 3 doses within 7 days. Do not use within 24 hours of triptan.  Directions: TRUDHESA is for nasal administration only. Assemble and prime (i.e., pumped 4 times) before use. Use TRUDHESA immediately after priming and then discard. https://www.[employer].pkcp.com/how-to-use/    Take Reglan 10 mg 15-30 min before the nasal spray to prevent  nausea.    Future consideration: other triptans (naratriptan, almotriptan or sumatriptan).    Lasmiditan or Sprix nasal spray can be tried for rescue treatment if needed in the future.     We may add an antinausea medication if needed for nausea or as co-adjuvant if monotherapy is not sufficient.      3. Preventive Treatment:     Consider neuromodulating devices Cefaly or Gammacore (see below). We will send a prescription of the preferred one.    Lidocaine cranial nerve blocks done today. Plan to continue with nerve blocks every 6-8 weeks.     Continue with Emgality monthly injections.     Continue with Botox 195 units every 12 weeks.      Continue with magnesium glycinate 600 mg daily. This may help with constipation too.    Continue with Cymbalta 40 mg BID (for depression from psychiatrist) as Cymbalta may help as migraine preventive.    Future considerations: add riboflavin. Switch Emgality to Ajovy, Aimovig or Nurtec QOD. Discussed medical marijuana.    Other nutraceutical options include: riboflavin, melatonin, feverfew, and coenzyme Q10.     Other options for oral preventive medications include: amitriptyline or nortriptyline, zonisamide, rimegepant, valproate, verapamil, gabapentin, pregabalin, candesartan, Namenda, escitalopram, and levetiracetam. We want to avoid weight gain.    Several non-invasive neuromodulation devices (gammaCore, Cefaly and Nerivio) are available to treat headaches preventively and/or acutely. These are typically not covered by insurance, but the companies have a trial period and will refund you if the device is ineffective and returned within that period.     Follow-up in the Headache Clinic as scheduled.

## 2023-07-19 NOTE — PROCEDURES
Procedures  Nerve Blocks and Trigger Points Procedure Note:  Indication:    Diagnosis Plan   1. Other chronic pain  lidocaine (XYLOCAINE) 20 mg/mL (2 %) injection 9 mL           I explained the risks and benefits and obtained written consent from Andressa Baker. Signed consent form will be scanned to patient's electronic medical records.     Lidocaine 2% without epinephrine was drawn in a sterile syringe.     Lot number and expiration date documented in informed consent.     I performed a time-out, including 2 unique patient identifiers.     I located the areas of maximal tenderness corresponding to each nerve or trigger point, and cleaned with alcohol swabs.     I used a 30 gauge 1/2 inch needle to inject lidocaine over the following:      Right Greater Occipital Nerve 2 cc   Left Greater Occipital Nerve 2 cc   Right Lesser Occipital Nerve 1 cc   Left Lesser Occipital Nerve 1 cc     Right Auriculotemporal Nerve 1 cc    Left Auriculotemporal Nerve 1 cc    Right Supraorbital Nerve 0.25 cc   Left Supraorbital Nerve 0.25 cc   Right Supratrochlear Nerve 0.25 cc   Left Supratrochlear Nerve 0.25 cc     Total of 9 cc injected.      The procedure was well tolerated and there were no complications.         Theresa Lewis MD  [facility]  [phone]

## 2023-07-22 ENCOUNTER — APPOINTMENT (OUTPATIENT)
Dept: LAB | Age: 43
End: 2023-07-22
Attending: NURSE PRACTITIONER
Payer: COMMERCIAL

## 2023-07-22 ENCOUNTER — TRANSCRIBE ORDERS (OUTPATIENT)
Dept: LAB | Age: 43
End: 2023-07-22

## 2023-07-22 DIAGNOSIS — N92.6 IRREGULAR MENSTRUATION, UNSPECIFIED: ICD-10-CM

## 2023-07-22 DIAGNOSIS — N92.6 IRREGULAR MENSTRUATION, UNSPECIFIED: Primary | ICD-10-CM

## 2023-07-22 LAB — ESTRADIOL SERPL-MCNC: 205 PG/ML

## 2023-07-22 PROCEDURE — 82670 ASSAY OF TOTAL ESTRADIOL: CPT

## 2023-07-22 PROCEDURE — 36415 COLL VENOUS BLD VENIPUNCTURE: CPT

## 2023-07-31 RX ORDER — GALCANEZUMAB 120 MG/ML
INJECTION, SOLUTION SUBCUTANEOUS
Qty: 1 ML | Refills: 3 | Status: SHIPPED | OUTPATIENT
Start: 2023-07-31 | End: 2023-10-27 | Stop reason: SDUPTHER

## 2023-07-31 NOTE — TELEPHONE ENCOUNTER
Medicine Refill Request    Last Office: 5/10/2023   Last Consult Visit: Visit date not found  Last Telemedicine Visit: 4/28/2020 Iesha Domingo MD    Next Appointment: Visit date not found      Current Outpatient Medications:   •  b complex vitamins capsule, Take 1 capsule by mouth daily., Disp: , Rfl:   •  diclofenac sodium (VOLTAREN) 1 % topical gel, Apply topically 2 (two) times a day as needed for mild pain., Disp: 100 g, Rfl: 1  •  dihydroergotamine (TRUDHESA) 0.725 mg/pump act. (4 mg/mL) spray,non-aerosol, Administer 1 spray into each nostril as needed (migraine). The dose may be repeated, if needed, a minimum of 1 hour after the first dose. Do not use more than 2 doses within a 24-hour period or 3 doses within 7 days., Disp: 4 mL, Rfl: 5  •  docosahexaenoic acid-epa 120-180 mg capsule, Take 1,000 mg by mouth daily., Disp: , Rfl:   •  DULoxetine (CYMBALTA) 30 mg capsule, Take 30 mg by mouth 2 (two) times a day., Disp: , Rfl:   •  EMGALITY  mg/mL pen injector subcutaneous pen, Inject 1 mL (120 mg total) under the skin every 30 (thirty) days., Disp: 1 mL, Rfl: 3  •  ergocalciferol (VITAMIN D2) 50,000 unit(1250 mcg) capsule, Take 50,000 Units by mouth once a week., Disp: , Rfl:   •  frovatriptan (FROVA) 2.5 mg tablet, Take 1 tablet (2.5 mg total) by mouth once as needed for migraine Indications: a migraine headache., Disp: 12 tablet, Rfl: 11  •  herbal drugs (CALMME ORAL), Take by mouth daily., Disp: , Rfl:   •  LORazepam (ATIVAN) 0.5 mg tablet, Take 1 tablet (0.5 mg total) by mouth every 8 (eight) hours as needed for anxiety., Disp: 20 tablet, Rfl: 0  •  metoclopramide (REGLAN) 10 mg tablet, Take 1 tablet (10 mg total) by mouth 3 (three) times a day as needed (nausea)., Disp: 20 tablet, Rfl: 2  •  naproxen sodium (ALEVE ORAL), Take by mouth as needed., Disp: , Rfl:   •  olopatadine (PATADAY) 0.2 % ophthalmic solution, Administer 1 drop into affected eye(s) daily., Disp: , Rfl:   •  ondansetron ODT  (ZOFRAN-ODT) 4 mg disintegrating tablet, Take 4 mg by mouth every 8 (eight) hours as needed for nausea or vomiting., Disp: , Rfl:   •  rimegepant (NURTEC ODT) 75 mg tablet,disintegrating, Take 1 tablet (75 mg total) by mouth as needed (migraine headache). Dissolve 1 tablet under the tongue. One dose per day as needed., Disp: 16 tablet, Rfl: 11  •  rizatriptan MLT (MAXALT-MLT) 10 mg disintegrating tablet, 1 tab at onset of severe headache. May repeat in 2 hours if needed. Maximum 2 tab/day and 2 days/week., Disp: 12 tablet, Rfl: 3  •  traZODone (DESYREL) 50 mg tablet, Take 25-50 mg by mouth nightly as needed., Disp: , Rfl:   •  TURMERIC ORAL, Take by mouth daily., Disp: , Rfl:   •  ubrogepant (UBRELVY) 100 mg tablet tablet, Take 1 tablet (100 mg total) by mouth as needed for migraine.  mg at onset of migraine. May repeat  mg once 2 hours later. Max 200 mg/day., Disp: 16 tablet, Rfl: 11      BP Readings from Last 3 Encounters:   07/19/23 120/84   07/11/23 117/76   06/16/23 118/82       Recent Lab results:  Lab Results   Component Value Date    CHOL 208 (H) 07/12/2022   ,   Lab Results   Component Value Date    HDL 50 (L) 07/12/2022   ,   Lab Results   Component Value Date    LDLCALC 126 (H) 07/12/2022   ,   Lab Results   Component Value Date    TRIG 159 (H) 07/12/2022        Lab Results   Component Value Date    GLUCOSE 91 07/17/2023   ,   Lab Results   Component Value Date    HGBA1C 5.1 07/12/2022         Lab Results   Component Value Date    CREATININE 0.8 07/17/2023       Lab Results   Component Value Date    TSH 0.52 07/17/2023

## 2023-08-03 ENCOUNTER — OFFICE VISIT (OUTPATIENT)
Dept: NEUROLOGY | Facility: CLINIC | Age: 43
End: 2023-08-03
Payer: COMMERCIAL

## 2023-08-03 DIAGNOSIS — G89.29 OTHER CHRONIC PAIN: Primary | ICD-10-CM

## 2023-08-03 DIAGNOSIS — R52 ACUTE PAIN: ICD-10-CM

## 2023-08-03 PROCEDURE — 99999 PR OFFICE/OUTPT VISIT,PROCEDURE ONLY: CPT | Performed by: PSYCHIATRY & NEUROLOGY

## 2023-08-03 PROCEDURE — 96372 THER/PROPH/DIAG INJ SC/IM: CPT | Performed by: PSYCHIATRY & NEUROLOGY

## 2023-08-03 RX ADMIN — KETOROLAC TROMETHAMINE 30 MG: 30 INJECTION, SOLUTION INTRAMUSCULAR; INTRAVENOUS at 16:18

## 2023-08-03 RX ADMIN — KETOROLAC TROMETHAMINE 30 MG: 30 INJECTION, SOLUTION INTRAMUSCULAR; INTRAVENOUS at 16:17

## 2023-08-03 NOTE — PROGRESS NOTES
Procedure Note for Toradol injecion for Acute Severe Headache or Pain:    Indication for procedure: No diagnosis found.     Recoberexplained the risks and benefits and obtained verbal consent from Andressa.    NDC, lot number, and expiration date documented in the MAR.    I performed a time-out, including 2 unique patient identifiers, with Andressa.    Using a 22 gauge 1 1/2 inch needle, she injected 2 ml (60 mg) intramuscularly.     Site of injection: left gluteus muscle.       Andressa tolerated the procedure well, without complications.        Radha Ray  [facility]

## 2023-08-04 ENCOUNTER — APPOINTMENT (OUTPATIENT)
Dept: LAB | Age: 43
End: 2023-08-04
Attending: NURSE PRACTITIONER
Payer: COMMERCIAL

## 2023-08-04 ENCOUNTER — TRANSCRIBE ORDERS (OUTPATIENT)
Dept: LAB | Age: 43
End: 2023-08-04

## 2023-08-04 DIAGNOSIS — N92.6 IRREGULAR MENSTRUATION, UNSPECIFIED: ICD-10-CM

## 2023-08-04 DIAGNOSIS — N92.6 IRREGULAR MENSTRUATION, UNSPECIFIED: Primary | ICD-10-CM

## 2023-08-04 LAB
ESTRADIOL SERPL-MCNC: 49 PG/ML
FSH SERPL-ACNC: 3.3 IU/L
LH SERPL-ACNC: 3.4 IU/L
PROGEST SERPL-MCNC: 3.63 NG/ML
TSH SERPL DL<=0.05 MIU/L-ACNC: 0.83 MIU/L (ref 0.34–5.6)

## 2023-08-04 PROCEDURE — 84403 ASSAY OF TOTAL TESTOSTERONE: CPT

## 2023-08-04 PROCEDURE — 36415 COLL VENOUS BLD VENIPUNCTURE: CPT

## 2023-08-04 PROCEDURE — 83001 ASSAY OF GONADOTROPIN (FSH): CPT

## 2023-08-04 PROCEDURE — 83002 ASSAY OF GONADOTROPIN (LH): CPT

## 2023-08-04 PROCEDURE — 82670 ASSAY OF TOTAL ESTRADIOL: CPT

## 2023-08-04 PROCEDURE — 84443 ASSAY THYROID STIM HORMONE: CPT

## 2023-08-04 PROCEDURE — 84144 ASSAY OF PROGESTERONE: CPT

## 2023-08-05 LAB — TESTOST SERPL-MCNC: 21.4 NG/DL (ref 7.2–79.3)

## 2023-08-07 ENCOUNTER — TRANSCRIBE ORDERS (OUTPATIENT)
Dept: LAB | Age: 43
End: 2023-08-07

## 2023-08-07 ENCOUNTER — APPOINTMENT (OUTPATIENT)
Dept: LAB | Age: 43
End: 2023-08-07
Attending: NURSE PRACTITIONER
Payer: COMMERCIAL

## 2023-08-07 DIAGNOSIS — N92.6 IRREGULAR MENSTRUATION, UNSPECIFIED: Primary | ICD-10-CM

## 2023-08-07 DIAGNOSIS — N92.6 IRREGULAR MENSTRUATION, UNSPECIFIED: ICD-10-CM

## 2023-08-07 PROCEDURE — 36415 COLL VENOUS BLD VENIPUNCTURE: CPT

## 2023-08-07 PROCEDURE — 82670 ASSAY OF TOTAL ESTRADIOL: CPT

## 2023-08-07 PROCEDURE — 84144 ASSAY OF PROGESTERONE: CPT

## 2023-08-08 LAB
ESTRADIOL SERPL-MCNC: 36 PG/ML
PROGEST SERPL-MCNC: 0.41 NG/ML

## 2023-08-17 ENCOUNTER — OFFICE VISIT (OUTPATIENT)
Dept: BARIATRICS/WEIGHT MGMT | Facility: CLINIC | Age: 43
End: 2023-08-17
Payer: COMMERCIAL

## 2023-08-17 VITALS
DIASTOLIC BLOOD PRESSURE: 78 MMHG | SYSTOLIC BLOOD PRESSURE: 124 MMHG | HEIGHT: 67 IN | RESPIRATION RATE: 16 BRPM | WEIGHT: 220.1 LBS | OXYGEN SATURATION: 99 % | BODY MASS INDEX: 34.55 KG/M2 | HEART RATE: 72 BPM

## 2023-08-17 DIAGNOSIS — E66.811 CLASS 1 OBESITY WITH SERIOUS COMORBIDITY AND BODY MASS INDEX (BMI) OF 31.0 TO 31.9 IN ADULT, UNSPECIFIED OBESITY TYPE: ICD-10-CM

## 2023-08-17 DIAGNOSIS — R60.9 LIPEDEMA: Primary | ICD-10-CM

## 2023-08-17 PROCEDURE — 99213 OFFICE O/P EST LOW 20 MIN: CPT | Performed by: FAMILY MEDICINE

## 2023-08-17 PROCEDURE — 3008F BODY MASS INDEX DOCD: CPT | Performed by: FAMILY MEDICINE

## 2023-08-17 NOTE — PATIENT INSTRUCTIONS
"Problem List Items Addressed This Visit          Endocrine/Metabolic    Class 1 obesity with serious comorbidity and body mass index (BMI) of 31.0 to 31.9 in adult     Use ozempic pen    9 clicks for 4 weeks.   18 clicks for 4 weeks  36 clicks for 4 weeks.                 Dermatologic    Lipedema - Primary     Lipedema is a congenital enlargement of the of loose connective tissue (fat) on the legs, hips, buttocks, and sometimes upper arms.   The waist is generally spared as are the wrists,ankles, hands and feet.  This condition occurs almost exclusively in women and is thought to have a genetic component, but all details of how and why Lipedema develops are still under investigation. The current thinking is that changes in estrogen such as puberty, pregnancy, and menopause play a major role in lipedema progression.     Lipedema often creates easy bruising and pain in the effected areas due to swelling of the fat organ and eventual lymphatic compression, but some women experience no pain or discomfort. There is an overlap for many women with Lipedema to other structural issues like varicose veins, spider veins, Mona-danlos syndrome, and being \"hyper flexible.\"    There are 5 subtypes of lipedema based on where the fat is preferentially stored.   Type I: Buttocks and hips   Type II: Buttocks to knees and inner knee   Type III: Buttocks to ankles   Type IV: Arms   Type V: Legs     Women with lipedema often have great difficulty losing weight in the effected areas despite great effort to get appropriate nutrition, routine exercise, and excellent self-care and stress management.  When weight loss does occur it is often significantly slower than would be expected based on a traditional calories and calories out model.      Treatment options for Lipedema:  Lipedema cannot be cured, but it can be managed.     Water pills and diuretics should be avoided whenever possible, as this makes clearing lymphatic tissue more " "difficult.    Steroids should be avoided as they encourage fat deposition once discontinued and also cause weakening of lymphatics.    Supplements:   Vitamin D 2000 IU per day with food  Diosmin (micronized) 600 mg or more per day (Vasculera is a FDA approved form and can be prescribed through [facility] for $50 a month, [employer]s over the counter)  magnesium glycinate 250-500 mg a day can be helpful   Selenium: 200 mg 3 times a day (or 3 brazil nuts)  Lymphatic Formula is an over the counter brand combining diosmin, Vitamin D and selenium     Manual Techniques: Manual Lymphatic drainage, deep tissue massage, compression stockings, belly breathing, vibration plates (look up lifeprofitness.com), and rebounding (mini-trampoline) can help move the stuck lymph tissue out of the lower body.     Nutrition: Eating a \"clean\" diet that avoids or greatly reduces toxins such as sugars, artificial sweeteners, dyes, preservatives, and pesticides is best.  It is also wise to avoid estrogen mimics in foods.  This includes all soy, flax, milk products (unless grass fed and organic) and the mycotoxins that are found in grain flours such as wheat, oats, rice, quinoa, barley, teff, and millet.  There is recent evidence that a ketogenic diet can be an effective strategy for lipedema management.     Physical activity:  Weight lifting and water activities are most effective strategies for lipedema as the muscle contraction with lifting helps the lymphatic flow.  The pressure from being in water also acts to compress and mobilize lymph.     Medications: Phentermine and metformin can help reduce pain associated with lipedema.  It is thought that metformin also acts as an \"anti-inflammatory\" for this condition.  No medications are FDA approved for Lipedema to date.     There is recent evidence that the GLP-1 family of medication (Ozempic, Victoza, Trulicity, Wegovy, Saxenda, Byetta) are effective treatment for lipedema. " Please note that these medications are FDA approved for weight loss and diabetes, but not for lipedema specifically.    There is more information available at the Lipedema foundation and the Fat Disorders Research Society.         Encouraged lymphatic support techniques.    Internal and extremal approaches listed -- pick one from each category to do regularly.         External support techniques:      ---Dry brushing: (can get brush at [employer], you tube for technique   ---Compression garments (bioflect, high compression leggings (athleta and under armour have    a high compression line)   ---Water based exercises   ---Massage therapy- manual lymphatic drainage, self, physical therapy or massage therapist   --- Pneumatic Compression Pumps for moderate to severe swelling/pain (lymphapress)    Internal support techniques:    ---Yoga or Yoga breathing 10 minutes daily   ---Cardiovascular exercise   ---Strength training   ---Vibration therapy (vibration plate, setting 10-30,  10 minutes a day -- [email or web address])   ---Rebounding: bouncing on a rebounder/trampoline 10 minutes a day            [facility]. [phone]. [email or web address].

## 2023-08-17 NOTE — ASSESSMENT & PLAN NOTE
Use ozempic pen - has 2 mg pen at home.      Restart instructions:    9 clicks for 4 weeks.   18 clicks for 4 weeks  36 clicks for 4 weeks.     Begin to use lymphatic support techniques.

## 2023-08-17 NOTE — ASSESSMENT & PLAN NOTE
"Lipedema is a congenital enlargement of the of loose connective tissue (fat) on the legs, hips, buttocks, and sometimes upper arms.   The waist is generally spared as are the wrists,ankles, hands and feet.  This condition occurs almost exclusively in women and is thought to have a genetic component, but all details of how and why Lipedema develops are still under investigation. The current thinking is that changes in estrogen such as puberty, pregnancy, and menopause play a major role in lipedema progression.     Lipedema often creates easy bruising and pain in the effected areas due to swelling of the fat organ and eventual lymphatic compression, but some women experience no pain or discomfort. There is an overlap for many women with Lipedema to other structural issues like varicose veins, spider veins, Mona-danlos syndrome, and being \"hyper flexible.\"    There are 5 subtypes of lipedema based on where the fat is preferentially stored.   Type I: Buttocks and hips   Type II: Buttocks to knees and inner knee   Type III: Buttocks to ankles   Type IV: Arms   Type V: Legs     Women with lipedema often have great difficulty losing weight in the effected areas despite great effort to get appropriate nutrition, routine exercise, and excellent self-care and stress management.  When weight loss does occur it is often significantly slower than would be expected based on a traditional calories and calories out model.      Treatment options for Lipedema:  Lipedema cannot be cured, but it can be managed.     Water pills and diuretics should be avoided whenever possible, as this makes clearing lymphatic tissue more difficult.    Steroids should be avoided as they encourage fat deposition once discontinued and also cause weakening of lymphatics.    Supplements:   Vitamin D 2000 IU per day with food  Diosmin (micronized) 600 mg or more per day (Vasculera is a FDA approved form and can be prescribed through [facility] for $50 a " "month, Garden of Life Lisa Legs over the counter)  magnesium glycinate 250-500 mg a day can be helpful   Selenium: 200 mg 3 times a day (or 3 brazil nuts)  Lymphatic Formula is an over the counter brand combining diosmin, Vitamin D and selenium     Manual Techniques: Manual Lymphatic drainage, deep tissue massage, compression stockings, belly breathing, vibration plates (look up [email or web address]), and rebounding (mini-trampoline) can help move the stuck lymph tissue out of the lower body.     Nutrition: Eating a \"clean\" diet that avoids or greatly reduces toxins such as sugars, artificial sweeteners, dyes, preservatives, and pesticides is best.  It is also wise to avoid estrogen mimics in foods.  This includes all soy, flax, milk products (unless grass fed and organic) and the mycotoxins that are found in grain flours such as wheat, oats, rice, quinoa, barley, teff, and millet.  There is recent evidence that a ketogenic diet can be an effective strategy for lipedema management.     Physical activity:  Weight lifting and water activities are most effective strategies for lipedema as the muscle contraction with lifting helps the lymphatic flow.  The pressure from being in water also acts to compress and mobilize lymph.     Medications: Phentermine and metformin can help reduce pain associated with lipedema.  It is thought that metformin also acts as an \"anti-inflammatory\" for this condition.  No medications are FDA approved for Lipedema to date.     There is recent evidence that the GLP-1 family of medication (Ozempic, Victoza, Trulicity, Wegovy, Saxenda, Byetta) are effective treatment for lipedema. Please note that these medications are FDA approved for weight loss and diabetes, but not for lipedema specifically.    There is more information available at the Lipedema foundation and the Fat Disorders Research Society.         Encouraged lymphatic support techniques.    Internal and extremal approaches listed -- " pick one from each category to do regularly.         External support techniques:      ---Dry brushing: (can get brush at [employer]/amazon, you tube for technique   ---Compression garments (bioflect, high compression leggings (athleta and under armour have    a high compression line)   ---Water based exercises   ---Massage therapy- manual lymphatic drainage, self, physical therapy or massage therapist   --- Pneumatic Compression Pumps for moderate to severe swelling/pain (lymphapress)    Internal support techniques:    ---Yoga or Yoga breathing 10 minutes daily   ---Cardiovascular exercise   ---Strength training   ---Vibration therapy (vibration plate, setting 10-30,  10 minutes a day -- [email or web address])   ---Rebounding: bouncing on a rebounder/trampoline 10 minutes a day

## 2023-08-18 DIAGNOSIS — E66.811 CLASS 1 OBESITY WITH SERIOUS COMORBIDITY AND BODY MASS INDEX (BMI) OF 31.0 TO 31.9 IN ADULT, UNSPECIFIED OBESITY TYPE: Primary | ICD-10-CM

## 2023-08-18 RX ORDER — SEMAGLUTIDE 1.34 MG/ML
0.5 INJECTION, SOLUTION SUBCUTANEOUS
Qty: 3 ML | Refills: 1 | Status: CANCELLED | OUTPATIENT
Start: 2023-08-18

## 2023-08-18 RX ORDER — SEMAGLUTIDE 2.68 MG/ML
2 INJECTION, SOLUTION SUBCUTANEOUS
Qty: 3 ML | Refills: 2 | Status: SHIPPED | OUTPATIENT
Start: 2023-08-18 | End: 2023-11-30

## 2023-08-18 RX ORDER — SEMAGLUTIDE 1.34 MG/ML
0.5 INJECTION, SOLUTION SUBCUTANEOUS
COMMUNITY
Start: 2023-08-17 | End: 2023-08-18

## 2023-08-18 NOTE — TELEPHONE ENCOUNTER
"Last Medical provider visit: 8/17          Last Dietitian or EP visit: 9/6/21 - 8/24 (upcoming appt)  Next visit: 11/30/23  Comments: Please check order, didn't know how to correctly order because \"clicks\" were ordered.  ty    "

## 2023-08-24 ENCOUNTER — CLINICAL SUPPORT (OUTPATIENT)
Dept: BARIATRICS/WEIGHT MGMT | Facility: CLINIC | Age: 43
End: 2023-08-24
Payer: COMMERCIAL

## 2023-08-24 VITALS — BODY MASS INDEX: 33.83 KG/M2 | WEIGHT: 216 LBS

## 2023-08-24 DIAGNOSIS — Z71.3 NUTRITIONAL COUNSELING: ICD-10-CM

## 2023-08-24 DIAGNOSIS — E66.811 CLASS 1 OBESITY WITH SERIOUS COMORBIDITY AND BODY MASS INDEX (BMI) OF 31.0 TO 31.9 IN ADULT, UNSPECIFIED OBESITY TYPE: Primary | ICD-10-CM

## 2023-08-24 DIAGNOSIS — R60.9 LIPEDEMA: ICD-10-CM

## 2023-08-24 PROCEDURE — 97802 MEDICAL NUTRITION INDIV IN: CPT | Performed by: DIETITIAN, REGISTERED

## 2023-08-24 NOTE — ASSESSMENT & PLAN NOTE
Time   Description of Meal or Snack   Proteins Fats   High Fiber Vegetables Low Fiber Carbs Condiments Off Plan    Bfast Greek yogurt (1 protein) + apple/berries (1 carb   Protein powder + ½ banana + berries + spinach + almond milk (1 protein + 1.5 carbs) 1   1-1.5     Lunch  3 oz chicken/fish (1 protein)  Salad   Dressing   Carb balance wrap    OR   2 slices 647 bread (1 carb)  1 tbsp low sugar jam   2 tbsp peanut butter (2 fat) 0-1 1-2  0-1     Snack  Protein bar (1 protein, 1 fat)  Non-starchy veggies + guac/hummus/Tzatziki ¼ cup (1 fat) 1 1 1-2      Dinner  6 oz chicken/fish/turkey (2 protein)  1 medium sweet potato (1 carb) + butter / oil ( 1 fat)   Non-starchy veggies  2 1-2 1-2 1                Goals 5 5 Unlimited 5         Prioritize protein with each meal and snack. Protein is needed to keep you feeling full and satisfied between meals, so it tends to help you snack less and avoid cravings for carbohydrate foods. This nutrient also helps with muscle recovery after exercise which can help increase your metabolic rate over time. Common protein options include beef, pork, chicken, turkey, lamb, fish, shellfish, eggs, soy (tofu, tempeh, edamame), beans, hummus, lentils, low-sugar Greek yogurt, cottage cheese, and protein supplements (shakes/bars). There is also a small amount of protein in foods such as cheese, nuts, seeds, whole grains, and vegetables. Including a variety of different protein sources is best!    The following brands are recommended for protein supplements...    Whey protein isolate powders:  - Earth Fed Muscle [discount code 'CWWP' gets you an additional 15% off your online purchase]  - Isopure (Infusions, Unflavored, Natural Flavor, or Naturally Sweetened varieties)  - [facility]  - [facility] Nutrition  - About Time   - Biochem  - Innosupps  - Clean Simple Eats  - Isopure Infusions (whey isolate clear protein powder)  - Devotion Nutrition [discount code 'CWWP' gets you an additional 20% off  your online purchase]    Plant-based powders:  - Tone it Up   - Martinez  - Garden of Life  - Orgain (Vegan or Simple varieties) [Use discount code 'ZV4275' on Orgain.com for 20% off your online purchase]   - Evolve  - Epicure  - Innosupps Vegan  - Aloha  - Ripple  - OWYN  - Truvani  - Clean Simple Eats Vegan  - Jamgo Organic Coconut Protein Blend  - MyProtein Clear Vegan Isolate (plant-based clear protein powder)    Dairy-based ready-to-drink beverages:  - Fairlife milk  - Slate chocolate milk  - Fairlife protein shakes (CorePower or Nutrition Plan varieties)  - Ismjlzj4B (whey isolate clear protein drink) [Use discount code '15P20' on Amazon for 15% off your online purchase]   - Isopure Infusions (whey isolate clear protein drink)  - Bariatric Advantage (whey isolate clear protein drink)  - Ascent Protein Recovery Water (whey isolate clear protein drink)  - Orgain  [Use discount code 'WQ6496' on Orgain.com for 20% off your online purchase]   - Pillars drinkable yogurt  - Chobani Complete drinkable yogurt  - Powerful Drink drinkable yogurt    Plant-based ready-to-drink beverages:  - Evolve  - Apres  - Tone it Up  - Orgain [Use discount code 'UW0623' on Orgain.com for 20% off your online purchase]   - [place]  - Joystickers of Life  - OWYN  - Ripple  - Koia  - Losfqbk3X (plant-based clear protein drink)    Protein bars:  - Built bar [discount code 'CWWPRD' gets you an additional 10% off your online purchase]  - RX bar  - IQ bar  - Perfect bar  - RawRev Maeve bar  - Simply Protein bar (Crispy variety)  - Quest bar (Treadwell variety from [employer] also approved)  - Garden of Life High Protein Weight Loss bar  - Aloha Plant-Based Protein bar  - Bulletproof Collagen Protein bar    Low-sugar Greek yogurt:  - Siggi's  - Siggi's Plant-Based  - Chobani Less Sugar  - Chobani Complete (lactose free)  - Two Good  - Dannon Oikos Triple Zero   - Dannon Oikoes Pro  - Fage Best Self (lactose free)  - [facility] Provisions    Other  supplements:  - Collagen peptides powder  - Vital Proteins Collagen Water  - Bone broth  - Fair Life High Protein Milk  - Chobani High Protein Milk  - Ancient Nutrition Bone Broth Varieties   - Simply Protein chips  - Quest Nutrition chips  - Twin Peaks or Shrewd Food protein puffs  - Magic Spoon or Schoolyard Snacks protein cereal  -RX Cereal   -[place] Protein Packed Power Waffles  -Hemp Hearts     Optimizing Your Mind and Body   Why Manage Glucose and Insulin Spikes?   • -Controls cravings   • -Regulates hunger  • -Stabilizes energy and mood   • -Reduces inflammation  • -Slows down aging  •   • Continuous Glucose Monitor Graphs by:  •  Nicole Corona     • Instagram [email or web address]   • Book on Amazon Glucose Revolution  •   How to Balance Glucose & Insulin  1. Start all meals you can with veggies - see list of attached non-starchy vegetables   2. No “naked carbs”  • Start with fat first  3. Food order makes a difference  • First--high fiber, low sugar  • Next--protein or fat  • Last--lower fiber carbs  4. Move for 10 minutes after eating    Non-starchy Vegetables: unlimited servings/day  One non-starchy vegetable choice (1/2 cup cooked or 1 cup raw) has 5 grams of carbohydrate, 2 grams of protein, 0 grams of fat, and 25 calories.     Amaranth leaves (Chinese Spinach)  Artichoke   Artichoke hearts (no oil)  Asparagus  Baby corn  Bamboo shoots  Bean sprouts (Alfalpha, mung, soybean)  Beans (green, wax, Italian, yard-long beans)  Broccoli  Broccoli slaw, packaged, no dressing  [place] sprouts  Cabbage (green, red, bok rachel, Chinese)  Cauliflower  Celery  Chayote  Coleslaw, packaged, no dressing  Cucumber  Daikon  Eggplant  Fennel  Gourds (bitter, bottle, luffa, bitter melon)  Green onions or scallions  Greens (mina, dandelion, mustard, purslane, turnip)   Hearts of palm  Jicama  Kale  Kohlrabi  Leeks  Mixed vegetables (without starchy vegetables, legumes, or pasta)  Mushrooms, all kinds,  fresh  Okra  Onions  Pea pods  Peppers (all varieties)   Sauerkraut, drained and rinsed  Spinach  Squash, summer varieties (yellow, pattypan, crookneck, zucchini)  Sugar snap peas  Swiss chard  Tomato  Tomatoes, canned  Tomato/vegetable juice  Water chestnuts        Find recipes at [email or web address] and [email or web address]

## 2023-08-24 NOTE — PATIENT INSTRUCTIONS
Problem List Items Addressed This Visit          Endocrine/Metabolic    Class 1 obesity with serious comorbidity and body mass index (BMI) of 31.0 to 31.9 in adult - Primary     Time   Description of Meal or Snack   Proteins Fats   High Fiber Vegetables Low Fiber Carbs Condiments Off Plan    Bfast Greek yogurt (1 protein) + apple/berries (1 carb   Protein powder + ½ banana + berries + spinach + almond milk (1 protein + 1.5 carbs) 1   1-1.5     Lunch  3 oz chicken/fish (1 protein)  Salad   Dressing   Carb balance wrap    OR   2 slices 647 bread (1 carb)  1 tbsp low sugar jam   2 tbsp peanut butter (2 fat) 0-1 1-2  0-1     Snack  Protein bar (1 protein, 1 fat)  Non-starchy veggies + guac/hummus/Tzatziki ¼ cup (1 fat) 1 1 1-2      Dinner  6 oz chicken/fish/turkey (2 protein)  1 medium sweet potato (1 carb) + butter / oil ( 1 fat)   Non-starchy veggies  2 1-2 1-2 1                Goals 5 5 Unlimited 5         Prioritize protein with each meal and snack. Protein is needed to keep you feeling full and satisfied between meals, so it tends to help you snack less and avoid cravings for carbohydrate foods. This nutrient also helps with muscle recovery after exercise which can help increase your metabolic rate over time. Common protein options include beef, pork, chicken, turkey, lamb, fish, shellfish, eggs, soy (tofu, tempeh, edamame), beans, hummus, lentils, low-sugar Greek yogurt, cottage cheese, and protein supplements (shakes/bars). There is also a small amount of protein in foods such as cheese, nuts, seeds, whole grains, and vegetables. Including a variety of different protein sources is best!    The following brands are recommended for protein supplements...    Whey protein isolate powders:  - Earth Fed Muscle [discount code 'CWWP' gets you an additional 15% off your online purchase]  - Isopure (Infusions, Unflavored, Natural Flavor, or Naturally Sweetened varieties)  - [facility]  - [facility] Nutrition  - About Time    - Biochem  - Innosupps  - Clean Simple Eats  - Isopure Infusions (whey isolate clear protein powder)  - Devotion Nutrition [discount code 'CWWP' gets you an additional 20% off your online purchase]    Plant-based powders:  - Tone it Up   - Martinez  - Garden of Life  - Orgain (Vegan or Simple varieties) [Use discount code 'AT7748' on Orgain.com for 20% off your online purchase]   - Evolve  - Epicure  - Innosupps Vegan  - Aloha  - Ripple  - OWYN  - Truvani  - Clean Simple Eats Vegan  - [employer] Organic Coconut Protein Blend  - MyProtein Clear Vegan Isolate (plant-based clear protein powder)    Dairy-based ready-to-drink beverages:  - Fairlife milk  - Slate chocolate milk  - Fairlife protein shakes (CorePower or Nutrition Plan varieties)  - Azsqmwa5L (whey isolate clear protein drink) [Use discount code '15P20' on Amazon for 15% off your online purchase]   - Isopure Infusions (whey isolate clear protein drink)  - Bariatric Advantage (whey isolate clear protein drink)  - Ascent Protein Recovery Water (whey isolate clear protein drink)  - Orgain  [Use discount code 'BG7641' on Orgain.com for 20% off your online purchase]   - Pillars drinkable yogurt  - Chobani Complete drinkable yogurt  - Powerful Drink drinkable yogurt    Plant-based ready-to-drink beverages:  - Evolve  - Apres  - Tone it Up  - Orgain [Use discount code 'MI3110' on Orgain.com for 20% off your online purchase]   - [place]  - Garden of Life  - OWYN  - Ripple  - Koia  - Sutfbyq5X (plant-based clear protein drink)    Protein bars:  - Built bar [discount code 'CWWPRD' gets you an additional 10% off your online purchase]  - RX bar  - IQ bar  - Perfect bar  - RawRev Maeve bar  - Simply Protein bar (Crispy variety)  - Quest bar (Treadwell variety from [employer] also approved)  - Garden of Life High Protein Weight Loss bar  - Aloha Plant-Based Protein bar  - Bulletproof Collagen Protein bar    Low-sugar Greek yogurt:  - Siggi's  - Siggi's Plant-Based  - Chobani  Less Sugar  - Chobani Complete (lactose free)  - Two Good  - Dannon Oikos Triple Zero   - Dannon Oikoes Pro  - Fage Best Self (lactose free)  - [facility] Provisions    Other supplements:  - Collagen peptides powder  - Vital Proteins Collagen Water  - Bone broth  - Fair Life High Protein Milk  - Chobani High Protein Milk  - Ancient Nutrition Bone Broth Varieties   - Simply Protein chips  - Quest Nutrition chips  - Twin [place]aks or Shrewd Food protein puffs  - Magic Spoon or Schoolyard Snacks protein cereal  -RX Cereal   -[place] Protein Packed Power Waffles  -Hemp Hearts     Optimizing Your Mind and Body   Why Manage Glucose and Insulin Spikes?   -Controls cravings   -Regulates hunger  -Stabilizes energy and mood   -Reduces inflammation  -Slows down aging    Continuous Glucose Monitor Graphs by:   Nicole Corona     Instagram [email or web address]   Book on Amazon Glucose Revolution    How to Balance Glucose & Insulin  1. Start all meals you can with veggies - see list of attached non-starchy vegetables   2. No “naked carbs”  Start with fat first  3. Food order makes a difference  First--high fiber, low sugar  Next--protein or fat  Last--lower fiber carbs  4. Move for 10 minutes after eating    Non-starchy Vegetables: unlimited servings/day  One non-starchy vegetable choice (1/2 cup cooked or 1 cup raw) has 5 grams of carbohydrate, 2 grams of protein, 0 grams of fat, and 25 calories.     Amaranth leaves (Chinese Spinach)  Artichoke   Artichoke hearts (no oil)  Asparagus  Baby corn  Bamboo shoots  Bean sprouts (Alfalpha, mung, soybean)  Beans (green, wax, Italian, yard-long beans)  Broccoli  Broccoli slaw, packaged, no dressing  Viola sprouts  Cabbage (green, red, bok rachel, Chinese)  Cauliflower  Celery  Chayote  Coleslaw, packaged, no dressing  Cucumber  Daikon  Eggplant  Fennel  Gourds (bitter, bottle, luffa, bitter melon)  Green onions or scallions  Greens (mina, dandelion, mustard, purslane, turnip)    Hearts of palm  Jicama  Kale  Kohlrabi  Leeks  Mixed vegetables (without starchy vegetables, legumes, or pasta)  Mushrooms, all kinds, fresh  Okra  Onions  Pea pods  Peppers (all varieties)   Sauerkraut, drained and rinsed  Spinach  Squash, summer varieties (yellow, pattypan, crookneck, zucchini)  Sugar snap peas  Swiss chard  Tomato  Tomatoes, canned  Tomato/vegetable juice  Water chestnuts        Find recipes at [email or web address] and [email or web address]                 Dermatologic    Lipedema       Other    Nutritional counseling

## 2023-08-24 NOTE — PROGRESS NOTES
"DETAILS OF VISIT   Consulting Service:  [facility] Comprehensive Weight and Wellness Program - Dietitian  Date: 23   Insurance: [facility]     Referring Physician: Renee Urbano*    PCP: Renee Urbano DO    Reason for Consultation:   Chief Complaint   Patient presents with   • Nutrition Counseling     I met with Andressa Mohrchowski today for Nutrition Counseling [Z71.3] and dietary education related to the following:    E66.09 - Obese due to excess calories and Z68.31 - BMI 31.0-31.9, adult   VISIT DESCRIPTION       Andressa Baker is a 43 y.o. female here for nutrition follow-up.    Current Weight:   Wt Readings from Last 1 Encounters:   23 98 kg (216 lb)     Height:   Ht Readings from Last 1 Encounters:   23 1.702 m (5' 7\")        BMI: Body mass index is 33.83 kg/m².    Weight Trends:  Wt Readings from Last 3 Encounters:   23 98 kg (216 lb)   23 99.8 kg (220 lb 1.6 oz)   23 90.7 kg (200 lb)     BMI Readings from Last 3 Encounters:   23 33.83 kg/m²   23 34.47 kg/m²   23 31.32 kg/m²     Patient seen by this RD for evaluation in the Comprehensive Weight & Wellness Program. During today's visit, we discussed the following:    Nutrition, Physical Activity, & Emotional Wellness:   Recommended Nutrition Plan  LGI/lipedema diet + ozempic   1500 kcal per day   131 gm protein, 58 gm protein, 113 gm carbs  5/5/5    Meal Structure  5 or less eating events recommended   Eating Interval  Less than 12 hours recommended   Intake Logging  Healthy Lifestyles paper food journal or electronic (Baritastic, MFP, LoseIt, Carb Manager, etc) - pt not logging food at this time    Successes  -hourly movement  -discussed no naked carbs   Challenges -migraines 4-5 days a week; see's neurologist    -stressors; family dog  recently. Getting another puppy in September    Education Provided Navigating obstacles listed above and outlined in goals below   Dietary recall  B- " Greek yogurt + fruit   Oatmeal   Smoothie with orgain/ garden of life     Fruit     L- leftovers (grilled chicken, hamburgers)   647 bread with peanut butter and jelly    Snack: veggies, mary bar/ thin bar   Smoothie - if she didn't have it for breakfast   Fair life shake     D- lean meat, sweet potatoes/brown rice, veggies     Snacks- veggies   Beverages- water      Physical activity:  History of knee surgery   Has home gym   Walks with friends occasionally     Emotional wellness:  5 children   Works from home    Sleep:  Did not discuss     Hydration:   Excellent hydration     The following hand-outs and/or educational materials were given: [facility] welcome packet, FAQ nutrition label reading handout    A patient-centered approach using motivational interviewing was used to develop the plan and recommendations outlined below.     Time Spent with Patient for face to face office visit: 1 hour      MEDICATIONS, LABS, AND ALLERGIES     Current Outpatient Medications on File Prior to Visit   Medication Sig Dispense Refill   • b complex vitamins capsule Take 1 capsule by mouth daily.     • diclofenac sodium (VOLTAREN) 1 % topical gel Apply topically 2 (two) times a day as needed for mild pain. 100 g 1   • dihydroergotamine (TRUDHESA) 0.725 mg/pump act. (4 mg/mL) spray,non-aerosol Administer 1 spray into each nostril as needed (migraine). The dose may be repeated, if needed, a minimum of 1 hour after the first dose. Do not use more than 2 doses within a 24-hour period or 3 doses within 7 days. 4 mL 5   • docosahexaenoic acid-epa 120-180 mg capsule Take 1,000 mg by mouth daily.     • DULoxetine (CYMBALTA) 30 mg capsule Take 40 mg by mouth 2 (two) times a day.     • EMGALITY  mg/mL pen injector subcutaneous pen INJECT 1 ML (120 MG TOTAL) UNDER THE SKIN EVERY 30 DAYS 1 mL 3   • ergocalciferol (VITAMIN D2) 50,000 unit(1250 mcg) capsule Take 50,000 Units by mouth once a week.     • frovatriptan (FROVA) 2.5 mg tablet Take 1  tablet (2.5 mg total) by mouth once as needed for migraine Indications: a migraine headache. 12 tablet 11   • herbal drugs (CALMME ORAL) Take by mouth daily.     • LORazepam (ATIVAN) 0.5 mg tablet Take 1 tablet (0.5 mg total) by mouth every 8 (eight) hours as needed for anxiety. 20 tablet 0   • metoclopramide (REGLAN) 10 mg tablet Take 1 tablet (10 mg total) by mouth 3 (three) times a day as needed (nausea). 20 tablet 2   • naproxen sodium (ALEVE ORAL) Take by mouth as needed.     • olopatadine (PATADAY) 0.2 % ophthalmic solution Administer 1 drop into affected eye(s) daily.     • ondansetron ODT (ZOFRAN-ODT) 4 mg disintegrating tablet Take 4 mg by mouth every 8 (eight) hours as needed for nausea or vomiting.     • rimegepant (NURTEC ODT) 75 mg tablet,disintegrating Take 1 tablet (75 mg total) by mouth as needed (migraine headache). Dissolve 1 tablet under the tongue. One dose per day as needed. 16 tablet 11   • rizatriptan MLT (MAXALT-MLT) 10 mg disintegrating tablet 1 tab at onset of severe headache. May repeat in 2 hours if needed. Maximum 2 tab/day and 2 days/week. 12 tablet 3   • semaglutide (OZEMPIC) 2 mg/dose (8 mg/3 mL) subcutaneous injection Inject 2 mg under the skin every (seven) 7 days. 3 mL 2   • traZODone (DESYREL) 50 mg tablet Take 25-50 mg by mouth nightly as needed.     • TURMERIC ORAL Take by mouth daily.     • ubrogepant (UBRELVY) 100 mg tablet tablet Take 1 tablet (100 mg total) by mouth as needed for migraine.  mg at onset of migraine. May repeat  mg once 2 hours later. Max 200 mg/day. 16 tablet 11     No current facility-administered medications on file prior to visit.         Patient has no known allergies.     PATIENT-LED GOALS     Class 1 obesity with serious comorbidity and body mass index (BMI) of 31.0 to 31.9 in adult  Time   Description of Meal or Snack   Proteins Fats   High Fiber Vegetables Low Fiber Carbs Condiments Off Plan    Bfast Greek yogurt (1 protein) +  apple/berries (1 carb   Protein powder + ½ banana + berries + spinach + almond milk (1 protein + 1.5 carbs) 1   1-1.5     Lunch  3 oz chicken/fish (1 protein)  Salad   Dressing   Carb balance wrap    OR   2 slices 647 bread (1 carb)  1 tbsp low sugar jam   2 tbsp peanut butter (2 fat) 0-1 1-2  0-1     Snack  Protein bar (1 protein, 1 fat)  Non-starchy veggies + guac/hummus/Tzatziki ¼ cup (1 fat) 1 1 1-2      Dinner  6 oz chicken/fish/turkey (2 protein)  1 medium sweet potato (1 carb) + butter / oil ( 1 fat)   Non-starchy veggies  2 1-2 1-2 1                Goals 5 5 Unlimited 5         Prioritize protein with each meal and snack. Protein is needed to keep you feeling full and satisfied between meals, so it tends to help you snack less and avoid cravings for carbohydrate foods. This nutrient also helps with muscle recovery after exercise which can help increase your metabolic rate over time. Common protein options include beef, pork, chicken, turkey, lamb, fish, shellfish, eggs, soy (tofu, tempeh, edamame), beans, hummus, lentils, low-sugar Greek yogurt, cottage cheese, and protein supplements (shakes/bars). There is also a small amount of protein in foods such as cheese, nuts, seeds, whole grains, and vegetables. Including a variety of different protein sources is best!    The following brands are recommended for protein supplements...    Whey protein isolate powders:  - Earth Fed Muscle [discount code 'CWWP' gets you an additional 15% off your online purchase]  - Isopure (Infusions, Unflavored, Natural Flavor, or Naturally Sweetened varieties)  - iheartmacros  - BioHealth Nutrition  - About Time   - Biochem  - Innosupps  - Clean Simple Eats  - Isopure Infusions (whey isolate clear protein powder)  - Devotion Nutrition [discount code 'CWWP' gets you an additional 20% off your online purchase]    Plant-based powders:  - Tone it Up   - Martinez  - Garden of Life  - Orgain (Vegan or Simple varieties) [Use discount code  'EA8468' on Orgain.com for 20% off your online purchase]   - Evolve  - Epicure  - Innosupps Vegan  - Aloha  - Ripple  - OWYN  - Truvani  - Clean Simple Eats Vegan  - OpVista Organic Coconut Protein Blend  - MyProtein Clear Vegan Isolate (plant-based clear protein powder)    Dairy-based ready-to-drink beverages:  - Fairlife milk  - Slate chocolate milk  - Fairlife protein shakes (CorePower or Nutrition Plan varieties)  - [id number] (whey isolate clear protein drink) [Use discount code '15P20' on Amazon for 15% off your online purchase]   - Isopure Infusions (whey isolate clear protein drink)  - Bariatric Advantage (whey isolate clear protein drink)  - Ascent Protein Recovery Water (whey isolate clear protein drink)  - Orgain  [Use discount code 'AE0843' on Orgain.com for 20% off your online purchase]   - Pillars drinkable yogurt  - Chobani Complete drinkable yogurt  - Powerful Drink drinkable yogurt    Plant-based ready-to-drink beverages:  - Evolve  - Apres  - Tone it Up  - Orgain [Use discount code 'TM8450' on Orgain.com for 20% off your online purchase]   - [place]  - Garden of Life  - OWYN  - Ripple  - Koia  - [id number] (plant-based clear protein drink)    Protein bars:  - Built bar [discount code 'CWWPRD' gets you an additional 10% off your online purchase]  - RX bar  - IQ bar  - Perfect bar  - RawRev Maeve bar  - Simply Protein bar (Crispy variety)  - Quest bar (Treadwell variety from [employer] ([place] also approved)  - Garden of Life High Protein Weight Loss bar  - Aloha Plant-Based Protein bar  - Bulletproof Collagen Protein bar    Low-sugar Greek yogurt:  - Siggi's  - Siggi's Plant-Based  - Chobani Less Sugar  - Chobani Complete (lactose free)  - Two Good  - Dannon Oikos Triple Zero   - Dannon Oikoes Pro  - Fage Best Self (lactose free)  - [facility] Provisions    Other supplements:  - Collagen peptides powder  - Vital Proteins Collagen Water  - Bone broth  - Fair Life High Protein Milk  - Chobani High Protein  Milk  - Ancient Nutrition Bone Broth Varieties   - Simply Protein chips  - Quest Nutrition chips  - [place] or Shrewd Food protein puffs  - Magic Spoon or Schoolyard Snacks protein cereal  -RX Cereal   -[place] Protein Packed Power Waffles  -Hemp Hearts     Optimizing Your Mind and Body   Why Manage Glucose and Insulin Spikes?   • -Controls cravings   • -Regulates hunger  • -Stabilizes energy and mood   • -Reduces inflammation  • -Slows down aging  •   • Continuous Glucose Monitor Graphs by:  •  Nicole Corona     • Instagram [email or web address]   • Book on Amazon Glucose Revolution  •   How to Balance Glucose & Insulin  1. Start all meals you can with veggies - see list of attached non-starchy vegetables   2. No “naked carbs”  • Start with fat first  3. Food order makes a difference  • First--high fiber, low sugar  • Next--protein or fat  • Last--lower fiber carbs  4. Move for 10 minutes after eating    Non-starchy Vegetables: unlimited servings/day  One non-starchy vegetable choice (1/2 cup cooked or 1 cup raw) has 5 grams of carbohydrate, 2 grams of protein, 0 grams of fat, and 25 calories.     Amaranth leaves (Chinese Spinach)  Artichoke   Artichoke hearts (no oil)  Asparagus  Baby corn  Bamboo shoots  Bean sprouts (Alfalpha, mung, soybean)  Beans (green, wax, Italian, yard-long beans)  Broccoli  Broccoli slaw, packaged, no dressing  Moroni sprouts  Cabbage (green, red, bok rachel, Chinese)  Cauliflower  Celery  Chayote  Coleslaw, packaged, no dressing  Cucumber  Daikon  Eggplant  Fennel  Gourds (bitter, bottle, luffa, bitter melon)  Green onions or scallions  Greens (mina, dandelion, mustard, purslane, turnip)   Hearts of palm  Jicama  Kale  Kohlrabi  Leeks  Mixed vegetables (without starchy vegetables, legumes, or pasta)  Mushrooms, all kinds, fresh  Okra  Onions  Pea pods  Peppers (all varieties)   Sauerkraut, drained and rinsed  Spinach  Squash, summer varieties (yellow, pattypan, crookneck,  zucchini)  Sugar snap peas  Swiss chard  Tomato  Tomatoes, canned  Tomato/vegetable juice  Water chestnuts        Find recipes at eatingwell.com and [email or web address]           Medication Check-in:     Jeanna Martin, MS, RD, LDN  Registered Dietitian for [facility] Comprehensive Weight and Wellness Program  8/24/2023     Thank you very much for allowing us to participate in the care of your patient. Please do not hesitate to call or email if there are any questions.

## 2023-08-29 PROBLEM — Z11.59 NEED FOR HEPATITIS C SCREENING TEST: Status: RESOLVED | Noted: 2022-02-17 | Resolved: 2023-08-29

## 2023-08-29 PROBLEM — R35.0 URINARY FREQUENCY: Status: RESOLVED | Noted: 2021-09-28 | Resolved: 2023-08-29

## 2023-08-29 PROBLEM — Z01.818 PRE-OPERATIVE CLEARANCE: Status: RESOLVED | Noted: 2022-11-03 | Resolved: 2023-08-29

## 2023-08-29 PROBLEM — G43.901 STATUS MIGRAINOSUS: Status: RESOLVED | Noted: 2023-01-23 | Resolved: 2023-08-29

## 2023-08-30 ENCOUNTER — OFFICE VISIT (OUTPATIENT)
Dept: FAMILY MEDICINE | Facility: CLINIC | Age: 43
End: 2023-08-30
Payer: COMMERCIAL

## 2023-08-30 VITALS
SYSTOLIC BLOOD PRESSURE: 118 MMHG | WEIGHT: 215.2 LBS | BODY MASS INDEX: 33.78 KG/M2 | TEMPERATURE: 97.4 F | HEIGHT: 67 IN | DIASTOLIC BLOOD PRESSURE: 62 MMHG | HEART RATE: 84 BPM | OXYGEN SATURATION: 98 %

## 2023-08-30 DIAGNOSIS — L98.9 PRECANCEROUS SKIN LESION: ICD-10-CM

## 2023-08-30 DIAGNOSIS — Z76.89 ESTABLISHING CARE WITH NEW DOCTOR, ENCOUNTER FOR: ICD-10-CM

## 2023-08-30 DIAGNOSIS — R60.9 LIPEDEMA: ICD-10-CM

## 2023-08-30 DIAGNOSIS — Z80.0 FAMILY HISTORY OF COLON CANCER: ICD-10-CM

## 2023-08-30 DIAGNOSIS — B36.0 TINEA VERSICOLOR: ICD-10-CM

## 2023-08-30 DIAGNOSIS — E66.811 CLASS 1 OBESITY DUE TO EXCESS CALORIES WITH SERIOUS COMORBIDITY AND BODY MASS INDEX (BMI) OF 31.0 TO 31.9 IN ADULT: ICD-10-CM

## 2023-08-30 DIAGNOSIS — Z23 IMMUNIZATION DUE: ICD-10-CM

## 2023-08-30 DIAGNOSIS — Z00.00 ENCOUNTER FOR PREVENTIVE CARE: Primary | ICD-10-CM

## 2023-08-30 DIAGNOSIS — E78.2 MIXED HYPERLIPIDEMIA: ICD-10-CM

## 2023-08-30 DIAGNOSIS — E66.09 CLASS 1 OBESITY DUE TO EXCESS CALORIES WITH SERIOUS COMORBIDITY AND BODY MASS INDEX (BMI) OF 31.0 TO 31.9 IN ADULT: ICD-10-CM

## 2023-08-30 DIAGNOSIS — M25.561 CHRONIC PAIN OF RIGHT KNEE: ICD-10-CM

## 2023-08-30 DIAGNOSIS — G89.29 CHRONIC PAIN OF RIGHT KNEE: ICD-10-CM

## 2023-08-30 PROCEDURE — 17110 DESTRUCTION B9 LES UP TO 14: CPT | Performed by: STUDENT IN AN ORGANIZED HEALTH CARE EDUCATION/TRAINING PROGRAM

## 2023-08-30 PROCEDURE — 90471 IMMUNIZATION ADMIN: CPT | Performed by: STUDENT IN AN ORGANIZED HEALTH CARE EDUCATION/TRAINING PROGRAM

## 2023-08-30 PROCEDURE — 99396 PREV VISIT EST AGE 40-64: CPT | Mod: 25 | Performed by: STUDENT IN AN ORGANIZED HEALTH CARE EDUCATION/TRAINING PROGRAM

## 2023-08-30 PROCEDURE — 3008F BODY MASS INDEX DOCD: CPT | Performed by: STUDENT IN AN ORGANIZED HEALTH CARE EDUCATION/TRAINING PROGRAM

## 2023-08-30 PROCEDURE — 90686 IIV4 VACC NO PRSV 0.5 ML IM: CPT | Performed by: STUDENT IN AN ORGANIZED HEALTH CARE EDUCATION/TRAINING PROGRAM

## 2023-08-30 RX ORDER — SELENIUM SULFIDE 1 G/100ML
SHAMPOO TOPICAL DAILY
Qty: 207 ML | Refills: 0 | Status: SHIPPED | OUTPATIENT
Start: 2023-08-30 | End: 2024-03-30

## 2023-08-30 RX ORDER — ATOMOXETINE 40 MG/1
40 CAPSULE ORAL 2 TIMES DAILY
COMMUNITY
Start: 2023-08-28 | End: 2025-01-21

## 2023-08-30 RX ORDER — PROGESTERONE 200 MG/1
CAPSULE ORAL
COMMUNITY
Start: 2023-08-17 | End: 2023-11-30

## 2023-08-30 RX ORDER — CLOTRIMAZOLE 1 %
CREAM (GRAM) TOPICAL 2 TIMES DAILY
Qty: 45 G | Refills: 1 | Status: SHIPPED | OUTPATIENT
Start: 2023-08-30 | End: 2023-09-29

## 2023-08-30 NOTE — PROGRESS NOTES
Subjective      Patient ID: Andressa Baker is a 43 y.o. female here for the following:   Establish Care      HPI    SUBJECTIVE:   43 y.o. female for annual routine checkup. Transfer from another PCP     Working with multiple specialists and [facility] to lose weight/control migraines   Better this month   Increased her mag supplements which helped     Specific diet: Standard American diet  Exercise program: No formal routine  With resistance. Walking   Sleep hours per night: Adequate  Dentist twice yearly: Yes  Optometrist or Opthalmologist annually?  Yes    Do you wear your seatbelt? y  Do you wear sunscreen? y  Smoke detectors? y    Immunization History   Administered Date(s) Administered   • Influenza Vaccine Quadrivalent 3 Yr And Older 09/15/2022   • Influenza Vaccine Quadrivalent Preservative Free 6 Mons and Up 10/01/2019, 2020, 2021, 2023   • Influenza, Unspecified 2018, 10/01/2019, 2021   • SARS-COV-2 (COVID-19) VACCINE, MODERNA MONOVALENT 2021, 2021, 2021   • Tdap 2019      Menstrual History:  OB History        5    Para   5    Term   5       0    AB   0    Living   5       SAB   0    IAB   0    Ectopic   0    Multiple   0    Live Births   5           Obstetric Comments   Twin pregnancy with vanishing twin at 12-14 weeks in .            Patient's last menstrual period was 2023.           Chemistry        Component Value Date/Time     2023 1633    K 4.2 2023 1633     2023 1633    CO2 29 2023 1633    BUN 15 2023 1633    CREATININE 0.8 2023 1633        Component Value Date/Time    CALCIUM 9.3 2023 1633    ALKPHOS 50 2023 1633    AST 14 2023 1633    ALT 16 2023 1633    BILITOT 0.3 2023 1633          Lab Results   Component Value Date    WBC 5.71 2023    HGB 14.3 2023    HCT 43.8 2023    MCV 87.6 2023     2023       Lab  Results   Component Value Date    HGBA1C 5.1 2022    HGBA1C 5.0 2021    HGBA1C 5.2 2020     Lab Results   Component Value Date    CHOL 208 (H) 2022    CHOL 183 2020    CHOL 196 2016     Lab Results   Component Value Date    HDL 50 (L) 2022    HDL 67 2020    HDL 67 2016     Lab Results   Component Value Date    LDLCALC 126 (H) 2022    LDLCALC 108 (H) 2020    LDLCALC 117 (H) 2016     Lab Results   Component Value Date    TRIG 159 (H) 2022    TRIG 40 2020    TRIG 58 2016     No results found for: CHOLHDL    The 10-year ASCVD risk score (Keya MENDES, et al., 2019) is: 0.7%    Values used to calculate the score:      Age: 43 years      Sex: Female      Is Non- : No      Diabetic: No      Tobacco smoker: No      Systolic Blood Pressure: 118 mmHg      Is BP treated: No      HDL Cholesterol: 50 mg/dL      Total Cholesterol: 208 mg/dL    The following have been reviewed and updated as appropriate in this visit:      Allergies  Meds  Problems  Social History      Patient Active Problem List   Diagnosis   • PVC (premature ventricular contraction)   • Anxiety   • History of    • Ventricular premature beats   • Mild tricuspid regurgitation by prior echocardiogram   • Class 1 obesity with serious comorbidity and body mass index (BMI) of 31.0 to 31.9 in adult   • Difficulty voiding   • Tension type headache   • Insomnia   • Lipedema   • Pelvic floor dysfunction   • Depression   • Attention deficit hyperactivity disorder (ADHD), predominantly inattentive type   • History of migraine   • Routine adult health maintenance   • Migraine headache   • Chronic pain of right knee   • Intractable chronic migraine without aura and with status migrainosus   • Cervicalgia   • Other chronic pain   • Chronic pain of both knees   • Nutritional counseling   • Tinea versicolor   • Mixed hyperlipidemia   • Family history of  colon cancer   • Precancerous skin lesion   • Encounter for preventive care    .    Social History     Tobacco Use   • Smoking status: Never   • Smokeless tobacco: Never   Vaping Use   • Vaping Use: Never used   Substance Use Topics   • Alcohol use: Yes     Comment: occasionally   • Drug use: No     Social History     Social History Narrative    Exercises 2-3 times per week -  and she is trying to run again    Sleep sometimes interrupted    Eating plan: Patient attempts low glycemic index eating    No history of domestic violence    Lives with  and 5 children      [employer] -- medical legal work / part-time    Has a dog       Family History   Problem Relation Age of Onset   • Hyperlipidemia Biological Mother    • Hyperlipidemia Biological Father    • Hypertension Biological Father    • Prostate cancer Biological Father 78   • Dementia Biological Father    • Other Biological Father         dief from covid 19   • Hyperlipidemia Maternal Grandmother    • Clotting disorder Maternal Grandfather         developed in 60's - required tansfusions/platelets   • Hyperlipidemia Paternal Grandmother    • Dementia Paternal Grandmother    • Diabetes Paternal Grandfather    • No Known Problems Biological Child        Allergies as of 08/30/2023   • (No Known Allergies)       Current Outpatient Medications   Medication Sig Dispense Refill   • atomoxetine (STRATTERA) 40 mg capsule      • b complex vitamins capsule Take 1 capsule by mouth daily.     • clotrimazole (LOTRIMIN) 1 % topical cream Apply topically 2 (two) times a day. 45 g 1   • dihydroergotamine (TRUDHESA) 0.725 mg/pump act. (4 mg/mL) spray,non-aerosol Administer 1 spray into each nostril as needed (migraine). The dose may be repeated, if needed, a minimum of 1 hour after the first dose. Do not use more than 2 doses within a 24-hour period or 3 doses within 7 days. 4 mL 5   • docosahexaenoic acid-epa 120-180 mg capsule Take 1,000 mg by mouth daily.      • DULoxetine (CYMBALTA) 30 mg capsule Take 40 mg by mouth 2 (two) times a day.     • EMGALITY  mg/mL pen injector subcutaneous pen INJECT 1 ML (120 MG TOTAL) UNDER THE SKIN EVERY 30 DAYS 1 mL 3   • ergocalciferol (VITAMIN D2) 50,000 unit(1250 mcg) capsule Take 50,000 Units by mouth once a week.     • frovatriptan (FROVA) 2.5 mg tablet Take 1 tablet (2.5 mg total) by mouth once as needed for migraine Indications: a migraine headache. 12 tablet 11   • herbal drugs (CALMME ORAL) Take by mouth daily.     • LORazepam (ATIVAN) 0.5 mg tablet Take 1 tablet (0.5 mg total) by mouth every 8 (eight) hours as needed for anxiety. 20 tablet 0   • metoclopramide (REGLAN) 10 mg tablet Take 1 tablet (10 mg total) by mouth 3 (three) times a day as needed (nausea). 20 tablet 2   • naproxen sodium (ALEVE ORAL) Take by mouth as needed.     • olopatadine (PATADAY) 0.2 % ophthalmic solution Administer 1 drop into affected eye(s) daily.     • ondansetron ODT (ZOFRAN-ODT) 4 mg disintegrating tablet Take 4 mg by mouth every 8 (eight) hours as needed for nausea or vomiting.     • progesterone (PROMETRIUM) 200 mg capsule TAKE 1 CAPSULE BY MOUTH EVERY DAY FOR 10 DAYS     • rimegepant (NURTEC ODT) 75 mg tablet,disintegrating Take 1 tablet (75 mg total) by mouth as needed (migraine headache). Dissolve 1 tablet under the tongue. One dose per day as needed. 16 tablet 11   • rizatriptan MLT (MAXALT-MLT) 10 mg disintegrating tablet 1 tab at onset of severe headache. May repeat in 2 hours if needed. Maximum 2 tab/day and 2 days/week. 12 tablet 3   • selenium sulfide (SELSUN BLUE) 1 % shampoo Apply topically daily for 7 days. 207 mL 0   • semaglutide (OZEMPIC) 2 mg/dose (8 mg/3 mL) subcutaneous injection Inject 2 mg under the skin every (seven) 7 days. 3 mL 2   • TURMERIC ORAL Take by mouth daily.     • ubrogepant (UBRELVY) 100 mg tablet tablet Take 1 tablet (100 mg total) by mouth as needed for migraine.  mg at onset of migraine. May  "repeat  mg once 2 hours later. Max 200 mg/day. 16 tablet 11   • traZODone (DESYREL) 50 mg tablet Take 25-50 mg by mouth nightly as needed.       No current facility-administered medications for this visit.         Review of Systems    ROS:  Feeling well. No dyspnea or chest pain on exertion.  No abdominal pain, change in bowel habits, black or bloody stools.  No urinary tract symptoms. No changes to hair, skin, or nails.   GYN ROS: normal menses, no abnormal bleeding, pelvic pain or discharge, no breast pain or new or enlarging lumps on self exam.   No neurological complaints.    Objective   Vitals:   Vitals:    08/30/23 1017   BP: 118/62   BP Location: Left upper arm   Patient Position: Sitting   Pulse: 84   Temp: 36.3 °C (97.4 °F)   TempSrc: Temporal   SpO2: 98%   Weight: 97.6 kg (215 lb 3.2 oz)   Height: 1.702 m (5' 7\")     Body mass index is 33.71 kg/m².  Patient's last menstrual period was 08/06/2023.    No results found.    Physical Exam  Skin:     Capillary Refill: Capillary refill takes less than 2 seconds.      Findings: Lesion (top of head, stuck on appearance with irregular border ) and rash (patches with central clearing across back and R ACF ) present.     The patient appears well, alert, oriented x 3, in no distress.  ENT normal.  Neck supple. No adenopathy or thyromegaly.  STEFANY. Sclera anicteric.  No ocular discharge, pink conjunctiva  Lungs are clear, good air entry, no wheezes, rhonchi or rales.   S1 and S2 normal, no murmurs, regular rate and rhythm.  Abdomen soft without tenderness, guarding, mass or organomegaly.   Extremities show no edema, normal peripheral pulses.   Neurological is normal, no focal findings. CN 2-12 intact. Moves all extremities spontaneously.    Psych: pleasant and cooperative      ASSESSMENT & PLAN    Problem List Items Addressed This Visit        Musculoskeletal    Chronic pain of right knee     Counseled on maintaining strength routine and slowly integrating 1 run a " week             Endocrine/Metabolic    Class 1 obesity with serious comorbidity and body mass index (BMI) of 31.0 to 31.9 in adult     Cont f/u with [facility]   Low threshold to consider topamax if migraines worse or no benefit of GLP as SE of paresthesia and word finding are transient and dose dependent     Obesity is defined as a “chronic, relapsing, multi-factorial, neurobehavioral disease, wherein an increase in body fat promotes adipose tissue dysfunction and abnormal fat mass physical forces, resulting in adverse metabolic, biomechanical, and psychosocial health consequences.” obesity medicine association 2022  Given chronic and progressive nature of obesity, this will need a comprehensive approach including lifestyle modification, medication management, and continued follow-up with PCP and ancillary specialists as determined by the patient and treating physician.     Excellent candidate for GLP-1 medication as this patient has tried multiple lifestyle interventions, for 6 or more months, with out sustained weight loss.  Additionally we have confirmed that there are no pregnancy plans, no history of pancreatitis, and no personal or family history of medullary thyroid tumors.      Educated on long-term nature of this medication, we do see weight regain with discontinuation of medication.  Successful weight loss with this medication begins at 5% body weight loss, but 1/3 of patients can achieve up to 20% body weight loss with this intervention.      These medications must be used in combination with proper nutrition, regular physical activity, and proper sleep and stress management. Patients are required to undergo nutrition counseling for at least 6 months during initialization and titration of this medication.     Discussed most common side effects of nausea and constipation.  Generally nausea can be helped by having small frequent meals.  Additionally, paul tea can be helpful in settling the stomach. If  constipation occurs, begin with magnesium supplementation: magnesium 250-500 mg nightly.  Fiber supplementation such as psyllium husk can also be used.             Relevant Orders    Lipid panel       Infectious/Inflammatory    Tinea versicolor    Relevant Medications    clotrimazole (LOTRIMIN) 1 % topical cream    selenium sulfide (SELSUN BLUE) 1 % shampoo       Dermatologic    Lipedema    Relevant Orders    Lipid panel    Precancerous skin lesion     Frozen in office today   Return 2 weeks for recheck          Relevant Medications    clotrimazole (LOTRIMIN) 1 % topical cream    selenium sulfide (SELSUN BLUE) 1 % shampoo    Other Relevant Orders    Destruction of lesion       Other    Mixed hyperlipidemia    Relevant Orders    Lipid panel    Family history of colon cancer     Annual fit til 45 yo then colonoscopy         Relevant Orders    FIT Test    Encounter for preventive care - Primary     Well adult female   PAP smear utd    Mammogram now   CRC screen at 45 yo colonoscopy   Check metabolic screening labs     ASSESSMENT:   well woman    PLAN:   counseled on breast self exam and family planning choices  additional lab tests per orders  return annually or prn  Counseled on importance of regular exercise including both cardio and resistance training and necessity of varied diet and regular exercise  UTD on age appropriate cancer screenings and vaccines    Counseled on necessity of varied diet, regular exercise, and self care including biannual dental visits   RTO 1 year or sooner PRN           Relevant Orders    Lipid panel    BI SCREENING MAMMOGRAM BILATERAL(TOMOSYNTHESIS)   Other Visit Diagnoses     Immunization due        Relevant Orders    Influenza vaccine quadrivalent preservative free 6 mon and older IM (FluLaval) (Completed)    Establishing care with new doctor, encounter for            Patient Instructions   You should ideally be fasting for 8+ hours prior to the labs so we can get a good look at your  sugar and cholesterol.  Please do not restrict liquid.  Black coffee or water only until labs are drawn.    Sweat rashid   NATHANIEL: One run a week (C25K?)  Try low impact strengthening like post pregnancy or barre/pilates         _____________________  Renee Alves, DO  08/30/23

## 2023-08-30 NOTE — ASSESSMENT & PLAN NOTE
Cont f/u with [facility]   Low threshold to consider topamax if migraines worse or no benefit of GLP as SE of paresthesia and word finding are transient and dose dependent     Obesity is defined as a “chronic, relapsing, multi-factorial, neurobehavioral disease, wherein an increase in body fat promotes adipose tissue dysfunction and abnormal fat mass physical forces, resulting in adverse metabolic, biomechanical, and psychosocial health consequences.” obesity medicine association 2022  Given chronic and progressive nature of obesity, this will need a comprehensive approach including lifestyle modification, medication management, and continued follow-up with PCP and ancillary specialists as determined by the patient and treating physician.     Excellent candidate for GLP-1 medication as this patient has tried multiple lifestyle interventions, for 6 or more months, with out sustained weight loss.  Additionally we have confirmed that there are no pregnancy plans, no history of pancreatitis, and no personal or family history of medullary thyroid tumors.      Educated on long-term nature of this medication, we do see weight regain with discontinuation of medication.  Successful weight loss with this medication begins at 5% body weight loss, but 1/3 of patients can achieve up to 20% body weight loss with this intervention.      These medications must be used in combination with proper nutrition, regular physical activity, and proper sleep and stress management. Patients are required to undergo nutrition counseling for at least 6 months during initialization and titration of this medication.     Discussed most common side effects of nausea and constipation.  Generally nausea can be helped by having small frequent meals.  Additionally, paul tea can be helpful in settling the stomach. If constipation occurs, begin with magnesium supplementation: magnesium 250-500 mg nightly.  Fiber supplementation such as psyllium husk can also be  used.

## 2023-08-30 NOTE — ASSESSMENT & PLAN NOTE
Well adult female   PAP smear utd    Mammogram now   CRC screen at 45 yo colonoscopy   Check metabolic screening labs     ASSESSMENT:   well woman    PLAN:   counseled on breast self exam and family planning choices  additional lab tests per orders  return annually or prn  Counseled on importance of regular exercise including both cardio and resistance training and necessity of varied diet and regular exercise  UTD on age appropriate cancer screenings and vaccines    Counseled on necessity of varied diet, regular exercise, and self care including biannual dental visits   RTO 1 year or sooner PRN

## 2023-08-30 NOTE — PATIENT INSTRUCTIONS
You should ideally be fasting for 8+ hours prior to the labs so we can get a good look at your sugar and cholesterol.  Please do not restrict liquid.  Black coffee or water only until labs are drawn.    Sweat rashid   NATHANIEL: One run a week (C25K?)  Try low impact strengthening like post pregnancy or barre/pilates

## 2023-08-30 NOTE — PROCEDURES
Destruction of lesion    Date/Time: 8/30/2023 1:23 PM    Performed by: Renee Urbano DO  Authorized by: Renee Urbano DO    Number of Lesions: 1  Lesion 1:     Body area: head/neck    Head/neck location: scalp    Initial size (mm): 3    Final defect size (mm): 1    Malignancy: malignancy unknown      Destruction method: cryotherapy

## 2023-09-12 ENCOUNTER — CLINICAL SUPPORT (OUTPATIENT)
Dept: BARIATRICS/WEIGHT MGMT | Facility: CLINIC | Age: 43
End: 2023-09-12
Payer: COMMERCIAL

## 2023-09-12 ENCOUNTER — OFFICE VISIT (OUTPATIENT)
Dept: NEUROLOGY | Facility: CLINIC | Age: 43
End: 2023-09-12
Payer: COMMERCIAL

## 2023-09-12 VITALS
BODY MASS INDEX: 34.06 KG/M2 | SYSTOLIC BLOOD PRESSURE: 100 MMHG | WEIGHT: 217 LBS | HEIGHT: 67 IN | RESPIRATION RATE: 16 BRPM | OXYGEN SATURATION: 97 % | HEART RATE: 80 BPM | DIASTOLIC BLOOD PRESSURE: 70 MMHG

## 2023-09-12 VITALS — WEIGHT: 217 LBS | BODY MASS INDEX: 33.99 KG/M2

## 2023-09-12 DIAGNOSIS — E66.811 CLASS 1 OBESITY WITH SERIOUS COMORBIDITY AND BODY MASS INDEX (BMI) OF 31.0 TO 31.9 IN ADULT, UNSPECIFIED OBESITY TYPE: Primary | ICD-10-CM

## 2023-09-12 DIAGNOSIS — Z71.3 NUTRITIONAL COUNSELING: ICD-10-CM

## 2023-09-12 DIAGNOSIS — R60.9 LIPEDEMA: ICD-10-CM

## 2023-09-12 DIAGNOSIS — E78.2 MIXED HYPERLIPIDEMIA: ICD-10-CM

## 2023-09-12 DIAGNOSIS — R52 ACUTE PAIN: Primary | ICD-10-CM

## 2023-09-12 PROCEDURE — 96372 THER/PROPH/DIAG INJ SC/IM: CPT | Performed by: PSYCHIATRY & NEUROLOGY

## 2023-09-12 PROCEDURE — 97803 MED NUTRITION INDIV SUBSEQ: CPT | Performed by: DIETITIAN, REGISTERED

## 2023-09-12 PROCEDURE — 99999 PR OFFICE/OUTPT VISIT,PROCEDURE ONLY: CPT | Performed by: PSYCHIATRY & NEUROLOGY

## 2023-09-12 RX ADMIN — KETOROLAC TROMETHAMINE 30 MG: 30 INJECTION, SOLUTION INTRAMUSCULAR; INTRAVENOUS at 16:41

## 2023-09-12 RX ADMIN — KETOROLAC TROMETHAMINE 30 MG: 30 INJECTION, SOLUTION INTRAMUSCULAR; INTRAVENOUS at 16:49

## 2023-09-12 NOTE — PROGRESS NOTES
NDC 19869-124-15  Exp 02/01/2025  Lot: [id number]  Documented in the MAR.  I permorformed a time -out ,including 2 unique patient identifiers with patient Andressa Chin ,using a 22 gauge 11/2 inch needle,i injected 2 ml (60mg) intramuscularly.    Site of injection : Left Gluteus muscle.    Andressa Baker tolerated the procedure well ,without complications.

## 2023-09-12 NOTE — PROGRESS NOTES
Seen today for acute treatment with toradol 60 mg IM.    No formal visit today.    Visit scheduled for next week.    Theresa Lewis MD  [facility]  [phone]

## 2023-09-12 NOTE — PROGRESS NOTES
"DETAILS OF VISIT   Consulting Service:  [facility] and Wellness Program - Dietitian  Date: 09/12/23   Insurance: [facility]     Referring Physician: Renee Urbano*    PCP: Renee Urbano DO    Reason for Consultation:   Chief Complaint   Patient presents with    Nutrition Counseling     I met with Andressa VuOlivia today for Nutrition Counseling [Z71.3] and dietary education related to the following:    E66.09 - Obese due to excess calories and Z68.31 - BMI 31.0-31.9, adult   VISIT DESCRIPTION       Andressa Baker is a 43 y.o. female here for nutrition follow-up.    Current Weight:   Wt Readings from Last 1 Encounters:   09/12/23 98.4 kg (217 lb)     Height:   Ht Readings from Last 1 Encounters:   08/30/23 1.702 m (5' 7\")        BMI: Body mass index is 33.99 kg/m².    Weight Trends:  Wt Readings from Last 3 Encounters:   09/12/23 98.4 kg (217 lb)   08/30/23 97.6 kg (215 lb 3.2 oz)   08/24/23 98 kg (216 lb)     BMI Readings from Last 3 Encounters:   09/12/23 33.99 kg/m²   08/30/23 33.71 kg/m²   08/24/23 33.83 kg/m²     Patient seen by this RD for evaluation in the Comprehensive Weight & Wellness Program. During today's visit, we discussed the following:    Nutrition, Physical Activity, & Emotional Wellness:   Recommended Nutrition Plan  LGI/lipedema diet + ozempic   1500 kcal per day   131 gm protein, 58 gm protein, 113 gm carbs  5/5/5    Meal Structure  5 or less eating events recommended   Eating Interval  Less than 12 hours recommended   Intake Logging  Healthy Lifestyles paper food journal or electronic ([employer]it[employer], MFP, LoseIt, Carb Manager, etc) - pt was previously logging food in [employer] pal   Successes  -hourly movement  -discussed no naked carbs  -light IF   -portions    Challenges -migraines 4-5 days a week; see's neurologist ; just had a migraine earlier today   -increased cravings    Education Provided Navigating obstacles listed above and outlined in goals below "   Dietary recall  B- Greek yogurt + fruit   Oatmeal   Smoothie with orgain/ garden of life - will add rashel seeds/ avocado with this for more balance     Fruit     L- leftovers (grilled chicken, hamburgers)   647 bread with peanut butter and jelly    Snack: veggies, mary bar/ thin bar   Smoothie - if she didn't have it for breakfast   Fair life shake     D- lean meat, sweet/regular potatoes/brown rice, veggies   Always increases non-starchy veggies at meals to increase volume  Much hungrier on days she exercises at the [facility]   Encouraged higher protein snack after exercising on those days.  Snacks- veggies   Beverages- water      Physical activity:  Walking twice a week in the morning   [facility]    Emotional wellness:  5 children   Works from home  Kids are back in school, more on schedule     Sleep:  Did not discuss     Hydration:   Doing very well with hydration     The following hand-outs and/or educational materials were given: [facility] welcome packet, FAQ nutrition label reading handout    A patient-centered approach using motivational interviewing was used to develop the plan and recommendations outlined below.     Time Spent with Patient for face to face office visit: 30 minutes      MEDICATIONS, LABS, AND ALLERGIES     Current Outpatient Medications on File Prior to Visit   Medication Sig Dispense Refill    atomoxetine (STRATTERA) 40 mg capsule       b complex vitamins capsule Take 1 capsule by mouth daily.      clotrimazole (LOTRIMIN) 1 % topical cream Apply topically 2 (two) times a day. 45 g 1    dihydroergotamine (TRUDHESA) 0.725 mg/pump act. (4 mg/mL) spray,non-aerosol Administer 1 spray into each nostril as needed (migraine). The dose may be repeated, if needed, a minimum of 1 hour after the first dose. Do not use more than 2 doses within a 24-hour period or 3 doses within 7 days. 4 mL 5    docosahexaenoic acid-epa 120-180 mg capsule Take 1,000 mg by mouth daily.      DULoxetine (CYMBALTA) 30 mg capsule  Take 40 mg by mouth 2 (two) times a day.      EMGALITY  mg/mL pen injector subcutaneous pen INJECT 1 ML (120 MG TOTAL) UNDER THE SKIN EVERY 30 DAYS 1 mL 3    ergocalciferol (VITAMIN D2) 50,000 unit(1250 mcg) capsule Take 50,000 Units by mouth once a week.      frovatriptan (FROVA) 2.5 mg tablet Take 1 tablet (2.5 mg total) by mouth once as needed for migraine Indications: a migraine headache. 12 tablet 11    herbal drugs (CALMME ORAL) Take by mouth daily.      LORazepam (ATIVAN) 0.5 mg tablet Take 1 tablet (0.5 mg total) by mouth every 8 (eight) hours as needed for anxiety. 20 tablet 0    metoclopramide (REGLAN) 10 mg tablet Take 1 tablet (10 mg total) by mouth 3 (three) times a day as needed (nausea). 20 tablet 2    naproxen sodium (ALEVE ORAL) Take by mouth as needed.      olopatadine (PATADAY) 0.2 % ophthalmic solution Administer 1 drop into affected eye(s) daily.      ondansetron ODT (ZOFRAN-ODT) 4 mg disintegrating tablet Take 4 mg by mouth every 8 (eight) hours as needed for nausea or vomiting.      progesterone (PROMETRIUM) 200 mg capsule TAKE 1 CAPSULE BY MOUTH EVERY DAY FOR 10 DAYS      rimegepant (NURTEC ODT) 75 mg tablet,disintegrating Take 1 tablet (75 mg total) by mouth as needed (migraine headache). Dissolve 1 tablet under the tongue. One dose per day as needed. 16 tablet 11    rizatriptan MLT (MAXALT-MLT) 10 mg disintegrating tablet 1 tab at onset of severe headache. May repeat in 2 hours if needed. Maximum 2 tab/day and 2 days/week. 12 tablet 3    selenium sulfide (SELSUN BLUE) 1 % shampoo Apply topically daily for 7 days. 207 mL 0    semaglutide (OZEMPIC) 2 mg/dose (8 mg/3 mL) subcutaneous injection Inject 2 mg under the skin every (seven) 7 days. 3 mL 2    traZODone (DESYREL) 50 mg tablet Take 25-50 mg by mouth nightly as needed.      TURMERIC ORAL Take by mouth daily.      ubrogepant (UBRELVY) 100 mg tablet tablet Take 1 tablet (100 mg total) by mouth as needed for  migraine.  mg at onset of migraine. May repeat  mg once 2 hours later. Max 200 mg/day. 16 tablet 11     No current facility-administered medications on file prior to visit.         Patient has no known allergies.     PATIENT-LED GOALS     Class 1 obesity with serious comorbidity and body mass index (BMI) of 31.0 to 31.9 in adult  Start logging food in [employer] pal      Medication Check-in: Ozempic 0.25 mg weekly - some nausea 1-2 days after injection     Jeanna Martin, MS, RD, LDN  Registered Dietitian for [facility]  9/12/2023     Thank you very much for allowing us to participate in the care of your patient. Please do not hesitate to call or email if there are any questions.

## 2023-09-12 NOTE — PATIENT INSTRUCTIONS
Problem List Items Addressed This Visit          Endocrine/Metabolic    Class 1 obesity with serious comorbidity and body mass index (BMI) of 31.0 to 31.9 in adult - Primary     Start logging food in [employer] pal            Dermatologic    Lipedema       Other    Nutritional counseling    Mixed hyperlipidemia

## 2023-09-19 ENCOUNTER — OFFICE VISIT (OUTPATIENT)
Dept: NEUROLOGY | Facility: CLINIC | Age: 43
End: 2023-09-19
Payer: COMMERCIAL

## 2023-09-19 ENCOUNTER — TELEPHONE (OUTPATIENT)
Dept: BARIATRICS/WEIGHT MGMT | Facility: CLINIC | Age: 43
End: 2023-09-19

## 2023-09-19 VITALS
RESPIRATION RATE: 16 BRPM | BODY MASS INDEX: 34.06 KG/M2 | SYSTOLIC BLOOD PRESSURE: 112 MMHG | DIASTOLIC BLOOD PRESSURE: 70 MMHG | WEIGHT: 217 LBS | HEART RATE: 97 BPM | HEIGHT: 67 IN | OXYGEN SATURATION: 99 %

## 2023-09-19 DIAGNOSIS — G89.29 OTHER CHRONIC PAIN: Primary | ICD-10-CM

## 2023-09-19 DIAGNOSIS — G43.711 INTRACTABLE CHRONIC MIGRAINE WITHOUT AURA AND WITH STATUS MIGRAINOSUS: ICD-10-CM

## 2023-09-19 DIAGNOSIS — M54.2 CERVICALGIA: ICD-10-CM

## 2023-09-19 PROCEDURE — 64405 NJX AA&/STRD GR OCPL NRV: CPT | Mod: 50 | Performed by: PSYCHIATRY & NEUROLOGY

## 2023-09-19 PROCEDURE — 99215 OFFICE O/P EST HI 40 MIN: CPT | Mod: 25 | Performed by: PSYCHIATRY & NEUROLOGY

## 2023-09-19 PROCEDURE — 64450 NJX AA&/STRD OTHER PN/BRANCH: CPT | Mod: 50 | Performed by: PSYCHIATRY & NEUROLOGY

## 2023-09-19 PROCEDURE — 3008F BODY MASS INDEX DOCD: CPT | Performed by: PSYCHIATRY & NEUROLOGY

## 2023-09-19 PROCEDURE — 64400 NJX AA&/STRD TRIGEMINAL NRV: CPT | Mod: XU,LT | Performed by: PSYCHIATRY & NEUROLOGY

## 2023-09-19 PROCEDURE — 200200 PR NO CHARGE: Performed by: PSYCHIATRY & NEUROLOGY

## 2023-09-19 RX ORDER — DULOXETIN HYDROCHLORIDE 20 MG/1
40 CAPSULE, DELAYED RELEASE ORAL 2 TIMES DAILY
COMMUNITY
Start: 2023-09-02 | End: 2025-03-26 | Stop reason: ALTCHOICE

## 2023-09-19 RX ORDER — LIDOCAINE HYDROCHLORIDE 20 MG/ML
9 INJECTION, SOLUTION INFILTRATION; PERINEURAL ONCE
Status: COMPLETED | OUTPATIENT
Start: 2023-09-19 | End: 2023-09-19

## 2023-09-19 RX ADMIN — LIDOCAINE HYDROCHLORIDE 9 ML: 20 INJECTION, SOLUTION INFILTRATION; PERINEURAL at 13:22

## 2023-09-19 NOTE — PATIENT INSTRUCTIONS
Diagnosis:  Chronic migraine without aura   Cervicalgia  Status migrainosus   Headache secondary to COVID vaccine booster - resolved     Evaluation:  Recommend considering a consultation with Dr. Pichardo at [facility].  No additional tests are needed at this time. If there are new symptoms or changes in the characteristics of the headaches, I will re-evaluate Andressa and consider if diagnostic tests are needed.     Treatment plan:     Recommend considering gluten free diet for at least 3 months.    Recommend discussing with Dr. Vallejo a different biologic for weight loss.     1. Healthy Habits: The following recommendations can greatly reduce the number and severity of headaches.  Maintain regular sleep hours and get sufficient sleep (8-9 hours)  Do not skip meals, especially breakfast  Drink at least 64 oz or 8 cups of water daily - enough to urinate 5-6 times a day  Get at least 30 minutes of daily aerobic exercise (enough to increase your heart rate and sweat)    Keep track of headaches and use of acute medication (prescription or over-the-counter) in a calendar or notebook and bring that to your clinic visits.    2. Acute Treatment:     Trial of Zavspret Nasal spray for acute treatment in place of Nurtec. Do not take both within the same 24 hours.  Directions: 1 spray (10 mg) in one nostril x 1. Max: 10 mg/24 hours.    Potential common side effects: taste disorder (18%), nausea (4%), vomiting (2%), nasal discomfort (3%)  To activate your copay offer: [employer]/savings     May use Cefaly as needed for acute headache treatment for up to 1 hour (acute mode).     If the Zavzpret nasal spray doesn't work better than Nurtec, then continue with Nurtec ODT 75 mg as needed at onset of moderate to severe headache. Maximum 1 tablet in 24 hours.   Do not take both within the same 24 hours.    For rescue treatment: Frovatriptan 2.5 mg for moderate to severe headache. May repeat 1 tablet 2 hours later.  Maximum 2 tablets in 24 hr. Limit to 2 days/week. Do not take within 24 hours of Frova.    Third line acute or rescue treatment: TRUDHESA 1.45 mg (administered as one metered spray of 0.725 mg into each nostril). The dose may be repeated, if needed, a minimum of 1 hour after the first dose. Do not use more than 2 doses within a 24-hour period or 3 doses within 7 days. Do not use within 24 hours of triptan.  Directions: TRUDHESA is for nasal administration only. Assemble and prime (i.e., pumped 4 times) before use. Use TRUDHESA immediately after priming and then discard. https://www.trudhesReady Solarc[employer].com/how-to-use/    Take Reglan 10 mg as needed for migraine related nausea or 15-30 min before the nasal spray to prevent nausea.    Future consideration: other triptans (naratriptan, almotriptan or sumatriptan).    Lasmiditan or Sprix nasal spray can be tried for rescue treatment if needed in the future.     We may add an antinausea medication if needed for nausea or as co-adjuvant if monotherapy is not sufficient.      3. Preventive Treatment:     Recommend using Cefaly 20 minutes every evening for prevention (prevention mode).    Lidocaine cranial nerve blocks done today. Plan to continue with nerve blocks every 6-8 weeks.     Continue with Emgality monthly injections.     Continue with Botox 195 units every 12 weeks.      Continue with magnesium glycinate 600 mg daily. This may help with constipation too.    Continue with Cymbalta 40 mg BID (for depression from psychiatrist) as Cymbalta may help as migraine preventive.    Future considerations: add riboflavin. Switch Emgality to Ajovy, Aimovig or Nurtec QOD. Discussed medical marijuana.    Other nutraceutical options include: riboflavin, melatonin, feverfew, and coenzyme Q10.     Other options for oral preventive medications include: amitriptyline or nortriptyline, zonisamide, rimegepant, valproate, verapamil, gabapentin, pregabalin, candesartan, Namenda, escitalopram, and  levetiracetam. We want to avoid weight gain.    Several non-invasive neuromodulation devices (gammaCore, Cefaly and Nerivio) are available to treat headaches preventively and/or acutely. These are typically not covered by insurance, but the companies have a trial period and will refund you if the device is ineffective and returned within that period.     Follow-up in the [facility] as scheduled for botox in October.

## 2023-09-19 NOTE — PROCEDURES
Procedures  Nerve Blocks and Trigger Points Procedure Note:  Indication:    Diagnosis Plan   1. Other chronic pain  lidocaine (XYLOCAINE) 20 mg/mL (2 %) injection 9 mL           I explained the risks and benefits and obtained written consent from Andressa Baker. Signed consent form will be scanned to patient's electronic medical records.     Lidocaine 2% without epinephrine was drawn in a sterile syringe.     Lot number and expiration date documented in informed consent.     I performed a time-out, including 2 unique patient identifiers.     I located the areas of maximal tenderness corresponding to each nerve or trigger point, and cleaned with alcohol swabs.     I used a 30 gauge 1/2 inch needle to inject lidocaine over the following:        Right Greater Occipital Nerve 2 cc   Left Greater Occipital Nerve 2 cc   Right Lesser Occipital Nerve 1 cc   Left Lesser Occipital Nerve 1 cc     Right Auriculotemporal Nerve 1 cc    Left Auriculotemporal Nerve 1 cc    Right Supraorbital Nerve 0.25 cc   Left Supraorbital Nerve 0.25 cc   Right Supratrochlear Nerve 0.25 cc   Left Supratrochlear Nerve 0.25 cc     Total of 9 cc injected.      The procedure was well tolerated and there were no complications.         Theresa Lewis MD  [facility]  [phone]

## 2023-09-19 NOTE — LETTER
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 Rfl:     TURMERIC ORAL, Take by mouth daily., Disp: , Rfl:     ubrogepant (UBRELVY) 100 mg tablet tablet, Take 1 tablet (100 mg total) by mouth as needed for migraine.  mg at onset of migraine. May repeat  mg once 2 hours later. Max 200 mg/day., Disp: 16 tablet, Rfl: 11    DULoxetine (CYMBALTA) 30 mg capsule, Take 40 mg by mouth 2 (two) times a day., Disp: , Rfl:     selenium sulfide (SELSUN BLUE) 1 % shampoo, Apply topically daily for 7 days., Disp: 207 mL, Rfl: 0    ALLERGIES: has No Known Allergies.     REVIEW OF SYSTEMS: As discussed above. Otherwise, all other ROS were reviewed and negative.    PAST MEDICAL HISTORY:  has a past medical history of Abnormal ECG, Arrhythmia (2018), Back pain, GERD (gastroesophageal reflux disease), Heartburn, History of fetal demise, not currently pregnant, Joint pain, Migraine headache, Ovarian cyst, Palpitations, Pericarditis (2018), and Status migrainosus (2023).    PAST SURGICAL HISTORY:  has a past surgical history that includes Ablation of dysrhythmic focus (2018);  section; and Knee arthroscopy w/ meniscal repair  (Right).    FAMILY HISTORY: family history includes Clotting disorder in her maternal grandfather; Dementia in her biological father and paternal grandmother; Diabetes in her paternal grandfather; Hyperlipidemia in her biological father, biological mother, maternal grandmother, and paternal grandmother; Hypertension in her biological father; No Known Problems in her biological child; Other in her biological father; Prostate cancer (age of onset: 78) in her biological father.   Migraine in her mother (Rizatriptan worked for her), one brother, and probably in her father too.    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    She has 5 children, last one born in 2019.     Andressa is considering a future pregnancy, but is not trying to conceive at this time, and wants to wait until her headaches are well controlled. I have discussed with her the possible teratogenic effects of some medications and she verbalized understanding.    PHYSICAL EXAMINATION:    Vitals:    09/19/23 1134   BP: 112/70   Pulse: 97   Resp: 16   SpO2: 99%      General: Well developed, well nourished, in acute distress.  Alert and oriented. Fluent with normal language and speech. Face symmetric.      Data Reviewed:   Brain MRI WO (6/2014)  Comparison: MRI brain 01/14/2012  Ventricles and sulci are normal in size and configuration. No diffusion evidence of acute infarction. No extra axial collection, mass-effect, or edema. No intraparenchymal hemorrhage on the gradient echo sequence. The right cerebellar tonsil protrudes approximately 4 mm below the level of the foramen magnum, not meeting criteria for Chiari malformation. The sella appears unremarkable.  The major intracranial flow voids at the skull base are normal in appearance. There is a small mucous retention cyst in the right maxillary sinus. Bone marrow signal is within normal limits.  IMPRESSION: No evidence of acute infarction or intracranial mass.     Lab results reviewed.  Pertinent findings include: CMP, CBC, TSH, all within normal limits (7/2022.      Brain MRI WO (9/2022) unremarkable. (Scans independently reviewed by Dr. Lewis)  IMPRESSION: No acute intracranial abnormality. No acute infarct, mass effect or acute intracranial hemorrhage.  Comparison:  6/20/2014.  Findings:  Sulci, ventricles and basal cisterns are within normal limits for patient's stated age.  Minimal periventricular white matter change.  No mass-effect, intracranial hemorrhage, acute segmental infarct or extra-axial fluid collection is seen. Cerebellar tonsils are normal in position.  Expected signal voids are seen in the intracranial vessels at the skull base.  The orbits  and sella are unremarkable.   The paranasal sinuses and mastoid air cells are clear.    IMPRESSION/PLAN:  Andressa Baker is a very pleasant 43 y.o. woman seen initially in July 2022 for chronic severe headaches since her early 20's, worsening in the last few months. Neurological exam was normal. Brain MRI (2014 and 9/2022) were both unremarkable. There are no atypical features or symptoms suggestive of a serious underlying condition or secondary headache. In our opinion, she has chronic migraine exacerbated by hormonal changes and frequent use of acute medications (Excedrin and Rizatriptan). There may be some contribution from myofascial neck pain, but there is no evidence of significant cervical spine disease.    Today, I have repeated the lidocaine nerve blocks and we will continue with this every 6-8 weeks. I recommend starting Cefaly for prevention and acute treatment. I also gave her a sample of Zavzpret NS to see if this works better than Nurtec.  Otherwise, she may continue with Nurtec for first line acute treatment and Frova for second line rescue treatment and DHE NS for third line rescue treatment. She will continue with Emgality, Botox and magnesium for prevention. I also discussed with Andressa trying Gluten free diet and changing the biologic for weight loss. See detailed recommendations below.    Diagnosis:  Chronic migraine without aura   Cervicalgia  Status migrainosus   Headache secondary to COVID vaccine booster - resolved     Evaluation:  Recommend considering a consultation with Dr. Pichardo at [facility].  No additional tests are needed at this time. If there are new symptoms or changes in the characteristics of the headaches, I will re-evaluate Andressa and consider if diagnostic tests are needed.     Treatment plan:     Recommend considering gluten free diet for at least 3 months.    Recommend discussing with Dr. Vallejo a different biologic for weight loss.     1. Healthy Habits: The following recommendations can greatly reduce the number and severity of headaches.  · Maintain regular sleep hours and get sufficient sleep (8-9 hours)  · Do not skip meals, especially breakfast  · Drink at least 64 oz or 8 cups of water daily - enough to urinate 5-6 times a day  · Get at least 30 minutes of daily aerobic exercise (enough to increase your heart rate and sweat)    Keep track of headaches and use of acute medication (prescription or over-the-counter) in a calendar or notebook and bring that to your clinic visits.    2. Acute Treatment:     Trial of Zavspret Nasal spray for acute treatment in place of Nurtec. Do not take both within the same 24 hours.  Directions: 1 spray (10 mg) in one nostril x 1. Max: 10 mg/24 hours.    Potential common side effects: taste disorder (18%), nausea (4%), vomiting (2%), nasal discomfort (3%)  To activate your copay offer: [email or web address]   Sample given today.    May use Cefaly as needed for acute  headache treatment for up to 1 hour (acute mode).     If the Zavzpret nasal spray doesn't work better than Nurtec, then continue with Nurtec ODT 75 mg as needed at onset of moderate to severe headache. Maximum 1 tablet in 24 hours.   Do not take both within the same 24 hours.    For rescue treatment: Frovatriptan 2.5 mg for moderate to severe headache. May repeat 1 tablet 2 hours later. Maximum 2 tablets in 24 hr. Limit to 2 days/week. Do not take within 24 hours of Frova.    Third line acute or rescue treatment: TRUDHESA 1.45 mg (administered as one metered spray of 0.725 mg into each nostril). The dose may be repeated, if needed, a minimum of 1 hour after the first dose. Do not use more than 2 doses within a 24-hour period or 3 doses within 7 days. Do not use within 24 hours of triptan.  Directions: TRUDHESA is for nasal administration only. Assemble and prime (i.e., pumped 4 times) before use. Use TRUDHESA immediately after priming and then discard. https://www.trudhes4[employer].com/how-to-use/    Take Reglan 10 mg as needed for migraine related nausea or 15-30 min before the nasal spray to prevent nausea.    Future consideration: other triptans (naratriptan, almotriptan or sumatriptan).    Lasmiditan or Sprix nasal spray can be tried for rescue treatment if needed in the future.     We may add an antinausea medication if needed for nausea or as co-adjuvant if monotherapy is not sufficient.      3. Preventive Treatment:     Recommend using Cefaly 20 minutes every evening for prevention (prevention mode).    Lidocaine cranial nerve blocks done today. Plan to continue with nerve blocks every 6-8 weeks.     Continue with Emgality monthly injections.     Continue with Botox 195 units every 12 weeks.      Continue with magnesium glycinate 600 mg daily. This may help with constipation too.    Continue with Cymbalta 40 mg BID (for depression from psychiatrist) as Cymbalta may help as migraine preventive.    Future  considerations: add riboflavin. Switch Emgality to Ajovy, Aimovig or Nurtec QOD. Discussed medical marijuana.    Other nutraceutical options include: riboflavin, melatonin, feverfew, and coenzyme Q10.     Other options for oral preventive medications include: amitriptyline or nortriptyline, zonisamide, rimegepant, valproate, verapamil, gabapentin, pregabalin, candesartan, Namenda, escitalopram, and levetiracetam. We want to avoid weight gain.    Several non-invasive neuromodulation devices (gammaCore, Cefaly and Nerivio) are available to treat headaches preventively and/or acutely. These are typically not covered by insurance, but the companies have a trial period and will refund you if the device is ineffective and returned within that period.     Follow-up in the [facility] as scheduled for botox in October.      We appreciate the opportunity to participate in the care of Andressa.     Please, do not hesitate to call our office at [phone] if you have any questions or concerns.       Theresa Lewis MD  [facility]  [phone]    I spent 45 minutes on this date of service performing the following activities: obtaining history, performing examination, entering orders, documenting, preparing for visit and providing counseling and education.    This was in addition to the time dedicated to the procedure.       Theresa Dalton MD  9/19/2023  1:54 PM  Signed  Procedures  Nerve Blocks and Trigger Points Procedure Note:  Indication:    Diagnosis Plan   1. Other chronic pain  lidocaine (XYLOCAINE) 20 mg/mL (2 %) injection 9 mL           I explained the risks and benefits and obtained written consent from Andressa Baker. Signed consent form will be scanned to patient's electronic medical records.     Lidocaine 2% without epinephrine was drawn in a sterile syringe.     Lot number and expiration date documented in informed consent.     I performed a time-out, including 2 unique patient  identifiers.     I located the areas of maximal tenderness corresponding to each nerve or trigger point, and cleaned with alcohol swabs.     I used a 30 gauge 1/2 inch needle to inject lidocaine over the following:        Right Greater Occipital Nerve 2 cc   Left Greater Occipital Nerve 2 cc   Right Lesser Occipital Nerve 1 cc   Left Lesser Occipital Nerve 1 cc     Right Auriculotemporal Nerve 1 cc    Left Auriculotemporal Nerve 1 cc    Right Supraorbital Nerve 0.25 cc   Left Supraorbital Nerve 0.25 cc   Right Supratrochlear Nerve 0.25 cc   Left Supratrochlear Nerve 0.25 cc     Total of 9 cc injected.      The procedure was well tolerated and there were no complications.         Theresa Lewis MD  [facility]  [phone]

## 2023-09-19 NOTE — PROGRESS NOTES
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 Disp: 207 mL, Rfl: 0    ALLERGIES: has No Known Allergies.     REVIEW OF SYSTEMS: As discussed above. Otherwise, all other ROS were reviewed and negative.    PAST MEDICAL HISTORY:  has a past medical history of Abnormal ECG, Arrhythmia (2018), Back pain, GERD (gastroesophageal reflux disease), Heartburn, History of fetal demise, not currently pregnant, Joint pain, Migraine headache, Ovarian cyst, Palpitations, Pericarditis (2018), and Status migrainosus (2023).    PAST SURGICAL HISTORY:  has a past surgical history that includes Ablation of dysrhythmic focus (2018);  section; and Knee arthroscopy w/ meniscal repair (Right).    FAMILY HISTORY: family history includes Clotting disorder in her maternal grandfather; Dementia in her biological father and paternal grandmother; Diabetes in her paternal grandfather; Hyperlipidemia in her biological father, biological mother, maternal grandmother, and paternal grandmother; Hypertension in her biological father; No Known Problems in her biological child; Other in her biological father; Prostate cancer (age of onset: 78) in her biological father.   Migraine in her mother (Rizatriptan worked for her), one brother, and probably in her father  too.    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    She has 5 children, last one born in 2019.     Andressa is considering a future pregnancy, but is not trying to conceive at this time, and wants to wait until her headaches are well controlled. I have discussed with her the possible teratogenic effects of some medications and she verbalized understanding.    PHYSICAL EXAMINATION:    Vitals:    09/19/23 1134   BP: 112/70   Pulse: 97   Resp: 16   SpO2: 99%      General: Well developed, well nourished, in acute distress.  Alert and oriented. Fluent with normal language and speech. Face symmetric.      Data Reviewed:   Brain MRI WO (6/2014)  Comparison: MRI brain 01/14/2012  Ventricles and sulci are normal in size and configuration. No diffusion evidence of acute infarction. No extra axial collection, mass-effect, or edema. No intraparenchymal hemorrhage on the gradient echo sequence. The right cerebellar tonsil protrudes approximately 4 mm below the level of the foramen magnum, not meeting criteria for Chiari malformation. The sella appears unremarkable. The major intracranial flow voids at the skull base are normal in appearance. There is a small mucous retention cyst in the right maxillary sinus. Bone marrow signal is within normal limits.  IMPRESSION: No evidence of acute infarction or intracranial mass.     Lab results reviewed.  Pertinent findings include: CMP, CBC, TSH, all within normal limits (7/2022.      Brain MRI WO (9/2022) unremarkable. (Scans independently reviewed by Dr. Lewis)  IMPRESSION: No acute intracranial abnormality. No acute infarct, mass effect or acute intracranial hemorrhage.  Comparison:   6/20/2014.  Findings:  Sulci, ventricles and basal cisterns are within normal limits for patient's stated age.  Minimal periventricular white matter change.  No mass-effect, intracranial hemorrhage, acute segmental infarct or extra-axial fluid collection is seen. Cerebellar tonsils are normal in position.  Expected signal voids are seen in the intracranial vessels at the skull base.  The orbits  and sella are unremarkable.   The paranasal sinuses and mastoid air cells are clear.    IMPRESSION/PLAN:  Andressa Baker is a very pleasant 43 y.o. woman seen initially in July 2022 for chronic severe headaches since her early 20's, worsening in the last few months. Neurological exam was normal. Brain MRI (2014 and 9/2022) were both unremarkable. There are no atypical features or symptoms suggestive of a serious underlying condition or secondary headache. In our opinion, she has chronic migraine exacerbated by hormonal changes and frequent use of acute medications (Excedrin and Rizatriptan). There may be some contribution from myofascial neck pain, but there is no evidence of significant cervical spine disease.    Today, I have repeated the lidocaine nerve blocks and we will continue with this every 6-8 weeks. I recommend starting Cefaly for prevention and acute treatment. I also gave her a sample of Zavzpret NS to see if this works better than Nurtec. Otherwise, she may continue with Nurtec for first line acute treatment and Frova for second line rescue treatment and DHE NS for third line rescue treatment. She will continue with Emgality, Botox and magnesium for prevention. I also discussed with Andressa trying Gluten free diet and changing the biologic for weight loss. See detailed recommendations below.    Diagnosis:  Chronic migraine without aura   Cervicalgia  Status migrainosus   Headache secondary to COVID vaccine booster - resolved     Evaluation:  Recommend considering a consultation with Dr. Pichardo at [facility].  No additional tests are needed at this time. If there are new symptoms or changes in the characteristics of the headaches, I will re-evaluate Andressa and consider if diagnostic tests are needed.     Treatment plan:     Recommend considering gluten free diet for at least 3 months.    Recommend discussing with Dr. Vallejo a different biologic for weight loss.     1. Healthy Habits: The following recommendations can greatly reduce the number and severity of headaches.  · Maintain regular sleep hours and get sufficient sleep (8-9 hours)  · Do not skip meals, especially breakfast  · Drink at least 64 oz or 8 cups of water daily - enough to urinate 5-6 times a day  · Get at least 30 minutes of daily aerobic exercise (enough to increase your heart rate and sweat)    Keep track of headaches and use of acute medication (prescription or over-the-counter) in a calendar or notebook and bring that to your clinic visits.    2. Acute Treatment:     Trial of Zavspret Nasal spray for acute treatment in place of Nurtec. Do not take both within the same 24 hours.  Directions: 1 spray (10 mg) in one nostril x 1. Max: 10 mg/24 hours.    Potential common side effects: taste disorder (18%), nausea (4%), vomiting (2%), nasal discomfort (3%)  To activate your copay offer: Plan A Drink/savings   Sample given today.    May use Cefaly as needed for acute headache treatment for up to 1 hour (acute mode).     If the Zavzpret nasal spray doesn't work better than Nurtec, then continue with Nurtec ODT 75 mg as needed at onset of moderate to severe headache. Maximum 1 tablet in 24 hours.   Do not take both within the same 24 hours.    For rescue treatment: Frovatriptan 2.5 mg for moderate to severe headache. May repeat 1 tablet 2 hours later. Maximum 2 tablets in 24 hr. Limit to 2 days/week. Do not take within 24 hours of Frova.    Third line acute or rescue treatment: TRUDHESA 1.45 mg (administered as one metered spray of 0.725  mg into each nostril). The dose may be repeated, if needed, a minimum of 1 hour after the first dose. Do not use more than 2 doses within a 24-hour period or 3 doses within 7 days. Do not use within 24 hours of triptan.  Directions: TRUDHESA is for nasal administration only. Assemble and prime (i.e., pumped 4 times) before use. Use TRUDHESA immediately after priming and then discard. [email or web address]://[email or web address].com/how-to-use/    Take Reglan 10 mg as needed for migraine related nausea or 15-30 min before the nasal spray to prevent nausea.    Future consideration: other triptans (naratriptan, almotriptan or sumatriptan).    Lasmiditan or Sprix nasal spray can be tried for rescue treatment if needed in the future.     We may add an antinausea medication if needed for nausea or as co-adjuvant if monotherapy is not sufficient.      3. Preventive Treatment:     Recommend using Cefaly 20 minutes every evening for prevention (prevention mode).    Lidocaine cranial nerve blocks done today. Plan to continue with nerve blocks every 6-8 weeks.     Continue with Emgality monthly injections.     Continue with Botox 195 units every 12 weeks.      Continue with magnesium glycinate 600 mg daily. This may help with constipation too.    Continue with Cymbalta 40 mg BID (for depression from psychiatrist) as Cymbalta may help as migraine preventive.    Future considerations: add riboflavin. Switch Emgality to Ajovy, Aimovig or Nurtec QOD. Discussed medical marijuana.    Other nutraceutical options include: riboflavin, melatonin, feverfew, and coenzyme Q10.     Other options for oral preventive medications include: amitriptyline or nortriptyline, zonisamide, rimegepant, valproate, verapamil, gabapentin, pregabalin, candesartan, Namenda, escitalopram, and levetiracetam. We want to avoid weight gain.    Several non-invasive neuromodulation devices (gammaCore, Cefaly and Nerivio) are available to treat headaches preventively and/or acutely.  These are typically not covered by insurance, but the companies have a trial period and will refund you if the device is ineffective and returned within that period.     Follow-up in the [facility] as scheduled for botox in October.      We appreciate the opportunity to participate in the care of Andressa.     Please, do not hesitate to call our office at [phone] if you have any questions or concerns.       Theresa Lewis MD  [facility]  [phone]    I spent 45 minutes on this date of service performing the following activities: obtaining history, performing examination, entering orders, documenting, preparing for visit and providing counseling and education.    This was in addition to the time dedicated to the procedure.

## 2023-09-27 ENCOUNTER — DOCUMENTATION (OUTPATIENT)
Dept: BARIATRICS/WEIGHT MGMT | Facility: CLINIC | Age: 43
End: 2023-09-27
Payer: COMMERCIAL

## 2023-09-28 NOTE — PROGRESS NOTES
Prior authorization approved   Case ID: [id number]      Payer:  [place]    [phone]    The case has been Approved from  [id number]   Approval Details    Authorized from September 19, 2023 to September 19, 2024

## 2023-10-03 ENCOUNTER — TELEPHONE (OUTPATIENT)
Dept: NEUROLOGY | Facility: CLINIC | Age: 43
End: 2023-10-03

## 2023-10-03 ENCOUNTER — OFFICE VISIT (OUTPATIENT)
Dept: NEUROLOGY | Facility: CLINIC | Age: 43
End: 2023-10-03
Attending: PSYCHIATRY & NEUROLOGY
Payer: COMMERCIAL

## 2023-10-03 DIAGNOSIS — G43.711 INTRACTABLE CHRONIC MIGRAINE WITHOUT AURA AND WITH STATUS MIGRAINOSUS: Primary | ICD-10-CM

## 2023-10-03 DIAGNOSIS — M54.2 CERVICALGIA: ICD-10-CM

## 2023-10-03 PROCEDURE — 99213 OFFICE O/P EST LOW 20 MIN: CPT | Mod: 25 | Performed by: PSYCHIATRY & NEUROLOGY

## 2023-10-03 PROCEDURE — 64615 CHEMODENERV MUSC MIGRAINE: CPT | Performed by: PSYCHIATRY & NEUROLOGY

## 2023-10-03 NOTE — TELEPHONE ENCOUNTER
Fazal was able to get pts botox approved, left voicemail for pt letting her know she is able to still come to her 3:00 appointment. Asked to return call or send [employer] message to schedule botox. Pt has a 10/17 OV.

## 2023-10-03 NOTE — PROGRESS NOTES
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 Rfl:     ondansetron ODT (ZOFRAN-ODT) 4 mg disintegrating tablet, Take 4 mg by mouth every 8 (eight) hours as needed for nausea or vomiting., Disp: , Rfl:     progesterone (PROMETRIUM) 200 mg capsule, TAKE 1 CAPSULE BY MOUTH EVERY DAY FOR 10 DAYS, Disp: , Rfl:     rimegepant (NURTEC ODT) 75 mg tablet,disintegrating, Take 1 tablet (75 mg total) by mouth as needed (migraine headache). Dissolve 1 tablet under the tongue. One dose per day as needed., Disp: 16 tablet, Rfl: 11    rizatriptan MLT (MAXALT-MLT) 10 mg disintegrating tablet, 1 tab at onset of severe headache. May repeat in 2 hours if needed. Maximum 2 tab/day and 2 days/week., Disp: 12 tablet, Rfl: 3    selenium sulfide (SELSUN BLUE) 1 % shampoo, Apply topically daily for 7 days., Disp: 207 mL, Rfl: 0    semaglutide (OZEMPIC) 2 mg/dose (8 mg/3 mL) subcutaneous injection, Inject 2 mg under the skin every (seven) 7 days., Disp: 3 mL, Rfl: 2    traZODone (DESYREL) 50 mg tablet, Take 25-50 mg by mouth nightly as needed., Disp: , Rfl:     TURMERIC ORAL, Take by mouth daily., Disp: , Rfl:     ubrogepant (UBRELVY) 100 mg tablet tablet, Take 1 tablet (100 mg total) by mouth as needed for migraine.  mg at onset of migraine. May repeat  mg once 2 hours later. Max 200  mg/day., Disp: 16 tablet, Rfl: 11    ALLERGIES: has No Known Allergies.     REVIEW OF SYSTEMS: As discussed above. Otherwise, all other ROS were reviewed and negative.    PAST MEDICAL HISTORY:  has a past medical history of Abnormal ECG, Arrhythmia (2018), Back pain, GERD (gastroesophageal reflux disease), Heartburn, History of fetal demise, not currently pregnant, Joint pain, Migraine headache, Ovarian cyst, Palpitations, Pericarditis (2018), and Status migrainosus (2023).    PAST SURGICAL HISTORY:  has a past surgical history that includes Ablation of dysrhythmic focus (2018);  section; and Knee arthroscopy w/ meniscal repair (Right).    FAMILY HISTORY: family history includes Clotting disorder in her maternal grandfather; Dementia in her biological father and paternal grandmother; Diabetes in her paternal grandfather; Hyperlipidemia in her biological father, biological mother, maternal grandmother, and paternal grandmother; Hypertension in her biological father; No Known Problems in her biological child; Other in her biological father; Prostate cancer (age of onset: 78) in her biological father.   Migraine in her mother (Rizatriptan worked for her), one brother, and probably in her father too.    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    She has 5 children, last one born in 2019.     Andressa is considering a future pregnancy, but is not trying to conceive at this time, and wants to wait until her headaches are well controlled. I have discussed with her the possible teratogenic effects of some medications and she verbalized understanding.    PHYSICAL EXAMINATION:    There were no vitals  filed for this visit.   General: Well developed, well nourished, in acute distress.  Alert and oriented. Fluent with normal language and speech. Face symmetric.      Data Reviewed:   Brain MRI WO (6/2014)  Comparison: MRI brain 01/14/2012  Ventricles and sulci are normal in size and configuration. No diffusion evidence of acute infarction. No extra axial collection, mass-effect, or edema. No intraparenchymal hemorrhage on the gradient echo sequence. The right cerebellar tonsil protrudes approximately 4 mm below the level of the foramen magnum, not meeting criteria for Chiari malformation. The sella appears unremarkable. The major intracranial flow voids at the skull base are normal in appearance. There is a small mucous retention cyst in the right maxillary sinus. Bone marrow signal is within normal limits.  IMPRESSION: No evidence of acute infarction or intracranial mass.     Lab results reviewed.  Pertinent findings include: CMP, CBC, TSH, all within normal limits (7/2022.      Brain MRI WO (9/2022) unremarkable. (Scans independently reviewed by Dr. Lewis)  IMPRESSION: No acute intracranial abnormality. No acute infarct, mass effect or acute intracranial hemorrhage.  Comparison:  6/20/2014.  Findings:  Sulci, ventricles and basal cisterns are within normal limits for patient's stated age.  Minimal periventricular white matter change.  No mass-effect, intracranial hemorrhage, acute segmental infarct or extra-axial fluid collection is seen. Cerebellar tonsils are normal in position.  Expected signal voids are seen in the intracranial vessels at the skull base.  The orbits  and sella are unremarkable.   The paranasal sinuses and mastoid air cells are clear.    IMPRESSION/PLAN:  Andressa Baker is a very pleasant 43 y.o. woman seen initially in July 2022 for chronic severe headaches since her early 20's, worsening in the last few months. Neurological exam was normal. Brain MRI (2014 and 9/2022) were both  unremarkable. There are no atypical features or symptoms suggestive of a serious underlying condition or secondary headache. In our opinion, she has chronic migraine exacerbated by hormonal changes and frequent use of acute medications (Excedrin and Rizatriptan). There may be some contribution from myofascial neck pain, but there is no evidence of significant cervical spine disease.    Today, I have repeated the botox treatment and recommend continuing with lidocaine nerve blocks every 6-8 weeks. I recommend using Cefaly consistently for prevention and acute treatment. She may continue with Nurtec for first line acute treatment, Frova for second line rescue treatment, and DHE NS for third line rescue treatment. She will continue with Emgality, Botox, lidocaine blocks, and magnesium for prevention. I recommend continuing with gluten free diet. See detailed recommendations below.    Diagnosis:  Chronic migraine without aura   Cervicalgia  Headache secondary to COVID vaccine booster - resolved     Evaluation:  Previously recommend considering a consultation with Dr. Pichardo at [facility].  No additional tests are needed at this time. If there are new symptoms or changes in the characteristics of the headaches, I will re-evaluate Andressa and consider if diagnostic tests are needed.     Treatment plan:     1. Healthy Habits: The following recommendations can greatly reduce the number and severity of headaches.  · Maintain regular sleep hours and get sufficient sleep (8-9 hours)  · Do not skip meals, especially breakfast  · Drink at least 64 oz or 8 cups of water daily - enough to urinate 5-6 times a day  · Get at least 30 minutes of daily aerobic exercise (enough to increase your heart rate and sweat)    Keep track of headaches and use of acute medication (prescription or over-the-counter) in a calendar or notebook and bring that to your clinic visits.    2. Acute Treatment:     Continue with Nurtec ODT 75  mg as needed at onset of moderate to severe headache. Maximum 1 tablet in 24 hours.     May use Cefaly as needed for acute headache treatment for up to 1 hour (acute mode).     For rescue treatment: Frovatriptan 2.5 mg for moderate to severe headache. May repeat 1 tablet 2 hours later. Maximum 2 tablets in 24 hr. Limit to 2 days/week. Do not take within 24 hours of Frova.    Third line acute or rescue treatment: TRUDHESA 1.45 mg (administered as one metered spray of 0.725 mg into each nostril). The dose may be repeated, if needed, a minimum of 1 hour after the first dose. Do not use more than 2 doses within a 24-hour period or 3 doses within 7 days. Do not use within 24 hours of triptan.  Directions: TRUDHESA is for nasal administration only. Assemble and prime (i.e., pumped 4 times) before use. Use TRUDHESA immediately after priming and then discard. https://www.[employer].com/how-to-use/    Take Reglan 10 mg as needed for migraine related nausea or 15-30 min before the nasal spray to prevent nausea.    Future consideration: other triptans (naratriptan, almotriptan or sumatriptan).    Lasmiditan or Sprix nasal spray can be tried for rescue treatment if needed in the future.     We may add an antinausea medication if needed for nausea or as co-adjuvant if monotherapy is not sufficient.      3. Preventive Treatment:     Recommend using Cefaly 20 minutes every evening for prevention (prevention mode).    Lidocaine cranial nerve blocks every 6-8 weeks.     Continue with Emgality monthly injections.     Continue with Botox 195 units every 12 weeks.      Continue with magnesium glycinate 600 mg daily. This may help with constipation too.    Continue with Cymbalta 40 mg BID (for depression from psychiatrist) as Cymbalta may help as migraine preventive.    Future considerations: add riboflavin. Switch Emgality to Ajovy, Aimovig or Nurtec QOD. Previously discussed medical marijuana.    Other nutraceutical options include:  riboflavin, melatonin, feverfew, and coenzyme Q10.     Other options for oral preventive medications include: amitriptyline or nortriptyline, zonisamide, rimegepant, valproate, verapamil, gabapentin, pregabalin, candesartan, Namenda, escitalopram, and levetiracetam. We want to avoid weight gain.    Several non-invasive neuromodulation devices (gammaCore, Cefaly and Nerivio) are available to treat headaches preventively and/or acutely. These are typically not covered by insurance, but the companies have a trial period and will refund you if the device is ineffective and returned within that period.     Follow-up in the Headache Clinic as scheduled.       We appreciate the opportunity to participate in the care of Andressa.     Please, do not hesitate to call our office at [phone] if you have any questions or concerns.       Theresa Lewis MD  [facility]  [phone]    I spent 20 minutes on this date of service performing the following activities: obtaining history, performing examination, entering orders, documenting, preparing for visit and providing counseling and education.    This was in addition to the time dedicated to the procedure.

## 2023-10-03 NOTE — PATIENT INSTRUCTIONS
Diagnosis:  Chronic migraine without aura   Cervicalgia  Status migrainosus   Headache secondary to COVID vaccine booster - resolved     Evaluation:  Previously recommend considering a consultation with Dr. Pichardo at [facility].  No additional tests are needed at this time. If there are new symptoms or changes in the characteristics of the headaches, I will re-evaluate Andressa and consider if diagnostic tests are needed.     Treatment plan:     1. Healthy Habits: The following recommendations can greatly reduce the number and severity of headaches.  Maintain regular sleep hours and get sufficient sleep (8-9 hours)  Do not skip meals, especially breakfast  Drink at least 64 oz or 8 cups of water daily - enough to urinate 5-6 times a day  Get at least 30 minutes of daily aerobic exercise (enough to increase your heart rate and sweat)    Keep track of headaches and use of acute medication (prescription or over-the-counter) in a calendar or notebook and bring that to your clinic visits.    2. Acute Treatment:     Continue with Nurtec ODT 75 mg as needed at onset of moderate to severe headache. Maximum 1 tablet in 24 hours.     May use Cefaly as needed for acute headache treatment for up to 1 hour (acute mode).     For rescue treatment: Frovatriptan 2.5 mg for moderate to severe headache. May repeat 1 tablet 2 hours later. Maximum 2 tablets in 24 hr. Limit to 2 days/week. Do not take within 24 hours of Frova.    Third line acute or rescue treatment: TRUDHESA 1.45 mg (administered as one metered spray of 0.725 mg into each nostril). The dose may be repeated, if needed, a minimum of 1 hour after the first dose. Do not use more than 2 doses within a 24-hour period or 3 doses within 7 days. Do not use within 24 hours of triptan.  Directions: TRUDHESA is for nasal administration only. Assemble and prime (i.e., pumped 4 times) before use. Use TRUDHESA immediately after priming and then discard.  https://www.[employer]/how-to-use/    Take Reglan 10 mg as needed for migraine related nausea or 15-30 min before the nasal spray to prevent nausea.    Future consideration: other triptans (naratriptan, almotriptan or sumatriptan).    Lasmiditan or Sprix nasal spray can be tried for rescue treatment if needed in the future.     We may add an antinausea medication if needed for nausea or as co-adjuvant if monotherapy is not sufficient.      3. Preventive Treatment:     Recommend using Cefaly 20 minutes every evening for prevention (prevention mode).    Lidocaine cranial nerve blocks every 6-8 weeks.     Continue with Emgality monthly injections.     Continue with Botox 195 units every 12 weeks.      Continue with magnesium glycinate 600 mg daily. This may help with constipation too.    Continue with Cymbalta 40 mg BID (for depression from psychiatrist) as Cymbalta may help as migraine preventive.    Future considerations: add riboflavin. Switch Emgality to Ajovy, Aimovig or Nurtec QOD. Discussed medical marijuana.    Other nutraceutical options include: riboflavin, melatonin, feverfew, and coenzyme Q10.     Other options for oral preventive medications include: amitriptyline or nortriptyline, zonisamide, rimegepant, valproate, verapamil, gabapentin, pregabalin, candesartan, Namenda, escitalopram, and levetiracetam. We want to avoid weight gain.    Several non-invasive neuromodulation devices (gammaCore, Cefaly and Nerivio) are available to treat headaches preventively and/or acutely. These are typically not covered by insurance, but the companies have a trial period and will refund you if the device is ineffective and returned within that period.     Follow-up in the Headache Clinic as scheduled.

## 2023-10-03 NOTE — LETTER
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 daily., Disp: , Rfl:     LORazepam (ATIVAN) 0.5 mg tablet, Take 1 tablet (0.5 mg total) by mouth every 8 (eight) hours as needed for anxiety., Disp: 20 tablet, Rfl: 0    metoclopramide (REGLAN) 10 mg tablet, Take 1 tablet (10 mg total) by mouth 3 (three) times a day as needed (nausea)., Disp: 20 tablet, Rfl: 2    naproxen sodium (ALEVE ORAL), Take by mouth as needed., Disp: , Rfl:     olopatadine (PATADAY) 0.2 % ophthalmic solution, Administer 1 drop into affected eye(s) daily., Disp: , Rfl:     ondansetron ODT (ZOFRAN-ODT) 4 mg disintegrating tablet, Take 4 mg by mouth every 8 (eight) hours as needed for nausea or vomiting., Disp: , Rfl:     progesterone (PROMETRIUM) 200 mg capsule, TAKE 1 CAPSULE BY MOUTH EVERY DAY FOR 10 DAYS, Disp: , Rfl:     rimegepant (NURTEC ODT) 75 mg tablet,disintegrating, Take 1 tablet (75 mg total) by mouth as needed (migraine headache). Dissolve 1 tablet under the tongue. One dose per day as needed., Disp: 16 tablet, Rfl: 11    rizatriptan MLT (MAXALT-MLT) 10 mg disintegrating tablet, 1 tab at onset of severe headache. May repeat in 2 hours if needed. Maximum 2 tab/day and 2 days/week., Disp: 12 tablet, Rfl:  3    selenium sulfide (SELSUN BLUE) 1 % shampoo, Apply topically daily for 7 days., Disp: 207 mL, Rfl: 0    semaglutide (OZEMPIC) 2 mg/dose (8 mg/3 mL) subcutaneous injection, Inject 2 mg under the skin every (seven) 7 days., Disp: 3 mL, Rfl: 2    traZODone (DESYREL) 50 mg tablet, Take 25-50 mg by mouth nightly as needed., Disp: , Rfl:     TURMERIC ORAL, Take by mouth daily., Disp: , Rfl:     ubrogepant (UBRELVY) 100 mg tablet tablet, Take 1 tablet (100 mg total) by mouth as needed for migraine.  mg at onset of migraine. May repeat  mg once 2 hours later. Max 200 mg/day., Disp: 16 tablet, Rfl: 11    ALLERGIES: has No Known Allergies.     REVIEW OF SYSTEMS: As discussed above. Otherwise, all other ROS were reviewed and negative.    PAST MEDICAL HISTORY:  has a past medical history of Abnormal ECG, Arrhythmia (2018), Back pain, GERD (gastroesophageal reflux disease), Heartburn, History of fetal demise, not currently pregnant, Joint pain, Migraine headache, Ovarian cyst, Palpitations, Pericarditis (2018), and Status migrainosus (2023).    PAST SURGICAL HISTORY:  has a past surgical history that includes Ablation of dysrhythmic focus (2018);  section; and Knee arthroscopy w/ meniscal repair (Right).    FAMILY HISTORY: family history includes Clotting disorder in her maternal grandfather; Dementia in her biological father and paternal grandmother; Diabetes in her paternal grandfather; Hyperlipidemia in her biological father, biological mother, maternal grandmother, and paternal grandmother; Hypertension in her biological father; No Known Problems in her biological child; Other in her biological father; Prostate cancer (age of onset: 78) in her biological father.   Migraine in her mother (Rizatriptan worked for her), one brother, and probably in her father too.    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    She has 5 children, last one born in 2019.     Andressa is considering a future pregnancy, but is not trying to conceive at this time, and wants to wait until her headaches are well controlled. I have discussed with her the possible teratogenic effects of some medications and she verbalized understanding.    PHYSICAL EXAMINATION:    There were no vitals filed for this visit.   General: Well developed, well nourished, in acute distress.  Alert and oriented. Fluent with normal language and speech. Face symmetric.      Data Reviewed:   Brain MRI WO (6/2014)  Comparison: MRI brain 01/14/2012  Ventricles and sulci are normal in size and configuration. No diffusion evidence of acute infarction. No extra axial collection, mass-effect, or edema. No intraparenchymal hemorrhage on the gradient echo sequence. The right cerebellar tonsil protrudes approximately 4 mm below the level of the foramen magnum, not meeting criteria for Chiari malformation. The sella appears unremarkable. The major intracranial flow voids at the skull base are normal in appearance. There is a small mucous retention cyst in the right maxillary sinus. Bone marrow signal is within normal limits.  IMPRESSION: No evidence of acute infarction or intracranial mass.     Lab results reviewed.  Pertinent findings include: CMP, CBC, TSH, all within normal limits (7/2022.      Brain MRI WO (9/2022) unremarkable. (Scans independently reviewed by Dr. Lewis)  IMPRESSION: No acute intracranial abnormality. No acute infarct, mass effect or acute intracranial hemorrhage.  Comparison:  6/20/2014.  Findings:  Sulci, ventricles and basal cisterns are within normal limits for patient's stated age.  Minimal periventricular white matter change.  No  mass-effect, intracranial hemorrhage, acute segmental infarct or extra-axial fluid collection is seen. Cerebellar tonsils are normal in position.  Expected signal voids are seen in the intracranial vessels at the skull base.  The orbits  and sella are unremarkable.   The paranasal sinuses and mastoid air cells are clear.    IMPRESSION/PLAN:  Andressa Baker is a very pleasant 43 y.o. woman seen initially in July 2022 for chronic severe headaches since her early 20's, worsening in the last few months. Neurological exam was normal. Brain MRI (2014 and 9/2022) were both unremarkable. There are no atypical features or symptoms suggestive of a serious underlying condition or secondary headache. In our opinion, she has chronic migraine exacerbated by hormonal changes and frequent use of acute medications (Excedrin and Rizatriptan). There may be some contribution from myofascial neck pain, but there is no evidence of significant cervical spine disease.    Today, I have repeated the botox treatment and recommend continuing with lidocaine nerve blocks every 6-8 weeks. I recommend using Cefaly consistently for prevention and acute treatment. She may continue with Nurtec for first line acute treatment, Frova for second line rescue treatment, and DHE NS for third line rescue treatment. She will continue with Emgality, Botox, lidocaine blocks, and magnesium for prevention. I recommend continuing with gluten free diet. See detailed recommendations below.    Diagnosis:  Chronic migraine without aura   Cervicalgia  Headache secondary to COVID vaccine booster - resolved     Evaluation:  Previously recommend considering a consultation with Dr. Pichardo at [facility].  No additional tests are needed at this time. If there are new symptoms or changes in the characteristics of the headaches, I will re-evaluate Andressa and consider if diagnostic tests are needed.     Treatment plan:     1. Healthy Habits: The following  recommendations can greatly reduce the number and severity of headaches.  · Maintain regular sleep hours and get sufficient sleep (8-9 hours)  · Do not skip meals, especially breakfast  · Drink at least 64 oz or 8 cups of water daily - enough to urinate 5-6 times a day  · Get at least 30 minutes of daily aerobic exercise (enough to increase your heart rate and sweat)    Keep track of headaches and use of acute medication (prescription or over-the-counter) in a calendar or notebook and bring that to your clinic visits.    2. Acute Treatment:     Continue with Nurtec ODT 75 mg as needed at onset of moderate to severe headache. Maximum 1 tablet in 24 hours.     May use Cefaly as needed for acute headache treatment for up to 1 hour (acute mode).     For rescue treatment: Frovatriptan 2.5 mg for moderate to severe headache. May repeat 1 tablet 2 hours later. Maximum 2 tablets in 24 hr. Limit to 2 days/week. Do not take within 24 hours of Frova.    Third line acute or rescue treatment: TRUDHESA 1.45 mg (administered as one metered spray of 0.725 mg into each nostril). The dose may be repeated, if needed, a minimum of 1 hour after the first dose. Do not use more than 2 doses within a 24-hour period or 3 doses within 7 days. Do not use within 24 hours of triptan.  Directions: TRUDHESA is for nasal administration only. Assemble and prime (i.e., pumped 4 times) before use. Use TRUDHESA immediately after priming and then discard. https://www.trudhesahcp.com/how-to-use/    Take Reglan 10 mg as needed for migraine related nausea or 15-30 min before the nasal spray to prevent nausea.    Future consideration: other triptans (naratriptan, almotriptan or sumatriptan).    Lasmiditan or Sprix nasal spray can be tried for rescue treatment if needed in the future.     We may add an antinausea medication if needed for nausea or as co-adjuvant if monotherapy is not sufficient.      3. Preventive Treatment:     Recommend using Cefaly 20  minutes every evening for prevention (prevention mode).    Lidocaine cranial nerve blocks every 6-8 weeks.     Continue with Emgality monthly injections.     Continue with Botox 195 units every 12 weeks.      Continue with magnesium glycinate 600 mg daily. This may help with constipation too.    Continue with Cymbalta 40 mg BID (for depression from psychiatrist) as Cymbalta may help as migraine preventive.    Future considerations: add riboflavin. Switch Emgality to Ajovy, Aimovig or Nurtec QOD. Previously discussed medical marijuana.    Other nutraceutical options include: riboflavin, melatonin, feverfew, and coenzyme Q10.     Other options for oral preventive medications include: amitriptyline or nortriptyline, zonisamide, rimegepant, valproate, verapamil, gabapentin, pregabalin, candesartan, Namenda, escitalopram, and levetiracetam. We want to avoid weight gain.    Several non-invasive neuromodulation devices (gammaCore, Cefaly and Nerivio) are available to treat headaches preventively and/or acutely. These are typically not covered by insurance, but the companies have a trial period and will refund you if the device is ineffective and returned within that period.     Follow-up in the Headache Clinic as scheduled.       We appreciate the opportunity to participate in the care of Andressa.     Please, do not hesitate to call our office at [phone] if you have any questions or concerns.       Theresa Lewis MD  [facility]  [phone]    I spent 20 minutes on this date of service performing the following activities: obtaining history, performing examination, entering orders, documenting, preparing for visit and providing counseling and education.    This was in addition to the time dedicated to the procedure.     Theresa Dalton MD  10/3/2023  4:14 PM  Signed  Procedures  Procedure Note for Botox Injections for Headache:    Indication for procedure:    Diagnosis Plan   1. Intractable chronic  migraine without aura and with status migrainosus  [facility] Botulinum Toxin Injection Appointment Request    onabotulinumtoxinA (BOTOX) 200 unit injection 200 Units          I explained the risks and benefits and obtained consent from Andressa.  Onabotulinumtoxin A 200 U were diluted in 4 mL of saline.    Lot number and expiration date documented in informed consent and MAR.  I performed a time-out, including 2 unique patient identifiers with Andressa.  Using a 30 gauge 1/2 inch needle, I injected 0.1mL = 5 U into each site according to the Chronic Migraine Protocol (PREEMPT Studies).   The following table reports the number of sites injected in each muscle.     Muscle Midline Right Left   Procerus 1          1 1   Frontalis   2 2   Temporalis   4 (10+5+5+5 U) 4 (10+5+5+5 U)   Occipitalis   3 (10+5+5 U) 3 (10+5+5 U)   Cervical Paraspinal   2 2   Trapezius   3 (10+10+5 U) 3 (10+10+5 U)   Other:            195 Units were injected and 5 Units were wasted.     Andressa tolerated the procedure well, without complications. She was instructed that she could experience increased pain in the next 2-3 days, which should be treated with ice and anti-inflammatory medications.      We appreciate the opportunity to participate in the care of Andressa ROSA Baker.     Please, do not hesitate to call me at [phone] if you have any questions or concerns.        Theresa Lewis MD  [facility]  [phone]

## 2023-10-03 NOTE — PROCEDURES
Procedures  Procedure Note for Botox Injections for Headache:    Indication for procedure:    Diagnosis Plan   1. Intractable chronic migraine without aura and with status migrainosus  [facility] Botulinum Toxin Injection Appointment Request    onabotulinumtoxinA (BOTOX) 200 unit injection 200 Units          I explained the risks and benefits and obtained consent from Andressa.  Onabotulinumtoxin A 200 U were diluted in 4 mL of saline.    Lot number and expiration date documented in informed consent and MAR.  I performed a time-out, including 2 unique patient identifiers with Andressa.  Using a 30 gauge 1/2 inch needle, I injected 0.1mL = 5 U into each site according to the Chronic Migraine Protocol (PREEMPT Studies).   The following table reports the number of sites injected in each muscle.     Muscle Midline Right Left   Procerus 1          1 1   Frontalis   2 2   Temporalis   4 (10+5+5+5 U) 4 (10+5+5+5 U)   Occipitalis   3 (10+5+5 U) 3 (10+5+5 U)   Cervical Paraspinal   2 2   Trapezius   3 (10+10+5 U) 3 (10+10+5 U)   Other:            195 Units were injected and 5 Units were wasted.     Andressa tolerated the procedure well, without complications. She was instructed that she could experience increased pain in the next 2-3 days, which should be treated with ice and anti-inflammatory medications.      We appreciate the opportunity to participate in the care of Andressa ELE Olivia.     Please, do not hesitate to call me at [phone] if you have any questions or concerns.        Theresa Lewis MD  [facility]  [phone]

## 2023-10-04 ENCOUNTER — HOSPITAL ENCOUNTER (OUTPATIENT)
Dept: RADIOLOGY | Age: 43
Discharge: HOME | End: 2023-10-04
Attending: STUDENT IN AN ORGANIZED HEALTH CARE EDUCATION/TRAINING PROGRAM
Payer: COMMERCIAL

## 2023-10-04 DIAGNOSIS — Z00.00 ENCOUNTER FOR PREVENTIVE CARE: ICD-10-CM

## 2023-10-04 PROCEDURE — 77063 BREAST TOMOSYNTHESIS BI: CPT

## 2023-10-05 ENCOUNTER — PATIENT OUTREACH (OUTPATIENT)
Dept: RADIOLOGY | Age: 43
End: 2023-10-05
Payer: COMMERCIAL

## 2023-10-05 ENCOUNTER — OFFICE VISIT (OUTPATIENT)
Dept: SURGERY | Facility: CLINIC | Age: 43
End: 2023-10-05
Payer: COMMERCIAL

## 2023-10-05 ENCOUNTER — TRANSCRIBE ORDERS (OUTPATIENT)
Dept: SURGERY | Facility: CLINIC | Age: 43
End: 2023-10-05

## 2023-10-05 ENCOUNTER — PATIENT OUTREACH (OUTPATIENT)
Dept: SURGERY | Facility: CLINIC | Age: 43
End: 2023-10-05

## 2023-10-05 ENCOUNTER — HOSPITAL ENCOUNTER (OUTPATIENT)
Dept: RADIOLOGY | Age: 43
Discharge: HOME | End: 2023-10-05
Attending: RADIOLOGY
Payer: COMMERCIAL

## 2023-10-05 VITALS
HEIGHT: 67 IN | SYSTOLIC BLOOD PRESSURE: 114 MMHG | OXYGEN SATURATION: 96 % | WEIGHT: 218.8 LBS | TEMPERATURE: 97.2 F | DIASTOLIC BLOOD PRESSURE: 72 MMHG | HEART RATE: 90 BPM | BODY MASS INDEX: 34.34 KG/M2

## 2023-10-05 DIAGNOSIS — R92.8 ABNORMAL MAMMOGRAM: ICD-10-CM

## 2023-10-05 DIAGNOSIS — R92.8 ABNORMAL MAMMOGRAM: Primary | ICD-10-CM

## 2023-10-05 PROCEDURE — 77065 DX MAMMO INCL CAD UNI: CPT | Mod: RT

## 2023-10-05 PROCEDURE — 3008F BODY MASS INDEX DOCD: CPT | Performed by: NURSE PRACTITIONER

## 2023-10-05 PROCEDURE — G0279 TOMOSYNTHESIS, MAMMO: HCPCS | Mod: RT

## 2023-10-05 PROCEDURE — 99204 OFFICE O/P NEW MOD 45 MIN: CPT | Performed by: NURSE PRACTITIONER

## 2023-10-05 NOTE — PROGRESS NOTES
[facility]  Dr. Arlen HARRIS Sizer  [address] [place] [address]  Phone: [phone]  Fax: [phone]      Patient ID: Andressa Baker                              : 1980    Visit Date: 10/5/2023  Referring Provider: Renee Urbano*   PCP: Renee Urbano,   GYN: No care team member to display    Chief Complaint: Abnormal Breast Imaging      Andressa Baker is a 43 y.o.  female who presents to the office with abnormal imaging after obtaining her first mammogram. She denies nipple discharge, nipple retraction, skin changes, or palpable masses.  Patient denies any family history of breast or ovarian cancer.  Her biological father had prostate cancer diagnosed in the late 70s, maternal uncle had colon cancer diagnosed in the 80s.  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       Breast Health:  Age at menarche: 13  Age at first live birth: 29   Age of menopause: Premenopausal   ,  > 60 months   Allergies: Patient has no known allergies.    Current Medications: has a current medication list which includes the following prescription(s): b complex vitamins, trudhesa, duloxetine, emgality pen, ergocalciferol, frovatriptan, herbal drugs, lorazepam, metoclopramide, olopatadine, ondansetron odt, rimegepant, ozempic, trazodone, turmeric, ubrogepant, atomoxetine, docosahexaenoic acid-epa, duloxetine, naproxen sodium, progesterone, rizatriptan mlt, and selsun blue.    Past Medical History:  has a past medical history of Abnormal ECG, Arrhythmia (2018), Back pain, GERD (gastroesophageal reflux disease), Heartburn, History of fetal demise, not currently pregnant, Joint pain, Migraine headache, Ovarian cyst, Palpitations, Pericarditis (2018), and Status migrainosus (2023).    Past Surgical History:  has a past surgical history that includes Ablation of dysrhythmic focus (2018);   "section; Knee arthroscopy w/ meniscal repair (Right); and Aragon tooth extraction.    Social History:   Social History     Tobacco Use    Smoking status: Never    Smokeless tobacco: Never   Vaping Use    Vaping Use: Never used   Substance Use Topics    Alcohol use: Yes     Comment: occasionally    Drug use: No       Family History: family history includes Clotting disorder in her maternal grandfather; Colon cancer in her mother's brother; Dementia in her biological father and paternal grandmother; Diabetes in her paternal grandfather; Hyperlipidemia in her biological father, biological mother, maternal grandmother, and paternal grandmother; Hypertension in her biological father; No Known Problems in her biological child; Other in her biological father; Prostate cancer (age of onset: 78) in her biological father.        Physical Exam:    Vitals:   Visit Vitals  /72   Pulse 90   Temp 36.2 °C (97.2 °F)   Ht 1.702 m (5' 7\")   Wt 99.2 kg (218 lb 12.8 oz) Comment: without shoes   SpO2 96%   BMI 34.27 kg/m²     Body mass index is 34.27 kg/m².    Physical Exam  Physical Examination performed in the supine and sitting position  BREAST SIZE: Moderate  SYMMETRY yes       SKIN - Skin color, texture, turgor normal. No rashes or lesions Skin is without tethering, dimpling, retraction or increased vascularity  AREOLA - normal    NIPPLES -  normal without retraction and without discharge  LYMPH NODES: infra, supra, cervical, parasternal and axillary nodes normal bilaterally  BREAST TISSUE: Right breast normal without discrete lesions. Left Breast  normal without discrete lesions.     Dr. Zaman present in room for right breast evaluation    Breast Imaging: I have personally reviewed the reports and images as follows.  Bilateral screening mammogram performed 10/4/2023, type B breast density.  Within the anterior right breast just below the plane of the nipple there is a small grouping of calcifications with possible soft " tissue nodularity.  BI-RADS 0  Right diagnostic mammogram performed 10/5/2023, grouped calcifications within the anterior depth of right breast, predominantly rounded with more amorphus calcifications.    Tissue sampling is recommended.  BI-RADS 4  Impression:  43-year-old female at average risk for breast cancer requiring tissue sampling for right breast calcifications  Recommendation and Plan:   Calcifications carry a 17% risk of carcinoma and therefore recommend biospy. This is best accessed via a stereotactic biopsy.   Details of the procedure were explained and she was informed that she will receive a call and results will be given over the phone in likely 24-48 hours   Patient was instructed that the pathology results will go directly to her through [employer].  She has the option of viewing results or waiting to be called by the office once available.  We will follow up with her for further management once pathology report is received.    Patient is scheduled to undergo biopsy tomorrow at [facility].    All of the questions were answered.  The patient is in agreement with the treatment plan.      No follow-ups on file.        10/5/2023   3:01 PM      BEKA Cutler

## 2023-10-06 ENCOUNTER — HOSPITAL ENCOUNTER (OUTPATIENT)
Dept: RADIOLOGY | Age: 43
Discharge: HOME | End: 2023-10-06
Attending: NURSE PRACTITIONER
Payer: COMMERCIAL

## 2023-10-06 VITALS — DIASTOLIC BLOOD PRESSURE: 82 MMHG | SYSTOLIC BLOOD PRESSURE: 129 MMHG

## 2023-10-06 DIAGNOSIS — R92.8 ABNORMAL MAMMOGRAM: ICD-10-CM

## 2023-10-06 PROCEDURE — A4648 IMPLANTABLE TISSUE MARKER: HCPCS

## 2023-10-06 PROCEDURE — 0HBT3ZX EXCISION OF RIGHT BREAST, PERCUTANEOUS APPROACH, DIAGNOSTIC: ICD-10-PCS | Performed by: RADIOLOGY

## 2023-10-06 PROCEDURE — 88305 TISSUE EXAM BY PATHOLOGIST: CPT | Performed by: NURSE PRACTITIONER

## 2023-10-06 PROCEDURE — 25000000 HC PHARMACY GENERAL: Performed by: NURSE PRACTITIONER

## 2023-10-06 PROCEDURE — 36100360 BI STEREOTACTIC BREAST BIOPSY RIGHT: Mod: RT

## 2023-10-06 PROCEDURE — 27200000 BI STEREOTACTIC BREAST BIOPSY RIGHT

## 2023-10-06 PROCEDURE — 77065 DX MAMMO INCL CAD UNI: CPT | Mod: RT

## 2023-10-06 RX ORDER — LIDOCAINE HYDROCHLORIDE 10 MG/ML
0-10 INJECTION, SOLUTION EPIDURAL; INFILTRATION; INTRACAUDAL; PERINEURAL ONCE
Status: COMPLETED | OUTPATIENT
Start: 2023-10-06 | End: 2023-10-06

## 2023-10-06 RX ORDER — LIDOCAINE HYDROCHLORIDE AND EPINEPHRINE 10; 10 UG/ML; MG/ML
0-30 INJECTION, SOLUTION INFILTRATION; PERINEURAL ONCE
Status: COMPLETED | OUTPATIENT
Start: 2023-10-06 | End: 2023-10-06

## 2023-10-06 RX ADMIN — LIDOCAINE HYDROCHLORIDE 8 ML: 10 INJECTION, SOLUTION EPIDURAL; INFILTRATION; INTRACAUDAL; PERINEURAL at 11:27

## 2023-10-06 RX ADMIN — LIDOCAINE HYDROCHLORIDE AND EPINEPHRINE 12 ML: 10; 10 INJECTION, SOLUTION INFILTRATION; PERINEURAL at 11:30

## 2023-10-06 NOTE — POST-PROCEDURE NOTE
Immediate Breast Interventional Postprocedure Note    Andressa Baker     Attending: Fazal Ahmadi MD    Assistant: none      Diagnosis: Breast Abnormality    Description of procedure: Imaging Guided Percutaneous Breast Biopsy     Laterality: Right    Anesthesia:  Local (Lidocaine)    Findings: Breast Abnormality    Complications: None    Estimated Blood Loss: Less than 100 ml    Specimens: Breast Tissue      10/6/2023 3:07 PM

## 2023-10-06 NOTE — OR SURGEON
Pre-Procedure patient identification:  I am the primary operating surgeon/proceduralist and I have reviewed the applicable pathology reports and radiology studies for this procedure. I have identified the patient and confirmed laterality is right on 10/06/23 at 10:54 AM Fazal Ahmadi MD

## 2023-10-09 DIAGNOSIS — G43.711 INTRACTABLE CHRONIC MIGRAINE WITHOUT AURA AND WITH STATUS MIGRAINOSUS: Primary | ICD-10-CM

## 2023-10-09 LAB
CASE RPRT: NORMAL
CLINICAL INFO: NORMAL
PATH REPORT.FINAL DX SPEC: NORMAL
PATH REPORT.GROSS SPEC: NORMAL

## 2023-10-10 ENCOUNTER — TELEPHONE (OUTPATIENT)
Dept: NEUROLOGY | Facility: CLINIC | Age: 43
End: 2023-10-10

## 2023-10-10 NOTE — TELEPHONE ENCOUNTER
Can you please look at her referrals and make sure it's all linked correctly? This is one that Fazal did auth and there is minimal information. The new auth number is [id number]. Thank you

## 2023-10-23 NOTE — ASSESSMENT & PLAN NOTE
Excellent progress with rethinking health.     Continue with regular exercise.     Continue with mindfulness practice.     Educational and counseling on strategies for lifestyle change to address weight related health conditions over 1/2 of todays 25 minute appointment.      4

## 2023-10-24 NOTE — TELEPHONE ENCOUNTER
I called the patient back. She was seen by Dr. Manzanares for symptoms of post catheter ablation pericarditis She feeling better on Indocin started last week on Tuesday.  However today she is having again same symptoms of increased palpitations, flushing and low-grade fever.  I told her to continue to take Indocin but will consider Solu-Medrol/steroid short course.  I contacted Dr. Patrick Manzanares who performed a catheter ablation procedure and prescribed Indocin.  He agrees that at this point she may benefit from a short course of steroid.    I called the patient back. She agrees to taking steroids.   I called a prescription to her pharmacy ( [facility] [phone]) for a Solu-Medrol pack.       Per pt  Local  Atorvastatin  Acyclovir
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
## 2023-10-27 ENCOUNTER — OFFICE VISIT (OUTPATIENT)
Dept: NEUROLOGY | Facility: CLINIC | Age: 43
End: 2023-10-27
Payer: COMMERCIAL

## 2023-10-27 ENCOUNTER — TELEPHONE (OUTPATIENT)
Dept: NEUROLOGY | Facility: CLINIC | Age: 43
End: 2023-10-27

## 2023-10-27 VITALS
HEART RATE: 87 BPM | BODY MASS INDEX: 34.21 KG/M2 | OXYGEN SATURATION: 98 % | RESPIRATION RATE: 16 BRPM | DIASTOLIC BLOOD PRESSURE: 75 MMHG | SYSTOLIC BLOOD PRESSURE: 115 MMHG | HEIGHT: 67 IN | WEIGHT: 218 LBS

## 2023-10-27 DIAGNOSIS — M54.2 CERVICALGIA: ICD-10-CM

## 2023-10-27 DIAGNOSIS — G43.901 STATUS MIGRAINOSUS: ICD-10-CM

## 2023-10-27 DIAGNOSIS — G43.711 INTRACTABLE CHRONIC MIGRAINE WITHOUT AURA AND WITH STATUS MIGRAINOSUS: Primary | ICD-10-CM

## 2023-10-27 DIAGNOSIS — R52 ACUTE PAIN: ICD-10-CM

## 2023-10-27 DIAGNOSIS — G43.711 INTRACTABLE CHRONIC MIGRAINE WITHOUT AURA AND WITH STATUS MIGRAINOSUS: ICD-10-CM

## 2023-10-27 DIAGNOSIS — G89.29 OTHER CHRONIC PAIN: Primary | ICD-10-CM

## 2023-10-27 PROCEDURE — 64405 NJX AA&/STRD GR OCPL NRV: CPT | Mod: 50 | Performed by: PSYCHIATRY & NEUROLOGY

## 2023-10-27 PROCEDURE — 64450 NJX AA&/STRD OTHER PN/BRANCH: CPT | Mod: 50 | Performed by: PSYCHIATRY & NEUROLOGY

## 2023-10-27 PROCEDURE — 99214 OFFICE O/P EST MOD 30 MIN: CPT | Mod: 25 | Performed by: PSYCHIATRY & NEUROLOGY

## 2023-10-27 PROCEDURE — 64400 NJX AA&/STRD TRIGEMINAL NRV: CPT | Mod: XS,LT | Performed by: PSYCHIATRY & NEUROLOGY

## 2023-10-27 PROCEDURE — 3008F BODY MASS INDEX DOCD: CPT | Performed by: PSYCHIATRY & NEUROLOGY

## 2023-10-27 PROCEDURE — 200200 PR NO CHARGE: Performed by: PSYCHIATRY & NEUROLOGY

## 2023-10-27 RX ORDER — DIHYDROERGOTAMINE MESYLATE 4 MG/ML
1 SPRAY, METERED NASAL AS NEEDED
Qty: 4 ML | Refills: 11 | Status: SHIPPED | OUTPATIENT
Start: 2023-10-27 | End: 2024-10-30

## 2023-10-27 RX ORDER — GALCANEZUMAB 120 MG/ML
120 INJECTION, SOLUTION SUBCUTANEOUS
Qty: 1 ML | Refills: 11 | Status: SHIPPED | OUTPATIENT
Start: 2023-10-27 | End: 2024-03-19

## 2023-10-27 RX ORDER — ONABOTULINUMTOXINA 100 [USP'U]/1
INJECTION, POWDER, LYOPHILIZED, FOR SOLUTION INTRADERMAL; INTRAMUSCULAR
COMMUNITY

## 2023-10-27 RX ORDER — LIDOCAINE HYDROCHLORIDE 20 MG/ML
9 INJECTION, SOLUTION INFILTRATION; PERINEURAL ONCE
Status: COMPLETED | OUTPATIENT
Start: 2023-10-27 | End: 2023-10-27

## 2023-10-27 RX ORDER — LAMOTRIGINE 25 MG/1
TABLET ORAL
COMMUNITY
Start: 2023-10-24 | End: 2023-12-13 | Stop reason: SDUPTHER

## 2023-10-27 RX ADMIN — LIDOCAINE HYDROCHLORIDE 9 ML: 20 INJECTION, SOLUTION INFILTRATION; PERINEURAL at 11:15

## 2023-10-27 NOTE — TELEPHONE ENCOUNTER
Regarding: Emgality/bad migraine  Contact: [phone]  ----- Message from Theresa Dalton MD sent at 10/26/2023  5:49 PM EDT -----  Could one of you block the 10:30 slot for AW and call her to confirm if she can come?   PLEASE NOTE THAT I TOLD HER TO BE THERE AT 10 AM BECAUSE SHE IS ALWAYS LATE.  Please, tell her to arrive at 10 even if the time of the appointment says 10:30  Thanks  Theresa     ----- Message sent from Theresa Dalton MD to Andressa Baker at 10/26/2023  5:48 PM -----   Andressa    You may need to go to the ED if the pain is very severe.    If it can wait until tomorrow, I can offer nerve blocks tomorrow in [facility]. Can you be there at 10 am?    I have a cancellation, but I am not able to block it or schedule you.    I am going to ask our  to call you tomorrow at 8 am to confirm if the slot is still available and we can bring you in for an urgent visit.    PLEASE, WAIT FOR THE CALL TO CONFIRM THIS - DON'T COME TO [facility] WITHOUT CONFIRMING IT.    Theresa Lewis MD  [facility]  [phone]      ----- Message -----       From:Andressa Baker       Sent:10/26/2023  1:09 PM EDT         To:Theresa Dalton    Subject:Emgality/bad migraine    Hi!  Im having my third day of migraine. Ive taken four frovatriptans, 2 nurtec, 3 alleve, two reglan. I get temporary relief from all these maudites but eventually it comes back.     Ive been waiting for my October dose of Emgality to be filled for over a week at least. I made the mistake of eating home made bread (with gluten obviously) and I think thats what triggered  this round of migraine. Were you able to complete the necessary pre authorization for Emgality?  Also what should I do about the current pain? Thanks for your time!  Barbara

## 2023-10-27 NOTE — PROCEDURES
Procedures    Nerve Blocks and Trigger Points Procedure Note:  Indication:    Diagnosis Plan   1. Other chronic pain  lidocaine (XYLOCAINE) 20 mg/mL (2 %) injection 9 mL      2. Intractable chronic migraine without aura and with status migrainosus        3. Cervicalgia        4. Acute pain        5. Status migrainosus             I explained the risks and benefits and obtained written consent from Andressa Baker. Signed consent form will be scanned to patient's electronic medical records.     Lidocaine 2% without epinephrine was drawn in a sterile syringe.     Lot number and expiration date documented in informed consent.     I performed a time-out, including 2 unique patient identifiers.     I located the areas of maximal tenderness corresponding to each nerve or trigger point, and cleaned with alcohol swabs.     I used a 30 gauge 1/2 inch needle to inject lidocaine over the following:        Right Greater Occipital Nerve 2 cc   Left Greater Occipital Nerve 2 cc   Right Lesser Occipital Nerve 1 cc   Left Lesser Occipital Nerve 1 cc     Right Auriculotemporal Nerve 1 cc    Left Auriculotemporal Nerve 1 cc    Right Supraorbital Nerve 0.25 cc   Left Supraorbital Nerve 0.25 cc   Right Supratrochlear Nerve 0.25 cc   Left Supratrochlear Nerve 0.25 cc     Total of 9 cc injected.      The procedure was well tolerated and there were no complications.         Theresa Lewis MD  [facility]  [phone]

## 2023-10-27 NOTE — Clinical Note
Could one of you look into her Emgality? She talked with the insurance and they said they sent some paperwork to us. ??????

## 2023-10-27 NOTE — TELEPHONE ENCOUNTER
I called her and left her a message that to be at the office by 10:00am today at [facility]

## 2023-10-27 NOTE — PROGRESS NOTES
Patient ID: Andressa Baker                              : 1980  MRN: [id number]                                            VISIT DATE: 10/27/2023   ENCOUNTER PROVIDER: Theresa Dalton I had the pleasure of evaluating Andressa Baker in the [facility] on 10/27/2023 for a follow-up patient visit and acute symptom management. The history was provided by Andressa Baker and supplemented by her medical records.    CHIEF COMPLAINT: Headache.    HISTORY OF PRESENT ILLNESS:  Andressa Baker is an established patient in the [facility] presenting for acute evaluation and management of a severe headache that has been refractory to treatment.     She has not been able to start Emgality yet. She called the insurance and it is not ready at the pharmacy.    Current headache started on: Tuesday - 4 days ago  Current level of pain: 4/10  Associated symptoms: nausea, photo and phonophobia  Patient denies motor deficits, sensory symptoms, and vision loss.     Medications tried at home in the last 24 hours: reglan and benadryl last night. Other acute treatments earlier. None this morning.    Past medical history, family history and social history were reviewed from prior visit. Pertinent changes made below.     MEDICATIONS AT START OF VISIT:    Current Outpatient Medications:     dihydroergotamine (TRUDHESA) 0.725 mg/pump act. (4 mg/mL) spray,non-aerosol, Administer 1 spray into each nostril as needed (migraine). The dose may be repeated, if needed, a minimum of 1 hour after the first dose. Do not use more than 2 doses within a 24-hour period or 3 doses within 7 days., Disp: 4 mL, Rfl: 11    docosahexaenoic acid-epa 120-180 mg capsule, Take 1,000 mg by mouth daily., Disp: , Rfl:     DULoxetine (CYMBALTA) 20 mg capsule, Take 40 mg by mouth 2 (two) times a day., Disp: , Rfl:     EMGALITY  mg/mL pen injector subcutaneous pen, INJECT 1 ML (120 MG  TOTAL) UNDER THE SKIN EVERY 30 DAYS, Disp: 1 mL, Rfl: 3    ergocalciferol (VITAMIN D2) 50,000 unit(1250 mcg) capsule, Take 50,000 Units by mouth once a week., Disp: , Rfl:     frovatriptan (FROVA) 2.5 mg tablet, Take 1 tablet (2.5 mg total) by mouth once as needed for migraine Indications: a migraine headache., Disp: 12 tablet, Rfl: 11    herbal drugs (CALMME ORAL), Take by mouth daily., Disp: , Rfl:     lamoTRIgine (LaMICtal) 25 mg tablet, TAKE 1 TAB ORALLY DAILY FOR TWO WEEKS THEN TAKE 2 TABS ORALLY DAILY THEREAFTER, Disp: , Rfl:     LORazepam (ATIVAN) 0.5 mg tablet, Take 1 tablet (0.5 mg total) by mouth every 8 (eight) hours as needed for anxiety., Disp: 20 tablet, Rfl: 0    metoclopramide (REGLAN) 10 mg tablet, Take 1 tablet (10 mg total) by mouth 3 (three) times a day as needed (nausea)., Disp: 20 tablet, Rfl: 2    naproxen sodium (ALEVE ORAL), Take by mouth as needed., Disp: , Rfl:     olopatadine (PATADAY) 0.2 % ophthalmic solution, Administer 1 drop into affected eye(s) daily., Disp: , Rfl:     ondansetron ODT (ZOFRAN-ODT) 4 mg disintegrating tablet, Take 4 mg by mouth every 8 (eight) hours as needed for nausea or vomiting., Disp: , Rfl:     rimegepant (NURTEC ODT) 75 mg tablet,disintegrating, Take 1 tablet (75 mg total) by mouth as needed (migraine headache). Dissolve 1 tablet under the tongue. One dose per day as needed., Disp: 16 tablet, Rfl: 11    selenium sulfide (SELSUN BLUE) 1 % shampoo, Apply topically daily for 7 days., Disp: 207 mL, Rfl: 0    traZODone (DESYREL) 50 mg tablet, Take 25-50 mg by mouth nightly as needed., Disp: , Rfl:     TURMERIC ORAL, Take by mouth daily., Disp: , Rfl:     atomoxetine (STRATTERA) 40 mg capsule, Take 40 mg by mouth 2 (two) times a day., Disp: , Rfl:     b complex vitamins capsule, Take 1 capsule by mouth daily., Disp: , Rfl:     botulinum toxin Type A (BOTOX), , Disp: , Rfl:     DULoxetine (CYMBALTA) 30 mg capsule, Take 40 mg by mouth 2 (two) times a  day., Disp: , Rfl:     progesterone (PROMETRIUM) 200 mg capsule, TAKE 1 CAPSULE BY MOUTH EVERY DAY FOR 10 DAYS, Disp: , Rfl:     rizatriptan MLT (MAXALT-MLT) 10 mg disintegrating tablet, 1 tab at onset of severe headache. May repeat in 2 hours if needed. Maximum 2 tab/day and 2 days/week. (Patient not taking: Reported on 10/5/2023), Disp: 12 tablet, Rfl: 3    semaglutide (OZEMPIC) 2 mg/dose (8 mg/3 mL) subcutaneous injection, Inject 2 mg under the skin every (seven) 7 days. (Patient not taking: Reported on 10/27/2023), Disp: 3 mL, Rfl: 2    ubrogepant (UBRELVY) 100 mg tablet tablet, Take 1 tablet (100 mg total) by mouth as needed for migraine.  mg at onset of migraine. May repeat  mg once 2 hours later. Max 200 mg/day. (Patient not taking: Reported on 10/27/2023), Disp: 16 tablet, Rfl: 11    ALLERGIES: has No Known Allergies.     REVIEW OF SYSTEMS: As discussed above. Otherwise, all other ROS were reviewed and negative.    PAST MEDICAL HISTORY:  has a past medical history of Abnormal ECG, Arrhythmia (2018), Back pain, GERD (gastroesophageal reflux disease), Heartburn, History of fetal demise, not currently pregnant, Joint pain, Migraine headache, Ovarian cyst, Palpitations, Pericarditis (2018), and Status migrainosus (2023).    PAST SURGICAL HISTORY:  has a past surgical history that includes Ablation of dysrhythmic focus (2018);  section; Knee arthroscopy w/ meniscal repair (Right); and [place] tooth extraction.    PHYSICAL EXAMINATION:    Vitals:    10/27/23 1018   BP: 115/75   Pulse: 87   Resp: 16   SpO2: 98%      General: Well developed, well nourished, in no acute distress.  Alert and oriented. Fluent with normal language and speech. Face symmetric.     IMPRESSION/PLAN:  Andressa LEE Olivia was evaluated for a severe headache that has been refractory to treatment.      Diagnosis Plan   1. Other chronic pain        2. Intractable chronic migraine without aura and with  status migrainosus        3. Cervicalgia        4. Acute pain        5. Status migrainosus          Sent request to look into prior authorization for Emgality to our staff.     Renewed prescription for Trudhesa.     Nerve blocks administered today. She was scheduled next Tuesday for these.    Recommend scheduling NB again in 12 weeks.    Botox visits already scheduled.            Theresa Dalton MD  [facility]        I spent 30 minutes on this date of service performing the following activities: obtaining history, performing examination, entering orders, documenting, preparing for visit, providing counseling and education and coordinating care.  This was in addition to the time dedicated to the procedure.

## 2023-10-27 NOTE — TELEPHONE ENCOUNTER
Medicine Refill Request    Last Office Visit: 10/27/2023  Last Telemedicine Visit: 3/7/2023 Edyta Zamora CRNP    Next Office Visit: 10/31/2023  Next Telemedicine Visit: Visit date not found     Emgality PA approved.     Sent request to look into prior authorization for Emgality to our staff.

## 2023-11-30 ENCOUNTER — OFFICE VISIT (OUTPATIENT)
Dept: BARIATRICS/WEIGHT MGMT | Facility: CLINIC | Age: 43
End: 2023-11-30
Payer: COMMERCIAL

## 2023-11-30 VITALS
HEIGHT: 67 IN | RESPIRATION RATE: 18 BRPM | BODY MASS INDEX: 34.67 KG/M2 | SYSTOLIC BLOOD PRESSURE: 110 MMHG | HEART RATE: 86 BPM | DIASTOLIC BLOOD PRESSURE: 60 MMHG | WEIGHT: 220.9 LBS | OXYGEN SATURATION: 99 %

## 2023-11-30 DIAGNOSIS — E66.811 CLASS 1 OBESITY DUE TO EXCESS CALORIES WITH SERIOUS COMORBIDITY AND BODY MASS INDEX (BMI) OF 31.0 TO 31.9 IN ADULT: ICD-10-CM

## 2023-11-30 DIAGNOSIS — E66.09 CLASS 1 OBESITY DUE TO EXCESS CALORIES WITH SERIOUS COMORBIDITY AND BODY MASS INDEX (BMI) OF 31.0 TO 31.9 IN ADULT: ICD-10-CM

## 2023-11-30 DIAGNOSIS — R60.9 LIPEDEMA: Primary | ICD-10-CM

## 2023-11-30 PROCEDURE — 99213 OFFICE O/P EST LOW 20 MIN: CPT | Performed by: FAMILY MEDICINE

## 2023-11-30 PROCEDURE — 3008F BODY MASS INDEX DOCD: CPT | Performed by: FAMILY MEDICINE

## 2023-11-30 RX ORDER — TIRZEPATIDE 2.5 MG/.5ML
2.5 INJECTION, SOLUTION SUBCUTANEOUS WEEKLY
Qty: 2 ML | Refills: 0 | Status: SHIPPED | OUTPATIENT
Start: 2023-11-30

## 2023-12-05 ENCOUNTER — TELEPHONE (OUTPATIENT)
Dept: BARIATRICS/WEIGHT MGMT | Facility: CLINIC | Age: 43
End: 2023-12-05
Payer: COMMERCIAL

## 2023-12-05 NOTE — TELEPHONE ENCOUNTER
Patient called and asked if you can send her script for Mounjaro to the [facility] at [address] Phone# [phone]

## 2023-12-07 ENCOUNTER — HOSPITAL ENCOUNTER (OUTPATIENT)
Dept: SLEEP MEDICINE | Facility: HOSPITAL | Age: 43
Discharge: HOME | End: 2023-12-07
Attending: STUDENT IN AN ORGANIZED HEALTH CARE EDUCATION/TRAINING PROGRAM
Payer: COMMERCIAL

## 2023-12-07 DIAGNOSIS — G43.711 INTRACTABLE CHRONIC MIGRAINE WITHOUT AURA AND WITH STATUS MIGRAINOSUS: ICD-10-CM

## 2023-12-07 DIAGNOSIS — F51.01 PRIMARY INSOMNIA: ICD-10-CM

## 2023-12-07 DIAGNOSIS — E66.811 CLASS 1 OBESITY DUE TO EXCESS CALORIES WITH SERIOUS COMORBIDITY AND BODY MASS INDEX (BMI) OF 31.0 TO 31.9 IN ADULT: ICD-10-CM

## 2023-12-07 DIAGNOSIS — E66.09 CLASS 1 OBESITY DUE TO EXCESS CALORIES WITH SERIOUS COMORBIDITY AND BODY MASS INDEX (BMI) OF 31.0 TO 31.9 IN ADULT: ICD-10-CM

## 2023-12-07 PROCEDURE — G0399 HOME SLEEP TEST/TYPE 3 PORTA: HCPCS

## 2023-12-13 RX ORDER — METOCLOPRAMIDE 10 MG/1
10 TABLET ORAL 3 TIMES DAILY PRN
Qty: 20 TABLET | Refills: 2 | Status: SHIPPED | OUTPATIENT
Start: 2023-12-13 | End: 2024-02-01

## 2023-12-13 RX ORDER — LAMOTRIGINE 25 MG/1
25 TABLET ORAL DAILY
Qty: 90 TABLET | Refills: 3 | Status: SHIPPED | OUTPATIENT
Start: 2023-12-13 | End: 2024-03-21

## 2023-12-13 NOTE — TELEPHONE ENCOUNTER
Medicine Refill Request    Last Office Visit: 8/30/2023   Last Consult Visit: Visit date not found  Last Telemedicine Visit: Visit date not found    Next Appointment: 12/22/2023      Current Outpatient Medications:   •  atomoxetine (STRATTERA) 40 mg capsule, Take 40 mg by mouth 2 (two) times a day., Disp: , Rfl:   •  b complex vitamins capsule, Take 1 capsule by mouth daily., Disp: , Rfl:   •  botulinum toxin Type A (BOTOX), , Disp: , Rfl:   •  dihydroergotamine (TRUDHESA) 0.725 mg/pump act. (4 mg/mL) spray,non-aerosol, Administer 1 spray into each nostril as needed (migraine). The dose may be repeated, if needed, a minimum of 1 hour after the first dose. Do not use more than 2 doses within a 24-hour period or 3 doses within 7 days., Disp: 4 mL, Rfl: 11  •  docosahexaenoic acid-epa 120-180 mg capsule, Take 1,000 mg by mouth daily., Disp: , Rfl:   •  DULoxetine (CYMBALTA) 20 mg capsule, Take 40 mg by mouth 2 (two) times a day., Disp: , Rfl:   •  EMGALITY  mg/mL pen injector subcutaneous pen, Inject 1 mL (120 mg total) under the skin every 30 (thirty) days., Disp: 1 mL, Rfl: 11  •  ergocalciferol (VITAMIN D2) 50,000 unit(1250 mcg) capsule, Take 50,000 Units by mouth once a week., Disp: , Rfl:   •  frovatriptan (FROVA) 2.5 mg tablet, Take 1 tablet (2.5 mg total) by mouth once as needed for migraine Indications: a migraine headache., Disp: 12 tablet, Rfl: 11  •  herbal drugs (CALMME ORAL), Take by mouth daily., Disp: , Rfl:   •  lamoTRIgine (LaMICtal) 25 mg tablet, TAKE 1 TAB ORALLY DAILY FOR TWO WEEKS THEN TAKE 2 TABS ORALLY DAILY THEREAFTER, Disp: , Rfl:   •  LORazepam (ATIVAN) 0.5 mg tablet, Take 1 tablet (0.5 mg total) by mouth every 8 (eight) hours as needed for anxiety., Disp: 20 tablet, Rfl: 0  •  metoclopramide (REGLAN) 10 mg tablet, Take 1 tablet (10 mg total) by mouth 3 (three) times a day as needed (nausea)., Disp: 20 tablet, Rfl: 2  •  naproxen sodium (ALEVE ORAL), Take by mouth as needed., Disp: ,  Rfl:   •  olopatadine (PATADAY) 0.2 % ophthalmic solution, Administer 1 drop into affected eye(s) daily., Disp: , Rfl:   •  ondansetron ODT (ZOFRAN-ODT) 4 mg disintegrating tablet, Take 4 mg by mouth every 8 (eight) hours as needed for nausea or vomiting., Disp: , Rfl:   •  rimegepant (NURTEC ODT) 75 mg tablet,disintegrating, Take 1 tablet (75 mg total) by mouth as needed (migraine headache). Dissolve 1 tablet under the tongue. One dose per day as needed., Disp: 16 tablet, Rfl: 11  •  selenium sulfide (SELSUN BLUE) 1 % shampoo, Apply topically daily for 7 days., Disp: 207 mL, Rfl: 0  •  tirzepatide (MOUNJARO) 2.5 mg/0.5 mL pen injector, Inject 2.5 mg under the skin once a week., Disp: 2 mL, Rfl: 0  •  traZODone (DESYREL) 50 mg tablet, Take 25-50 mg by mouth nightly as needed., Disp: , Rfl:   •  TURMERIC ORAL, Take by mouth daily., Disp: , Rfl:       BP Readings from Last 3 Encounters:   11/30/23 110/60   10/27/23 115/75   10/06/23 129/82       Recent Lab results:  Lab Results   Component Value Date    CHOL 208 (H) 07/12/2022   ,   Lab Results   Component Value Date    HDL 50 (L) 07/12/2022   ,   Lab Results   Component Value Date    LDLCALC 126 (H) 07/12/2022   ,   Lab Results   Component Value Date    TRIG 159 (H) 07/12/2022        Lab Results   Component Value Date    GLUCOSE 91 07/17/2023   ,   Lab Results   Component Value Date    HGBA1C 5.1 07/12/2022         Lab Results   Component Value Date    CREATININE 0.8 07/17/2023       Lab Results   Component Value Date    TSH 0.83 08/04/2023           Lab Results   Component Value Date    HGBA1C 5.1 07/12/2022

## 2023-12-13 NOTE — TELEPHONE ENCOUNTER
Medicine Refill Request    Last Office Visit: 10/27/2023  *Theresa Dalton MD*  Last Telemedicine Visit: 9/23/2022 *Edyta Zamora CRNP*    Next Office Visit: 12/27/2023  *Edyta Zamora CRNP*  Next Telemedicine Visit: Visit date not found     Take Reglan 10 mg as needed for migraine related nausea or 15-30 min before the nasal spray to prevent nausea.

## 2023-12-17 PROBLEM — R52 ACUTE PAIN: Status: RESOLVED | Noted: 2023-09-12 | Resolved: 2023-12-17

## 2023-12-17 PROBLEM — Z86.69 HISTORY OF MIGRAINE: Status: RESOLVED | Noted: 2022-02-17 | Resolved: 2023-12-17

## 2023-12-17 PROBLEM — G47.33 MILD OBSTRUCTIVE SLEEP APNEA: Status: ACTIVE | Noted: 2023-12-17

## 2023-12-17 PROBLEM — G89.29 OTHER CHRONIC PAIN: Status: RESOLVED | Noted: 2023-03-16 | Resolved: 2023-12-17

## 2023-12-21 NOTE — PROGRESS NOTES
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   •  dihydroergotamine (TRUDHESA) 0.725 mg/pump act. (4 mg/mL) spray,non-aerosol, Administer 1 spray into each nostril as needed (migraine). The dose may be repeated, if needed, a minimum of 1 hour after the first dose. Do not use more than 2 doses within a 24-hour period or 3 doses within 7 days., Disp: 4 mL, Rfl: 11  •  docosahexaenoic acid-epa 120-180 mg capsule, Take 1,000 mg by mouth daily., Disp: , Rfl:   •  DULoxetine (CYMBALTA) 20 mg capsule, Take 40 mg by mouth 2 (two) times a day., Disp: , Rfl:   •  EMGALITY  mg/mL pen injector subcutaneous pen, Inject 1 mL (120 mg total) under the skin every 30 (thirty) days., Disp: 1 mL, Rfl: 11  •  ergocalciferol (VITAMIN D2) 50,000 unit(1250 mcg) capsule, Take 50,000 Units by mouth once a week., Disp: , Rfl:   •  frovatriptan (FROVA) 2.5 mg tablet, Take 1 tablet (2.5 mg total) by mouth once as needed for migraine Indications: a migraine headache., Disp: 12 tablet, Rfl: 11  •  herbal drugs (CALMME ORAL), Take by mouth daily., Disp: , Rfl:   •  LORazepam (ATIVAN) 0.5 mg tablet, Take 1 tablet (0.5 mg total) by mouth every 8 (eight) hours as needed for anxiety., Disp: 20 tablet, Rfl: 0  •  naproxen sodium (ALEVE ORAL), Take by mouth as needed., Disp: , Rfl:   •  olopatadine (PATADAY)  0.2 % ophthalmic solution, Administer 1 drop into affected eye(s) daily., Disp: , Rfl:   •  ondansetron ODT (ZOFRAN-ODT) 4 mg disintegrating tablet, Take 4 mg by mouth every 8 (eight) hours as needed for nausea or vomiting., Disp: , Rfl:   •  rimegepant (NURTEC ODT) 75 mg tablet,disintegrating, Take 1 tablet (75 mg total) by mouth as needed (migraine headache). Dissolve 1 tablet under the tongue. One dose per day as needed., Disp: 16 tablet, Rfl: 11  •  selenium sulfide (SELSUN BLUE) 1 % shampoo, Apply topically daily for 7 days., Disp: 207 mL, Rfl: 0  •  tirzepatide (MOUNJARO) 2.5 mg/0.5 mL pen injector, Inject 2.5 mg under the skin once a week., Disp: 2 mL, Rfl: 0  •  traZODone (DESYREL) 50 mg tablet, Take 25-50 mg by mouth nightly as needed., Disp: , Rfl:   •  TURMERIC ORAL, Take by mouth daily., Disp: , Rfl:     ALLERGIES: has No Known Allergies.     REVIEW OF SYSTEMS: As discussed above. Otherwise, all other ROS were reviewed and negative.    PAST MEDICAL HISTORY:  has a past medical history of Abnormal ECG, Arrhythmia (2018), Back pain, GERD (gastroesophageal reflux disease), Heartburn, History of fetal demise, not currently pregnant, Joint pain, Migraine headache, Ovarian cyst, Palpitations, Pericarditis (2018), and Status migrainosus (2023).    PAST SURGICAL HISTORY:  has a past surgical history that includes Ablation of dysrhythmic focus (2018);  section; Knee arthroscopy w/ meniscal repair (Right); and Armbrust tooth extraction.    FAMILY HISTORY: family history includes Clotting disorder in her maternal grandfather; Colon cancer in her mother's brother; Dementia in her biological father and paternal grandmother; Diabetes in her paternal grandfather; Hyperlipidemia in her biological father, biological mother, maternal grandmother, and paternal grandmother; Hypertension in her biological father; No Known Problems in her biological child; Other in her biological father; Prostate cancer  (age of onset: 78) in her biological father.   Migraine in her mother (Rizatriptan worked for her), one brother, and probably in her father too.    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    She has 5 children, last one born in 2019.     Andressa is considering a future pregnancy, but is not trying to conceive at this time, and wants to wait until her headaches are well controlled. I have discussed with her the possible teratogenic effects of some medications and she verbalized understanding.    PHYSICAL EXAMINATION:    Vitals:    12/27/23 1125   BP: 120/76   Pulse: 84   Resp: 16   SpO2: 98%      General: Well developed, well nourished, in acute distress.  Alert and oriented. Fluent with normal language and speech. Face symmetric.      Data Reviewed:   Brain MRI WO (6/2014)  Comparison: MRI brain 01/14/2012  Ventricles and sulci are normal in size and configuration. No diffusion evidence of acute infarction. No extra axial collection, mass-effect, or edema. No intraparenchymal hemorrhage on the gradient echo sequence. The right cerebellar tonsil protrudes approximately 4 mm below the level of the foramen magnum, not meeting criteria for Chiari malformation. The sella appears unremarkable. The major intracranial flow voids at the skull base are normal in appearance. There is a small mucous retention cyst in the right maxillary sinus. Bone marrow signal is within normal limits.  IMPRESSION: No evidence of acute infarction or intracranial mass.     Lab results reviewed.  Pertinent findings include: CMP, CBC, TSH, all within normal limits (7/2022.      Brain MRI WO (9/2022) unremarkable. (Scans independently reviewed by  Dr. Lewis)  IMPRESSION: No acute intracranial abnormality. No acute infarct, mass effect or acute intracranial hemorrhage.  Comparison:  6/20/2014.  Findings:  Sulci, ventricles and basal cisterns are within normal limits for patient's stated age.  Minimal periventricular white matter change.  No mass-effect, intracranial hemorrhage, acute segmental infarct or extra-axial fluid collection is seen. Cerebellar tonsils are normal in position.  Expected signal voids are seen in the intracranial vessels at the skull base.  The orbits  and sella are unremarkable.   The paranasal sinuses and mastoid air cells are clear.    IMPRESSION/PLAN:  Andressa Baker is a very pleasant 43 y.o. woman seen initially in July 2022 for chronic severe headaches since her early 20's, worsening in the last few months. Neurological exam was normal. Brain MRI (2014 and 9/2022) were both unremarkable. There are no atypical features or symptoms suggestive of a serious underlying condition or secondary headache. In our opinion, she has chronic migraine exacerbated by hormonal changes and frequent use of acute medications (Excedrin and Rizatriptan). There may be some contribution from myofascial neck pain, but there is no evidence of significant cervical spine disease.    At this time, She will continue with Botox, Emgality, and nerve blocks for preventive therapy. She will continue with Nurtec and frovatriptan for acute therapy, and she has Trudhesa NS available for rescue therapy. See detailed recommendations below.    Diagnosis:  Chronic migraine without aura   Cervicalgia  Headache secondary to COVID vaccine booster - resolved     Evaluation:  Previously recommend considering a consultation with Dr. Pichardo at [facility].  No additional tests are needed at this time. If there are new symptoms or changes in the characteristics of the headaches, I will re-evaluate Andressa and consider if diagnostic tests are needed.      Treatment plan:     1. Healthy Habits: The following recommendations can greatly reduce the number and severity of headaches.  · Maintain regular sleep hours and get sufficient sleep (8-9 hours)  · Do not skip meals, especially breakfast  · Drink at least 64 oz or 8 cups of water daily - enough to urinate 5-6 times a day  · Get at least 30 minutes of daily aerobic exercise (enough to increase your heart rate and sweat)    Keep track of headaches and use of acute medication (prescription or over-the-counter) in a calendar or notebook and bring that to your clinic visits.    2. Acute Treatment:     Continue with Nurtec ODT 75 mg as needed at onset of moderate to severe headache. Maximum 1 tablet in 24 hours.     May use Cefaly as needed for acute headache treatment for up to 1 hour (acute mode).     May continue with frovatriptan 2.5 mg for moderate to severe headache. May repeat 1 tablet 2 hours later. Maximum 2 tablets in 24 hr. Limit to 2 days/week.     Third line acute or rescue treatment: TRUDHESA 1.45 mg (administered as one metered spray of 0.725 mg into each nostril). The dose may be repeated, if needed, a minimum of 1 hour after the first dose. Do not use more than 2 doses within a 24-hour period or 3 doses within 7 days. Do not use within 24 hours of triptan.  Directions: TRUDHESA is for nasal administration only. Assemble and prime (i.e., pumped 4 times) before use. Use TRUDHESA immediately after priming and then discard. [email or web address] [email or web address]-to-[email or web address]  - DO NOT TAKE WITHIN 24 HOURS OF FROVATRIPTAN.     Take Reglan 10 mg as needed for migraine related nausea or 15-30 min before the nasal spray to prevent nausea.    Future consideration: other triptans (naratriptan, almotriptan, or sumatriptan).    Lasmiditan or Sprix nasal spray can be tried for rescue treatment if needed in the future.     We may add an antinausea medication if needed for nausea or as co-adjuvant if monotherapy is not  sufficient.      3. Preventive Treatment:     Continue with Botox 195 units every 12 weeks.      Continue with Emgality monthly injections.     Lidocaine cranial nerve blocks every 6-8 weeks.    Recommend using Cefaly 20 minutes every evening for prevention (prevention mode).     Continue with magnesium glycinate 600 mg daily. This may help with constipation too.     Continue with Cymbalta 40 mg BID (for depression from psychiatrist) as Cymbalta may help as migraine preventive.    Future considerations: add riboflavin. Switch Emgality to Ajovy, Aimovig or Nurtec QOD. Previously discussed medical marijuana.    Other nutraceutical options include: riboflavin, melatonin, feverfew, and coenzyme Q10.     Other options for oral preventive medications include: amitriptyline or nortriptyline, zonisamide, rimegepant, valproate, verapamil, gabapentin, pregabalin, candesartan, Namenda, escitalopram, and levetiracetam. We want to avoid weight gain.    Several non-invasive neuromodulation devices (gammaCore, Cefaly and Nerivio) are available to treat headaches preventively and/or acutely. These are typically not covered by insurance, but the companies have a trial period and will refund you if the device is ineffective and returned within that period.     Follow-up in the [facility]ache [facility] for her next nerve block and Botox treatment visits.       We appreciate the opportunity to participate in the care of Andressa.     Please, do not hesitate to call our office at [phone] if you have any questions or concerns.       BEKA Up  [facility]    I spent 15 minutes on this date of service performing the following activities: obtaining history, performing examination, entering orders, documenting, preparing for visit, providing counseling and education and coordinating care. This was in addition to the time dedicated to the procedure.

## 2023-12-22 ENCOUNTER — OFFICE VISIT (OUTPATIENT)
Dept: FAMILY MEDICINE | Facility: CLINIC | Age: 43
End: 2023-12-22
Payer: COMMERCIAL

## 2023-12-22 VITALS
WEIGHT: 218.6 LBS | HEIGHT: 67 IN | TEMPERATURE: 97.2 F | DIASTOLIC BLOOD PRESSURE: 78 MMHG | OXYGEN SATURATION: 99 % | SYSTOLIC BLOOD PRESSURE: 122 MMHG | BODY MASS INDEX: 34.31 KG/M2 | HEART RATE: 75 BPM

## 2023-12-22 DIAGNOSIS — G43.711 INTRACTABLE CHRONIC MIGRAINE WITHOUT AURA AND WITH STATUS MIGRAINOSUS: ICD-10-CM

## 2023-12-22 DIAGNOSIS — Z51.81 THERAPEUTIC DRUG MONITORING: ICD-10-CM

## 2023-12-22 DIAGNOSIS — F51.01 PRIMARY INSOMNIA: Primary | ICD-10-CM

## 2023-12-22 DIAGNOSIS — B36.0 TINEA VERSICOLOR: ICD-10-CM

## 2023-12-22 DIAGNOSIS — R60.9 LIPEDEMA: ICD-10-CM

## 2023-12-22 DIAGNOSIS — E66.811 CLASS 1 OBESITY DUE TO EXCESS CALORIES WITH SERIOUS COMORBIDITY AND BODY MASS INDEX (BMI) OF 31.0 TO 31.9 IN ADULT: ICD-10-CM

## 2023-12-22 DIAGNOSIS — E66.09 CLASS 1 OBESITY DUE TO EXCESS CALORIES WITH SERIOUS COMORBIDITY AND BODY MASS INDEX (BMI) OF 31.0 TO 31.9 IN ADULT: ICD-10-CM

## 2023-12-22 PROCEDURE — 200200 PR NO CHARGE: Performed by: STUDENT IN AN ORGANIZED HEALTH CARE EDUCATION/TRAINING PROGRAM

## 2023-12-22 RX ORDER — CLOTRIMAZOLE AND BETAMETHASONE DIPROPIONATE 10; .64 MG/G; MG/G
CREAM TOPICAL 2 TIMES DAILY
Qty: 45 G | Refills: 1 | Status: SHIPPED | OUTPATIENT
Start: 2023-12-22 | End: 2023-12-27 | Stop reason: ALTCHOICE

## 2023-12-22 RX ORDER — AMOXICILLIN AND CLAVULANATE POTASSIUM 875; 125 MG/1; MG/1
TABLET, FILM COATED ORAL
COMMUNITY
Start: 2023-12-15 | End: 2023-12-27 | Stop reason: ALTCHOICE

## 2023-12-22 RX ORDER — TERBINAFINE HYDROCHLORIDE 250 MG/1
250 TABLET ORAL DAILY
Qty: 42 TABLET | Refills: 0 | Status: SHIPPED | OUTPATIENT
Start: 2023-12-22 | End: 2024-02-02

## 2023-12-22 ASSESSMENT — ENCOUNTER SYMPTOMS
APPETITE CHANGE: 0
HYPERACTIVE: 0
TROUBLE SWALLOWING: 0
DIARRHEA: 0
HEADACHES: 1
NERVOUS/ANXIOUS: 0
ARTHRALGIAS: 1
ACTIVITY CHANGE: 0
SLEEP DISTURBANCE: 0
COLOR CHANGE: 1
POLYPHAGIA: 0
CONSTIPATION: 1
TREMORS: 0
ABDOMINAL PAIN: 0
ROS GI COMMENTS: DENIES CHANGE IN BOWEL HABITS, MELENA, HEMATOCHEZIA, BLOATING, OR EARLY SATIETY
PALPITATIONS: 0
BRUISES/BLEEDS EASILY: 0
POLYDIPSIA: 0
ADENOPATHY: 0
UNEXPECTED WEIGHT CHANGE: 0
FATIGUE: 0
VOICE CHANGE: 0
ROS SKIN COMMENTS: DENIES NEW OR CHANGING MOLES

## 2023-12-22 ASSESSMENT — PATIENT HEALTH QUESTIONNAIRE - PHQ9: SUM OF ALL RESPONSES TO PHQ9 QUESTIONS 1 & 2: 0

## 2023-12-22 NOTE — ASSESSMENT & PLAN NOTE
Obesity is defined as a “chronic, relapsing, multi-factorial, neurobehavioral disease, wherein an increase in body fat promotes adipose tissue dysfunction and abnormal fat mass physical forces, resulting in adverse metabolic, biomechanical, and psychosocial health consequences.” obesity medicine association 2022  Given chronic and progressive nature of obesity, this will need a comprehensive approach including lifestyle modification, medication management, and continued follow-up with PCP and ancillary specialists as determined by the patient and treating physician.    Continue Zepbound  Follow-up with Dr. Vallejo  Check labs prior to next visit   RTO in 6 months for next headache/weight check     Patient Instructions   Intentionality: for sleep   Friday teen date night   Minimizing late nights chats -- rosary in prep for bed     Monthly:   Communion   Confession   Counselor

## 2023-12-22 NOTE — PATIENT INSTRUCTIONS
Intentionality: for sleep   Friday teen date night   Minimizing late nights chats -- rosary in prep for bed     Monthly:   Communion   Confession   Counselor

## 2023-12-22 NOTE — ASSESSMENT & PLAN NOTE
Chronic and controlled   Discussed that migraines will likely improve with improvements in sleep     RTO in 6 months for next headache/weight check

## 2023-12-22 NOTE — ASSESSMENT & PLAN NOTE
Chronic and borderline controlled   She is waiting for CPAP machine   Discussed strategies to improve sleep

## 2023-12-22 NOTE — PROGRESS NOTES
DETAILS OF VISIT   PCP: Renee Urbano DO   HISTORY OF PRESENT ILLNESS        Andressa Baker is a 43 y.o. female following up on lifestyle management and weight loss as adjunctive treatment strategies for lipedema and other health conditions.      Medications used to support lifestyle modifications: Zepbound     Visit recap:  Started Zepbound - lost 8 lbs in 3 weeks  Using Citrucel for constipation     Not using much trazadone for sleep, rather uses melatonin 5 mg    Has not gotten CPAP machine yet for mild ALEXIS  Has been sleeping later recently with children's sleep schedules     Reports tinea versicolor on her right arm, no improvement after using selsun blue shampoo    Still experiences frequent migraines 4-5 days a week but is controlled with medications     Participating in personal training 2-3x a week  Intense workouts and fun    Experiencing some knee pain but plans to adjust workouts to accomodate pain     Goal: Does not want unhealthy weight to stop from possibility of 6th pregnancy     Interval history:   Chart review done since last visit.    Reviewed notes, labs and imaging tabs and noted the following updates: None    PAST MEDICAL History        Past Medical History:   Diagnosis Date   • Abnormal ECG     PVCs   • Arrhythmia 02/2018    PVCs, status post ablation, partially successful.   • Back pain    • GERD (gastroesophageal reflux disease)    • Heartburn    • History of fetal demise, not currently pregnant     Twin pregnancy, 1 fetal demise, 1 live birth   • Joint pain    • Migraine headache    • Ovarian cyst    • Palpitations    • Pericarditis 03/06/2018    After john ablation   • Status migrainosus 1/23/2023       MEDICATIONS        Current Outpatient Medications on File Prior to Visit   Medication Sig Dispense Refill   • amoxicillin-pot clavulanate (AUGMENTIN) 875-125 mg per tablet TAKE 1 TABLET BY MOUTH TWICE A DAY UNTIL FINISHED     • atomoxetine (STRATTERA) 40 mg capsule Take 40  mg by mouth 2 (two) times a day.     • b complex vitamins capsule Take 1 capsule by mouth daily.     • botulinum toxin Type A (BOTOX)      • dihydroergotamine (TRUDHESA) 0.725 mg/pump act. (4 mg/mL) spray,non-aerosol Administer 1 spray into each nostril as needed (migraine). The dose may be repeated, if needed, a minimum of 1 hour after the first dose. Do not use more than 2 doses within a 24-hour period or 3 doses within 7 days. 4 mL 11   • docosahexaenoic acid-epa 120-180 mg capsule Take 1,000 mg by mouth daily.     • DULoxetine (CYMBALTA) 20 mg capsule Take 40 mg by mouth 2 (two) times a day.     • EMGALITY  mg/mL pen injector subcutaneous pen Inject 1 mL (120 mg total) under the skin every 30 (thirty) days. 1 mL 11   • ergocalciferol (VITAMIN D2) 50,000 unit(1250 mcg) capsule Take 50,000 Units by mouth once a week.     • frovatriptan (FROVA) 2.5 mg tablet Take 1 tablet (2.5 mg total) by mouth once as needed for migraine Indications: a migraine headache. 12 tablet 11   • herbal drugs (CALMME ORAL) Take by mouth daily.     • lamoTRIgine (LaMICtal) 25 mg tablet Take 1 tablet (25 mg total) by mouth daily. 90 tablet 3   • LORazepam (ATIVAN) 0.5 mg tablet Take 1 tablet (0.5 mg total) by mouth every 8 (eight) hours as needed for anxiety. 20 tablet 0   • metoclopramide (REGLAN) 10 mg tablet Take 1 tablet (10 mg total) by mouth 3 (three) times a day as needed (nausea). 20 tablet 2   • naproxen sodium (ALEVE ORAL) Take by mouth as needed.     • olopatadine (PATADAY) 0.2 % ophthalmic solution Administer 1 drop into affected eye(s) daily.     • rimegepant (NURTEC ODT) 75 mg tablet,disintegrating Take 1 tablet (75 mg total) by mouth as needed (migraine headache). Dissolve 1 tablet under the tongue. One dose per day as needed. 16 tablet 11   • tirzepatide (MOUNJARO) 2.5 mg/0.5 mL pen injector Inject 2.5 mg under the skin once a week. 2 mL 0   • traZODone (DESYREL) 50 mg tablet Take 25-50 mg by mouth nightly as needed.   "   • TURMERIC ORAL Take by mouth daily.     • selenium sulfide (SELSUN BLUE) 1 % shampoo Apply topically daily for 7 days. 207 mL 0     No current facility-administered medications on file prior to visit.       ALLERGIES        Patient has no known allergies.           REVIEW OF SYSTEMS      Review of Systems    Review of Systems   Constitutional: Negative for activity change, appetite change, fatigue and unexpected weight change.   HENT: Negative for trouble swallowing and voice change.    Cardiovascular: Negative for chest pain, palpitations and leg swelling.   Gastrointestinal: Positive for constipation. Negative for abdominal pain and diarrhea.        Denies Change in bowel habits, melena, hematochezia, bloating, or early satiety   Endocrine: Negative for cold intolerance, heat intolerance, polydipsia, polyphagia and polyuria.   Genitourinary:        Denies changes in breasts including nipple discharge, skin change, or new lumps on self exam    Musculoskeletal: Positive for arthralgias (knee).   Skin: Positive for color change. Negative for rash.        Denies new or changing moles   Allergic/Immunologic: Negative for environmental allergies, food allergies and immunocompromised state.   Neurological: Positive for headaches. Negative for tremors.   Hematological: Negative for adenopathy. Does not bruise/bleed easily.   Psychiatric/Behavioral: Negative for sleep disturbance. The patient is not nervous/anxious and is not hyperactive.         PHYSICAL EXAMINATION      Visit Vitals  /78 (BP Location: Left upper arm, Patient Position: Sitting)   Pulse 75   Temp 36.2 °C (97.2 °F) (Temporal)   Ht 1.702 m (5' 7\")   Wt 99.2 kg (218 lb 9.6 oz)   SpO2 99%   BMI 34.24 kg/m²      Body mass index is 34.24 kg/m².    BP Readings from Last 3 Encounters:   12/22/23 122/78   11/30/23 110/60   10/27/23 115/75     Wt Readings from Last 3 Encounters:   12/22/23 99.2 kg (218 lb 9.6 oz)   11/30/23 100 kg (220 lb 14.4 oz) " "  10/27/23 98.9 kg (218 lb)           10/27/2023    10:18 AM 11/30/2023    11:21 AM 12/22/2023     9:42 AM   Bariatric Review   Weight 218 lbs 220 lbs 14 oz 218 lbs 10 oz   Weight Change Since Last Visit 6 lbs 3 oz 0 lbs 2 oz 0 lbs 15 oz   Height 5' 7\" 5' 7\" 5' 7\"   BMI (Calculated) 34.2 34.7 34.3   Waist Circumference  43.5 in.    Percent Excess Weight Loss -161.43 Percent -163.59 Percent -161.87 Percent   Initial Excess Weight -3 lbs 3 oz -3 lbs 3 oz -3 lbs 3 oz   Ideal Body Weight (lb) 8 lbs 7 oz 8 lbs 7 oz 8 lbs 7 oz   Percent Ideal Body Wt. (%) 161.48 Percent 163.63 Percent 161.93 Percent   Excess Body Weight (lb) 5 lbs 3 oz 5 lbs 6 oz 5 lbs 4 oz       Physical Exam  Constitutional:       Appearance: Normal appearance. She is not ill-appearing or diaphoretic.   HENT:      Head: Normocephalic and atraumatic.      Nose: Nose normal.      Mouth/Throat:      Mouth: Mucous membranes are moist.   Eyes:      General: No scleral icterus.        Right eye: No discharge.         Left eye: No discharge.      Extraocular Movements: Extraocular movements intact.      Conjunctiva/sclera: Conjunctivae normal.   Pulmonary:      Effort: Pulmonary effort is normal. No respiratory distress.      Breath sounds: No stridor.      Comments: Speaking in full sentences  Skin:     Capillary Refill: Capillary refill takes less than 2 seconds.      Coloration: Skin is not jaundiced or pale.      Findings: No rash.   Neurological:      General: No focal deficit present.      Mental Status: She is alert and oriented to person, place, and time.   Psychiatric:         Mood and Affect: Mood normal.         Behavior: Behavior normal.           LABS / IMAGING / EKG        Reviewed the following Labs:    Lab Results   Component Value Date    HGBA1C 5.1 07/12/2022    HGBA1C 5.0 08/14/2021    HGBA1C 5.2 08/22/2020     Lab Results   Component Value Date    CHOL 208 (H) 07/12/2022    CHOL 183 08/22/2020    CHOL 196 01/29/2016     Lab Results " "  Component Value Date    HDL 50 (L) 07/12/2022    HDL 67 08/22/2020    HDL 67 01/29/2016     Lab Results   Component Value Date    LDLCALC 126 (H) 07/12/2022    LDLCALC 108 (H) 08/22/2020    LDLCALC 117 (H) 01/29/2016     Lab Results   Component Value Date    TRIG 159 (H) 07/12/2022    TRIG 40 08/22/2020    TRIG 58 01/29/2016     No results found for: \"CHOLHDL\"      ASSESSMENT AND PLAN         Diagnoses and all orders for this visit:    Primary insomnia (Primary)  Assessment & Plan:  Chronic and borderline controlled   She is waiting for CPAP machine   Discussed strategies to improve sleep         Intractable chronic migraine without aura and with status migrainosus  Assessment & Plan:  Chronic and controlled   Discussed that migraines will likely improve with improvements in sleep     RTO in 6 months for next headache/weight check      Class 1 obesity due to excess calories with serious comorbidity and body mass index (BMI) of 31.0 to 31.9 in adult  Assessment & Plan:  Obesity is defined as a “chronic, relapsing, multi-factorial, neurobehavioral disease, wherein an increase in body fat promotes adipose tissue dysfunction and abnormal fat mass physical forces, resulting in adverse metabolic, biomechanical, and psychosocial health consequences.” obesity medicine association 2022  Given chronic and progressive nature of obesity, this will need a comprehensive approach including lifestyle modification, medication management, and continued follow-up with PCP and ancillary specialists as determined by the patient and treating physician.    Continue Zepbound  Follow-up with Dr. Vallejo  Check labs prior to next visit   RTO in 6 months for next headache/weight check     Patient Instructions   Intentionality: for sleep   Friday teen date night   Minimizing late nights chats -- rosary in prep for bed     Monthly:   Communion   Confession   Counselor         Orders:  -     Comprehensive metabolic panel; " Future    Lipedema    Tinea versicolor  Assessment & Plan:  Chronic without improvement with selsun blue   Prescribed terbinafine 250 mg tablet and clotrimazole-betamethasone 1-0.05 % cream  Instructed on usage     Orders:  -     terbinafine (LamiSIL) 250 mg tablet; Take 1 tablet (250 mg total) by mouth daily.  -     clotrimazole-betamethasone (LOTRISONE) 1-0.05 % cream; Apply topically 2 (two) times a day.  -     Comprehensive metabolic panel; Future    Therapeutic drug monitoring  -     Comprehensive metabolic panel; Future       By signing my name below, I, Catia Grayson, attest that this documentation has been prepared under the direction and in the presence of Renee Brown DO      12/22/2023 10:49 AM  Catia Grayson     This note was completed in part utilizing a voice recognition software and in part with a scribe. Grammatical errors, random word insertion, spelling mistakes, and incomplete sentences may be an occasional consequence of the system secondary to software limitations, ambient noise and hardware issues. Please read the chart carefully and recognize, using context, where substitutions have occurred. If you have any questions or concerns about the context, text or information contained within the body of this dictation, please contact me, the provider, for further clarification.      Renee Brown D.O.  Family Medicine at [facility] 12/22/23

## 2023-12-22 NOTE — ASSESSMENT & PLAN NOTE
Chronic without improvement with selsun blue   Prescribed terbinafine 250 mg tablet and clotrimazole-betamethasone 1-0.05 % cream  Instructed on usage

## 2023-12-27 ENCOUNTER — OFFICE VISIT (OUTPATIENT)
Dept: NEUROLOGY | Facility: CLINIC | Age: 43
End: 2023-12-27
Attending: PSYCHIATRY & NEUROLOGY
Payer: COMMERCIAL

## 2023-12-27 VITALS
OXYGEN SATURATION: 98 % | BODY MASS INDEX: 34.21 KG/M2 | HEART RATE: 84 BPM | HEIGHT: 67 IN | RESPIRATION RATE: 16 BRPM | WEIGHT: 218 LBS | SYSTOLIC BLOOD PRESSURE: 120 MMHG | DIASTOLIC BLOOD PRESSURE: 76 MMHG

## 2023-12-27 DIAGNOSIS — G43.711 INTRACTABLE CHRONIC MIGRAINE WITHOUT AURA AND WITH STATUS MIGRAINOSUS: ICD-10-CM

## 2023-12-27 PROCEDURE — 99212 OFFICE O/P EST SF 10 MIN: CPT | Mod: 25 | Performed by: NURSE PRACTITIONER

## 2023-12-27 PROCEDURE — 64615 CHEMODENERV MUSC MIGRAINE: CPT | Performed by: NURSE PRACTITIONER

## 2023-12-27 PROCEDURE — 3008F BODY MASS INDEX DOCD: CPT | Performed by: NURSE PRACTITIONER

## 2023-12-27 NOTE — PROCEDURES
Procedures    Procedure Note for Botox Injections for Headache:    Indication for procedure:    Diagnosis Plan   1. Intractable chronic migraine without aura and with status migrainosus  [facility] Botulinum Toxin Injection Appointment Request    onabotulinumtoxinA (BOTOX) 200 unit injection 200 Units    Injection          I explained the risks and benefits and obtained consent from Andressa.  Onabotulinumtoxin A 200 U were diluted in 4 mL of saline.    Lot number and expiration date documented in informed consent and MAR.  I performed a time-out, including 2 unique patient identifiers with Andressa.  Using a 30 gauge 1/2 inch needle, I injected 0.1mL = 5 U into each site according to the Chronic Migraine Protocol (PREEMPT Studies).   The following table reports the number of sites injected in each muscle.     Muscle Midline Right Left   Procerus 1          1 1   Frontalis   2 2   Temporalis   4 (10+5+5+5 U) 4 (10+5+5+5 U)   Occipitalis   3 (10+5+5 U) 3 (10+5+5 U)   Cervical Paraspinal   2 2   Trapezius   3 (10+10+5 U) 3 (10+10+5 U)   Other:            195 Units were injected and 5 Units were wasted.     Andressa tolerated the procedure well, without complications.      We appreciate the opportunity to participate in the care of Andressarahul Baker.     Please, do not hesitate to call me at [phone] if you have any questions or concerns.      [facility]  [facility]

## 2024-01-25 PROCEDURE — 82274 ASSAY TEST FOR BLOOD FECAL: CPT | Performed by: STUDENT IN AN ORGANIZED HEALTH CARE EDUCATION/TRAINING PROGRAM

## 2024-02-01 RX ORDER — METOCLOPRAMIDE 10 MG/1
TABLET ORAL
Qty: 20 TABLET | Refills: 2 | Status: SHIPPED | OUTPATIENT
Start: 2024-02-01 | End: 2024-07-02

## 2024-02-01 NOTE — TELEPHONE ENCOUNTER
Medicine Refill Request    Last Office Visit: 12/27/2023  *Edyta Zamora CRNP*  Last Telemedicine Visit: 9/23/2022 *Edyta Zamora CRNP*    Next Office Visit: 6/12/2024  Next Telemedicine Visit: Visit date not found        Take Reglan 10 mg as needed for migraine related nausea or 15-30 min before the nasal spray to prevent nausea.

## 2024-02-05 ENCOUNTER — OFFICE VISIT (OUTPATIENT)
Dept: NEUROLOGY | Facility: CLINIC | Age: 44
End: 2024-02-05
Payer: COMMERCIAL

## 2024-02-05 VITALS
HEIGHT: 67 IN | WEIGHT: 210 LBS | RESPIRATION RATE: 16 BRPM | HEART RATE: 86 BPM | BODY MASS INDEX: 32.96 KG/M2 | OXYGEN SATURATION: 99 % | DIASTOLIC BLOOD PRESSURE: 70 MMHG | SYSTOLIC BLOOD PRESSURE: 124 MMHG

## 2024-02-05 DIAGNOSIS — G89.29 OTHER CHRONIC PAIN: Primary | ICD-10-CM

## 2024-02-05 PROCEDURE — 3008F BODY MASS INDEX DOCD: CPT | Performed by: PSYCHIATRY & NEUROLOGY

## 2024-02-05 PROCEDURE — 64405 NJX AA&/STRD GR OCPL NRV: CPT | Mod: 50 | Performed by: PSYCHIATRY & NEUROLOGY

## 2024-02-05 PROCEDURE — 64400 NJX AA&/STRD TRIGEMINAL NRV: CPT | Mod: XU,LT | Performed by: PSYCHIATRY & NEUROLOGY

## 2024-02-05 PROCEDURE — 64450 NJX AA&/STRD OTHER PN/BRANCH: CPT | Mod: 50 | Performed by: PSYCHIATRY & NEUROLOGY

## 2024-02-05 PROCEDURE — 99215 OFFICE O/P EST HI 40 MIN: CPT | Mod: 25 | Performed by: PSYCHIATRY & NEUROLOGY

## 2024-02-05 RX ORDER — LIDOCAINE HYDROCHLORIDE 20 MG/ML
9 INJECTION, SOLUTION INFILTRATION; PERINEURAL ONCE
Status: COMPLETED | OUTPATIENT
Start: 2024-02-05 | End: 2024-02-05

## 2024-02-05 RX ORDER — ONDANSETRON 4 MG/1
4 TABLET, FILM COATED ORAL EVERY 8 HOURS PRN
COMMUNITY
Start: 2024-01-19

## 2024-02-05 RX ADMIN — LIDOCAINE HYDROCHLORIDE 9 ML: 20 INJECTION, SOLUTION INFILTRATION; PERINEURAL at 10:24

## 2024-02-05 NOTE — PROCEDURES
Procedures  Nerve Blocks and Trigger Points Procedure Note:  Indication:    Diagnosis Plan   1. Other chronic pain  lidocaine (XYLOCAINE) 20 mg/mL (2 %) injection 9 mL           I explained the risks and benefits and obtained written consent from Andressa Baker. Signed consent form will be scanned to patient's electronic medical records.     Lidocaine 2% without epinephrine was drawn in a sterile syringe.     Lot number and expiration date documented in informed consent.     I performed a time-out, including 2 unique patient identifiers.     I located the areas of maximal tenderness corresponding to each nerve or trigger point, and cleaned with alcohol swabs.     I used a 30 gauge 1/2 inch needle to inject lidocaine over the following:        Right Greater Occipital Nerve 2 cc   Left Greater Occipital Nerve 2 cc   Right Lesser Occipital Nerve 1 cc   Left Lesser Occipital Nerve 1 cc     Right Auriculotemporal Nerve 1 cc    Left Auriculotemporal Nerve 1 cc    Right Supraorbital Nerve 0.25 cc   Left Supraorbital Nerve 0.25 cc   Right Supratrochlear Nerve 0.25 cc   Left Supratrochlear Nerve 0.25 cc     Total of 9 cc injected.     The procedure was well tolerated and there were no complications.         Theresa Lewis MD  [facility]  [phone]

## 2024-02-05 NOTE — PATIENT INSTRUCTIONS
Treatment plan:     1. Healthy Habits: The following recommendations can greatly reduce the number and severity of headaches.  Maintain regular sleep hours and get sufficient sleep (8-9 hours)  Do not skip meals, especially breakfast  Drink at least 64 oz or 8 cups of water daily - enough to urinate 5-6 times a day  Get at least 30 minutes of daily aerobic exercise (enough to increase your heart rate and sweat)    Keep track of headaches and use of acute medication (prescription or over-the-counter) in a calendar or notebook and bring that to your clinic visits.    2. Acute Treatment:     Continue with Nurtec ODT 75 mg as needed at onset of moderate to severe headache. Maximum 1 tablet in 24 hours.     May use Cefaly as needed for acute headache treatment for up to 1 hour (acute mode).     May continue with frovatriptan 2.5 mg for moderate to severe headache. May repeat 1 tablet 2 hours later. Maximum 2 tablets in 24 hr. Limit to 2 days/week.     Third line acute or rescue treatment: TRUDHESA 1.45 mg (administered as one metered spray of 0.725 mg into each nostril). The dose may be repeated, if needed, a minimum of 1 hour after the first dose. Do not use more than 2 doses within a 24-hour period or 3 doses within 7 days. Do not use within 24 hours of triptan.  Directions: TRUDHESA is for nasal administration only. Assemble and prime (i.e., pumped 4 times) before use. Use TRUDHESA immediately after priming and then discard. https://[email or web address].com/how-to-use/  - DO NOT TAKE WITHIN 24 HOURS OF FROVATRIPTAN.     Take Reglan 10 mg as needed for migraine related nausea or 15-30 min before the nasal spray to prevent nausea.    Future consideration: other triptans (naratriptan, almotriptan, or sumatriptan).    Lasmiditan or Sprix nasal spray can be tried for rescue treatment if needed in the future.     We may add an antinausea medication if needed for nausea or as co-adjuvant if monotherapy is not sufficient.      3.  Preventive Treatment:     Continue with Botox 195 units every 12 weeks.      Continue with Emgality monthly injections.     Lidocaine cranial nerve blocks every 6-8 weeks.     Recommend using Cefaly 20 minutes every evening for prevention (prevention mode).     Continue with magnesium citrate and glycinate 600 mg daily. This may help with constipation too.     Continue with Cymbalta 40 mg BID (for depression from psychiatrist) as Cymbalta may help as migraine preventive.    Future considerations: add riboflavin. Switch Emgality to Ajovy, Aimovig or Nurtec QOD. Previously discussed medical marijuana.    Other nutraceutical options include: riboflavin, melatonin, feverfew, and coenzyme Q10.     Other options for oral preventive medications include: amitriptyline or nortriptyline, zonisamide, rimegepant, valproate, verapamil, gabapentin, pregabalin, candesartan, Namenda, escitalopram, and levetiracetam. We want to avoid weight gain.    Several non-invasive neuromodulation devices (gammaCore, Cefaly and Nerivio) are available to treat headaches preventively and/or acutely. These are typically not covered by insurance, but the companies have a trial period and will refund you if the device is ineffective and returned within that period.     Follow-up in the Headache Clinic as scheduled for the next nerve block and Botox treatment visits.  Schedule NB in about 12 weeks.

## 2024-02-05 NOTE — LETTER
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 and now it is not available (generic also works, but headache reoccurs 6-8 hours).  Excedrin - partial benefit  Tylenol - no benefit  Ibuprofen - similar to Excedrin  Naproxen - similar to Excedrin, currently taking Aleve at bedtime with partial benefit  Ubrelvy 100 mg - works well most of the times. No SE. Not recently. Nurtec has been better.  Eletriptan for rescue usually helps. No SE.  Frovatriptan works better than other triptans. No SE.   Nurtec helps but not consistently. No SE.   Dexamethasone 4 mg - does not tolerate (last 9/2022) 2 mg was well tolerated (12/2022).  Prednisone course for 6 days starting at 40 mg - helped and was well tolerated (1/6/2022)  Lidocaine Nerve Blocks (9/2022, 12/2022) worked very well in September, but headache reoccurred in Dec.  Zavzpret NS did not work.  IV medications/Hospitalizations:  IV Infusion (9/2022) no sustained benefit, but the headache was related to the COVID booster.  Non-Pharmacologic:  Massage  Chiropractor  CBT    MEDICATIONS AT START OF VISIT:    Current Outpatient Medications:   •  atomoxetine (STRATTERA) 40 mg capsule, Take 40 mg by mouth 2 (two) times a day., Disp: , Rfl:   •   b complex vitamins capsule, Take 1 capsule by mouth daily., Disp: , Rfl:   •  botulinum toxin Type A (BOTOX), , Disp: , Rfl:   •  dihydroergotamine (TRUDHESA) 0.725 mg/pump act. (4 mg/mL) spray,non-aerosol, Administer 1 spray into each nostril as needed (migraine). The dose may be repeated, if needed, a minimum of 1 hour after the first dose. Do not use more than 2 doses within a 24-hour period or 3 doses within 7 days., Disp: 4 mL, Rfl: 11  •  docosahexaenoic acid-epa 120-180 mg capsule, Take 1,000 mg by mouth daily., Disp: , Rfl:   •  DULoxetine (CYMBALTA) 20 mg capsule, Take 40 mg by mouth 2 (two) times a day., Disp: , Rfl:   •  EMGALITY  mg/mL pen injector subcutaneous pen, Inject 1 mL (120 mg total) under the skin every 30 (thirty) days., Disp: 1 mL, Rfl: 11  •  ergocalciferol (VITAMIN D2) 50,000 unit(1250 mcg) capsule, Take 50,000 Units by mouth once a week., Disp: , Rfl:   •  frovatriptan (FROVA) 2.5 mg tablet, Take 1 tablet (2.5 mg total) by mouth once as needed for migraine Indications: a migraine headache., Disp: 12 tablet, Rfl: 11  •  herbal drugs (CALMME ORAL), Take by mouth daily., Disp: , Rfl:   •  LORazepam (ATIVAN) 0.5 mg tablet, Take 1 tablet (0.5 mg total) by mouth every 8 (eight) hours as needed for anxiety., Disp: 20 tablet, Rfl: 0  •  metoclopramide (REGLAN) 10 mg tablet, TAKE 1 TABLET (10 MG TOTAL) BY MOUTH 3 TIMES A DAY AS NEEDED FOR NAUSEA, Disp: 20 tablet, Rfl: 2  •  naproxen sodium (ALEVE ORAL), Take by mouth as needed., Disp: , Rfl:   •  olopatadine (PATADAY) 0.2 % ophthalmic solution, Administer 1 drop into affected eye(s) daily., Disp: , Rfl:   •  ondansetron (ZOFRAN) 4 mg tablet, Take 4 mg by mouth every 8 (eight) hours as needed., Disp: , Rfl:   •  rimegepant (NURTEC ODT) 75 mg tablet,disintegrating, Take 1 tablet (75 mg total) by mouth as needed (migraine headache). Dissolve 1 tablet under the tongue. One dose per day as needed., Disp: 16 tablet, Rfl: 11  •  tirzepatide  (MOUNJARO) 2.5 mg/0.5 mL pen injector, Inject 2.5 mg under the skin once a week., Disp: 2 mL, Rfl: 0  •  TURMERIC ORAL, Take by mouth daily., Disp: , Rfl:   •  lamoTRIgine (LaMICtal) 25 mg tablet, Take 1 tablet (25 mg total) by mouth daily., Disp: 90 tablet, Rfl: 3  •  selenium sulfide (SELSUN BLUE) 1 % shampoo, Apply topically daily for 7 days., Disp: 207 mL, Rfl: 0  •  traZODone (DESYREL) 50 mg tablet, Take 25-50 mg by mouth nightly as needed., Disp: , Rfl:     ALLERGIES: has No Known Allergies.     REVIEW OF SYSTEMS: As discussed above. Otherwise, all other ROS were reviewed and negative.    PAST MEDICAL HISTORY:  has a past medical history of Abnormal ECG, Arrhythmia (2018), Back pain, GERD (gastroesophageal reflux disease), Heartburn, History of fetal demise, not currently pregnant, Joint pain, Migraine headache, Ovarian cyst, Palpitations, Pericarditis (2018), and Status migrainosus (2023).    PAST SURGICAL HISTORY:  has a past surgical history that includes Ablation of dysrhythmic focus (2018);  section; Knee arthroscopy w/ meniscal repair (Right); and [place] tooth extraction.    FAMILY HISTORY: family history includes Clotting disorder in her maternal grandfather; Colon cancer in her mother's brother; Dementia in her biological father and paternal grandmother; Diabetes in her paternal grandfather; Hyperlipidemia in her biological father, biological mother, maternal grandmother, and paternal grandmother; Hypertension in her biological father; No Known Problems in her biological child; Other in her biological father; Prostate cancer (age of onset: 78) in her biological father.   Migraine in her mother (Rizatriptan worked for her), one brother, and probably in her father too.    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    She has 5 children, last one born in 2019.     Andressa is considering a future pregnancy, but is not trying to conceive at this time, and wants to wait until her headaches are well controlled. I have discussed with her the possible teratogenic effects of some medications and she verbalized understanding.    PHYSICAL EXAMINATION:    Vitals:    02/05/24 0923   BP: 124/70   Pulse: 86   Resp: 16   SpO2: 99%      General: Well developed, well nourished, in acute distress.  Alert and oriented. Fluent with normal language and speech. Face symmetric. Fundi normal bilaterally. PERRLA.      Data Reviewed:   Brain MRI WO (6/2014)  Comparison: MRI brain 01/14/2012  Ventricles and sulci are normal in size and configuration. No diffusion evidence of acute infarction. No extra axial collection, mass-effect, or edema. No intraparenchymal hemorrhage on the gradient echo sequence. The right cerebellar tonsil protrudes approximately 4 mm below the level of the foramen magnum, not meeting criteria for Chiari malformation. The sella appears unremarkable. The major intracranial flow voids at the skull base are normal in appearance. There is a small mucous retention cyst in the right maxillary sinus. Bone marrow signal is within normal limits.  IMPRESSION: No evidence of acute infarction or intracranial mass.     Brain MRI WO (9/2022) unremarkable. (Scans independently reviewed by Dr. Lewis)  IMPRESSION: No acute intracranial abnormality. No acute infarct, mass effect or acute intracranial hemorrhage.  Comparison:  6/20/2014.  Findings:  Sulci, ventricles and basal cisterns are within normal limits for patient's stated age.  Minimal periventricular white matter change.  No mass-effect, intracranial hemorrhage, acute segmental infarct or extra-axial fluid collection is seen. Cerebellar tonsils  "are normal in position.  Expected signal voids are seen in the intracranial vessels at the skull base.  The orbits  and sella are unremarkable.   The paranasal sinuses and mastoid air cells are clear.    Lab results reviewed.  Pertinent findings include: CMP, CBC, TSH, all within normal limits (7/2022) Normal CMP (7/2023) Plans to have this repeated this month.     ECG 03/16/2023: Done at Dr. Jane' office per her note \"I personally reviewed her 12-lead EKG performed today which reveals normal sinus rhythm at 79 bpm\" Planned visit with Dr. Jane scheduled in [place] 2024.    IMPRESSION/PLAN:  Andressa Baker is a very pleasant 43 y.o. woman seen initially in July 2022 for chronic severe headaches since her early 20's, worsening in the last few months. Neurological exam was normal. Brain MRI (2014 and 9/2022) were both unremarkable. There are no atypical features or symptoms suggestive of a serious underlying condition or secondary headache. In our opinion, she has chronic migraine exacerbated by hormonal changes and frequent use of acute medications (Excedrin and Rizatriptan). There may be some contribution from myofascial neck pain, but there is no evidence of significant cervical spine disease.    Today, I have repeated her lidocaine blocks and we discussed the possibility of doing acute infusions at [facility] in [place] if needed in the future. I recommend continuing with Botox, Emgality, and nerve blocks for preventive therapy. She is also on Cymbalta from her psychiatrist. She will continue with Nurtec and frovatriptan for acute therapy, and she has Trudhesa NS available for rescue therapy. See detailed recommendations below.    Diagnosis:  Chronic migraine without aura   Cervicalgia  Headache secondary to COVID vaccine booster - resolved     Evaluation:  No additional tests are needed at this time. If there are new symptoms or changes in the characteristics of the headaches, I " will re-evaluate Andressa and consider if diagnostic tests are needed.  Previously recommend considering a consultation with Dr. Pichardo at [facility].     Treatment plan:     1. Healthy Habits: The following recommendations can greatly reduce the number and severity of headaches.  · Maintain regular sleep hours and get sufficient sleep (8-9 hours)  · Do not skip meals, especially breakfast  · Drink at least 64 oz or 8 cups of water daily - enough to urinate 5-6 times a day  · Get at least 30 minutes of daily aerobic exercise (enough to increase your heart rate and sweat)    Keep track of headaches and use of acute medication (prescription or over-the-counter) in a calendar or notebook and bring that to your clinic visits.    2. Acute Treatment:     Continue with Nurtec ODT 75 mg as needed at onset of moderate to severe headache. Maximum 1 tablet in 24 hours.     May use Cefaly as needed for acute headache treatment for up to 1 hour (acute mode).     May continue with frovatriptan 2.5 mg for moderate to severe headache. May repeat 1 tablet 2 hours later. Maximum 2 tablets in 24 hr. Limit to 2 days/week.     Third line acute or rescue treatment: TRUDHESA 1.45 mg (administered as one metered spray of 0.725 mg into each nostril). The dose may be repeated, if needed, a minimum of 1 hour after the first dose. Do not use more than 2 doses within a 24-hour period or 3 doses within 7 days. Do not use within 24 hours of triptan.  Directions: TRUDHESA is for nasal administration only. Assemble and prime (i.e., pumped 4 times) before use. Use TRUDHESA immediately after priming and then discard. https://www.trudhesahcp.com/how-to-use/  - DO NOT TAKE WITHIN 24 HOURS OF FROVATRIPTAN.     Take Reglan 10 mg as needed for migraine related nausea or 15-30 min before the nasal spray to prevent nausea.    Future consideration: other triptans (naratriptan, almotriptan, or sumatriptan).    Lasmiditan or Sprix nasal spray  can be tried for rescue treatment if needed in the future.     We may add an antinausea medication if needed for nausea or as co-adjuvant if monotherapy is not sufficient.      3. Preventive Treatment:     Continue with Botox 195 units every 12 weeks.      Continue with Emgality monthly injections.     Lidocaine cranial nerve blocks every 6-8 weeks.     Recommend using Cefaly 20 minutes every evening for prevention (prevention mode).     Continue with magnesium citrate and glycinate 600 mg daily. This may help with constipation too.     Continue with Cymbalta 40 mg BID (for depression from psychiatrist) as Cymbalta may help as migraine preventive.    Future considerations: add riboflavin. Switch Emgality to Ajovy, Aimovig or Nurtec QOD. Previously discussed medical marijuana.    Other nutraceutical options include: riboflavin, melatonin, feverfew, and coenzyme Q10.     Other options for oral preventive medications include: amitriptyline or nortriptyline, zonisamide, rimegepant, valproate, verapamil, gabapentin, pregabalin, candesartan, Namenda, escitalopram, and levetiracetam. We want to avoid weight gain.    Several non-invasive neuromodulation devices (gammaCore, Cefaly and Nerivio) are available to treat headaches preventively and/or acutely. These are typically not covered by insurance, but the companies have a trial period and will refund you if the device is ineffective and returned within that period.     Follow-up in the Headache Clinic as scheduled for the next nerve block and Botox treatment visits.  Schedule NB in about 12 weeks.     We appreciate the opportunity to participate in the care of Andressa.     Please, do not hesitate to call our office at [phone] if you have any questions or concerns.       Theresa Lewis MD  [facility]  [phone]        I spent 41 minutes on this date of service performing the following activities: obtaining history, performing examination, entering orders,  documenting, preparing for visit, obtaining / reviewing records, providing counseling and education and coordinating care. This was in addition to the time dedicated to the procedure. We also prepared her records in case she needs to come in for acute IV infusion treatment in the future.     Theresa Dalton MD  2/5/2024 12:23 PM  Signed  Procedures  Nerve Blocks and Trigger Points Procedure Note:  Indication:    Diagnosis Plan   1. Other chronic pain  lidocaine (XYLOCAINE) 20 mg/mL (2 %) injection 9 mL           I explained the risks and benefits and obtained written consent from Andressa Baker. Signed consent form will be scanned to patient's electronic medical records.     Lidocaine 2% without epinephrine was drawn in a sterile syringe.     Lot number and expiration date documented in informed consent.     I performed a time-out, including 2 unique patient identifiers.     I located the areas of maximal tenderness corresponding to each nerve or trigger point, and cleaned with alcohol swabs.     I used a 30 gauge 1/2 inch needle to inject lidocaine over the following:        Right Greater Occipital Nerve 2 cc   Left Greater Occipital Nerve 2 cc   Right Lesser Occipital Nerve 1 cc   Left Lesser Occipital Nerve 1 cc     Right Auriculotemporal Nerve 1 cc    Left Auriculotemporal Nerve 1 cc    Right Supraorbital Nerve 0.25 cc   Left Supraorbital Nerve 0.25 cc   Right Supratrochlear Nerve 0.25 cc   Left Supratrochlear Nerve 0.25 cc     Total of 9 cc injected.     The procedure was well tolerated and there were no complications.         Theresa Lewis MD  [facility]  [phone]

## 2024-02-05 NOTE — PROGRESS NOTES
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 consistently. No SE.   Dexamethasone 4 mg - does not tolerate (last 9/2022) 2 mg was well tolerated (12/2022).  Prednisone course for 6 days starting at 40 mg - helped and was well tolerated (1/6/2022)  Lidocaine Nerve Blocks (9/2022, 12/2022) worked very well in September, but headache reoccurred in Dec.  Zavzpret NS did not work.  IV medications/Hospitalizations:  IV Infusion (9/2022) no sustained benefit, but the headache was related to the COVID booster.  Non-Pharmacologic:  Massage  Chiropractor  CBT    MEDICATIONS AT START OF VISIT:    Current Outpatient Medications:   •  atomoxetine (STRATTERA) 40 mg capsule, Take 40 mg by mouth 2 (two) times a day., Disp: , Rfl:   •  b complex vitamins capsule, Take 1 capsule by mouth daily., Disp: , Rfl:   •  botulinum toxin Type A (BOTOX), , Disp: , Rfl:   •  dihydroergotamine (TRUDHESA) 0.725 mg/pump act. (4 mg/mL) spray,non-aerosol, Administer 1 spray into each nostril as needed (migraine). The dose may be repeated, if needed, a minimum of 1 hour after the first dose. Do not use more than 2 doses within a 24-hour period or 3 doses within 7 days., Disp: 4 mL, Rfl: 11  •  docosahexaenoic acid-epa 120-180 mg capsule, Take 1,000 mg by mouth daily., Disp: , Rfl:   •  DULoxetine (CYMBALTA) 20 mg capsule, Take 40  mg by mouth 2 (two) times a day., Disp: , Rfl:   •  EMGALITY  mg/mL pen injector subcutaneous pen, Inject 1 mL (120 mg total) under the skin every 30 (thirty) days., Disp: 1 mL, Rfl: 11  •  ergocalciferol (VITAMIN D2) 50,000 unit(1250 mcg) capsule, Take 50,000 Units by mouth once a week., Disp: , Rfl:   •  frovatriptan (FROVA) 2.5 mg tablet, Take 1 tablet (2.5 mg total) by mouth once as needed for migraine Indications: a migraine headache., Disp: 12 tablet, Rfl: 11  •  herbal drugs (CALMME ORAL), Take by mouth daily., Disp: , Rfl:   •  LORazepam (ATIVAN) 0.5 mg tablet, Take 1 tablet (0.5 mg total) by mouth every 8 (eight) hours as needed for anxiety., Disp: 20 tablet, Rfl: 0  •  metoclopramide (REGLAN) 10 mg tablet, TAKE 1 TABLET (10 MG TOTAL) BY MOUTH 3 TIMES A DAY AS NEEDED FOR NAUSEA, Disp: 20 tablet, Rfl: 2  •  naproxen sodium (ALEVE ORAL), Take by mouth as needed., Disp: , Rfl:   •  olopatadine (PATADAY) 0.2 % ophthalmic solution, Administer 1 drop into affected eye(s) daily., Disp: , Rfl:   •  ondansetron (ZOFRAN) 4 mg tablet, Take 4 mg by mouth every 8 (eight) hours as needed., Disp: , Rfl:   •  rimegepant (NURTEC ODT) 75 mg tablet,disintegrating, Take 1 tablet (75 mg total) by mouth as needed (migraine headache). Dissolve 1 tablet under the tongue. One dose per day as needed., Disp: 16 tablet, Rfl: 11  •  tirzepatide (MOUNJARO) 2.5 mg/0.5 mL pen injector, Inject 2.5 mg under the skin once a week., Disp: 2 mL, Rfl: 0  •  TURMERIC ORAL, Take by mouth daily., Disp: , Rfl:   •  lamoTRIgine (LaMICtal) 25 mg tablet, Take 1 tablet (25 mg total) by mouth daily., Disp: 90 tablet, Rfl: 3  •  selenium sulfide (SELSUN BLUE) 1 % shampoo, Apply topically daily for 7 days., Disp: 207 mL, Rfl: 0  •  traZODone (DESYREL) 50 mg tablet, Take 25-50 mg by mouth nightly as needed., Disp: , Rfl:     ALLERGIES: has No Known Allergies.     REVIEW OF SYSTEMS: As discussed above. Otherwise, all other ROS were reviewed and  negative.    PAST MEDICAL HISTORY:  has a past medical history of Abnormal ECG, Arrhythmia (2018), Back pain, GERD (gastroesophageal reflux disease), Heartburn, History of fetal demise, not currently pregnant, Joint pain, Migraine headache, Ovarian cyst, Palpitations, Pericarditis (2018), and Status migrainosus (2023).    PAST SURGICAL HISTORY:  has a past surgical history that includes Ablation of dysrhythmic focus (2018);  section; Knee arthroscopy w/ meniscal repair (Right); and Cla[place] tooth extraction.    FAMILY HISTORY: family history includes Clotting disorder in her maternal grandfather; Colon cancer in her mother's brother; Dementia in her biological father and paternal grandmother; Diabetes in her paternal grandfather; Hyperlipidemia in her biological father, biological mother, maternal grandmother, and paternal grandmother; Hypertension in her biological father; No Known Problems in her biological child; Other in her biological father; Prostate cancer (age of onset: 78) in her biological father.   Migraine in her mother (Rizatriptan worked for her), one brother, and probably in her father too.    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    She has 5 children, last one born in 2019.     Andressa is considering a future pregnancy, but is not trying to conceive at this time, and wants to wait until her headaches are well controlled. I have discussed with her the possible teratogenic effects of some medications and she verbalized understanding.    PHYSICAL EXAMINATION:    Vitals:    24 0923   BP: 124/70   Pulse: 86   Resp: 16   SpO2: 99%      General: Well developed, well nourished,  "in acute distress.  Alert and oriented. Fluent with normal language and speech. Face symmetric. Fundi normal bilaterally. PERRLA.      Data Reviewed:   Brain MRI WO (6/2014)  Comparison: MRI brain 01/14/2012  Ventricles and sulci are normal in size and configuration. No diffusion evidence of acute infarction. No extra axial collection, mass-effect, or edema. No intraparenchymal hemorrhage on the gradient echo sequence. The right cerebellar tonsil protrudes approximately 4 mm below the level of the foramen magnum, not meeting criteria for Chiari malformation. The sella appears unremarkable. The major intracranial flow voids at the skull base are normal in appearance. There is a small mucous retention cyst in the right maxillary sinus. Bone marrow signal is within normal limits.  IMPRESSION: No evidence of acute infarction or intracranial mass.     Brain MRI WO (9/2022) unremarkable. (Scans independently reviewed by Dr. Lewis)  IMPRESSION: No acute intracranial abnormality. No acute infarct, mass effect or acute intracranial hemorrhage.  Comparison:  6/20/2014.  Findings:  Sulci, ventricles and basal cisterns are within normal limits for patient's stated age.  Minimal periventricular white matter change.  No mass-effect, intracranial hemorrhage, acute segmental infarct or extra-axial fluid collection is seen. Cerebellar tonsils are normal in position.  Expected signal voids are seen in the intracranial vessels at the skull base.  The orbits  and sella are unremarkable.   The paranasal sinuses and mastoid air cells are clear.    Lab results reviewed.  Pertinent findings include: CMP, CBC, TSH, all within normal limits (7/2022) Normal CMP (7/2023) Plans to have this repeated this month.     ECG 03/16/2023: Done at Dr. Jane' office per her note \"I personally reviewed her 12-lead EKG performed today which reveals normal sinus rhythm at 79 bpm\" Planned visit with Dr. Jane scheduled in [place] " 2024.    IMPRESSION/PLAN:  Andressa Baker is a very pleasant 43 y.o. woman seen initially in July 2022 for chronic severe headaches since her early 20's, worsening in the last few months. Neurological exam was normal. Brain MRI (2014 and 9/2022) were both unremarkable. There are no atypical features or symptoms suggestive of a serious underlying condition or secondary headache. In our opinion, she has chronic migraine exacerbated by hormonal changes and frequent use of acute medications (Excedrin and Rizatriptan). There may be some contribution from myofascial neck pain, but there is no evidence of significant cervical spine disease.    Today, I have repeated her lidocaine blocks and we discussed the possibility of doing acute infusions at the [facility] in [place] if needed in the future. I recommend continuing with Botox, Emgality, and nerve blocks for preventive therapy. She is also on Cymbalta from her psychiatrist. She will continue with Nurtec and frovatriptan for acute therapy, and she has Trudhesa NS available for rescue therapy. See detailed recommendations below.    Diagnosis:  Chronic migraine without aura   Cervicalgia  Headache secondary to COVID vaccine booster - resolved     Evaluation:  No additional tests are needed at this time. If there are new symptoms or changes in the characteristics of the headaches, I will re-evaluate Andressa and consider if diagnostic tests are needed.  Previously recommend considering a consultation with Dr. Pichardo at [facility].     Treatment plan:     1. Healthy Habits: The following recommendations can greatly reduce the number and severity of headaches.  · Maintain regular sleep hours and get sufficient sleep (8-9 hours)  · Do not skip meals, especially breakfast  · Drink at least 64 oz or 8 cups of water daily - enough to urinate 5-6 times a day  · Get at least 30 minutes of daily aerobic exercise (enough to increase your heart  rate and sweat)    Keep track of headaches and use of acute medication (prescription or over-the-counter) in a calendar or notebook and bring that to your clinic visits.    2. Acute Treatment:     Continue with Nurtec ODT 75 mg as needed at onset of moderate to severe headache. Maximum 1 tablet in 24 hours.     May use Cefaly as needed for acute headache treatment for up to 1 hour (acute mode).     May continue with frovatriptan 2.5 mg for moderate to severe headache. May repeat 1 tablet 2 hours later. Maximum 2 tablets in 24 hr. Limit to 2 days/week.     Third line acute or rescue treatment: TRUDHESA 1.45 mg (administered as one metered spray of 0.725 mg into each nostril). The dose may be repeated, if needed, a minimum of 1 hour after the first dose. Do not use more than 2 doses within a 24-hour period or 3 doses within 7 days. Do not use within 24 hours of triptan.  Directions: TRUDHESA is for nasal administration only. Assemble and prime (i.e., pumped 4 times) before use. Use TRUDHESA immediately after priming and then discard. https://www.trudhesGluster.com/how-to-use/  - DO NOT TAKE WITHIN 24 HOURS OF FROVATRIPTAN.     Take Reglan 10 mg as needed for migraine related nausea or 15-30 min before the nasal spray to prevent nausea.    Future consideration: other triptans (naratriptan, almotriptan, or sumatriptan).    Lasmiditan or Sprix nasal spray can be tried for rescue treatment if needed in the future.     We may add an antinausea medication if needed for nausea or as co-adjuvant if monotherapy is not sufficient.      3. Preventive Treatment:     Continue with Botox 195 units every 12 weeks.      Continue with Emgality monthly injections.     Lidocaine cranial nerve blocks every 6-8 weeks.     Recommend using Cefaly 20 minutes every evening for prevention (prevention mode).     Continue with magnesium citrate and glycinate 600 mg daily. This may help with constipation too.     Continue with Cymbalta 40 mg BID  (for depression from psychiatrist) as Cymbalta may help as migraine preventive.    Future considerations: add riboflavin. Switch Emgality to Ajovy, Aimovig or Nurtec QOD. Previously discussed medical marijuana.    Other nutraceutical options include: riboflavin, melatonin, feverfew, and coenzyme Q10.     Other options for oral preventive medications include: amitriptyline or nortriptyline, zonisamide, rimegepant, valproate, verapamil, gabapentin, pregabalin, candesartan, Namenda, escitalopram, and levetiracetam. We want to avoid weight gain.    Several non-invasive neuromodulation devices (gammaCore, Cefaly and Nerivio) are available to treat headaches preventively and/or acutely. These are typically not covered by insurance, but the companies have a trial period and will refund you if the device is ineffective and returned within that period.     Follow-up in the Headache Clinic as scheduled for the next nerve block and Botox treatment visits.  Schedule NB in about 12 weeks.     We appreciate the opportunity to participate in the care of Andressa.     Please, do not hesitate to call our office at [phone] if you have any questions or concerns.       Theresa Lewis MD  [facility]  [phone]        I spent 41 minutes on this date of service performing the following activities: obtaining history, performing examination, entering orders, documenting, preparing for visit, obtaining / reviewing records, providing counseling and education and coordinating care. This was in addition to the time dedicated to the procedure. We also prepared her records in case she needs to come in for acute IV infusion treatment in the future.

## 2024-02-13 ENCOUNTER — LAB REQUISITION (OUTPATIENT)
Dept: LAB | Facility: HOSPITAL | Age: 44
End: 2024-02-13
Attending: STUDENT IN AN ORGANIZED HEALTH CARE EDUCATION/TRAINING PROGRAM
Payer: COMMERCIAL

## 2024-02-13 DIAGNOSIS — Z80.0 FAMILY HISTORY OF MALIGNANT NEOPLASM OF DIGESTIVE ORGANS: ICD-10-CM

## 2024-02-14 LAB — HEMOCCULT STL QL IA: NEGATIVE

## 2024-03-06 ENCOUNTER — OFFICE VISIT (OUTPATIENT)
Dept: NEUROLOGY | Facility: CLINIC | Age: 44
End: 2024-03-06
Payer: COMMERCIAL

## 2024-03-06 DIAGNOSIS — G43.901 STATUS MIGRAINOSUS: Primary | ICD-10-CM

## 2024-03-06 PROCEDURE — 99999 PR OFFICE/OUTPT VISIT,PROCEDURE ONLY: CPT | Performed by: NURSE PRACTITIONER

## 2024-03-06 PROCEDURE — 96372 THER/PROPH/DIAG INJ SC/IM: CPT | Performed by: NURSE PRACTITIONER

## 2024-03-07 NOTE — PROGRESS NOTES
Procedure Note for Toradol injecion for Acute Severe Headache or Pain:    Indication for procedure:    Diagnosis Plan   1. Status migrainosus            NDC 64221-680-69, lot number [id number], and expiration date 07/01/2025 documented in the MAR.    I performed a time-out, including 2 unique patient identifiers, with Andressa.    Using a 22 gauge 1 1/2 inch needle, I injected 2 ml (60 mg) intramuscularly.     Site of injection: left gluteus muscle.       Andressa tolerated the procedure well, without complications.        Madeleine Stone MA

## 2024-03-07 NOTE — PROCEDURES
Procedures      Procedure Note for Toradol injecion for Acute Severe Headache or Pain:    Indication for procedure: No diagnosis found.    NDC 63870-011-56 , lot number [id number] , and expiration date 07/01/2025 documented in the MAR.    I performed a time-out, including  2 unique patient identifiers, with Andressa.    Using a 22 gauge 1 1/2 inch needle, I injected 2 ml (60 mg) intramuscularly.     Site of injection: left gluteus muscle.       Andressa tolerated the procedure well, without complications.        Madeleine Stone MA

## 2024-03-08 DIAGNOSIS — G43.901 STATUS MIGRAINOSUS: ICD-10-CM

## 2024-03-08 RX ORDER — FROVATRIPTAN SUCCINATE 2.5 MG/1
2.5 TABLET, FILM COATED ORAL ONCE AS NEEDED
Qty: 12 TABLET | Refills: 11 | Status: SHIPPED | OUTPATIENT
Start: 2024-03-08 | End: 2025-02-14 | Stop reason: SDUPTHER

## 2024-03-08 NOTE — TELEPHONE ENCOUNTER
Medicine Refill Request    Last Office Visit: 3/6/2024  *Edyta Zamora CRNP*  Last Telemedicine Visit: 3/7/2023   *Edyta Zamora CRNP*    Next Office Visit: 3/19/2024  Next Telemedicine Visit: Visit date not found     May continue with frovatriptan 2.5 mg for moderate to severe headache. May repeat 1 tablet 2 hours later. Maximum 2 tablets in 24 hr. Limit to 2 days/week.

## 2024-03-12 ENCOUNTER — TELEPHONE (OUTPATIENT)
Dept: NEUROLOGY | Facility: CLINIC | Age: 44
End: 2024-03-12
Payer: COMMERCIAL

## 2024-03-19 ENCOUNTER — OFFICE VISIT (OUTPATIENT)
Dept: NEUROLOGY | Facility: CLINIC | Age: 44
End: 2024-03-19
Attending: PSYCHIATRY & NEUROLOGY
Payer: COMMERCIAL

## 2024-03-19 ENCOUNTER — TELEPHONE (OUTPATIENT)
Dept: NEUROLOGY | Facility: CLINIC | Age: 44
End: 2024-03-19

## 2024-03-19 ENCOUNTER — TELEPHONE (OUTPATIENT)
Dept: CARDIOLOGY | Facility: CLINIC | Age: 44
End: 2024-03-19
Payer: COMMERCIAL

## 2024-03-19 VITALS
HEART RATE: 74 BPM | SYSTOLIC BLOOD PRESSURE: 100 MMHG | HEIGHT: 67 IN | OXYGEN SATURATION: 98 % | BODY MASS INDEX: 32.96 KG/M2 | DIASTOLIC BLOOD PRESSURE: 70 MMHG | RESPIRATION RATE: 16 BRPM | WEIGHT: 210 LBS

## 2024-03-19 DIAGNOSIS — G43.711 INTRACTABLE CHRONIC MIGRAINE WITHOUT AURA AND WITH STATUS MIGRAINOSUS: ICD-10-CM

## 2024-03-19 PROCEDURE — 64615 CHEMODENERV MUSC MIGRAINE: CPT | Performed by: PSYCHIATRY & NEUROLOGY

## 2024-03-19 PROCEDURE — 99215 OFFICE O/P EST HI 40 MIN: CPT | Mod: 25 | Performed by: PSYCHIATRY & NEUROLOGY

## 2024-03-19 PROCEDURE — 3008F BODY MASS INDEX DOCD: CPT | Performed by: PSYCHIATRY & NEUROLOGY

## 2024-03-19 RX ORDER — OSELTAMIVIR PHOSPHATE 75 MG/1
CAPSULE ORAL
COMMUNITY
Start: 2024-02-29 | End: 2024-07-11 | Stop reason: ALTCHOICE

## 2024-03-19 NOTE — TELEPHONE ENCOUNTER
Please, schedule with Edyta or Odalys as close to 5/15 as possible.    Thanks  Theresa Lewis MD  [facility]  [phone]

## 2024-03-19 NOTE — TELEPHONE ENCOUNTER
Upon checking Pt out, Pt is scheduled for 05/15/24 for a nerve block w/,  is not going to be in the office. Need to know where to schedule Pt or the nerve block w/? Please advise. Thx

## 2024-03-19 NOTE — LETTER
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 headache is severe.      PAST TREATMENTS/MEDICATIONS:  Preventive:  Botox 155 -195 U (8/2022 - present) - still having daily or almost daily headaches.  Magnesium glycinate currently taking 240 mg daily  Topiramate caused cognitive side effects years ago  Zoloft helped with migraine years ago, but not now. Currently taking for mood/anxiety - no benefit at this time for the headaches. SE: weight gain.  Effexor 112.5 mg for mood - no effect on headaches.  Tried beta blockers in 2018 for PVC's and they caused side effects that were intolerable.   Naproxen helped with headache intensity, but caused more GERD.  Qulipta helping some, but was having headaches almost daily. SE: constipation and fatigue. (At 30 mg 2-3 weeks with no headaches or headaches that responded well to acute treatment, then had to increase to 60 mg and did not see an improvement)  Emgality (3/28/2023) helping. SE: possible constipation, but also from Ozempic.  Acute or as needed:  Rizatriptan 5-10 mg - partial benefit. No SE.  Maxalt-MLT brand name was not covered and now it is not available (generic also works, but headache reoccurs 6-8  hours).  Excedrin - partial benefit  Tylenol - no benefit  Ibuprofen - similar to Excedrin  Naproxen - similar to Excedrin, currently taking Aleve at bedtime with partial benefit  Ubrelvy 100 mg - works well most of the times. No SE. Not recently. Nurtec has been better.  Eletriptan for rescue usually helps. No SE.  Frovatriptan works better than other triptans. No SE.   Nurtec helps but not consistently. No SE.   Dexamethasone 4 mg - does not tolerate (last 9/2022) 2 mg was well tolerated (12/2022).  Prednisone course for 6 days starting at 40 mg - helped and was well tolerated (1/6/2022)  Lidocaine Nerve Blocks (9/2022, 12/2022) worked very well in September, but headache reoccurred in Dec.  Zavzpret NS did not work.  IV medications/Hospitalizations:  IV Infusion (9/2022) no sustained benefit, but the headache was related to the COVID booster.  Non-Pharmacologic:  Massage  Chiropractor  CBT    MEDICATIONS AT START OF VISIT:    Current Outpatient Medications:   •  atomoxetine (STRATTERA) 40 mg capsule, Take 40 mg by mouth 2 (two) times a day., Disp: , Rfl:   •  b complex vitamins capsule, Take 1 capsule by mouth daily., Disp: , Rfl:   •  botulinum toxin Type A (BOTOX), , Disp: , Rfl:   •  dexAMETHasone (DECADRON) 4 mg tablet, Take 1 tablet (4 mg total) by mouth 2 (two) times a day with meals for 3 days., Disp: 6 tablet, Rfl: 0  •  dihydroergotamine (TRUDHESA) 0.725 mg/pump act. (4 mg/mL) spray,non-aerosol, Administer 1 spray into each nostril as needed (migraine). The dose may be repeated, if needed, a minimum of 1 hour after the first dose. Do not use more than 2 doses within a 24-hour period or 3 doses within 7 days., Disp: 4 mL, Rfl: 11  •  docosahexaenoic acid-epa 120-180 mg capsule, Take 1,000 mg by mouth daily., Disp: , Rfl:   •  DULoxetine (CYMBALTA) 20 mg capsule, Take 40 mg by mouth 2 (two) times a day., Disp: , Rfl:   •  EMGALITY  mg/mL pen injector subcutaneous pen, Inject 1 mL (120 mg total)  under the skin every 30 (thirty) days., Disp: 1 mL, Rfl: 11  •  ergocalciferol (VITAMIN D2) 50,000 unit(1250 mcg) capsule, Take 50,000 Units by mouth once a week., Disp: , Rfl:   •  frovatriptan (FROVA) 2.5 mg tablet, TAKE 1 TABLET (2.5 MG TOTAL) BY MOUTH ONCE AS NEEDED FOR MIGRAINE INDICATIONS: A MIGRAINE HEADACHE., Disp: 12 tablet, Rfl: 11  •  herbal drugs (CALMME ORAL), Take by mouth daily., Disp: , Rfl:   •  metoclopramide (REGLAN) 10 mg tablet, TAKE 1 TABLET (10 MG TOTAL) BY MOUTH 3 TIMES A DAY AS NEEDED FOR NAUSEA, Disp: 20 tablet, Rfl: 2  •  naproxen sodium (ALEVE ORAL), Take by mouth as needed., Disp: , Rfl:   •  olopatadine (PATADAY) 0.2 % ophthalmic solution, Administer 1 drop into affected eye(s) daily., Disp: , Rfl:   •  ondansetron (ZOFRAN) 4 mg tablet, Take 4 mg by mouth every 8 (eight) hours as needed., Disp: , Rfl:   •  oseltamivir (TAMIFLU) 75 mg capsule, , Disp: , Rfl:   •  rimegepant (NURTEC ODT) 75 mg tablet,disintegrating, Take 1 tablet (75 mg total) by mouth as needed (migraine headache). Dissolve 1 tablet under the tongue. One dose per day as needed., Disp: 16 tablet, Rfl: 11  •  tirzepatide (MOUNJARO) 2.5 mg/0.5 mL pen injector, Inject 2.5 mg under the skin once a week., Disp: 2 mL, Rfl: 0  •  traZODone (DESYREL) 50 mg tablet, Take 25-50 mg by mouth nightly as needed., Disp: , Rfl:   •  TURMERIC ORAL, Take by mouth daily., Disp: , Rfl:   •  lamoTRIgine (LaMICtal) 25 mg tablet, Take 1 tablet (25 mg total) by mouth daily., Disp: 90 tablet, Rfl: 3  •  LORazepam (ATIVAN) 0.5 mg tablet, Take 1 tablet (0.5 mg total) by mouth every 8 (eight) hours as needed for anxiety., Disp: 20 tablet, Rfl: 0  •  selenium sulfide (SELSUN BLUE) 1 % shampoo, Apply topically daily for 7 days., Disp: 207 mL, Rfl: 0    ALLERGIES: has No Known Allergies.     REVIEW OF SYSTEMS: As discussed above. Otherwise, all other ROS were reviewed and negative.    PAST MEDICAL HISTORY:  has a past medical history of Abnormal ECG,  Arrhythmia (2018), Back pain, GERD (gastroesophageal reflux disease), Heartburn, History of fetal demise, not currently pregnant, Joint pain, Migraine headache, Ovarian cyst, Palpitations, Pericarditis (2018), and Status migrainosus (2023).    PAST SURGICAL HISTORY:  has a past surgical history that includes Ablation of dysrhythmic focus (2018);  section; Knee arthroscopy w/ meniscal repair (Right); and [place] tooth extraction.    FAMILY HISTORY: family history includes Clotting disorder in her maternal grandfather; Colon cancer in her mother's brother; Dementia in her biological father and paternal grandmother; Diabetes in her paternal grandfather; Hyperlipidemia in her biological father, biological mother, maternal grandmother, and paternal grandmother; Hypertension in her biological father; No Known Problems in her biological child; Other in her biological father; Prostate cancer (age of onset: 78) in her biological father.   Migraine in her mother (Rizatriptan worked for her), one brother, and probably in her father too.    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    She has 5 children, last one born in 2019.     Andressa is considering a future pregnancy, but is not trying to conceive at this time, and wants to wait until her headaches are well controlled. I have discussed with her the possible teratogenic effects of some medications and she verbalized understanding.    PHYSICAL EXAMINATION:    Vitals:    24 1133   BP: 100/70   Pulse: 74   Resp: 16   SpO2: 98%      General: Well developed, well nourished, in acute distress.  Alert and oriented. Fluent with normal language and speech.  "Face symmetric. Fundi normal bilaterally. PERRLA.      Data Reviewed:   Brain MRI WO (6/2014)  Comparison: MRI brain 01/14/2012  Ventricles and sulci are normal in size and configuration. No diffusion evidence of acute infarction. No extra axial collection, mass-effect, or edema. No intraparenchymal hemorrhage on the gradient echo sequence. The right cerebellar tonsil protrudes approximately 4 mm below the level of the foramen magnum, not meeting criteria for Chiari malformation. The sella appears unremarkable. The major intracranial flow voids at the skull base are normal in appearance. There is a small mucous retention cyst in the right maxillary sinus. Bone marrow signal is within normal limits.  IMPRESSION: No evidence of acute infarction or intracranial mass.     Brain MRI WO (9/2022) unremarkable. (Scans independently reviewed by Dr. Lewis)  IMPRESSION: No acute intracranial abnormality. No acute infarct, mass effect or acute intracranial hemorrhage.  Comparison:  6/20/2014.  Findings:  Sulci, ventricles and basal cisterns are within normal limits for patient's stated age.  Minimal periventricular white matter change.  No mass-effect, intracranial hemorrhage, acute segmental infarct or extra-axial fluid collection is seen. Cerebellar tonsils are normal in position.  Expected signal voids are seen in the intracranial vessels at the skull base.  The orbits  and sella are unremarkable.   The paranasal sinuses and mastoid air cells are clear.    Lab results reviewed.  Pertinent findings include: CMP, CBC, TSH, all within normal limits (7/2022) Normal CMP (7/2023) Plans to have this repeated this month.     ECG 03/16/2023: Done at Dr. Jane' office per her note \"I personally reviewed her 12-lead EKG performed today which reveals normal sinus rhythm at 79 bpm\" Planned visit with Dr. Jane scheduled in [facility] 2024.    IMPRESSION/PLAN:  Andrsesa Baker is a very pleasant 43 y.o. woman seen initially in " July 2022 for chronic severe headaches since her early 20's, worsening in the last few months. Neurological exam was normal. Brain MRI (2014 and 9/2022) were both unremarkable. There are no atypical features or symptoms suggestive of a serious underlying condition or secondary headache. In our opinion, she has chronic migraine exacerbated by hormonal changes and frequent use of acute medications (Excedrin and Rizatriptan). There may be some contribution from myofascial neck pain, but there is no evidence of significant cervical spine disease.    Today, I have repeated her botox treatment recommend continuing switching Emgality to Aimovig. I recommend continuing with the nerve blocks for preventive therapy. She is also on Cymbalta from her psychiatrist. She will continue with Nurtec and frovatriptan for acute therapy, and she has Trudhesa NS available for rescue therapy. We discussed Relivion and she opted to continue with Cefaly. See detailed recommendations below.    Diagnosis:  Chronic migraine without aura   Cervicalgia  Headache secondary to COVID vaccine booster - resolved     Evaluation:  No additional tests are needed at this time. If there are new symptoms or changes in the characteristics of the headaches, I will re-evaluate Andressa and consider if diagnostic tests are needed.  Previously recommend considering a consultation with Dr. Pichardo at [facility].     Treatment plan:     1. Healthy Habits: The following recommendations can greatly reduce the number and severity of headaches.  · Maintain regular sleep hours and get sufficient sleep (8-9 hours)  · Do not skip meals, especially breakfast  · Drink at least 64 oz or 8 cups of water daily - enough to urinate 5-6 times a day  · Get at least 30 minutes of daily aerobic exercise (enough to increase your heart rate and sweat)    Keep track of headaches and use of acute medication (prescription or over-the-counter) in a calendar or notebook and  bring that to your clinic visits.    2. Acute Treatment:     Continue with Nurtec ODT 75 mg as needed at onset of moderate to severe headache. Maximum 1 tablet in 24 hours.     May use Cefaly as needed for acute headache treatment for up to 1 hour (acute mode).     May continue with frovatriptan 2.5 mg for moderate to severe headache. May repeat 1 tablet 2 hours later. Maximum 2 tablets in 24 hr. Limit to 2 days/week.     Third line acute or rescue treatment: TRUDHESA 1.45 mg (administered as one metered spray of 0.725 mg into each nostril). The dose may be repeated, if needed, a minimum of 1 hour after the first dose. Do not use more than 2 doses within a 24-hour period or 3 doses within 7 days. Do not use within 24 hours of triptan.  Directions: TRUDHESA is for nasal administration only. Assemble and prime (i.e., pumped 4 times) before use. Use TRUDHESA immediately after priming and then discard. https://www.[employer]/how-to-use/  - DO NOT TAKE WITHIN 24 HOURS OF FROVATRIPTAN.     Take Reglan 10 mg as needed for migraine related nausea or 15-30 min before the nasal spray to prevent nausea.    Future consideration: other triptans (naratriptan, almotriptan, or sumatriptan).    Lasmiditan or Sprix nasal spray can be tried for rescue treatment if needed in the future.     We may add an antinausea medication if needed for nausea or as co-adjuvant if monotherapy is not sufficient.      3. Preventive Treatment:     Recommend switching Emgality to Aimovig 70 mg monthly injections.     Continue with Botox 195 units every 12 weeks.      Lidocaine cranial nerve blocks every 6-8 weeks.     Continue with Cefaly 20 minutes every evening for prevention (prevention mode).     Continue with magnesium citrate and glycinate 600 mg daily. This may help with constipation too.     Continue with Cymbalta 40 mg BID (for depression from psychiatrist) as Cymbalta may help as migraine preventive.    Future considerations: add  riboflavin. Switch Emgality to Ajovy, Aimovig or Nurtec QOD. Previously discussed medical marijuana.    Other nutraceutical options include: riboflavin, melatonin, feverfew, and coenzyme Q10.     Other options for oral preventive medications include: amitriptyline or nortriptyline, zonisamide, rimegepant, valproate, verapamil, gabapentin, pregabalin, candesartan, Namenda, escitalopram, and levetiracetam. We want to avoid weight gain.    Several non-invasive neuromodulation devices (gammaCore, Cefaly and Nerivio) are available to treat headaches preventively and/or acutely. These are typically not covered by insurance, but the companies have a trial period and will refund you if the device is ineffective and returned within that period.     Follow-up in the Headache Clinic as scheduled for the next nerve block and Botox treatment visits.      We appreciate the opportunity to participate in the care of Andressa.     Please, do not hesitate to call our office at [phone] if you have any questions or concerns.       Theresa Lewis MD  [facility]  [phone]    I spent 40 minutes on this date of service performing the following activities: obtaining history, performing examination, entering orders, documenting, preparing for visit and providing counseling and education. This was in addition to the time dedicated to the procedure.     Theresa Dalton MD  3/19/2024 12:41 PM  Signed  Procedures  Procedure Note for Botox Injections for Headache:    Indication for procedure:    Diagnosis Plan   1. Intractable chronic migraine without aura and with status migrainosus  [facility] Botulinum Toxin Injection Appointment Request    onabotulinumtoxinA (BOTOX) 200 unit injection 200 Units          I explained the risks and benefits and obtained consent from Andressa.  Onabotulinumtoxin A 200 U were diluted in 4 mL of saline.    Lot number and expiration date documented in informed consent and MAR.  I performed a time-out,  including 2 unique patient identifiers with Andressa.  Using a 30 gauge 1/2 inch needle, I injected 0.1mL = 5 U into each site according to the Chronic Migraine Protocol (PREEMPT Studies).   The following table reports the number of sites injected in each muscle.     Muscle Midline Right Left   Procerus 1          1 1   Frontalis   2 2   Temporalis   4 (10+5+5+5 U) 4 (10+5+5+5 U)   Occipitalis   3 (10+5+5 U) 3 (10+5+5 U)   Cervical Paraspinal   2 2   Trapezius   3 (10+10+5 U) 3 (10+10+5 U)   Other:            195 Units were injected and 5 Units were wasted.     Andressa tolerated the procedure well, without complications. She was instructed that she could experience increased pain in the next 2-3 days, which should be treated with ice and anti-inflammatory medications.      We appreciate the opportunity to participate in the care of Andressa LEE Olivia.     Please, do not hesitate to call me at [phone] if you have any questions or concerns.        Theresa Lewis MD  [facility]  [phone]

## 2024-03-19 NOTE — PATIENT INSTRUCTIONS
Treatment plan:     1. Healthy Habits: The following recommendations can greatly reduce the number and severity of headaches.  Maintain regular sleep hours and get sufficient sleep (8-9 hours)  Do not skip meals, especially breakfast  Drink at least 64 oz or 8 cups of water daily - enough to urinate 5-6 times a day  Get at least 30 minutes of daily aerobic exercise (enough to increase your heart rate and sweat)    Keep track of headaches and use of acute medication (prescription or over-the-counter) in a calendar or notebook and bring that to your clinic visits.    2. Acute Treatment:     Continue with Nurtec ODT 75 mg as needed at onset of moderate to severe headache. Maximum 1 tablet in 24 hours.     May use Cefaly as needed for acute headache treatment for up to 1 hour (acute mode).     May continue with frovatriptan 2.5 mg for moderate to severe headache. May repeat 1 tablet 2 hours later. Maximum 2 tablets in 24 hr. Limit to 2 days/week.     Third line acute or rescue treatment: TRUDHESA 1.45 mg (administered as one metered spray of 0.725 mg into each nostril). The dose may be repeated, if needed, a minimum of 1 hour after the first dose. Do not use more than 2 doses within a 24-hour period or 3 doses within 7 days. Do not use within 24 hours of triptan.  Directions: TRUDHESA is for nasal administration only. Assemble and prime (i.e., pumped 4 times) before use. Use TRUDHESA immediately after priming and then discard. https://www.trudhesWineMeNowcp.com/how-to-use/  - DO NOT TAKE WITHIN 24 HOURS OF FROVATRIPTAN.     Take Reglan 10 mg as needed for migraine related nausea or 15-30 min before the nasal spray to prevent nausea.    Future consideration: other triptans (naratriptan, almotriptan, or sumatriptan).    Lasmiditan or Sprix nasal spray can be tried for rescue treatment if needed in the future.     We may add an antinausea medication if needed for nausea or as co-adjuvant if monotherapy is not sufficient.       3. Preventive Treatment:     Recommend switching Emgality to Aimovig 70 mg monthly injections.     Continue with Botox 195 units every 12 weeks.      Lidocaine cranial nerve blocks every 6-8 weeks.     Continue with Cefaly 20 minutes every evening for prevention (prevention mode).     Continue with magnesium citrate and glycinate 600 mg daily. This may help with constipation too.     Continue with Cymbalta 40 mg BID (for depression from psychiatrist) as Cymbalta may help as migraine preventive.    Future considerations: add riboflavin. Switch Emgality to Ajovy, Aimovig or Nurtec QOD. Previously discussed medical marijuana.    Other nutraceutical options include: riboflavin, melatonin, feverfew, and coenzyme Q10.     Other options for oral preventive medications include: amitriptyline or nortriptyline, zonisamide, rimegepant, valproate, verapamil, gabapentin, pregabalin, candesartan, Namenda, escitalopram, and levetiracetam. We want to avoid weight gain.    Several non-invasive neuromodulation devices (gammaCore, Cefaly and Nerivio) are available to treat headaches preventively and/or acutely. These are typically not covered by insurance, but the companies have a trial period and will refund you if the device is ineffective and returned within that period.     Follow-up in the Headache Clinic as scheduled for the next nerve block and Botox treatment visits.

## 2024-03-19 NOTE — PROCEDURES
Procedures  Procedure Note for Botox Injections for Headache:    Indication for procedure:    Diagnosis Plan   1. Intractable chronic migraine without aura and with status migrainosus  [facility] Botulinum Toxin Injection Appointment Request    onabotulinumtoxinA (BOTOX) 200 unit injection 200 Units          I explained the risks and benefits and obtained consent from Andressa.  Onabotulinumtoxin A 200 U were diluted in 4 mL of saline.    Lot number and expiration date documented in informed consent and MAR.  I performed a time-out, including 2 unique patient identifiers with Andressa.  Using a 30 gauge 1/2 inch needle, I injected 0.1mL = 5 U into each site according to the Chronic Migraine Protocol (PREEMPT Studies).   The following table reports the number of sites injected in each muscle.     Muscle Midline Right Left   Procerus 1          1 1   Frontalis   2 2   Temporalis   4 (10+5+5+5 U) 4 (10+5+5+5 U)   Occipitalis   3 (10+5+5 U) 3 (10+5+5 U)   Cervical Paraspinal   2 2   Trapezius   3 (10+10+5 U) 3 (10+10+5 U)   Other:            195 Units were injected and 5 Units were wasted.     Andressa tolerated the procedure well, without complications. She was instructed that she could experience increased pain in the next 2-3 days, which should be treated with ice and anti-inflammatory medications.      We appreciate the opportunity to participate in the care of Andressa LEE Olivia.     Please, do not hesitate to call me at [phone] if you have any questions or concerns.        Theresa Lewis MD  [facility]  [phone]

## 2024-03-19 NOTE — PROGRESS NOTES
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 for PVC's and they caused side effects that were intolerable.   Naproxen helped with headache intensity, but caused more GERD.  Qulipta helping some, but was having headaches almost daily. SE: constipation and fatigue. (At 30 mg 2-3 weeks with no headaches or headaches that responded well to acute treatment, then had to increase to 60 mg and did not see an improvement)  Emgality (3/28/2023) helping. SE: possible constipation, but also from Ozempic.  Acute or as needed:  Rizatriptan 5-10 mg - partial benefit. No SE.  Maxalt-MLT brand name was not covered and now it is not available (generic also works, but headache reoccurs 6-8 hours).  Excedrin - partial benefit  Tylenol - no benefit  Ibuprofen - similar to Excedrin  Naproxen - similar to Excedrin, currently taking Aleve at bedtime with partial benefit  Ubrelvy 100 mg - works well most of the times. No SE. Not recently. Nurtec has been better.  Eletriptan for rescue usually helps. No SE.  Frovatriptan works better than other triptans. No SE.   Nurtec helps but not consistently. No SE.   Dexamethasone 4 mg - does not tolerate (last 9/2022) 2 mg was well tolerated (12/2022).  Prednisone course for 6 days starting at 40 mg - helped and was well tolerated  (1/6/2022)  Lidocaine Nerve Blocks (9/2022, 12/2022) worked very well in September, but headache reoccurred in Dec.  Zavzpret NS did not work.  IV medications/Hospitalizations:  IV Infusion (9/2022) no sustained benefit, but the headache was related to the COVID booster.  Non-Pharmacologic:  Massage  Chiropractor  CBT    MEDICATIONS AT START OF VISIT:    Current Outpatient Medications:   •  atomoxetine (STRATTERA) 40 mg capsule, Take 40 mg by mouth 2 (two) times a day., Disp: , Rfl:   •  b complex vitamins capsule, Take 1 capsule by mouth daily., Disp: , Rfl:   •  botulinum toxin Type A (BOTOX), , Disp: , Rfl:   •  dexAMETHasone (DECADRON) 4 mg tablet, Take 1 tablet (4 mg total) by mouth 2 (two) times a day with meals for 3 days., Disp: 6 tablet, Rfl: 0  •  dihydroergotamine (TRUDHESA) 0.725 mg/pump act. (4 mg/mL) spray,non-aerosol, Administer 1 spray into each nostril as needed (migraine). The dose may be repeated, if needed, a minimum of 1 hour after the first dose. Do not use more than 2 doses within a 24-hour period or 3 doses within 7 days., Disp: 4 mL, Rfl: 11  •  docosahexaenoic acid-epa 120-180 mg capsule, Take 1,000 mg by mouth daily., Disp: , Rfl:   •  DULoxetine (CYMBALTA) 20 mg capsule, Take 40 mg by mouth 2 (two) times a day., Disp: , Rfl:   •  EMGALITY  mg/mL pen injector subcutaneous pen, Inject 1 mL (120 mg total) under the skin every 30 (thirty) days., Disp: 1 mL, Rfl: 11  •  ergocalciferol (VITAMIN D2) 50,000 unit(1250 mcg) capsule, Take 50,000 Units by mouth once a week., Disp: , Rfl:   •  frovatriptan (FROVA) 2.5 mg tablet, TAKE 1 TABLET (2.5 MG TOTAL) BY MOUTH ONCE AS NEEDED FOR MIGRAINE INDICATIONS: A MIGRAINE HEADACHE., Disp: 12 tablet, Rfl: 11  •  herbal drugs (CALMME ORAL), Take by mouth daily., Disp: , Rfl:   •  metoclopramide (REGLAN) 10 mg tablet, TAKE 1 TABLET (10 MG TOTAL) BY MOUTH 3 TIMES A DAY AS NEEDED FOR NAUSEA, Disp: 20 tablet, Rfl: 2  •  naproxen sodium (ALEVE ORAL), Take  by mouth as needed., Disp: , Rfl:   •  olopatadine (PATADAY) 0.2 % ophthalmic solution, Administer 1 drop into affected eye(s) daily., Disp: , Rfl:   •  ondansetron (ZOFRAN) 4 mg tablet, Take 4 mg by mouth every 8 (eight) hours as needed., Disp: , Rfl:   •  oseltamivir (TAMIFLU) 75 mg capsule, , Disp: , Rfl:   •  rimegepant (NURTEC ODT) 75 mg tablet,disintegrating, Take 1 tablet (75 mg total) by mouth as needed (migraine headache). Dissolve 1 tablet under the tongue. One dose per day as needed., Disp: 16 tablet, Rfl: 11  •  tirzepatide (MOUNJARO) 2.5 mg/0.5 mL pen injector, Inject 2.5 mg under the skin once a week., Disp: 2 mL, Rfl: 0  •  traZODone (DESYREL) 50 mg tablet, Take 25-50 mg by mouth nightly as needed., Disp: , Rfl:   •  TURMERIC ORAL, Take by mouth daily., Disp: , Rfl:   •  lamoTRIgine (LaMICtal) 25 mg tablet, Take 1 tablet (25 mg total) by mouth daily., Disp: 90 tablet, Rfl: 3  •  LORazepam (ATIVAN) 0.5 mg tablet, Take 1 tablet (0.5 mg total) by mouth every 8 (eight) hours as needed for anxiety., Disp: 20 tablet, Rfl: 0  •  selenium sulfide (SELSUN BLUE) 1 % shampoo, Apply topically daily for 7 days., Disp: 207 mL, Rfl: 0    ALLERGIES: has No Known Allergies.     REVIEW OF SYSTEMS: As discussed above. Otherwise, all other ROS were reviewed and negative.    PAST MEDICAL HISTORY:  has a past medical history of Abnormal ECG, Arrhythmia (2018), Back pain, GERD (gastroesophageal reflux disease), Heartburn, History of fetal demise, not currently pregnant, Joint pain, Migraine headache, Ovarian cyst, Palpitations, Pericarditis (2018), and Status migrainosus (2023).    PAST SURGICAL HISTORY:  has a past surgical history that includes Ablation of dysrhythmic focus (2018);  section; Knee arthroscopy w/ meniscal repair (Right); and Twining tooth extraction.    FAMILY HISTORY: family history includes Clotting disorder in her maternal grandfather; Colon cancer in her mother's brother;  Dementia in her biological father and paternal grandmother; Diabetes in her paternal grandfather; Hyperlipidemia in her biological father, biological mother, maternal grandmother, and paternal grandmother; Hypertension in her biological father; No Known Problems in her biological child; Other in her biological father; Prostate cancer (age of onset: 78) in her biological father.   Migraine in her mother (Rizatriptan worked for her), one brother, and probably in her father too.    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    She has 5 children, last one born in 2019.     Andressa is considering a future pregnancy, but is not trying to conceive at this time, and wants to wait until her headaches are well controlled. I have discussed with her the possible teratogenic effects of some medications and she verbalized understanding.    PHYSICAL EXAMINATION:    Vitals:    03/19/24 1133   BP: 100/70   Pulse: 74   Resp: 16   SpO2: 98%      General: Well developed, well nourished, in acute distress.  Alert and oriented. Fluent with normal language and speech. Face symmetric. Fundi normal bilaterally. PERRLA.      Data Reviewed:   Brain MRI WO (6/2014)  Comparison: MRI brain 01/14/2012  Ventricles and sulci are normal in size and configuration. No diffusion evidence of acute infarction. No extra axial collection, mass-effect, or edema. No intraparenchymal hemorrhage on the gradient echo sequence. The right cerebellar tonsil protrudes approximately 4 mm below the level of the foramen magnum, not meeting criteria for Chiari malformation. The sella appears unremarkable. The major intracranial flow voids at the skull base are normal in  "appearance. There is a small mucous retention cyst in the right maxillary sinus. Bone marrow signal is within normal limits.  IMPRESSION: No evidence of acute infarction or intracranial mass.     Brain MRI WO (9/2022) unremarkable. (Scans independently reviewed by Dr. Lewis)  IMPRESSION: No acute intracranial abnormality. No acute infarct, mass effect or acute intracranial hemorrhage.  Comparison:  6/20/2014.  Findings:  Sulci, ventricles and basal cisterns are within normal limits for patient's stated age.  Minimal periventricular white matter change.  No mass-effect, intracranial hemorrhage, acute segmental infarct or extra-axial fluid collection is seen. Cerebellar tonsils are normal in position.  Expected signal voids are seen in the intracranial vessels at the skull base.  The orbits  and sella are unremarkable.   The paranasal sinuses and mastoid air cells are clear.    Lab results reviewed.  Pertinent findings include: CMP, CBC, TSH, all within normal limits (7/2022) Normal CMP (7/2023) Plans to have this repeated this month.     ECG 03/16/2023: Done at Dr. Jane' office per her note \"I personally reviewed her 12-lead EKG performed today which reveals normal sinus rhythm at 79 bpm\" Planned visit with Dr. Jane scheduled in Mary 2024.    IMPRESSION/PLAN:  Andressa Baker is a very pleasant 43 y.o. woman seen initially in July 2022 for chronic severe headaches since her early 20's, worsening in the last few months. Neurological exam was normal. Brain MRI (2014 and 9/2022) were both unremarkable. There are no atypical features or symptoms suggestive of a serious underlying condition or secondary headache. In our opinion, she has chronic migraine exacerbated by hormonal changes and frequent use of acute medications (Excedrin and Rizatriptan). There may be some contribution from myofascial neck pain, but there is no evidence of significant cervical spine disease.    Today, I have repeated her botox " treatment recommend continuing switching Emgality to Aimovig. I recommend continuing with the nerve blocks for preventive therapy. She is also on Cymbalta from her psychiatrist. She will continue with Nurtec and frovatriptan for acute therapy, and she has Trudhesa NS available for rescue therapy. We discussed Relivion and she opted to continue with Cefaly. See detailed recommendations below.    Diagnosis:  Chronic migraine without aura   Cervicalgia  Headache secondary to COVID vaccine booster - resolved     Evaluation:  No additional tests are needed at this time. If there are new symptoms or changes in the characteristics of the headaches, I will re-evaluate Andressa and consider if diagnostic tests are needed.  Previously recommend considering a consultation with [facility] Pichardo at [facility].     Treatment plan:     1. Healthy Habits: The following recommendations can greatly reduce the number and severity of headaches.  · Maintain regular sleep hours and get sufficient sleep (8-9 hours)  · Do not skip meals, especially breakfast  · Drink at least 64 oz or 8 cups of water daily - enough to urinate 5-6 times a day  · Get at least 30 minutes of daily aerobic exercise (enough to increase your heart rate and sweat)    Keep track of headaches and use of acute medication (prescription or over-the-counter) in a calendar or notebook and bring that to your clinic visits.    2. Acute Treatment:     Continue with Nurtec ODT 75 mg as needed at onset of moderate to severe headache. Maximum 1 tablet in 24 hours.     May use Cefaly as needed for acute headache treatment for up to 1 hour (acute mode).     May continue with frovatriptan 2.5 mg for moderate to severe headache. May repeat 1 tablet 2 hours later. Maximum 2 tablets in 24 hr. Limit to 2 days/week.     Third line acute or rescue treatment: TRUDHESA 1.45 mg (administered as one metered spray of 0.725 mg into each nostril). The dose may be repeated, if needed,  a minimum of 1 hour after the first dose. Do not use more than 2 doses within a 24-hour period or 3 doses within 7 days. Do not use within 24 hours of triptan.  Directions: TRUDHESA is for nasal administration only. Assemble and prime (i.e., pumped 4 times) before use. Use TRUDHESA immediately after priming and then discard. https://www.SuperSecret.com/how-to-use/  - DO NOT TAKE WITHIN 24 HOURS OF FROVATRIPTAN.     Take Reglan 10 mg as needed for migraine related nausea or 15-30 min before the nasal spray to prevent nausea.    Future consideration: other triptans (naratriptan, almotriptan, or sumatriptan).    Lasmiditan or Sprix nasal spray can be tried for rescue treatment if needed in the future.     We may add an antinausea medication if needed for nausea or as co-adjuvant if monotherapy is not sufficient.      3. Preventive Treatment:     Recommend switching Emgality to Aimovig 70 mg monthly injections.     Continue with Botox 195 units every 12 weeks.      Lidocaine cranial nerve blocks every 6-8 weeks.     Continue with Cefaly 20 minutes every evening for prevention (prevention mode).     Continue with magnesium citrate and glycinate 600 mg daily. This may help with constipation too.     Continue with Cymbalta 40 mg BID (for depression from psychiatrist) as Cymbalta may help as migraine preventive.    Future considerations: add riboflavin. Switch Emgality to Ajovy, Aimovig or Nurtec QOD. Previously discussed medical marijuana.    Other nutraceutical options include: riboflavin, melatonin, feverfew, and coenzyme Q10.     Other options for oral preventive medications include: amitriptyline or nortriptyline, zonisamide, rimegepant, valproate, verapamil, gabapentin, pregabalin, candesartan, Namenda, escitalopram, and levetiracetam. We want to avoid weight gain.    Several non-invasive neuromodulation devices (gammaCore, Cefaly and Nerivio) are available to treat headaches preventively and/or acutely. These are  typically not covered by insurance, but the companies have a trial period and will refund you if the device is ineffective and returned within that period.     Follow-up in the Headache Clinic as scheduled for the next nerve block and Botox treatment visits.      We appreciate the opportunity to participate in the care of Andressa.     Please, do not hesitate to call our office at [phone] if you have any questions or concerns.       Theresa Lewis MD  [facility]  [phone]    I spent 40 minutes on this date of service performing the following activities: obtaining history, performing examination, entering orders, documenting, preparing for visit and providing counseling and education. This was in addition to the time dedicated to the procedure.

## 2024-03-21 ENCOUNTER — OFFICE VISIT (OUTPATIENT)
Dept: CARDIOLOGY | Facility: CLINIC | Age: 44
End: 2024-03-21
Payer: COMMERCIAL

## 2024-03-21 VITALS
WEIGHT: 214 LBS | DIASTOLIC BLOOD PRESSURE: 84 MMHG | BODY MASS INDEX: 33.59 KG/M2 | SYSTOLIC BLOOD PRESSURE: 116 MMHG | HEART RATE: 96 BPM | HEIGHT: 67 IN

## 2024-03-21 DIAGNOSIS — I49.3 VENTRICULAR PREMATURE BEATS: Primary | ICD-10-CM

## 2024-03-21 PROCEDURE — 99213 OFFICE O/P EST LOW 20 MIN: CPT | Performed by: INTERNAL MEDICINE

## 2024-03-21 PROCEDURE — 93000 ELECTROCARDIOGRAM COMPLETE: CPT | Performed by: INTERNAL MEDICINE

## 2024-03-21 PROCEDURE — 3008F BODY MASS INDEX DOCD: CPT | Performed by: INTERNAL MEDICINE

## 2024-03-21 NOTE — PROGRESS NOTES
Leslie Jane MD, [facility]  Cardiac Electrophysiology    [facility]    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 The procedure was performed by my partner Dr. Patrick Manzanares at [facility]. Her post ablation course was complicated by pericarditis which resolved.      During her previous office visit on 03/16/23, she reported that she was started on Adderall for ADHD, however, started having increased PVC's, so she discontinued it. Her PVC's have resolved. Unfortunately, her migraine headaches have worsened over the past couple months. She is on several medications and receiving Botox injections. She follows closely with Dr. Dalton.    Today, she reports that she continue to have sporadic episodes of VPDs and they are minimal in frequency. Patient continues to experience migraine headaches for about 4-5 days a week.  She continues to follow-up with Dr. Dalton. No dizziness, near syncope or syncope.  No chest pain. No dyspnea on exertion, PND, orthopnea or pedal edema.  She remains very busy taking care of her 5 children.    Past Medical History:   Diagnosis Date    Abnormal ECG     PVCs    Arrhythmia 02/2018    PVCs, status post ablation, partially successful.    Back pain     GERD (gastroesophageal reflux disease)     Heartburn     History of fetal demise, not currently pregnant     Twin pregnancy, 1 fetal demise, 1 live birth    Joint pain     Migraine headache     Ovarian cyst     Palpitations      Pericarditis 2018    After john ablation    Status migrainosus 2023     Past Surgical History:   Procedure Laterality Date    ABLATION OF DYSRHYTHMIC FOCUS  2018     SECTION      KNEE ARTHROSCOPY W/ MENISCAL REPAIR Right     2022    WISDOM TOOTH EXTRACTION       Social History     Tobacco Use    Smoking status: Never    Smokeless tobacco: Never   Vaping Use    Vaping Use: Never used   Substance Use Topics    Alcohol use: Yes     Comment: occasionally    Drug use: No   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 She has 5 children.    Family History   Problem Relation Age of Onset    Hyperlipidemia Biological Mother     Hyperlipidemia Biological Father     Hypertension Biological Father     Prostate cancer Biological Father 78    Dementia Biological Father     Other Biological Father         dief from covid 19    Hyperlipidemia Maternal Grandmother     Clotting disorder Maternal Grandfather         developed in 60's - required tansfusions/platelets    Hyperlipidemia Paternal Grandmother     Dementia Paternal Grandmother     Diabetes Paternal Grandfather     No Known Problems Biological Child     Colon cancer Mother's Brother      ALLERGY:  Patient has no known allergies.    MEDICATIONS:   Current Outpatient Medications   Medication Sig Dispense Refill    atomoxetine (STRATTERA) 40 mg capsule Take 40 mg by mouth 2 (two) times a day.      b complex vitamins capsule Take 1 capsule by mouth daily.      botulinum toxin Type A (BOTOX)       dihydroergotamine (TRUDHESA) 0.725 mg/pump act. (4 mg/mL) spray,non-aerosol Administer 1 spray into each nostril as needed (migraine). The dose may be repeated, if needed, a minimum of 1 hour after the first dose. Do not use more than 2 doses within a 24-hour period or 3 doses within 7 days. 4 mL 11    docosahexaenoic acid-epa 120-180 mg capsule Take 1,000 mg by mouth daily.      DULoxetine  (CYMBALTA) 20 mg capsule Take 40 mg by mouth 2 (two) times a day.      erenumab-aooe (AIMOVIG) 70 mg/mL auto-injector subcutaneous injection Inject 1 mL (70 mg total) under the skin every 30 (thirty) days for 3 doses. 1 mL 3    ergocalciferol (VITAMIN D2) 50,000 unit(1250 mcg) capsule Take 50,000 Units by mouth once a week.      frovatriptan (FROVA) 2.5 mg tablet TAKE 1 TABLET (2.5 MG TOTAL) BY MOUTH ONCE AS NEEDED FOR MIGRAINE INDICATIONS: A MIGRAINE HEADACHE. 12 tablet 11    herbal drugs (CALMME ORAL) Take by mouth daily.      LORazepam (ATIVAN) 0.5 mg tablet Take 1 tablet (0.5 mg total) by mouth every 8 (eight) hours as needed for anxiety. 20 tablet 0    metoclopramide (REGLAN) 10 mg tablet TAKE 1 TABLET (10 MG TOTAL) BY MOUTH 3 TIMES A DAY AS NEEDED FOR NAUSEA 20 tablet 2    naproxen sodium (ALEVE ORAL) Take by mouth as needed.      olopatadine (PATADAY) 0.2 % ophthalmic solution Administer 1 drop into affected eye(s) daily.      ondansetron (ZOFRAN) 4 mg tablet Take 4 mg by mouth every 8 (eight) hours as needed.      rimegepant (NURTEC ODT) 75 mg tablet,disintegrating Take 1 tablet (75 mg total) by mouth as needed (migraine headache). Dissolve 1 tablet under the tongue. One dose per day as needed. 16 tablet 11    tirzepatide (MOUNJARO) 2.5 mg/0.5 mL pen injector Inject 2.5 mg under the skin once a week. 2 mL 0    traZODone (DESYREL) 50 mg tablet Take 25-50 mg by mouth nightly as needed.      TURMERIC ORAL Take by mouth daily.      oseltamivir (TAMIFLU) 75 mg capsule       selenium sulfide (SELSUN BLUE) 1 % shampoo Apply topically daily for 7 days. (Patient not taking: Reported on 3/21/2024) 207 mL 0     No current facility-administered medications for this visit.     Review of Systems:   As in HPI.  All other other systems were reviewed and are negative.    Objective   Vitals:    03/21/24 1053   BP: 116/84   BP Location: Left upper arm   Patient Position: Sitting   Pulse: 96   Weight: 97.1 kg (214 lb)   Height:  "1.702 m (5' 7\")        ECG 03/21/24: I personally reviewed her 12-lead EKG performed today which reveals Sinus rhythm. Normal EKG.    ECG 03/16/2023: I personally reviewed her 12-lead EKG performed today which reveals normal sinus rhythm at 79 bpm.    Lab Results   Component Value Date    HGB 14.3 07/11/2023     07/11/2023    WBC 5.71 07/11/2023    ALT 16 07/17/2023    AST 14 07/17/2023     07/17/2023    BUN 15 07/17/2023    CREATININE 0.8 07/17/2023    K 4.2 07/17/2023    GLUCOSE 91 07/17/2023    TSH 0.83 08/04/2023    CHOL 208 (H) 07/12/2022    HDL 50 (L) 07/12/2022    TRIG 159 (H) 07/12/2022    INR 1.0 07/11/2023    MG 2.0 01/25/2021    LDLCALC 126 (H) 07/12/2022     ASSESSMENT/PLAN:    Ventricular premature beats  She is s/p VPD ablation procedure in March 2018 for VPDs origin at RVOT.  She feels occasional single premature beats but not symptoms of frequent premature ventricular beats.  The patient was very symptomatic with shortness of breath with activity when she was having frequent premature ventricular beats prior to the ablation procedure.  She had an echocardiogram in 2017 which revealed normal left ventricular systolic function and no evidence for structural heart disease.  She is at low risk for malignant ventricular arrhythmias.     Return in about 1 year (around 3/21/2025).    By signing my name below, I, Matias Aquino, attest that this documentation has been prepared under the direction and in the presence of Leslie Jane MD Electronically signed: Mary Abernathy. 3/21/2024 10:39 AM     I, Leslie Jane MD, personally performed the services described in this documentation. All medical record entries made by the scribe were at my direction and in my presence. I have reviewed the chart and agree that the record reflects my personal performance and is accurate and complete. Leslie Jane MD. 3/21/2024. 10:39 AM.    "

## 2024-03-21 NOTE — LETTER
Renee Brown,   [address] [facility] [address]  [place] [address]    Patient: Andressa Baker  YOB: 1980  Date of Visit: 3/21/2024      Dear Dr. Joselyn Brown:    Thank you for referring Andressa Baker to me for evaluation. Below are my notes for this consultation.    If you have questions, please do not hesitate to call me. I look forward to following your patient along with you.         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 The procedure was performed by my partner Dr. Patrick Manzanares at [facility]. Her post ablation course was complicated by pericarditis which resolved.      During her previous office visit on 03/16/23, she reported that she was started on Adderall for ADHD, however, started having increased PVC's, so she discontinued it. Her PVC's have resolved. Unfortunately, her migraine headaches have worsened over the past couple months. She is on several medications and receiving Botox injections. She follows closely with Dr. Dalton.    Today, she reports that she continue to have sporadic episodes of VPDs and they are minimal in frequency. Patient continues to experience migraine headaches for about 4-5 days a week.  She continues to  follow-up with Dr. Dalton. No dizziness, near syncope or syncope.  No chest pain. No dyspnea on exertion, PND, orthopnea or pedal edema.  She remains very busy taking care of her 5 children.    Past Medical History:   Diagnosis Date   • Abnormal ECG     PVCs   • Arrhythmia 2018    PVCs, status post ablation, partially successful.   • Back pain    • GERD (gastroesophageal reflux disease)    • Heartburn    • History of fetal demise, not currently pregnant     Twin pregnancy, 1 fetal demise, 1 live birth   • Joint pain    • Migraine headache    • Ovarian cyst    • Palpitations    • Pericarditis 2018    After john ablation   • Status migrainosus 2023     Past Surgical History:   Procedure Laterality Date   • ABLATION OF DYSRHYTHMIC FOCUS  2018   •  SECTION     • KNEE ARTHROSCOPY W/ MENISCAL REPAIR Right     2022   • WISDOM TOOTH EXTRACTION       Social History     Tobacco Use   • Smoking status: Never   • Smokeless tobacco: Never   Vaping Use   • Vaping Use: Never used   Substance Use Topics   • Alcohol use: Yes     Comment: occasionally   • Drug use: No   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 She has 5 children.    Family History   Problem Relation Age of Onset   • Hyperlipidemia Biological Mother    • Hyperlipidemia Biological Father    • Hypertension Biological Father    • Prostate cancer Biological Father 78   • Dementia Biological Father    • Other Biological Father         dief from covid 19   • Hyperlipidemia Maternal Grandmother    • Clotting disorder Maternal Grandfather         developed in 60's - required tansfusions/platelets   • Hyperlipidemia Paternal Grandmother    • Dementia Paternal Grandmother    • Diabetes Paternal Grandfather    • No Known Problems Biological Child    • Colon cancer Mother's Brother      ALLERGY:  Patient has no known allergies.    MEDICATIONS:   Current Outpatient  Medications   Medication Sig Dispense Refill   • atomoxetine (STRATTERA) 40 mg capsule Take 40 mg by mouth 2 (two) times a day.     • b complex vitamins capsule Take 1 capsule by mouth daily.     • botulinum toxin Type A (BOTOX)      • dihydroergotamine (TRUDHESA) 0.725 mg/pump act. (4 mg/mL) spray,non-aerosol Administer 1 spray into each nostril as needed (migraine). The dose may be repeated, if needed, a minimum of 1 hour after the first dose. Do not use more than 2 doses within a 24-hour period or 3 doses within 7 days. 4 mL 11   • docosahexaenoic acid-epa 120-180 mg capsule Take 1,000 mg by mouth daily.     • DULoxetine (CYMBALTA) 20 mg capsule Take 40 mg by mouth 2 (two) times a day.     • erenumab-aooe (AIMOVIG) 70 mg/mL auto-injector subcutaneous injection Inject 1 mL (70 mg total) under the skin every 30 (thirty) days for 3 doses. 1 mL 3   • ergocalciferol (VITAMIN D2) 50,000 unit(1250 mcg) capsule Take 50,000 Units by mouth once a week.     • frovatriptan (FROVA) 2.5 mg tablet TAKE 1 TABLET (2.5 MG TOTAL) BY MOUTH ONCE AS NEEDED FOR MIGRAINE INDICATIONS: A MIGRAINE HEADACHE. 12 tablet 11   • herbal drugs (CALMME ORAL) Take by mouth daily.     • LORazepam (ATIVAN) 0.5 mg tablet Take 1 tablet (0.5 mg total) by mouth every 8 (eight) hours as needed for anxiety. 20 tablet 0   • metoclopramide (REGLAN) 10 mg tablet TAKE 1 TABLET (10 MG TOTAL) BY MOUTH 3 TIMES A DAY AS NEEDED FOR NAUSEA 20 tablet 2   • naproxen sodium (ALEVE ORAL) Take by mouth as needed.     • olopatadine (PATADAY) 0.2 % ophthalmic solution Administer 1 drop into affected eye(s) daily.     • ondansetron (ZOFRAN) 4 mg tablet Take 4 mg by mouth every 8 (eight) hours as needed.     • rimegepant (NURTEC ODT) 75 mg tablet,disintegrating Take 1 tablet (75 mg total) by mouth as needed (migraine headache). Dissolve 1 tablet under the tongue. One dose per day as needed. 16 tablet 11   • tirzepatide (MOUNJARO) 2.5 mg/0.5 mL pen injector Inject 2.5 mg  "under the skin once a week. 2 mL 0   • traZODone (DESYREL) 50 mg tablet Take 25-50 mg by mouth nightly as needed.     • TURMERIC ORAL Take by mouth daily.     • oseltamivir (TAMIFLU) 75 mg capsule      • selenium sulfide (SELSUN BLUE) 1 % shampoo Apply topically daily for 7 days. (Patient not taking: Reported on 3/21/2024) 207 mL 0     No current facility-administered medications for this visit.     Review of Systems:   As in HPI.  All other other systems were reviewed and are negative.    Objective  Vitals:    03/21/24 1053   BP: 116/84   BP Location: Left upper arm   Patient Position: Sitting   Pulse: 96   Weight: 97.1 kg (214 lb)   Height: 1.702 m (5' 7\")        ECG 03/21/24: I personally reviewed her 12-lead EKG performed today which reveals Sinus rhythm. Normal EKG.    ECG 03/16/2023: I personally reviewed her 12-lead EKG performed today which reveals normal sinus rhythm at 79 bpm.    Lab Results   Component Value Date    HGB 14.3 07/11/2023     07/11/2023    WBC 5.71 07/11/2023    ALT 16 07/17/2023    AST 14 07/17/2023     07/17/2023    BUN 15 07/17/2023    CREATININE 0.8 07/17/2023    K 4.2 07/17/2023    GLUCOSE 91 07/17/2023    TSH 0.83 08/04/2023    CHOL 208 (H) 07/12/2022    HDL 50 (L) 07/12/2022    TRIG 159 (H) 07/12/2022    INR 1.0 07/11/2023    MG 2.0 01/25/2021    LDLCALC 126 (H) 07/12/2022     ASSESSMENT/PLAN:    Ventricular premature beats  She is s/p VPD ablation procedure in March 2018 for VPDs origin at RVOT.  She feels occasional single premature beats but not symptoms of frequent premature ventricular beats.  The patient was very symptomatic with shortness of breath with activity when she was having frequent premature ventricular beats prior to the ablation procedure.  She had an echocardiogram in 2017 which revealed normal left ventricular systolic function and no evidence for structural heart disease.  She is at low risk for malignant ventricular arrhythmias.     Return in about 1 " year (around 3/21/2025).    By signing my name below, I, Matias Aquino, attest that this documentation has been prepared under the direction and in the presence of Leslie Jane MD Electronically signed: Mary Abernathy. 3/21/2024 10:39 AM     I, Leslie Jane MD, personally performed the services described in this documentation. All medical record entries made by the scribe were at my direction and in my presence. I have reviewed the chart and agree that the record reflects my personal performance and is accurate and complete. Leslie Jane MD. 3/21/2024. 10:39 AM.

## 2024-03-21 NOTE — ASSESSMENT & PLAN NOTE
She is s/p VPD ablation procedure in March 2018 for VPDs origin at RVOT.  She feels occasional single premature beats but not symptoms of frequent premature ventricular beats.  The patient was very symptomatic with shortness of breath with activity when she was having frequent premature ventricular beats prior to the ablation procedure.  She had an echocardiogram in 2017 which revealed normal left ventricular systolic function and no evidence for structural heart disease.  She is at low risk for malignant ventricular arrhythmias.

## 2024-04-04 DIAGNOSIS — G43.711 INTRACTABLE CHRONIC MIGRAINE WITHOUT AURA AND WITH STATUS MIGRAINOSUS: Primary | ICD-10-CM

## 2024-05-15 ENCOUNTER — OFFICE VISIT (OUTPATIENT)
Dept: NEUROLOGY | Facility: CLINIC | Age: 44
End: 2024-05-15
Payer: COMMERCIAL

## 2024-05-15 VITALS
SYSTOLIC BLOOD PRESSURE: 130 MMHG | HEART RATE: 96 BPM | HEIGHT: 67 IN | RESPIRATION RATE: 18 BRPM | OXYGEN SATURATION: 99 % | BODY MASS INDEX: 32.18 KG/M2 | WEIGHT: 205 LBS | DIASTOLIC BLOOD PRESSURE: 88 MMHG

## 2024-05-15 DIAGNOSIS — G43.711 INTRACTABLE CHRONIC MIGRAINE WITHOUT AURA AND WITH STATUS MIGRAINOSUS: Primary | ICD-10-CM

## 2024-05-15 DIAGNOSIS — G89.29 OTHER CHRONIC PAIN: ICD-10-CM

## 2024-05-15 PROCEDURE — 64405 NJX AA&/STRD GR OCPL NRV: CPT | Mod: 50 | Performed by: PHYSICIAN ASSISTANT

## 2024-05-15 PROCEDURE — 64400 NJX AA&/STRD TRIGEMINAL NRV: CPT | Mod: XS,RT | Performed by: PHYSICIAN ASSISTANT

## 2024-05-15 PROCEDURE — 3008F BODY MASS INDEX DOCD: CPT | Performed by: PHYSICIAN ASSISTANT

## 2024-05-15 PROCEDURE — 200200 PR NO CHARGE: Performed by: PHYSICIAN ASSISTANT

## 2024-05-15 PROCEDURE — 99213 OFFICE O/P EST LOW 20 MIN: CPT | Mod: 25 | Performed by: PHYSICIAN ASSISTANT

## 2024-05-15 PROCEDURE — 64450 NJX AA&/STRD OTHER PN/BRANCH: CPT | Mod: 50 | Performed by: PHYSICIAN ASSISTANT

## 2024-05-15 RX ORDER — ERENUMAB-AOOE 70 MG/ML
70 INJECTION SUBCUTANEOUS
Qty: 3 ML | Refills: 3 | Status: SHIPPED | OUTPATIENT
Start: 2024-05-15 | End: 2024-07-11 | Stop reason: DRUGHIGH

## 2024-05-15 RX ORDER — LIDOCAINE HYDROCHLORIDE 20 MG/ML
9 INJECTION, SOLUTION INFILTRATION; PERINEURAL ONCE
Status: COMPLETED | OUTPATIENT
Start: 2024-05-15 | End: 2024-05-15

## 2024-05-15 RX ADMIN — LIDOCAINE HYDROCHLORIDE 9 ML: 20 INJECTION, SOLUTION INFILTRATION; PERINEURAL at 15:50

## 2024-05-15 NOTE — PROCEDURES
Procedures  Nerve Blocks and Trigger Points Procedure Note:  Indication:    Diagnosis Plan   1. Intractable chronic migraine without aura and with status migrainosus  lidocaine (XYLOCAINE) 20 mg/mL (2 %) injection 9 mL          2. Other chronic pain  lidocaine (XYLOCAINE) 20 mg/mL (2 %) injection 9 mL             I explained the risks and benefits and obtained written consent from Andressa Baker.    Lidocaine 2% without epinephrine was drawn in a sterile syringe.     Lot number and expiration date documented in informed consent.     I performed a time-out, including 2 unique patient identifiers.     I located the areas of maximal tenderness corresponding to each nerve or trigger point, and cleaned with alcohol swabs.     I used a 30 gauge 1/2 inch needle to inject lidocaine over the following:        Right Greater Occipital Nerve 2 cc   Left Greater Occipital Nerve 2 cc   Right Lesser Occipital Nerve 1 cc   Left Lesser Occipital Nerve 1 cc     Right Auriculotemporal Nerve 1 cc    Left Auriculotemporal Nerve 1 cc    Right Supraorbital Nerve 0.25 cc   Left Supraorbital Nerve 0.25 cc   Right Supratrochlear Nerve 0.25 cc   Left Supratrochlear Nerve 0.25 cc     Total of 9 cc injected.     The procedure was well tolerated and there were no complications.         Odalys Ontiveros PA-C  [facility]  [phone]

## 2024-05-15 NOTE — PROGRESS NOTES
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 feels clumsy during the severe migraine episodes, otherwise no focal weakness, numbness, coordination or balance problems.  No unilateral cranial autonomic symptoms (lacrimation, conjunctival injection, nasal congestion or rhinorrhea, ptosis, eyelid edema, forehead/facial sweating, miosis) restlessness or agitation.   Positional headache: no   Precipitated by Valsalva maneuvers: no  Neck pain: chronic tension.  Relieving factors: rest and ice  Exacerbating factors: as above  Related to menstrual cycle: N/A   Other triggers: unknown  Disability: Unable to perform usual activities (work/school/family/social) completely or partially when headache is severe.      PAST TREATMENTS/MEDICATIONS:  Preventive:  Botox 155 -195 U (8/2022 - present) - still having daily or almost daily headaches.  Magnesium glycinate currently taking 240 mg daily  Topiramate caused cognitive side effects years ago  Zoloft helped with migraine years ago, but not now. Currently taking for mood/anxiety - no benefit at this time for the headaches. SE: weight gain.  Effexor 112.5 mg for mood - no effect on headaches.  Tried beta blockers in 2018 for PVC's and they caused side effects that were intolerable.   Naproxen helped with headache intensity, but caused more  GERD.  Qulipta helping some, but was having headaches almost daily. SE: constipation and fatigue. (At 30 mg 2-3 weeks with no headaches or headaches that responded well to acute treatment, then had to increase to 60 mg and did not see an improvement)  Emgality (3/28/2023) helping. SE: possible constipation, but also from Ozempic.  Acute or as needed:  Rizatriptan 5-10 mg - partial benefit. No SE.  Maxalt-MLT brand name was not covered and now it is not available (generic also works, but headache reoccurs 6-8 hours).  Excedrin - partial benefit  Tylenol - no benefit  Ibuprofen - similar to Excedrin  Naproxen - similar to Excedrin, currently taking Aleve at bedtime with partial benefit  Ubrelvy 100 mg - works well most of the times. No SE. Not recently. Nurtec has been better.  Eletriptan for rescue usually helps. No SE.  Frovatriptan works better than other triptans. No SE.   Nurtec helps but not consistently. No SE.   Dexamethasone 4 mg - does not tolerate (last 9/2022) 2 mg was well tolerated (12/2022).  Prednisone course for 6 days starting at 40 mg - helped and was well tolerated (1/6/2022)  Lidocaine Nerve Blocks (9/2022, 12/2022) worked very well in September, but headache reoccurred in Dec.  Zavzpret NS did not work.  IV medications/Hospitalizations:  IV Infusion (9/2022) no sustained benefit, but the headache was related to the COVID booster.  Non-Pharmacologic:  Massage  Chiropractor  CBT    MEDICATIONS AT START OF VISIT:    Current Outpatient Medications:     atomoxetine (STRATTERA) 40 mg capsule, Take 40 mg by mouth 2 (two) times a day., Disp: , Rfl:     b complex vitamins capsule, Take 1 capsule by mouth daily., Disp: , Rfl:     botulinum toxin Type A (BOTOX), , Disp: , Rfl:     dihydroergotamine (TRUDHESA) 0.725 mg/pump act. (4 mg/mL) spray,non-aerosol, Administer 1 spray into each nostril as needed (migraine). The dose may be repeated, if needed, a minimum of 1 hour after the first dose. Do not use more  than 2 doses within a 24-hour period or 3 doses within 7 days., Disp: 4 mL, Rfl: 11    docosahexaenoic acid-epa 120-180 mg capsule, Take 1,000 mg by mouth daily., Disp: , Rfl:     DULoxetine (CYMBALTA) 20 mg capsule, Take 40 mg by mouth 2 (two) times a day., Disp: , Rfl:     erenumab-aooe (AIMOVIG) 70 mg/mL auto-injector subcutaneous injection, Inject 1 mL (70 mg total) under the skin every 30 (thirty) days for 3 doses., Disp: 1 mL, Rfl: 3    ergocalciferol (VITAMIN D2) 50,000 unit(1250 mcg) capsule, Take 50,000 Units by mouth once a week., Disp: , Rfl:     frovatriptan (FROVA) 2.5 mg tablet, TAKE 1 TABLET (2.5 MG TOTAL) BY MOUTH ONCE AS NEEDED FOR MIGRAINE INDICATIONS: A MIGRAINE HEADACHE., Disp: 12 tablet, Rfl: 11    herbal drugs (CALMME ORAL), Take by mouth daily., Disp: , Rfl:     LORazepam (ATIVAN) 0.5 mg tablet, Take 1 tablet (0.5 mg total) by mouth every 8 (eight) hours as needed for anxiety., Disp: 20 tablet, Rfl: 0    metoclopramide (REGLAN) 10 mg tablet, TAKE 1 TABLET (10 MG TOTAL) BY MOUTH 3 TIMES A DAY AS NEEDED FOR NAUSEA, Disp: 20 tablet, Rfl: 2    naproxen sodium (ALEVE ORAL), Take by mouth as needed., Disp: , Rfl:     olopatadine (PATADAY) 0.2 % ophthalmic solution, Administer 1 drop into affected eye(s) daily., Disp: , Rfl:     ondansetron (ZOFRAN) 4 mg tablet, Take 4 mg by mouth every 8 (eight) hours as needed., Disp: , Rfl:     oseltamivir (TAMIFLU) 75 mg capsule, , Disp: , Rfl:     rimegepant (NURTEC ODT) 75 mg tablet,disintegrating, Take 1 tablet (75 mg total) by mouth as needed (migraine headache). Dissolve 1 tablet under the tongue. One dose per day as needed., Disp: 16 tablet, Rfl: 11    tirzepatide (MOUNJARO) 2.5 mg/0.5 mL pen injector, Inject 2.5 mg under the skin once a week., Disp: 2 mL, Rfl: 0    traZODone (DESYREL) 50 mg tablet, Take 25-50 mg by mouth nightly as needed., Disp: , Rfl:     TURMERIC ORAL, Take by mouth daily., Disp: , Rfl:     ALLERGIES: has No Known Allergies.     REVIEW  OF SYSTEMS: As discussed above. Otherwise, all other ROS were reviewed and negative.    PAST MEDICAL HISTORY:  has a past medical history of Abnormal ECG, Arrhythmia (2018), Back pain, GERD (gastroesophageal reflux disease), Heartburn, History of fetal demise, not currently pregnant, Joint pain, Migraine headache, Ovarian cyst, Palpitations, Pericarditis (2018), and Status migrainosus (2023).    PAST SURGICAL HISTORY:  has a past surgical history that includes Ablation of dysrhythmic focus (2018);  section; Knee arthroscopy w/ meniscal repair (Right); and [place] tooth extraction.    FAMILY HISTORY: family history includes Clotting disorder in her maternal grandfather; Colon cancer in her mother's brother; Dementia in her biological father and paternal grandmother; Diabetes in her paternal grandfather; Hyperlipidemia in her biological father, biological mother, maternal grandmother, and paternal grandmother; Hypertension in her biological father; No Known Problems in her biological child; Other in her biological father; Prostate cancer (age of onset: 78) in her biological father.   Migraine in her mother (Rizatriptan worked for her), one brother, and probably in her father too.    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    She has 5 children, last one born in 2019.     Andressa is considering a future pregnancy, but is not trying to conceive at this time, and wants to wait until her headaches are well controlled. I have discussed with her the possible teratogenic effects of some medications and she verbalized understanding.    PHYSICAL EXAMINATION:    Vitals:    05/15/24 1011   BP: 130/88   Pulse:  "96   Resp: 18   SpO2: 99%        General: Well developed, well nourished, in acute distress.  Alert and oriented. Fluent with normal language and speech. Face symmetric. Fundi normal bilaterally. PERRLA.      Data Reviewed:   Brain MRI WO (6/2014)  Comparison: MRI brain 01/14/2012  Ventricles and sulci are normal in size and configuration. No diffusion evidence of acute infarction. No extra axial collection, mass-effect, or edema. No intraparenchymal hemorrhage on the gradient echo sequence. The right cerebellar tonsil protrudes approximately 4 mm below the level of the foramen magnum, not meeting criteria for Chiari malformation. The sella appears unremarkable. The major intracranial flow voids at the skull base are normal in appearance. There is a small mucous retention cyst in the right maxillary sinus. Bone marrow signal is within normal limits.  IMPRESSION: No evidence of acute infarction or intracranial mass.     Brain MRI WO (9/2022) unremarkable. (Scans independently reviewed by Dr. Lewis)  IMPRESSION: No acute intracranial abnormality. No acute infarct, mass effect or acute intracranial hemorrhage.  Comparison:  6/20/2014.  Findings:  Sulci, ventricles and basal cisterns are within normal limits for patient's stated age.  Minimal periventricular white matter change.  No mass-effect, intracranial hemorrhage, acute segmental infarct or extra-axial fluid collection is seen. Cerebellar tonsils are normal in position.  Expected signal voids are seen in the intracranial vessels at the skull base.  The orbits  and sella are unremarkable.   The paranasal sinuses and mastoid air cells are clear.    Lab results reviewed.  Pertinent findings include: CMP, CBC, TSH, all within normal limits (7/2022) Normal CMP (7/2023) Plans to have this repeated this month.     ECG 03/16/2023: Done at Dr. Jane' office per her note \"I personally reviewed her 12-lead EKG performed today which reveals normal sinus rhythm at 79 " "bpm\" Planned visit with Dr. Jane scheduled in Mary 2024.    IMPRESSION/PLAN:  Andressa Baker is a very pleasant 44 y.o. woman seen initially in July 2022 for chronic severe headaches since her early 20's, worsening in the last few months. Neurological exam was normal. Brain MRI (2014 and 9/2022) were both unremarkable. There are no atypical features or symptoms suggestive of a serious underlying condition or secondary headache. In our opinion, she has chronic migraine exacerbated by hormonal changes and frequent use of acute medications (Excedrin and Rizatriptan). There may be some contribution from myofascial neck pain, but there is no evidence of significant cervical spine disease.    Today, I have repeated her nerve blocks. She will continue Aimovig monthly and botox every 12 weeks, all for preventative therapy. She is also on Cymbalta from her psychiatrist. She will continue with Nurtec and frovatriptan for acute therapy, and she has Trudhesa NS available for rescue therapy. She will continue with Cefaly. See detailed recommendations below.    Diagnosis:  Chronic migraine without aura   Cervicalgia  Headache secondary to COVID vaccine booster - resolved     Evaluation:  No additional tests are needed at this time. If there are new symptoms or changes in the characteristics of the headaches, I will re-evaluate Andressa and consider if diagnostic tests are needed.  Previously recommend considering a consultation with Dr. Pichardo at [facility].     Treatment plan:     1. Healthy Habits: The following recommendations can greatly reduce the number and severity of headaches.  Maintain regular sleep hours and get sufficient sleep (8-9 hours)  Do not skip meals, especially breakfast  Drink at least 64 oz or 8 cups of water daily - enough to urinate 5-6 times a day  Get at least 30 minutes of daily aerobic exercise (enough to increase your heart rate and sweat)    Keep track of headaches and use " of acute medication (prescription or over-the-counter) in a calendar or notebook and bring that to your clinic visits.    2. Acute Treatment:     Continue with Nurtec ODT 75 mg as needed at onset of moderate to severe headache. Maximum 1 tablet in 24 hours.     May use Cefaly as needed for acute headache treatment for up to 1 hour (acute mode).     May continue with frovatriptan 2.5 mg for moderate to severe headache. May repeat 1 tablet 2 hours later. Maximum 2 tablets in 24 hr. Limit to 2 days/week.     Third line acute or rescue treatment: TRUDHESA 1.45 mg (administered as one metered spray of 0.725 mg into each nostril). The dose may be repeated, if needed, a minimum of 1 hour after the first dose. Do not use more than 2 doses within a 24-hour period or 3 doses within 7 days. Do not use within 24 hours of triptan.  Directions: TRUDHESA is for nasal administration only. Assemble and prime (i.e., pumped 4 times) before use. Use TRUDHESA immediately after priming and then discard. https://[email or web address].com/how-to-use/  - DO NOT TAKE WITHIN 24 HOURS OF FROVATRIPTAN.     Take Reglan 10 mg as needed for migraine related nausea or 15-30 min before the nasal spray to prevent nausea.    Future consideration: other triptans (naratriptan, almotriptan, or sumatriptan).    Lasmiditan or Sprix nasal spray can be tried for rescue treatment if needed in the future.     We may add an antinausea medication if needed for nausea or as co-adjuvant if monotherapy is not sufficient.      3. Preventive Treatment:     Recommend switching Emgality to Aimovig 70 mg monthly injections.     Continue with Botox 195 units every 12 weeks.      Lidocaine cranial nerve blocks every 6-8 weeks- done today     Continue with Cefaly 20 minutes every evening for prevention (prevention mode).     Continue with magnesium citrate and glycinate 600 mg daily. This may help with constipation too.     Continue with Cymbalta 40 mg BID (for depression from  psychiatrist) as Cymbalta may help as migraine preventive.    Future considerations: add riboflavin. Switch Emgality to Ajovy, Aimovig or Nurtec QOD. Previously discussed medical marijuana.    Other nutraceutical options include: riboflavin, melatonin, feverfew, and coenzyme Q10.     Other options for oral preventive medications include: amitriptyline or nortriptyline, zonisamide, rimegepant, valproate, verapamil, gabapentin, pregabalin, candesartan, Namenda, escitalopram, and levetiracetam. We want to avoid weight gain.    Several non-invasive neuromodulation devices (gammaCore, Cefaly and Nerivio) are available to treat headaches preventively and/or acutely. These are typically not covered by insurance, but the companies have a trial period and will refund you if the device is ineffective and returned within that period.     Follow-up in the Headache Clinic as scheduled for the next nerve block and Botox treatment visits.      We appreciate the opportunity to participate in the care of Andressa.     Please, do not hesitate to call our office at [phone] if you have any questions or concerns.     Odalys Ontiveros PA-C  [facility]      I spent  28 minutes on this date of service performing the following activities: obtaining history, performing examination, entering orders, documenting, preparing for visit and providing counseling and education. This was in addition to the time dedicated to the procedure.

## 2024-05-21 ENCOUNTER — HOSPITAL ENCOUNTER (OUTPATIENT)
Facility: CLINIC | Age: 44
Discharge: STILL ADMITTED OR ADMINISTRATIVE | End: 2024-05-21
Payer: COMMERCIAL

## 2024-05-21 ENCOUNTER — TELEPHONE (OUTPATIENT)
Dept: FAMILY MEDICINE | Facility: CLINIC | Age: 44
End: 2024-05-21
Payer: COMMERCIAL
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
## 2024-05-22 NOTE — TELEPHONE ENCOUNTER
Training with employee.    Margo made call to the patient to schedule appt for today @ 12:00.  Left message for patient to return call to get claim info and very appointment time.

## 2024-05-22 NOTE — TELEPHONE ENCOUNTER
Looks like patient cancelled appt   Renee Brown D.O.  Family Medicine at [facility] 05/22/24

## 2024-05-22 NOTE — TELEPHONE ENCOUNTER
Left another voice mail for patient.  Need to get the claim / insurance information from patient - (Margo)

## 2024-06-12 ENCOUNTER — OFFICE VISIT (OUTPATIENT)
Dept: NEUROLOGY | Facility: CLINIC | Age: 44
End: 2024-06-12
Attending: PSYCHIATRY & NEUROLOGY
Payer: COMMERCIAL

## 2024-06-12 VITALS
OXYGEN SATURATION: 97 % | RESPIRATION RATE: 18 BRPM | HEIGHT: 67 IN | BODY MASS INDEX: 31.39 KG/M2 | SYSTOLIC BLOOD PRESSURE: 130 MMHG | HEART RATE: 84 BPM | DIASTOLIC BLOOD PRESSURE: 82 MMHG | WEIGHT: 200 LBS

## 2024-06-12 DIAGNOSIS — G43.711 INTRACTABLE CHRONIC MIGRAINE WITHOUT AURA AND WITH STATUS MIGRAINOSUS: Primary | ICD-10-CM

## 2024-06-12 PROCEDURE — 3008F BODY MASS INDEX DOCD: CPT | Performed by: PSYCHIATRY & NEUROLOGY

## 2024-06-12 PROCEDURE — 99214 OFFICE O/P EST MOD 30 MIN: CPT | Mod: 25 | Performed by: PSYCHIATRY & NEUROLOGY

## 2024-06-12 PROCEDURE — 96372 THER/PROPH/DIAG INJ SC/IM: CPT | Mod: XU | Performed by: PSYCHIATRY & NEUROLOGY

## 2024-06-12 PROCEDURE — 64615 CHEMODENERV MUSC MIGRAINE: CPT | Performed by: PSYCHIATRY & NEUROLOGY

## 2024-06-12 RX ORDER — KETOROLAC TROMETHAMINE 30 MG/ML
60 INJECTION, SOLUTION INTRAMUSCULAR; INTRAVENOUS ONCE
Status: COMPLETED | OUTPATIENT
Start: 2024-06-12 | End: 2024-06-12

## 2024-06-12 RX ADMIN — KETOROLAC TROMETHAMINE 60 MG: 30 INJECTION, SOLUTION INTRAMUSCULAR; INTRAVENOUS at 11:00

## 2024-06-12 NOTE — PROCEDURES
Procedures  Procedure Note for Botox Injections for Headache:    Indication for procedure:    Diagnosis Plan   1. Intractable chronic migraine without aura and with status migrainosus  [facility] Botulinum Toxin Injection Appointment Request    ketorolac (TORADOL) injection 60 mg    onabotulinumtoxinA (BOTOX) 200 unit injection 200 Units          I explained the risks and benefits and obtained consent from Andressa.  Onabotulinumtoxin A 200 U were diluted in 4 mL of saline.    Lot number and expiration date documented in informed consent and MAR.  I performed a time-out, including 2 unique patient identifiers with Andressa.  Using a 30 gauge 1/2 inch needle, I injected 0.1mL = 5 U into each site according to the Chronic Migraine Protocol (PREEMPT Studies).   The following table reports the number of sites injected in each muscle.     Muscle Midline Right Left   Procerus 1          1 1   Frontalis   2 2   Temporalis   4 (10+5+5+5 U) 4 (10+5+5+5 U)   Occipitalis   3 (10+5+5 U) 3 (10+5+5 U)   Cervical Paraspinal   2 2   Trapezius   3 (10+10+5 U) 3 (10+10+5 U)   Other:            195 Units were injected and 5 Units were wasted.     Andressa tolerated the procedure well, without complications. She was instructed that she could experience increased pain in the next 2-3 days, which should be treated with ice and anti-inflammatory medications.      We appreciate the opportunity to participate in the care of Andressa LEE Olivia.     Please, do not hesitate to call me at [phone] if you have any questions or concerns.        Theresa Lewis MD  [facility]  [phone]

## 2024-06-12 NOTE — LETTER
2024     Renee Brown DO  [address]  [place] [id number]    Patient: Andressa Baker  YOB: 1980  Date of Visit: 2024      Dear Dr. Joselyn Brown:    Thank you for referring Andressa Baker to me for evaluation. Below are my notes for this consultation.    If you have questions, please do not hesitate to call me. I look forward to following your patient along with you.         Sincerely,        Theresa Dalton MD        CC: No Recipients    Theresa Dalton MD  2024  1:05 PM  Sign when Signing Visit  Patient ID: Andressa Baker                              : 1980  MRN: [id number]                                            VISIT DATE: 2024   ENCOUNTER PROVIDER: Theresa Dalton    I had the pleasure of evaluating Andressa Baker in the [facility] on 2024 for a follow-up visit. The history was provided by Andressa Mohrchowski and supplemented by her medical records.    CHIEF COMPLAINT: Headache.    HISTORY OF PRESENT ILLNESS:  Andressa Baker is a very pleasant 44 y.o.  woman seen initially in 2022 for chronic severe headaches since her early 's, worsening in the last few months. Neurological exam was normal. Brain MRI () was unremarkable. There are no atypical features or symptoms suggestive of a serious underlying condition or secondary headache. In our opinion, she has chronic migraine exacerbated by hormonal changes and frequent use of acute medications (Excedrin and Rizatriptan - now resolved). There may be some contribution from myofascial neck pain, but there is no evidence of significant cervical spine disease.    Follow-up Clinic Note:  Last clinic visit: 5/15/2024 with Odalys Ontiveros    At the last visit, she received lidocaine nerve blocks. These helped.    She continued with Aimovig and felt that this was helping. She just got the last injection 2 days  ago.    She has been on a headache cycle since last Monday, about 10 days ago.     Overall, headaches have been unchanged.     Headache frequency: about 3 days/week but not as incapacitating. Worse the last 10 days.   Headache characteristics: Unchanged.   Preventive therapy: botox 195 U ever 12 weeks. Nerve blocks. Aimovig monthly seems to be working. Cymbalta 40 mg BID (from psychiatrist). Cefaly most days.  Acute therapy: Nurtec has not worked well recently. Frova helps for about 12 hours. Trudhesa NS helps, but the headache has not resolved. Cefaly acutely helps some.  Other medical problems: ALEXIS - using CPAP since Dec 2023    PMH/SH/FH: Reviewed and unchanged since previous visit or updated below.    Summary from previous visits.  Visit 5/15/2024  At the last visit, pt underwent repeat botox injection, and stopped Emgality and began Aimovig. She continued Nurtec and frovatriptan for acute therapy, and she has Trudhesa NS available for rescue therapy.  Relivion was discussed and she opted to continue with Cefaly.   Overall, feeling better, is now having headache free time, up to a day or two. Fewer migraines, less severe. Occur 2-3x weekly   Will treat with nurtec, if not helpful will take frova as well (does this about 60% of the time). Usually will have to take a second frova. Occasionally will have to do Trudhesa the next day for complete relief.   Began dry needling which seems to help, a day or 2 of relief. Does it 1-2x week.   Had third shot of Aimovig yesterday.   Headache frequency: 2-3x/week  Headache characteristics: Unchanged.   Preventive therapy: botox 195u ever 12 weeks. Nerve blocks. Aimovig monthly.   Acute therapy: Nurtec, frova, Trudhesa  Other medical problems: ALEXIS - using CPAP since Dec 2023    Visit 3/19/24:   Last clinic visit: 2/5/2024  At the last visit, I repeated her lidocaine blocks and we discussed the possibility of doing acute infusions at the [facility] in [place]  if needed. I recommended continuing with Botox, Emgality, and nerve blocks for preventive therapy. She was also on Cymbalta from her psychiatrist. She continued with Nurtec and frovatriptan for acute therapy, and she had Trudhesa NS available for rescue therapy.   She continues to use the CPAP consistently.  Overall, her headaches are not well controlled with her current regimen. She is interested in changing Emgality to a different mAb and she wasn't more information about Relivion.  Headache frequency: variable, overall unchanged.  Headache characteristics: Unchanged. More severe this past weekend  Preventive therapy: Emgality 120 mg monthly injections - no injection site reaction, Botox 195 units every 12 weeks. Nerve blocks helps.CEFALY.  Acute therapy: Nurtec, frovatriptan. Rescue: Trudhesa NS, Benadryl.   Other medical problems: ALEXIS - using CPAP since Dec 2023  PMH/SH/FH: Reviewed and unchanged since previous visit or updated below.    Visit 2/5/2024  At the last visit, she received her botox treatment and we recommended continuing with Emgality and nerve blocks for preventive therapy. She continued with Nurtec and frovatriptan for acute therapy, and she had Trudhesa NS available for rescue therapy.   Using CPAP consistently now up to 6-7 hours/night. She finds it helpful.  She stopped lamictal (prescribed by psychiatrist for anxiety).  She has the Cefaly device, but she doesn't like the vibration and she has problems using it due to poor internet connection.  She has had worse headaches in the past 2 weeks. She had incapacitating pain and she is more functional today, but still has a headache. She took Ultram from her  and it helped a little. Her level of pain is now 2/10.  Headache characteristics: Unchanged. More severe this past weekend  Preventive therapy: Emgality 120 mg monthly injections - no injection site reaction, Botox 195 units every 12 weeks. Nerve blocks helps.CEFALY.  Acute therapy:  Nurtec, frovatriptan. Rescue: Laura NIX, Benadryl.   Other medical problems: ALEXIS - using CPAP since Dec 2023    Visit 12/27/2023  At the last visit, nerve blocks administered.   Recommend scheduling NB again in 12 weeks.  She was diagnosed with sleep apnea. She started using a CPAP yesterday.   She is currently experiencing a wearing of the Botox and Emgality. She has been taking Nurtec more often, 4 - 5 days/week. She has been doing so with the frovatriptan.   The addition of the nerve blocks has been helpful.   She has the Cefaly device, but she needs to remember to use it preventively.   Headache characteristics: Unchanged.   Preventive therapy: Emgality 120 mg monthly injections - no injection site reaction, Botox 195 units every 12 weeks.   Acute therapy: Nurtec, frovatriptan. Rescue: Laura NIX, Benadryl.     Urgent visit 10/27/2023   She has not been able to start Emgality yet. She called the insurance and it is not ready at the pharmacy.  Current headache started on: Tuesday - 4 days ago  Current level of pain: 4/10  Associated symptoms: nausea, photo and phonophobia  Patient denies motor deficits, sensory symptoms, and vision loss.   Medications tried at home in the last 24 hours: Reglan and benadryl last night. Other acute treatments earlier. None this morning.    Visit 10/3/2023   Last clinic visit: 9/19/2023   At the last visit, I have repeated the lidocaine nerve blocks. I recommended starting Cefaly for prevention and acute treatment. I gave her a sample of Zavzpret NS to see if this works better than Nurtec. She continued with Emgality, Botox and magnesium for prevention. We also discussed trying gluten free diet and changing the biologic for weight loss.   She stopped Ozempic and started gluten free diet, but is not doing this 100%.   She has Cefaly but has not used it much.  Zavzpret did not work well.  Overall, she continues to have very frequent migraine headaches. The may be less severe since  the last visit.  Headache frequency: almost daily.  Headache characteristics: Unchanged.   Preventive therapy: Emgality loading dose on 3/28 - helping - they respond better to acute treatment. SE: constipation (may be from Emgality and Ozempic). Botox 195 U every 12 weeks. Cymbalta 40 mg BID (from psychiatrist).  Acute therapy: Rizatriptan or Frovatriptan - both help and are similar; better than eletriptan. Nurtec helps some, but inconsistently. DHE NS with benadryl helps for rescue but causes nausea and congestion.  Other medical problems: Denies new medical problems.     Visit 9/19/2023  At the last visit, she presented with another episode of status migrainosus. I repeated the lidocaine nerve blocks and recommended continuing with Nurtec for first line acute treatment. I prescribed generic rizatriptan-MLT for rescue in place of Frova. I recommend considering Cefaly or GammaCore and continuing with Emgality, Botox and magnesium for prevention.   She started taking Ozempic again 4 weeks ago and atomoxetine for brain fog about 2 weeks ago.   She has noticed that her migraine headache condition gets worse every time she restarts Ozempic and improves when she stops it.   She had severe headaches requiring IM Toradol on 8/3 and 9/12.    She ordered Cefaly and recently got it, but has not used it yet.   She had some blood work done and was prescribed progesterone, but has not use the estrogen patch due to concerns about risk or blood clotting.   Overall, she continues to have very frequent migraine headaches.   Headache frequency: almost daily.  Headache characteristics: Unchanged.   Preventive therapy: Emgality loading dose on 3/28 - helping - they respond better to acute treatment. SE: constipation (may be from Emgality and Ozempic). Botox 195 U every 12 weeks. Cymbalta 40 mg BID (from psychiatrist).  Acute therapy: Rizatriptan or Frovatriptan - both help and are similar; better than eletriptan. Nurtec helps some, but  inconsistently. Maxalt-MLT brand name was not covered and now it is not available (generic also works, but headache reoccurs 6-8 hours). DHE NS with benadryl helps for rescue but causes nausea and congestion.  Other medical problems: Denies new medical problems.     Visit 7/19/2023  At her last visit, she presented with status migrainosus. I repeated her nerve blocks and changed her acute regimen. I prescribed a trial of Nurtec and Maxalt-MLT (wants to try brand name to see if this works better than the generic) to use in place of Ubrelvy and Frova. I recommended continuing with Emgality, Botox and magnesium for prevention.   She received nerve blocks again on 6/16/2023 and Botox on 7/11/23.  She contacted us last week with a constant headache that had been gradually getting worse since her last visit.   She was prescribed a Medrol dose pack that she is finishing today.  Current headache started 2 weeks ago.  Current level of pain: 4/10  Associated symptoms: photophobia - this morning had nausea and took Reglan  Patient denies motor deficits, sensory symptoms, and vision loss.   Medications tried at home in the last 24 hours: Reglan, Nurtec and Tylenol.  She has undergone some blood work to rule out hormonal imbalance and everything was reportedly within the normal limits.   She started using a  from her dentist.  Overall, she continues to have very frequent migraine headaches.   Headache frequency: almost daily.  Headache characteristics: Unchanged.   Preventive therapy: Emgality loading dose on 3/28 - helping. SE: constipation (may be from Emgality and Ozempic). Botox 195 U every 12 weeks. Cymbalta 40 mg BID (from psychiatrist).  Acute therapy: Frovatriptan seems to work better than eletriptan. Nurtec helps some, but inconsistently. Maxalt-MLT brand name was not covered. DHE NS with benadryl helps for rescue but causes nausea and congestion.  Other medical problems: Denies new medical problems.      Visit 5/3/2023  At the last visit, she was noticing some improvement after adding Emgality, but the headaches were still occur 5 days/week. I repeated her Botox treatment and recommended to increase the dose of magnesium. She continued with Ubrelvy acutely and Frovatriptan for rescue treatment.   She continues to have frequent almost daily headaches and her acute medications are not working as well.   She contacted us today to see if we could repeat the lidocaine nerve blocks as her headache has not resolved with her oral medications.  She took 2 doses of Frova yesterday and then Reglan + Benadryl + Tylenol last night. This morning she took Tylenol again. She run out of Ubrelvy and her pharmacy cannot refill it  Current headache rated 3-4/10.  Headache frequency: on average almost daily.  Headache characteristics: Unchanged.   Preventive therapy: Emgality loading dose on 3/28 is helping. SE: constipation (may be from Emgality and Ozempic). Botox 195 U every 12 weeks. Effexor 37.5 mg daily - switching to Cymbalta 20 mg BID (from psychiatrist).  Acute therapy: Frovatriptan seems to work better than eletriptan. Ubrelvy helps for the day. DHE NS with benadryl helps for rescue but causes nausea and congestion.  Other medical problems: Denies new medical problems.     Visit 4/18/2023  At the last visit, I administered lidocaine nerve blocks again and recommended some changes in her preventive and acute regimen. I prescribed Emgality in place of Qulipta. She was planning switch from Effexor to a different antidepressant. I changed eletriptan to Frovatriptan for rescue treatment as eletriptan only helped for a few hours.  She started Emgality shortly after the last visit and tried Frova with good response.   Her psychiatrist is switching her from Effexor to Cymbalta.  Overall, she has noticed about 30% improvement in migraine headache frequency and response to acute treatment.    Headache frequency: on average 5  days/week now. Daily prior month.  Headache characteristics: Unchanged. More responsive to acute treatment.   Preventive therapy: Emgality loading dose on 3/28 is helping. SE: constipation (may be from Emgality and Ozempic). Botox 195 U every 12 weeks. Effexor 37.5 mg daily - switching to Cymbalta 20 mg BID (from psychiatrist).  Acute therapy: Frovatriptan seems to work better than eletriptan. Ubrelvy helps for the day. DHE NS with benadryl helps for rescue but causes nausea and congestion.  Other medical problems: Denies new medical problems.     Visit 3/27/2023  At the last visit I administered lidocaine nerve blocks which provided immediate benefit. However, the headaches continue to reoccur almost daily.   She is here to repeat the nerve blocks and discuss alternative acute and preventive treatments.   Her psychiatrist has recommended switching from Effexor to Lamictal. Cymbalta has been considered, but they are trying to find a weight neutral medication.  She restarted Ozempic.   Overall, she has had severe refractory headaches almost daily for over a month.   Headache frequency: not continuous but almost daily since 2/15/2023  Headache characteristics: Unchanged.   Preventive therapy: Botox 195 U every 12 weeks. Effexor 112.4 mg daily. Qulipta 60 mg helping some (at 30 mg 2-3 weeks with no headaches or headaches that responded well to acute treatment, then had to increase to 60 mg and did not see an improvement). She is having some constipation and fatigue.  Acute therapy: Eletriptan helps some within an hour but comes back in a couple of hours. Ubrelvy helps for the day. DHE NS with benadryl helps for rescue but causes nausea and congestion.  Other medical problems: Denies new medical problems.     Visit 3/16/2023  At the last visit, she reported significant improvement of her headache condition after adding Qulipta initially, but she presented with persistent headache for several weeks. We recommended  starting a course of daily naproxen for 1 - 2 weeks and discussed the need to decrease the amount of acute medications to prevent rebound headaches.   We switched naproxen to nabumetone on 3/13.  Current headache started on: 2/15/2023 on and off. Was headache free yesterday, but woke up with a severe headache again this morning.  Current level of pain: 8/10  Associated symptoms: phonophobia, photophobia and nausea.  Patient denies motor deficits, sensory symptoms, and vision loss.   Medications tried at home in the last 24 hours: Ubrelvy this morning and nabumetone last night.   Overall, she has had severe refractory headaches almost daily for the last month.  Headache frequency: not continuous but almost daily since 2/15/2023  Headache characteristics: Unchanged.   Preventive therapy: Botox. Effexor 112.4 mg daily. Qulipta 60 mg (at 30 mg 2-3 weeks with no headaches or headaches that responded well to acute treatment, then had to increase to 60 mg and did not see an improvement).  Acute therapy: Ubrelvy. Eletriptan. DHE NS  Other medical problems: Denies new medical problems.     Telemed 3/7/2023  At the last visit, we recommended to add Qulipta for prevention and continue with Botox, Magnesium, and Effexor (from psychiatrist). I recommended to continue with the same acute and rescue regimen, Ubrelvy, Eletriptan, and DHE NS, but we discussed the possibility of trying Nurtec as needed. Nurtec and Anti-CGRP antibodies can also be considered for prevention.  Initially, within a few days of starting the Qulipta 30 mg, she noticed an improvement. A few weeks later, she increased the dose to 60 mg. Tolerating well. She was experiencing an occasional headache, which was relieved by Ubrelvy.   She went to [place] on February 15. She has been experiencing a persistent headache since that time. It was stressful traveling, but she was able to enjoy the trip. She admits to taking a lot of Ubrelvy and triptans. She took  Trudhesa around her menstrual cycle. She found an old prescription for naproxen 500 mg, which she took over the weekend. She was headache free yesterday, but the headache recurred today.      Telemed 1/23/2023   Last clinic visit: 12/27/2022 with Edyta  At the last visit, she presented with refractory status migrainosus and was treated with lidocaine nerve blocks. These helped some, but not as much as the ones in September. She took dexamethasone 2 mg every morning for 5 days without much benefit, followed by prednisone taper starting at 40 mg for 6 days.   That headache episode eventually resolved with prednisone and she was headache free for a few days. However, she has continued to have very frequent headaches.   She contacted us today with a refractory headache and wanting to discuss other options for headache prevention.   Current headache started  4-5 days ago. She took Ubrelvy last night and woke up without headache. Around 11 am the headache came back and she took eletriptan 40 mg and a second dose around 1 pm. Now the headache is rated at 4 pm.    She was prescribed Effexor by her psychiatrist and has been increasing the dose. The headache features have not changed.  Overall, the headaches have been occurring daily or almost daily.  Headache frequency: daily or almost daily, but not constant. Resolved with steroids for a few day.   Headache characteristics: Unchanged.   Preventive therapy: Botox. Magnesium 400 mg daily. Effexor 112.5 mg daily.  Acute therapy: Ubrelvy works well most of the times. Eletriptan for rescue worked well. Trudhesa NS works well for rescue.    Other medical problems: Denies new medical problems.     Visit 12/27/2022 Urgent visit - lidocaine nerve blocks   At the last visit, her headaches were improved, but still occurring 4-5 days/week. We increased the dose of Botox and recommended to continue with Effexor as this may also help. Otherwise, she was to continue with magnesium and  the same acute regimen.  She presented today for an urgent visit.   Current headache started on: 12/5   Headache diary -   12/5 - Ubrelvy Trudhesa pm   Many days with Ubrelvy and eletriptan   12/15 -Excedrin Migraine  am and Ubrelvy   12/15 - Ubrelvy am; Ubrelvy 745 pm; (also had eyes dilated); took Aleve  and Tylenol at 745  12/18 - Ubrelvy  12/19 - Ubrelvy x 2; eletriptan at 7 eletriptan at 10  12/20 - Aleve  eletriptan 6 am; 745 pm Aleve  Tylenol; 845 pm eletriptan; 11 Zofran 8 mg  12/21- Ubrelvy 10 am 10 pm  12/22 Aleve  1 am  12/23 Ubrelvy 4 pm  12/24 Aleve  7 am  12/25 Ubrelvy  12/26 Ubrelvy 1:45 eletriptan 9 pm  12/27 eletriptan 1 pm  Current level of pain: 7   Associated symptoms: right side heaviness, dropping things; photophobia, phonophobia.   Effexor dose was increased.     Visit 11/3/2022   Last clinic visit: 9/26 for nerve blocks and 9/23 for follow-up   At the last visit, she received lidocaine nerve blocks which finally broke the refractory headache precipitated by the COVID booster.  Her psychiatrist switch Zoloft to Effexor 3 weeks ago and this seems to be helping with mood and headaches.  Overall, the headaches have been better since she got the lidocaine nerve blocks and she feels that Effexor is also helping.   Headache frequency: on average 4-5 days/week with headaches. All of them required acute treatment with Ubrelvy and 2-3 days need eletriptan.     Headache characteristics: Unchanged.   Preventive therapy: Botox 155 U. Magnesium 400 mg daily. Effexor 75 mg daily.  Acute therapy: Ubrelvy working better now. Eletriptan for rescue worked well. Trudhesa NS works well for rescue.    Other medical problems: Denies new medical problems. Scheduled for meniscal repair surgery.     Urgent visit - lidocaine nerve blocks 9/26/2022    Telemed Visit 9/23/2022  She mentioned that she underwent the new bivalent booster the night before the onset of this most recent headache.    At the last visit, she was  evaluated for a severe headache that has been refractory to treatment. She underwent an acute migraine infusion.   dexamethasone sodium phosphate 10 mg  diphenhydramine HCl 25 mg, 25 mg  ketorolac tromethamine 30 mg  metoclopramide HCl (4 administrations)  valproate (DEPACON) 1,000 mg in sodium chloride... 1000 mg  After the infusion, she felt well for a few hours. Later that day, the headache intensity increased. She took dexamethasone and the headache improved. She was able to attend a meeting. I prescribed a higher dose (4 mg) dexamethasone taper, but she did not tolerate that dose. She had trouble falling asleep. Yesterday, she took dexamethasone 2 mg. She did not take any doses today. She took eletriptan last night.    Her current headache is a 1 - 2/10.     Urgent visit - acute migraine infusion 9/19/2022  Last clinic visit: 8/10/2022  At the last visit, she underwent the first Botox treatment. She was to complete a 30 days course of naproxen and continue with daily magnesium. She was to continue with Ubrelvy for acute treatment and eletriptan for rescue treatment.   She tolerated the Botox well. She reports less frequent headaches. However, the acute attacks are more severe and last longer.   Current headache started on: Friday   Current level of pain: 5 - 6/10  Associated symptoms: initially right arm and leg heaviness/tingling, dropping things with her right hand, photophobia, and phonophobia. Possibly some blurry vision.    Medications tried at home in the last 24 hours: Ubrelvy, eletriptan, Zofran, Tylenol, Trazodone. Yesterday, Aleve.   She stopped Adderall 2 - 3 weeks ago.     Follow-up Clinic Note:  Last clinic visit: 8/10/2022  At the last visit, I prescribed a 30 days course of naproxen and recommended to start Botox for long term prevention. I also prescribed a trial of Ubrelvy for acute treatment and eletriptan for rescue treatment. She has used these with some benefit. She stopped the OTC's and  rizatriptan as recommended.  She is here today for her first Botox treatment.  Overall, Andressa the headaches have remained daily, but they are less intense since she started naproxen on daily basis for prevention.  Headache frequency: daily for several months. Unchanged.    Headache characteristics: Unchanged. Less intense while on naproxen.  Preventive therapy: Magnesium 240 mg daily.  Acute therapy: Ubrelvy usually helps, getting better. A few times needed a second tablet and it didn't work. Eletriptan twice for rescue worked well. Took percocet once for rescue.   Other medical problems: Denies new medical problems.     Initial clinic visit (7/27/2022):  Andressa developed headaches in her early 20's while in college. The headaches started without known precipitating event (i.e. illness,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

## 2024-06-12 NOTE — PROGRESS NOTES
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 taking Adderall for ADHD, she stopped breastfeeding a few months ago, and she had gradually increased the use of acute medications to about 4-5 days/week.   She was previously seen by Dr. Haney between 2006 and 2012.   She had a negative brain MRI in 2014 that only revealed low lying tonsils.     Headache Semiology:  Frequency: > 15 days per month. Daily for several months.    Location: always right sided, parietal, frontotemporal, behind the right eye and in the right occipital and trapezius area  Quality: pressure or dull  Severity: severe without treatment  Duration: constant 24/6, always > 4 hours without treatment  Timing or pattern: no  Aggravation by regular physical activity: yes  Associated features: photophobia, phonophobia, and nausea.   Presence of aura: no  Focal neurologic symptoms: right arm feels clumsy during the severe migraine episodes, otherwise no focal weakness, numbness, coordination or balance problems.  No unilateral cranial autonomic symptoms (lacrimation, conjunctival injection, nasal congestion or rhinorrhea, ptosis, eyelid edema, forehead/facial sweating, miosis) restlessness or agitation.   Positional headache: no   Precipitated by Valsalva maneuvers: no  Neck pain: chronic tension.  Relieving factors: rest  and ice  Exacerbating factors: as above  Related to menstrual cycle: N/A   Other triggers: unknown  Disability: Unable to perform usual activities (work/school/family/social) completely or partially when headache is severe.      PAST TREATMENTS/MEDICATIONS:  Preventive:  Botox 155 -195 U (8/2022 - present) - still having daily or almost daily headaches.  Magnesium glycinate currently taking 240 mg daily  Topiramate caused cognitive side effects years ago  Zoloft helped with migraine years ago, but not now. Currently taking for mood/anxiety - no benefit at this time for the headaches. SE: weight gain.  Effexor 112.5 mg for mood - no effect on headaches.  Tried beta blockers in 2018 for PVC's and they caused side effects that were intolerable.   Naproxen helped with headache intensity, but caused more GERD.  Qulipta helping some, but was having headaches almost daily. SE: constipation and fatigue. (At 30 mg 2-3 weeks with no headaches or headaches that responded well to acute treatment, then had to increase to 60 mg and did not see an improvement)  Emgality (3/28/2023) helping. SE: possible constipation, but also from Ozempic.  Acute or as needed:  Rizatriptan 5-10 mg - partial benefit. No SE.  Maxalt-MLT brand name was not covered and now it is not available (generic also works, but headache reoccurs 6-8 hours).  Excedrin - partial benefit  Tylenol - no benefit  Ibuprofen - similar to Excedrin  Naproxen - similar to Excedrin, currently taking Aleve at bedtime with partial benefit  Ubrelvy 100 mg - works well most of the times. No SE. Not recently. Nurtec has been better.  Eletriptan for rescue usually helps. No SE.  Frovatriptan works better than other triptans. No SE.   Nurtec helps but not consistently. No SE.   Dexamethasone 4 mg - does not tolerate (last 9/2022) 2 mg was well tolerated (12/2022).  Prednisone course for 6 days starting at 40 mg - helped and was well tolerated (1/6/2022)  Lidocaine Nerve Blocks  (9/2022, 12/2022) worked very well in September, but headache reoccurred in Dec.  Zavzpret NS did not work.  Toradol IM works acutely, but mixed results  IV medications/Hospitalizations:  IV Infusion (9/2022) no sustained benefit, but the headache was related to the COVID booster.  Non-Pharmacologic:  Massage  Chiropractor  CBT    MEDICATIONS AT START OF VISIT:    Current Outpatient Medications:     AIMOVIG AUTOINJECTOR 70 mg/mL auto-injector subcutaneous injection, Inject 1 mL (70 mg total) under the skin every 30 (thirty) days., Disp: 3 mL, Rfl: 3    atomoxetine (STRATTERA) 40 mg capsule, Take 40 mg by mouth 2 (two) times a day., Disp: , Rfl:     b complex vitamins capsule, Take 1 capsule by mouth daily., Disp: , Rfl:     botulinum toxin Type A (BOTOX), , Disp: , Rfl:     dihydroergotamine (TRUDHESA) 0.725 mg/pump act. (4 mg/mL) spray,non-aerosol, Administer 1 spray into each nostril as needed (migraine). The dose may be repeated, if needed, a minimum of 1 hour after the first dose. Do not use more than 2 doses within a 24-hour period or 3 doses within 7 days., Disp: 4 mL, Rfl: 11    docosahexaenoic acid-epa 120-180 mg capsule, Take 1,000 mg by mouth daily., Disp: , Rfl:     DULoxetine (CYMBALTA) 20 mg capsule, Take 40 mg by mouth 2 (two) times a day., Disp: , Rfl:     ergocalciferol (VITAMIN D2) 50,000 unit(1250 mcg) capsule, Take 50,000 Units by mouth once a week., Disp: , Rfl:     frovatriptan (FROVA) 2.5 mg tablet, TAKE 1 TABLET (2.5 MG TOTAL) BY MOUTH ONCE AS NEEDED FOR MIGRAINE INDICATIONS: A MIGRAINE HEADACHE., Disp: 12 tablet, Rfl: 11    herbal drugs (CALMME ORAL), Take by mouth daily., Disp: , Rfl:     LORazepam (ATIVAN) 0.5 mg tablet, Take 1 tablet (0.5 mg total) by mouth every 8 (eight) hours as needed for anxiety., Disp: 20 tablet, Rfl: 0    metoclopramide (REGLAN) 10 mg tablet, TAKE 1 TABLET (10 MG TOTAL) BY MOUTH 3 TIMES A DAY AS NEEDED FOR NAUSEA, Disp: 20 tablet, Rfl: 2    naproxen sodium (ALEVE  ORAL), Take by mouth as needed., Disp: , Rfl:     olopatadine (PATADAY) 0.2 % ophthalmic solution, Administer 1 drop into affected eye(s) daily., Disp: , Rfl:     ondansetron (ZOFRAN) 4 mg tablet, Take 4 mg by mouth every 8 (eight) hours as needed., Disp: , Rfl:     oseltamivir (TAMIFLU) 75 mg capsule, , Disp: , Rfl:     rimegepant (NURTEC ODT) 75 mg tablet,disintegrating, Take 1 tablet (75 mg total) by mouth as needed (migraine headache). Dissolve 1 tablet under the tongue. One dose per day as needed., Disp: 16 tablet, Rfl: 11    tirzepatide (MOUNJARO) 2.5 mg/0.5 mL pen injector, Inject 2.5 mg under the skin once a week., Disp: 2 mL, Rfl: 0    traZODone (DESYREL) 50 mg tablet, Take 25-50 mg by mouth nightly as needed., Disp: , Rfl:     TURMERIC ORAL, Take by mouth daily., Disp: , Rfl:     ALLERGIES: has No Known Allergies.     REVIEW OF SYSTEMS: As discussed above. Otherwise, all other ROS were reviewed and negative.    PAST MEDICAL HISTORY:  has a past medical history of Abnormal ECG, Arrhythmia (2018), Back pain, GERD (gastroesophageal reflux disease), Heartburn, History of fetal demise, not currently pregnant, Joint pain, Migraine headache, Ovarian cyst, Palpitations, Pericarditis (2018), and Status migrainosus (2023).    PAST SURGICAL HISTORY:  has a past surgical history that includes Ablation of dysrhythmic focus (2018);  section; Knee arthroscopy w/ meniscal repair (Right); and [place] tooth extraction.    FAMILY HISTORY: family history includes Clotting disorder in her maternal grandfather; Colon cancer in her mother's brother; Dementia in her biological father and paternal grandmother; Diabetes in her paternal grandfather; Hyperlipidemia in her biological father, biological mother, maternal grandmother, and paternal grandmother; Hypertension in her biological father; No Known Problems in her biological child; Other in her biological father; Prostate cancer (age of onset: 78) in her  biological father.   Migraine in her mother (Rizatriptan worked for her), one brother, and probably in her father too.    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    She has 5 children, last one born in 2019.     Andressa is considering a future pregnancy, but is not trying to conceive at this time, and wants to wait until her headaches are well controlled. I have discussed with her the possible teratogenic effects of some medications and she verbalized understanding.    PHYSICAL EXAMINATION:    Vitals:    06/12/24 1006   BP: 130/82   Pulse: 84   Resp: 18   SpO2: 97%      General: Well developed, well nourished, in acute distress.  Alert and oriented. Fluent with normal language and speech. Face symmetric.       Data Reviewed:   Brain MRI WO (6/2014)  Comparison: MRI brain 01/14/2012  Ventricles and sulci are normal in size and configuration. No diffusion evidence of acute infarction. No extra axial collection, mass-effect, or edema. No intraparenchymal hemorrhage on the gradient echo sequence. The right cerebellar tonsil protrudes approximately 4 mm below the level of the foramen magnum, not meeting criteria for Chiari malformation. The sella appears unremarkable. The major intracranial flow voids at the skull base are normal in appearance. There is a small mucous retention cyst in the right maxillary sinus. Bone marrow signal is within normal limits.  IMPRESSION: No evidence of acute infarction or intracranial mass.     Brain MRI WO (9/2022) unremarkable. (Scans independently reviewed by Dr. Lewis)  IMPRESSION: No acute intracranial abnormality. No acute infarct, mass effect or acute intracranial  "hemorrhage.  Comparison:  6/20/2014.  Findings:  Sulci, ventricles and basal cisterns are within normal limits for patient's stated age.  Minimal periventricular white matter change.  No mass-effect, intracranial hemorrhage, acute segmental infarct or extra-axial fluid collection is seen. Cerebellar tonsils are normal in position.  Expected signal voids are seen in the intracranial vessels at the skull base.  The orbits  and sella are unremarkable.   The paranasal sinuses and mastoid air cells are clear.    Lab results reviewed.  Pertinent findings include: CMP, CBC, TSH, all within normal limits (7/2022) Normal CMP (7/2023) Plans to have this repeated this month.     ECG 03/16/2023: Done at Dr. Jane' office per her note \"I personally reviewed her 12-lead EKG performed today which reveals normal sinus rhythm at 79 bpm\" Planned visit with Dr. Jane scheduled in [place] 2024.    IMPRESSION/PLAN:  Andressa Baker is a very pleasant 44 y.o. woman seen initially in July 2022 for chronic severe headaches since her early 20's, worsening in the last few months. Neurological exam was normal. Brain MRI (2014 and 9/2022) were both unremarkable. There are no atypical features or symptoms suggestive of a serious underlying condition or secondary headache. In our opinion, she has chronic migraine exacerbated by hormonal changes and frequent use of acute medications (Excedrin and Rizatriptan). There may be some contribution from myofascial neck pain, but there is no evidence of significant cervical spine disease.    Today, I have treated her acute headache with Toradol IM and repeated her botox treatment. I recommend adding riboflavin to magnesium. She will continue Aimovig monthly and botox and lidocaine blocks every 12 weeks. She is also on Cymbalta from her psychiatrist. She will continue with Nurtec and frovatriptan for acute therapy, and she has Trudhesa NS available for rescue therapy. She will continue with " Cefaly. See detailed recommendations below.    Diagnosis:  Chronic migraine without aura   Cervicalgia  Headache secondary to COVID vaccine booster - resolved     Evaluation:  No additional tests are needed at this time. If there are new symptoms or changes in the characteristics of the headaches, I will re-evaluate Andressa and consider if diagnostic tests are needed.  Previously recommend considering a consultation with Dr. Pichardo at [facility].     Treatment plan:     1. Healthy Habits: The following recommendations can greatly reduce the number and severity of headaches.  Maintain regular sleep hours and get sufficient sleep (8-9 hours)  Do not skip meals, especially breakfast  Drink at least 64 oz or 8 cups of water daily - enough to urinate 5-6 times a day  Get at least 30 minutes of daily aerobic exercise (enough to increase your heart rate and sweat)    Keep track of headaches and use of acute medication (prescription or over-the-counter) in a calendar or notebook and bring that to your clinic visits.    2. Acute Treatment:     Toradol 60 mg IM administered today.    Continue with Nurtec ODT 75 mg as needed at onset of moderate to severe headache. Maximum 1 tablet in 24 hours.     May use Cefaly as needed for acute headache treatment for up to 1 hour (acute mode).     May continue with frovatriptan 2.5 mg for moderate to severe headache. May repeat 1 tablet 2 hours later. Maximum 2 tablets in 24 hr. Limit to 2 days/week.     Third line acute or rescue treatment: TRUDHESA 1.45 mg (administered as one metered spray of 0.725 mg into each nostril). The dose may be repeated, if needed, a minimum of 1 hour after the first dose. Do not use more than 2 doses within a 24-hour period or 3 doses within 7 days. Do not use within 24 hours of triptan.  Directions: TRUDHESA is for nasal administration only. Assemble and prime (i.e., pumped 4 times) before use. Use TRUDHESA immediately after priming and then  robin. https://www.[employer]/how-to-use/  - DO NOT TAKE WITHIN 24 HOURS OF FROVATRIPTAN.     Take Reglan 10 mg as needed for migraine related nausea or 15-30 min before the nasal spray to prevent nausea.    Future consideration: other triptans (naratriptan, almotriptan, or sumatriptan).    Lasmiditan or Sprix nasal spray can be tried for rescue treatment if needed in the future.     We may add an antinausea medication if needed for nausea or as co-adjuvant if monotherapy is not sufficient.      3. Preventive Treatment:     May add riboflavin (vitamin B2) 200 mg twice a day.    Continue with Aimovig 70 mg monthly injections. We could increase the dose of Aimovig to 140 mg at the next visit or before then if needed. We have discussed this already.  SE: constipation.    Continue with magnesium for migraine and constipation (goal dose of migraine prevention 400 - 600 mg)    Continue with Botox 195 units every 12 weeks.      Lidocaine cranial nerve blocks every 6-8 weeks.    Continue with Cefaly 20 minutes every evening for prevention (prevention mode).     Continue with magnesium citrate and glycinate 600 mg daily. This may help with constipation too.     Continue with Cymbalta 40 mg BID (for depression from psychiatrist) as Cymbalta may help as migraine preventive.    Future considerations: Increase the dose of Aimovig or switch to Nurtec QOD in place of anti-CGRP antibody. Previously discussed medical marijuana.    Other nutraceutical options include: riboflavin, melatonin, feverfew, and coenzyme Q10.     Other options for oral preventive medications include: amitriptyline or nortriptyline, zonisamide, rimegepant, valproate, verapamil, gabapentin, pregabalin, candesartan, Namenda, escitalopram, and levetiracetam. We want to avoid weight gain.    Several non-invasive neuromodulation devices (gammaCore, Cefaly and Nerivio) are available to treat headaches preventively and/or acutely. These are typically not  covered by insurance, but the companies have a trial period and will refund you if the device is ineffective and returned within that period.     Follow-up in the Headache Clinic as scheduled for the next botox and nerve blocks.      We appreciate the opportunity to participate in the care of Andressa.     Please, do not hesitate to call our office at [phone] if you have any questions or concerns.       Theresa Lewis MD  [facility]  [phone]        I spent  31 minutes on this date of service performing the following activities: obtaining history, performing examination, entering orders, documenting, preparing for visit, and providing counseling and education. This was in addition to the time dedicated to the procedure.

## 2024-06-12 NOTE — PATIENT INSTRUCTIONS
Diagnosis:  Chronic migraine without aura   Cervicalgia  Headache secondary to COVID vaccine booster - resolved     Evaluation:  No additional tests are needed at this time. If there are new symptoms or changes in the characteristics of the headaches, I will re-evaluate Andressa and consider if diagnostic tests are needed.  Previously recommend considering a consultation with Dr. Pichardo at [facility].     Treatment plan:     1. Healthy Habits: The following recommendations can greatly reduce the number and severity of headaches.  Maintain regular sleep hours and get sufficient sleep (8-9 hours)  Do not skip meals, especially breakfast  Drink at least 64 oz or 8 cups of water daily - enough to urinate 5-6 times a day  Get at least 30 minutes of daily aerobic exercise (enough to increase your heart rate and sweat)    Keep track of headaches and use of acute medication (prescription or over-the-counter) in a calendar or notebook and bring that to your clinic visits.    2. Acute Treatment:     Toradol 60 mg IM administered today.    Continue with Nurtec ODT 75 mg as needed at onset of moderate to severe headache. Maximum 1 tablet in 24 hours.     May use Cefaly as needed for acute headache treatment for up to 1 hour (acute mode).     May continue with frovatriptan 2.5 mg for moderate to severe headache. May repeat 1 tablet 2 hours later. Maximum 2 tablets in 24 hr. Limit to 2 days/week.     Third line acute or rescue treatment: TRUDHESA 1.45 mg (administered as one metered spray of 0.725 mg into each nostril). The dose may be repeated, if needed, a minimum of 1 hour after the first dose. Do not use more than 2 doses within a 24-hour period or 3 doses within 7 days. Do not use within 24 hours of triptan.  Directions: TRUDHESA is for nasal administration only. Assemble and prime (i.e., pumped 4 times) before use. Use TRUDHESA immediately after priming and then discard.  https://www."[email or web address].Stamped/how-to-use/  - DO NOT TAKE WITHIN 24 HOURS OF FROVATRIPTAN.     Take Reglan 10 mg as needed for migraine related nausea or 15-30 min before the nasal spray to prevent nausea.    Future consideration: other triptans (naratriptan, almotriptan, or sumatriptan).    Lasmiditan or Sprix nasal spray can be tried for rescue treatment if needed in the future.     We may add an antinausea medication if needed for nausea or as co-adjuvant if monotherapy is not sufficient.      3. Preventive Treatment:     May add riboflavin (vitamin B2) 200 mg twice a day.    Continue with Aimovig 70 mg monthly injections. We could increase the dose of Aimovig to 140 mg at the next visit or before then if needed. We have discussed this already.  SE: constipation.    Continue with magnesium for migraine and constipation (goal dose of migraine prevention 400 - 600 mg)    Continue with Botox 195 units every 12 weeks.      Lidocaine cranial nerve blocks every 6-8 weeks.    Continue with Cefaly 20 minutes every evening for prevention (prevention mode).     Continue with magnesium citrate and glycinate 600 mg daily. This may help with constipation too.     Continue with Cymbalta 40 mg BID (for depression from psychiatrist) as Cymbalta may help as migraine preventive.    Future considerations: Increase the dose of Aimovig or switch to Nurtec QOD in place of anti-CGRP antibody. Previously discussed medical marijuana.    Other nutraceutical options include: riboflavin, melatonin, feverfew, and coenzyme Q10.     Other options for oral preventive medications include: amitriptyline or nortriptyline, zonisamide, rimegepant, valproate, verapamil, gabapentin, pregabalin, candesartan, Namenda, escitalopram, and levetiracetam. We want to avoid weight gain.    Several non-invasive neuromodulation devices (gammaCore, Cefaly and Nerivio) are available to treat headaches preventively and/or acutely. These are typically not covered by  insurance, but the companies have a trial period and will refund you if the device is ineffective and returned within that period.     Follow-up in the Headache Clinic as scheduled for the next botox and nerve blocks.

## 2024-06-12 NOTE — PROGRESS NOTES
Procedure Note for Toradol injecion for Acute Severe Headache or Pain:    Indication for procedure:    Diagnosis Plan   1. Intractable chronic migraine without aura and with status migrainosus  [facility] Botulinum Toxin Injection Appointment Request    ketorolac (TORADOL) injection 60 mg    onabotulinumtoxinA (BOTOX) 200 unit injection 200 Units          NDC, lot number, and expiration date documented in the MAR.    I performed a time-out, including 2 unique patient identifiers, with Andressa.    Using a 22 gauge 1 1/2 inch needle, I injected 2 ml (60 mg) intramuscularly.     Site of injection: right gluteus muscle.       Andressa tolerated the procedure well, without complications.        Sarah Anderson RN

## 2024-06-22 ENCOUNTER — APPOINTMENT (OUTPATIENT)
Dept: LAB | Age: 44
End: 2024-06-22
Attending: STUDENT IN AN ORGANIZED HEALTH CARE EDUCATION/TRAINING PROGRAM
Payer: COMMERCIAL

## 2024-06-22 DIAGNOSIS — E78.2 MIXED HYPERLIPIDEMIA: ICD-10-CM

## 2024-06-22 DIAGNOSIS — R60.9 LIPEDEMA: ICD-10-CM

## 2024-06-22 DIAGNOSIS — E66.811 CLASS 1 OBESITY DUE TO EXCESS CALORIES WITH SERIOUS COMORBIDITY AND BODY MASS INDEX (BMI) OF 31.0 TO 31.9 IN ADULT: ICD-10-CM

## 2024-06-22 DIAGNOSIS — Z51.81 THERAPEUTIC DRUG MONITORING: ICD-10-CM

## 2024-06-22 DIAGNOSIS — B36.0 TINEA VERSICOLOR: ICD-10-CM

## 2024-06-22 DIAGNOSIS — E66.09 CLASS 1 OBESITY DUE TO EXCESS CALORIES WITH SERIOUS COMORBIDITY AND BODY MASS INDEX (BMI) OF 31.0 TO 31.9 IN ADULT: ICD-10-CM

## 2024-06-22 DIAGNOSIS — Z00.00 ENCOUNTER FOR PREVENTIVE CARE: ICD-10-CM

## 2024-06-22 LAB
ALBUMIN SERPL-MCNC: 4.2 G/DL (ref 3.5–5.7)
ALP SERPL-CCNC: 49 IU/L (ref 34–125)
ALT SERPL-CCNC: 20 IU/L (ref 7–52)
ANION GAP SERPL CALC-SCNC: 5 MEQ/L (ref 3–15)
AST SERPL-CCNC: 16 IU/L (ref 13–39)
BILIRUB SERPL-MCNC: 0.4 MG/DL (ref 0.3–1.2)
BUN SERPL-MCNC: 12 MG/DL (ref 7–25)
CALCIUM SERPL-MCNC: 9.3 MG/DL (ref 8.6–10.3)
CHLORIDE SERPL-SCNC: 103 MEQ/L (ref 98–107)
CHOLEST SERPL-MCNC: 230 MG/DL
CO2 SERPL-SCNC: 30 MEQ/L (ref 21–31)
CREAT SERPL-MCNC: 0.7 MG/DL (ref 0.6–1.2)
EGFRCR SERPLBLD CKD-EPI 2021: >60 ML/MIN/1.73M*2
GLUCOSE SERPL-MCNC: 96 MG/DL (ref 70–99)
HDLC SERPL-MCNC: 54 MG/DL
HDLC SERPL: 4.3 {RATIO}
LDLC SERPL CALC-MCNC: 148 MG/DL
NONHDLC SERPL-MCNC: 176 MG/DL
POTASSIUM SERPL-SCNC: 4.4 MEQ/L (ref 3.5–5.1)
PROT SERPL-MCNC: 6.9 G/DL (ref 6–8.2)
SODIUM SERPL-SCNC: 138 MEQ/L (ref 136–145)
TRIGL SERPL-MCNC: 138 MG/DL

## 2024-06-22 PROCEDURE — 80053 COMPREHEN METABOLIC PANEL: CPT

## 2024-06-22 PROCEDURE — 36415 COLL VENOUS BLD VENIPUNCTURE: CPT

## 2024-06-22 PROCEDURE — 80061 LIPID PANEL: CPT

## 2024-06-27 ENCOUNTER — OFFICE VISIT (OUTPATIENT)
Dept: NEUROLOGY | Facility: CLINIC | Age: 44
End: 2024-06-27
Payer: COMMERCIAL

## 2024-06-27 DIAGNOSIS — R52 ACUTE PAIN: Primary | ICD-10-CM

## 2024-06-27 DIAGNOSIS — G43.901 STATUS MIGRAINOSUS: ICD-10-CM

## 2024-06-27 PROCEDURE — 99999 PR OFFICE/OUTPT VISIT,PROCEDURE ONLY: CPT | Performed by: PSYCHIATRY & NEUROLOGY

## 2024-06-27 PROCEDURE — 96372 THER/PROPH/DIAG INJ SC/IM: CPT | Performed by: PSYCHIATRY & NEUROLOGY

## 2024-06-27 RX ORDER — KETOROLAC TROMETHAMINE 30 MG/ML
60 INJECTION, SOLUTION INTRAMUSCULAR; INTRAVENOUS ONCE
Status: COMPLETED | OUTPATIENT
Start: 2024-06-27 | End: 2024-06-27

## 2024-06-27 RX ADMIN — KETOROLAC TROMETHAMINE 60 MG: 30 INJECTION, SOLUTION INTRAMUSCULAR; INTRAVENOUS at 12:02

## 2024-06-27 NOTE — PROGRESS NOTES
No office visit.    Patient received acute treatment with Toradol 60 mg IM for severe headache.    She has had this in the past with good response and no adverse effects.    Theresa Lewis MD  [facility]  [phone]

## 2024-06-27 NOTE — PROGRESS NOTES
Procedure Note for Toradol injecion for Acute Severe Headache or Pain:    Indication for procedure:    Diagnosis Plan   1. Acute pain  ketorolac (TORADOL) injection 60 mg      2. Status migrainosus            NDC, lot number, and expiration date documented in the MAR.    I performed a time-out, including 2 unique patient identifiers, with Andressa.    Using a 22 gauge 1 1/2 inch needle, I injected 2 ml (60 mg) intramuscularly.     Site of injection: left gluteus muscle.       Andressa tolerated the procedure well, without complications.        Madeleine Stone MA

## 2024-06-27 NOTE — PROCEDURES
Procedure Note for Toradol injecion for Acute Severe Headache or Pain:    Indication for procedure:    Diagnosis Plan   1. Acute pain  ketorolac (TORADOL) injection 60 mg      2. Status migrainosus            NDC, lot number, and expiration date documented in the MAR.    I performed a time-out, including 2 unique patient identifiers, with Andressa.    Using a 22 gauge 1 1/2 inch needle, I injected 2 ml (60 mg) intramuscularly.     Site of injection: left gluteus muscle.       Andressa tolerated the procedure well, without complications.        Madeleine Stone MA      Procedures

## 2024-07-02 RX ORDER — METOCLOPRAMIDE 10 MG/1
TABLET ORAL
Qty: 20 TABLET | Refills: 2 | Status: SHIPPED | OUTPATIENT
Start: 2024-07-02

## 2024-07-02 NOTE — TELEPHONE ENCOUNTER
Medicine Refill Request    Last Office Visit: 12/22/2023   Last Consult Visit: Visit date not found  Last Telemedicine Visit: Visit date not found    Next Appointment: 9/10/2024      Current Outpatient Medications:     AIMOVIG AUTOINJECTOR 70 mg/mL auto-injector subcutaneous injection, Inject 1 mL (70 mg total) under the skin every 30 (thirty) days., Disp: 3 mL, Rfl: 3    atomoxetine (STRATTERA) 40 mg capsule, Take 40 mg by mouth 2 (two) times a day., Disp: , Rfl:     b complex vitamins capsule, Take 1 capsule by mouth daily., Disp: , Rfl:     botulinum toxin Type A (BOTOX), , Disp: , Rfl:     dihydroergotamine (TRUDHESA) 0.725 mg/pump act. (4 mg/mL) spray,non-aerosol, Administer 1 spray into each nostril as needed (migraine). The dose may be repeated, if needed, a minimum of 1 hour after the first dose. Do not use more than 2 doses within a 24-hour period or 3 doses within 7 days., Disp: 4 mL, Rfl: 11    docosahexaenoic acid-epa 120-180 mg capsule, Take 1,000 mg by mouth daily., Disp: , Rfl:     DULoxetine (CYMBALTA) 20 mg capsule, Take 40 mg by mouth 2 (two) times a day., Disp: , Rfl:     ergocalciferol (VITAMIN D2) 50,000 unit(1250 mcg) capsule, Take 50,000 Units by mouth once a week., Disp: , Rfl:     frovatriptan (FROVA) 2.5 mg tablet, TAKE 1 TABLET (2.5 MG TOTAL) BY MOUTH ONCE AS NEEDED FOR MIGRAINE INDICATIONS: A MIGRAINE HEADACHE., Disp: 12 tablet, Rfl: 11    herbal drugs (CALMME ORAL), Take by mouth daily., Disp: , Rfl:     LORazepam (ATIVAN) 0.5 mg tablet, Take 1 tablet (0.5 mg total) by mouth every 8 (eight) hours as needed for anxiety., Disp: 20 tablet, Rfl: 0    metoclopramide (REGLAN) 10 mg tablet, TAKE 1 TABLET (10 MG TOTAL) BY MOUTH 3 TIMES A DAY AS NEEDED FOR NAUSEA, Disp: 20 tablet, Rfl: 2    naproxen sodium (ALEVE ORAL), Take by mouth as needed., Disp: , Rfl:     olopatadine (PATADAY) 0.2 % ophthalmic solution, Administer 1 drop into affected eye(s) daily., Disp: , Rfl:     ondansetron (ZOFRAN) 4  mg tablet, Take 4 mg by mouth every 8 (eight) hours as needed., Disp: , Rfl:     oseltamivir (TAMIFLU) 75 mg capsule, , Disp: , Rfl:     rimegepant (NURTEC ODT) 75 mg tablet,disintegrating, Take 1 tablet (75 mg total) by mouth as needed (migraine headache). Dissolve 1 tablet under the tongue. One dose per day as needed., Disp: 16 tablet, Rfl: 11    tirzepatide (MOUNJARO) 2.5 mg/0.5 mL pen injector, Inject 2.5 mg under the skin once a week., Disp: 2 mL, Rfl: 0    traZODone (DESYREL) 50 mg tablet, Take 25-50 mg by mouth nightly as needed., Disp: , Rfl:     TURMERIC ORAL, Take by mouth daily., Disp: , Rfl:       BP Readings from Last 3 Encounters:   06/12/24 130/82   05/15/24 130/88   03/21/24 116/84       Recent Lab results:  Lab Results   Component Value Date    CHOL 230 (H) 06/22/2024   ,   Lab Results   Component Value Date    HDL 54 06/22/2024   ,   Lab Results   Component Value Date    LDLCALC 148 (H) 06/22/2024   ,   Lab Results   Component Value Date    TRIG 138 06/22/2024        Lab Results   Component Value Date    GLUCOSE 96 06/22/2024   ,   Lab Results   Component Value Date    HGBA1C 5.1 07/12/2022         Lab Results   Component Value Date    CREATININE 0.7 06/22/2024       Lab Results   Component Value Date    TSH 0.83 08/04/2023           Lab Results   Component Value Date    HGBA1C 5.1 07/12/2022

## 2024-07-02 NOTE — TELEPHONE ENCOUNTER
Medicine Refill Request    Last Office Visit: 6/12/2024  Last Telemedicine Visit: 9/23/2022 Edyta Zamora CRNP    Next Office Visit: 9/4/2024  Next Telemedicine Visit: Visit date not found     Take Reglan 10 mg as needed for migraine related nausea or 15-30 min before the nasal spray to prevent nausea.

## 2024-07-03 NOTE — ASSESSMENT & PLAN NOTE
Recommend rest from aggravating physical activities and given rx for PT, ok to use PRN anagesics, tylenol    no

## 2024-07-05 RX ORDER — TERBINAFINE HYDROCHLORIDE 250 MG/1
250 TABLET ORAL DAILY
Qty: 42 TABLET | Refills: 0 | Status: SHIPPED | OUTPATIENT
Start: 2024-07-05 | End: 2024-07-11 | Stop reason: ALTCHOICE

## 2024-07-10 NOTE — PROGRESS NOTES
Patient ID: Andressa Baker                              : 1980  MRN: [id number]                                            VISIT DATE: 2024   ENCOUNTER PROVIDER: Edyta Zamora    I had the pleasure of evaluating Andressa Baker in the [facility] on 2024 for a follow-up visit. The history was provided by Andressa Baker and supplemented by her medical records.    CHIEF COMPLAINT: Headache.    HISTORY OF PRESENT ILLNESS:  Andressa Baker is a very pleasant 44 y.o.  woman seen initially in 2022 for chronic severe headaches since her early s, worsening in the last few months. Neurological exam was normal. Brain MRI () was unremarkable. There are no atypical features or symptoms suggestive of a serious underlying condition or secondary headache. In our opinion, she has chronic migraine exacerbated by hormonal changes and frequent use of acute medications (Excedrin and Rizatriptan - now resolved). There may be some contribution from myofascial neck pain, but there is no evidence of significant cervical spine disease.    Follow-up Clinic Note:  Last clinic visit: 2024  Toradol injection: 2024       At the last visit, we treated her acute headache with Toradol IM and repeated her Botox treatment. We recommended adding riboflavin to magnesium. She was to continue Aimovig monthly and Botox and lidocaine blocks every 12 weeks. She was also on Cymbalta from her psychiatrist. She was to continue with Nurtec and frovatriptan for acute therapy, and she has Trudhesa NS available for rescue therapy. She was to continue with Cefaly.     She has been experiencing daily migraines. She has been waking up with a severe headaches daily.     She has not experienced an improvement since her last Botox and Aimovig treatments.     She either takes Nurtec. If ineffective, she will take a triptan and then repeat the dose. Trudhesa has been the most helpful  lately. She tries to limit the triptan to 2 to 3 days/week, and limits the Trudhesa to 3 days/week.     She takes Reglan and Aleve most night to help with the morning headaches.     She is open to infusion therapy.     She recently started using a .     Lamotrigine 25 mg was recently added.     Constipation has improved.     She is going to [facility] with her  in a few week, then [facility].     PMH/SH/FH: Reviewed and unchanged since previous visit or updated below.    Summary from previous visits.  Visit 6/12/2024   Last clinic visit: 5/15/2024 with Odalys Ontiveros  At the last visit, she received lidocaine nerve blocks. These helped.  She continued with Aimovig and felt that this was helping. She just got the last injection 2 days ago.  She has been on a headache cycle since last Monday, about 10 days ago.   Overall, headaches have been unchanged.   Headache frequency: about 3 days/week but not as incapacitating. Worse the last 10 days.   Headache characteristics: Unchanged.   Preventive therapy: Botox 195 U ever 12 weeks. Nerve blocks. Aimovig monthly seems to be working. Cymbalta 40 mg BID (from psychiatrist). Cefaly most days.  Acute therapy: Nurtec has not worked well recently. Frova helps for about 12 hours. Trudhesa NS helps, but the headache has not resolved. Cefaly acutely helps some.  Other medical problems: ALEXIS - using CPAP since Dec 2023    Visit 5/15/2024  At the last visit, pt underwent repeat Botox injection, and stopped Emgality and began Aimovig. She continued Nurtec and frovatriptan for acute therapy, and she has Trudhesa NS available for rescue therapy.  Relivion was discussed and she opted to continue with Cefaly.   Overall, feeling better, is now having headache free time, up to a day or two. Fewer migraines, less severe. Occur 2-3x weekly   Will treat with Nurtec, if not helpful will take Frova as well (does this about 60% of the time). Usually will have to take a second Frova.  Occasionally will have to do Trudhesa the next day for complete relief.   Began dry needling which seems to help, a day or 2 of relief. Does it 1-2x week.   Had third shot of Aimovig yesterday.   Headache frequency: 2-3x/week  Headache characteristics: Unchanged.   Preventive therapy: Botox 195 U ever 12 weeks. Nerve blocks. Aimovig monthly.   Acute therapy: Nurtec, Frova, Trudhesa  Other medical problems: ALEXIS - using CPAP since Dec 2023    Visit 3/19/24:   Last clinic visit: 2/5/2024  At the last visit, I repeated her lidocaine blocks and we discussed the possibility of doing acute infusions at the [facility] in [place] if needed. I recommended continuing with Botox, Emgality, and nerve blocks for preventive therapy. She was also on Cymbalta from her psychiatrist. She continued with Nurtec and frovatriptan for acute therapy, and she had Trudhesa NS available for rescue therapy.   She continues to use the CPAP consistently.  Overall, her headaches are not well controlled with her current regimen. She is interested in changing Emgality to a different mAb and she wasn't more information about Relivion.  Headache frequency: variable, overall unchanged.  Headache characteristics: Unchanged. More severe this past weekend  Preventive therapy: Emgality 120 mg monthly injections - no injection site reaction, Botox 195 units every 12 weeks. Nerve blocks helps.CEFALY.  Acute therapy: Nurtec, frovatriptan. Rescue: Trudhesa NS, Benadryl.   Other medical problems: ALEXIS - using CPAP since Dec 2023  PMH/SH/FH: Reviewed and unchanged since previous visit or updated below.    Visit 2/5/2024  At the last visit, she received her Botox treatment and we recommended continuing with Emgality and nerve blocks for preventive therapy. She continued with Nurtec and frovatriptan for acute therapy, and she had Trudhesa NS available for rescue therapy.   Using CPAP consistently now up to 6-7 hours/night. She finds it  helpful.  She stopped Lamictal (prescribed by psychiatrist for anxiety).  She has the Cefaly device, but she doesn't like the vibration and she has problems using it due to poor internet connection.  She has had worse headaches in the past 2 weeks. She had incapacitating pain and she is more functional today, but still has a headache. She took Ultram from her  and it helped a little. Her level of pain is now 2/10.  Headache characteristics: Unchanged. More severe this past weekend  Preventive therapy: Emgality 120 mg monthly injections - no injection site reaction, Botox 195 units every 12 weeks. Nerve blocks helps.CEFALY.  Acute therapy: Nurtec, frovatriptan. Rescue: Trudhesa NS, Benadryl.   Other medical problems: ALEXIS - using CPAP since Dec 2023    Visit 12/27/2023  At the last visit, nerve blocks administered.   Recommend scheduling NB again in 12 weeks.  She was diagnosed with sleep apnea. She started using a CPAP yesterday.   She is currently experiencing a wearing of the Botox and Emgality. She has been taking Nurtec more often, 4 - 5 days/week. She has been doing so with the frovatriptan.   The addition of the nerve blocks has been helpful.   She has the Cefaly device, but she needs to remember to use it preventively.   Headache characteristics: Unchanged.   Preventive therapy: Emgality 120 mg monthly injections - no injection site reaction, Botox 195 units every 12 weeks.   Acute therapy: Nurtec, frovatriptan. Rescue: Trudhesa NS, Benadryl.     Urgent visit 10/27/2023   She has not been able to start Emgality yet. She called the insurance and it is not ready at the pharmacy.  Current headache started on: Tuesday - 4 days ago  Current level of pain: 4/10  Associated symptoms: nausea, photo and phonophobia  Patient denies motor deficits, sensory symptoms, and vision loss.   Medications tried at home in the last 24 hours: Reglan and benadryl last night. Other acute treatments earlier. None this  morning.    Visit 10/3/2023   Last clinic visit: 9/19/2023   At the last visit, I have repeated the lidocaine nerve blocks. I recommended starting Cefaly for prevention and acute treatment. I gave her a sample of Zavzpret NS to see if this works better than Nurtec. She continued with Emgality, Botox and magnesium for prevention. We also discussed trying gluten free diet and changing the biologic for weight loss.   She stopped Ozempic and started gluten free diet, but is not doing this 100%.   She has Cefaly but has not used it much.  Zavzpret did not work well.  Overall, she continues to have very frequent migraine headaches. The may be less severe since the last visit.  Headache frequency: almost daily.  Headache characteristics: Unchanged.   Preventive therapy: Emgality loading dose on 3/28 - helping - they respond better to acute treatment. SE: constipation (may be from Emgality and Ozempic). Botox 195 U every 12 weeks. Cymbalta 40 mg BID (from psychiatrist).  Acute therapy: Rizatriptan or Frovatriptan - both help and are similar; better than eletriptan. Nurtec helps some, but inconsistently. DHE NS with benadryl helps for rescue but causes nausea and congestion.  Other medical problems: Denies new medical problems.     Visit 9/19/2023  At the last visit, she presented with another episode of status migrainosus. I repeated the lidocaine nerve blocks and recommended continuing with Nurtec for first line acute treatment. I prescribed generic rizatriptan-MLT for rescue in place of Frova. I recommend considering Cefaly or GammaCore and continuing with Emgality, Botox and magnesium for prevention.   She started taking Ozempic again 4 weeks ago and atomoxetine for brain fog about 2 weeks ago.   She has noticed that her migraine headache condition gets worse every time she restarts Ozempic and improves when she stops it.   She had severe headaches requiring IM Toradol on 8/3 and 9/12.    She ordered Cefaly and recently  got it, but has not used it yet.   She had some blood work done and was prescribed progesterone, but has not use the estrogen patch due to concerns about risk or blood clotting.   Overall, she continues to have very frequent migraine headaches.   Headache frequency: almost daily.  Headache characteristics: Unchanged.   Preventive therapy: Emgality loading dose on 3/28 - helping - they respond better to acute treatment. SE: constipation (may be from Emgality and Ozempic). Botox 195 U every 12 weeks. Cymbalta 40 mg BID (from psychiatrist).  Acute therapy: Rizatriptan or Frovatriptan - both help and are similar; better than eletriptan. Nurtec helps some, but inconsistently. Maxalt-MLT brand name was not covered and now it is not available (generic also works, but headache reoccurs 6-8 hours). DHE NS with benadryl helps for rescue but causes nausea and congestion.  Other medical problems: Denies new medical problems.     Visit 7/19/2023  At her last visit, she presented with status migrainosus. I repeated her nerve blocks and changed her acute regimen. I prescribed a trial of Nurtec and Maxalt-MLT (wants to try brand name to see if this works better than the generic) to use in place of Ubrelvy and Frova. I recommended continuing with Emgality, Botox and magnesium for prevention.   She received nerve blocks again on 6/16/2023 and Botox on 7/11/23.  She contacted us last week with a constant headache that had been gradually getting worse since her last visit.   She was prescribed a Medrol dose pack that she is finishing today.  Current headache started 2 weeks ago.  Current level of pain: 4/10  Associated symptoms: photophobia - this morning had nausea and took Reglan  Patient denies motor deficits, sensory symptoms, and vision loss.   Medications tried at home in the last 24 hours: Reglan, Nurtec and Tylenol.  She has undergone some blood work to rule out hormonal imbalance and everything was reportedly within the normal  limits.   She started using a  from her dentist.  Overall, she continues to have very frequent migraine headaches.   Headache frequency: almost daily.  Headache characteristics: Unchanged.   Preventive therapy: Emgality loading dose on 3/28 - helping. SE: constipation (may be from Emgality and Ozempic). Botox 195 U every 12 weeks. Cymbalta 40 mg BID (from psychiatrist).  Acute therapy: Frovatriptan seems to work better than eletriptan. Nurtec helps some, but inconsistently. Maxalt-MLT brand name was not covered. DHE NS with benadryl helps for rescue but causes nausea and congestion.  Other medical problems: Denies new medical problems.     Visit 5/3/2023  At the last visit, she was noticing some improvement after adding Emgality, but the headaches were still occur 5 days/week. I repeated her Botox treatment and recommended to increase the dose of magnesium. She continued with Ubrelvy acutely and Frovatriptan for rescue treatment.   She continues to have frequent almost daily headaches and her acute medications are not working as well.   She contacted us today to see if we could repeat the lidocaine nerve blocks as her headache has not resolved with her oral medications.  She took 2 doses of Frova yesterday and then Reglan + Benadryl + Tylenol last night. This morning she took Tylenol again. She run out of Ubrelvy and her pharmacy cannot refill it  Current headache rated 3-4/10.  Headache frequency: on average almost daily.  Headache characteristics: Unchanged.   Preventive therapy: Emgality loading dose on 3/28 is helping. SE: constipation (may be from Emgality and Ozempic). Botox 195 U every 12 weeks. Effexor 37.5 mg daily - switching to Cymbalta 20 mg BID (from psychiatrist).  Acute therapy: Frovatriptan seems to work better than eletriptan. Ubrelvy helps for the day. DHE NS with benadryl helps for rescue but causes nausea and congestion.  Other medical problems: Denies new medical problems.     Visit  4/18/2023  At the last visit, I administered lidocaine nerve blocks again and recommended some changes in her preventive and acute regimen. I prescribed Emgality in place of Qulipta. She was planning switch from Effexor to a different antidepressant. I changed eletriptan to Frovatriptan for rescue treatment as eletriptan only helped for a few hours.  She started Emgality shortly after the last visit and tried Frova with good response.   Her psychiatrist is switching her from Effexor to Cymbalta.  Overall, she has noticed about 30% improvement in migraine headache frequency and response to acute treatment.    Headache frequency: on average 5 days/week now. Daily prior month.  Headache characteristics: Unchanged. More responsive to acute treatment.   Preventive therapy: Emgality loading dose on 3/28 is helping. SE: constipation (may be from Emgality and Ozempic). Botox 195 U every 12 weeks. Effexor 37.5 mg daily - switching to Cymbalta 20 mg BID (from psychiatrist).  Acute therapy: Frovatriptan seems to work better than eletriptan. Ubrelvy helps for the day. DHE NS with benadryl helps for rescue but causes nausea and congestion.  Other medical problems: Denies new medical problems.     Visit 3/27/2023  At the last visit I administered lidocaine nerve blocks which provided immediate benefit. However, the headaches continue to reoccur almost daily.   She is here to repeat the nerve blocks and discuss alternative acute and preventive treatments.   Her psychiatrist has recommended switching from Effexor to Lamictal. Cymbalta has been considered, but they are trying to find a weight neutral medication.  She restarted Ozempic.   Overall, she has had severe refractory headaches almost daily for over a month.   Headache frequency: not continuous but almost daily since 2/15/2023  Headache characteristics: Unchanged.   Preventive therapy: Botox 195 U every 12 weeks. Effexor 112.4 mg daily. Qulipta 60 mg helping some (at 30 mg  2-3 weeks with no headaches or headaches that responded well to acute treatment, then had to increase to 60 mg and did not see an improvement). She is having some constipation and fatigue.  Acute therapy: Eletriptan helps some within an hour but comes back in a couple of hours. Ubrelvy helps for the day. DHE NS with benadryl helps for rescue but causes nausea and congestion.  Other medical problems: Denies new medical problems.     Visit 3/16/2023  At the last visit, she reported significant improvement of her headache condition after adding Qulipta initially, but she presented with persistent headache for several weeks. We recommended starting a course of daily naproxen for 1 - 2 weeks and discussed the need to decrease the amount of acute medications to prevent rebound headaches.   We switched naproxen to nabumetone on 3/13.  Current headache started on: 2/15/2023 on and off. Was headache free yesterday, but woke up with a severe headache again this morning.  Current level of pain: 8/10  Associated symptoms: phonophobia, photophobia and nausea.  Patient denies motor deficits, sensory symptoms, and vision loss.   Medications tried at home in the last 24 hours: Ubrelvy this morning and nabumetone last night.   Overall, she has had severe refractory headaches almost daily for the last month.  Headache frequency: not continuous but almost daily since 2/15/2023  Headache characteristics: Unchanged.   Preventive therapy: Botox. Effexor 112.4 mg daily. Qulipta 60 mg (at 30 mg 2-3 weeks with no headaches or headaches that responded well to acute treatment, then had to increase to 60 mg and did not see an improvement).  Acute therapy: Ubrelvy. Eletriptan. DHE NS  Other medical problems: Denies new medical problems.     Telemed 3/7/2023  At the last visit, we recommended to add Qulipta for prevention and continue with Botox, Magnesium, and Effexor (from psychiatrist). I recommended to continue with the same acute and rescue  regimen, Ubrelvy, Eletriptan, and DHE NS, but we discussed the possibility of trying Nurtec as needed. Nurtec and Anti-CGRP antibodies can also be considered for prevention.  Initially, within a few days of starting the Qulipta 30 mg, she noticed an improvement. A few weeks later, she increased the dose to 60 mg. Tolerating well. She was experiencing an occasional headache, which was relieved by Ubrelvy.   She went to [place] on February 15. She has been experiencing a persistent headache since that time. It was stressful traveling, but she was able to enjoy the trip. She admits to taking a lot of Ubrelvy and triptans. She took Trudhesa around her menstrual cycle. She found an old prescription for naproxen 500 mg, which she took over the weekend. She was headache free yesterday, but the headache recurred today.      Telemed 1/23/2023   Last clinic visit: 12/27/2022 with Edyta  At the last visit, she presented with refractory status migrainosus and was treated with lidocaine nerve blocks. These helped some, but not as much as the ones in September. She took dexamethasone 2 mg every morning for 5 days without much benefit, followed by prednisone taper starting at 40 mg for 6 days.   That headache episode eventually resolved with prednisone and she was headache free for a few days. However, she has continued to have very frequent headaches.   She contacted us today with a refractory headache and wanting to discuss other options for headache prevention.   Current headache started  4-5 days ago. She took Ubrelvy last night and woke up without headache. Around 11 am the headache came back and she took eletriptan 40 mg and a second dose around 1 pm. Now the headache is rated at 4 pm.    She was prescribed Effexor by her psychiatrist and has been increasing the dose. The headache features have not changed.  Overall, the headaches have been occurring daily or almost daily.  Headache frequency: daily or almost daily, but not  constant. Resolved with steroids for a few day.   Headache characteristics: Unchanged.   Preventive therapy: Botox. Magnesium 400 mg daily. Effexor 112.5 mg daily.  Acute therapy: Ubrelvy works well most of the times. Eletriptan for rescue worked well. Trudhesa NS works well for rescue.    Other medical problems: Denies new medical problems.     Visit 12/27/2022 Urgent visit - lidocaine nerve blocks   At the last visit, her headaches were improved, but still occurring 4-5 days/week. We increased the dose of Botox and recommended to continue with Effexor as this may also help. Otherwise, she was to continue with magnesium and the same acute regimen.  She presented today for an urgent visit.   Current headache started on: 12/5   Headache diary -   12/5 - Ubrelvy Trudhesa pm   Many days with Ubrelvy and eletriptan   12/15 -Excedrin Migraine  am and Ubrelvy   12/15 - Ubrelvy am; Ubrelvy 745 pm; (also had eyes dilated); took Aleve  and Tylenol at 745  12/18 - Ubrelvy  12/19 - Ubrelvy x 2; eletriptan at 7 eletriptan at 10  12/20 - Aleve  eletriptan 6 am; 745 pm Aleve  Tylenol; 845 pm eletriptan; 11 Zofran 8 mg  12/21- Ubrelvy 10 am 10 pm  12/22 Aleve  1 am  12/23 Ubrelvy 4 pm  12/24 Aleve  7 am  12/25 Ubrelvy  12/26 Ubrelvy 1:45 eletriptan 9 pm  12/27 eletriptan 1 pm  Current level of pain: 7   Associated symptoms: right side heaviness, dropping things; photophobia, phonophobia.   Effexor dose was increased.     Visit 11/3/2022   Last clinic visit: 9/26 for nerve blocks and 9/23 for follow-up   At the last visit, she received lidocaine nerve blocks which finally broke the refractory headache precipitated by the COVID booster.  Her psychiatrist switch Zoloft to Effexor 3 weeks ago and this seems to be helping with mood and headaches.  Overall, the headaches have been better since she got the lidocaine nerve blocks and she feels that Effexor is also helping.   Headache frequency: on average 4-5 days/week with headaches. All  of them required acute treatment with Ubrelvy and 2-3 days need eletriptan.     Headache characteristics: Unchanged.   Preventive therapy: Botox 155 U. Magnesium 400 mg daily. Effexor 75 mg daily.  Acute therapy: Ubrelvy working better now. Eletriptan for rescue worked well. Trudhesa NS works well for rescue.    Other medical problems: Denies new medical problems. Scheduled for meniscal repair surgery.     Urgent visit - lidocaine nerve blocks 9/26/2022    Telemed Visit 9/23/2022  She mentioned that she underwent the new bivalent booster the night before the onset of this most recent headache.    At the last visit, she was evaluated for a severe headache that has been refractory to treatment. She underwent an acute migraine infusion.   dexamethasone sodium phosphate 10 mg  diphenhydramine HCl 25 mg, 25 mg  ketorolac tromethamine 30 mg  metoclopramide HCl (4 administrations)  valproate (DEPACON) 1,000 mg in sodium chloride... 1000 mg  After the infusion, she felt well for a few hours. Later that day, the headache intensity increased. She took dexamethasone and the headache improved. She was able to attend a meeting. I prescribed a higher dose (4 mg) dexamethasone taper, but she did not tolerate that dose. She had trouble falling asleep. Yesterday, she took dexamethasone 2 mg. She did not take any doses today. She took eletriptan last night.    Her current headache is a 1 - 2/10.     Urgent visit - acute migraine infusion 9/19/2022  Last clinic visit: 8/10/2022  At the last visit, she underwent the first Botox treatment. She was to complete a 30 days course of naproxen and continue with daily magnesium. She was to continue with Ubrelvy for acute treatment and eletriptan for rescue treatment.   She tolerated the Botox well. She reports less frequent headaches. However, the acute attacks are more severe and last longer.   Current headache started on: Friday   Current level of pain: 5 - 6/10  Associated symptoms:  initially right arm and leg heaviness/tingling, dropping things with her right hand, photophobia, and phonophobia. Possibly some blurry vision.    Medications tried at home in the last 24 hours: Ubrelvy, eletriptan, Zofran, Tylenol, Trazodone. Yesterday, Aleve.   She stopped Adderall 2 - 3 weeks ago.     Follow-up Clinic Note:  Last clinic visit: 8/10/2022  At the last visit, I prescribed a 30 days course of naproxen and recommended to start Botox for long term prevention. I also prescribed a trial of Ubrelvy for acute treatment and eletriptan for rescue treatment. She has used these with some benefit. She stopped the OTC's and rizatriptan as recommended.  She is here today for her first Botox treatment.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

## 2024-07-11 ENCOUNTER — OFFICE VISIT (OUTPATIENT)
Dept: NEUROLOGY | Facility: CLINIC | Age: 44
End: 2024-07-11
Payer: COMMERCIAL

## 2024-07-11 VITALS
RESPIRATION RATE: 16 BRPM | HEART RATE: 86 BPM | DIASTOLIC BLOOD PRESSURE: 70 MMHG | WEIGHT: 198 LBS | HEIGHT: 67 IN | OXYGEN SATURATION: 98 % | SYSTOLIC BLOOD PRESSURE: 110 MMHG | BODY MASS INDEX: 31.08 KG/M2

## 2024-07-11 DIAGNOSIS — G43.711 INTRACTABLE CHRONIC MIGRAINE WITHOUT AURA AND WITH STATUS MIGRAINOSUS: ICD-10-CM

## 2024-07-11 DIAGNOSIS — G43.901 STATUS MIGRAINOSUS: Primary | ICD-10-CM

## 2024-07-11 PROCEDURE — 99212 OFFICE O/P EST SF 10 MIN: CPT | Performed by: NURSE PRACTITIONER

## 2024-07-11 PROCEDURE — 3008F BODY MASS INDEX DOCD: CPT | Performed by: NURSE PRACTITIONER

## 2024-07-11 RX ORDER — FEXOFENADINE HCL 60 MG/1
TABLET, FILM COATED ORAL EVERY 12 HOURS
COMMUNITY

## 2024-07-11 RX ORDER — FLUTICASONE PROPIONATE 50 MCG
2 SPRAY, SUSPENSION (ML) NASAL DAILY
COMMUNITY

## 2024-07-11 RX ORDER — DOXYLAMINE SUCCINATE AND PYRIDOXINE HYDROCHLORIDE, DELAYED RELEASE TABLETS 10 MG/10 MG 10; 10 MG/1; MG/1
1 TABLET, DELAYED RELEASE ORAL DAILY PRN
COMMUNITY
End: 2025-01-21

## 2024-07-11 RX ORDER — ERENUMAB-AOOE 140 MG/ML
140 INJECTION, SOLUTION SUBCUTANEOUS
Qty: 3 ML | Refills: 3 | Status: SHIPPED | OUTPATIENT
Start: 2024-07-11

## 2024-07-11 RX ORDER — ZINC GLUCONATE 50 MG
500 TABLET ORAL 2 TIMES DAILY
COMMUNITY

## 2024-07-11 RX ORDER — LAMOTRIGINE 25 MG/1
50 TABLET ORAL DAILY
COMMUNITY
Start: 2024-07-03

## 2024-07-11 RX ORDER — IBUPROFEN 200 MG
2-3 TABLET ORAL
COMMUNITY

## 2024-07-12 ENCOUNTER — INFUSION (OUTPATIENT)
Dept: INFUSION THERAPY | Facility: CLINIC | Age: 44
End: 2024-07-12
Attending: NURSE PRACTITIONER
Payer: COMMERCIAL

## 2024-07-12 VITALS
DIASTOLIC BLOOD PRESSURE: 79 MMHG | HEIGHT: 67 IN | BODY MASS INDEX: 31.08 KG/M2 | HEART RATE: 91 BPM | SYSTOLIC BLOOD PRESSURE: 117 MMHG | WEIGHT: 198 LBS

## 2024-07-12 DIAGNOSIS — G43.901 STATUS MIGRAINOSUS: ICD-10-CM

## 2024-07-12 PROCEDURE — 96375 TX/PRO/DX INJ NEW DRUG ADDON: CPT | Performed by: NURSE PRACTITIONER

## 2024-07-12 PROCEDURE — 96365 THER/PROPH/DIAG IV INF INIT: CPT | Performed by: NURSE PRACTITIONER

## 2024-07-12 PROCEDURE — 96374 THER/PROPH/DIAG INJ IV PUSH: CPT | Mod: XU | Performed by: NURSE PRACTITIONER

## 2024-07-12 PROCEDURE — 96366 THER/PROPH/DIAG IV INF ADDON: CPT | Performed by: NURSE PRACTITIONER

## 2024-07-12 RX ORDER — DEXAMETHASONE SODIUM PHOSPHATE 10 MG/ML
10 INJECTION INTRAMUSCULAR; INTRAVENOUS ONCE
Status: CANCELLED | OUTPATIENT
Start: 2024-07-12 | End: 2024-07-12

## 2024-07-12 RX ORDER — METOCLOPRAMIDE HYDROCHLORIDE 5 MG/ML
10 INJECTION INTRAMUSCULAR; INTRAVENOUS
Status: COMPLETED | OUTPATIENT
Start: 2024-07-12 | End: 2024-07-12

## 2024-07-12 RX ORDER — ONDANSETRON HYDROCHLORIDE 2 MG/ML
4 INJECTION, SOLUTION INTRAVENOUS ONCE
Status: CANCELLED | OUTPATIENT
Start: 2024-07-12 | End: 2024-07-12

## 2024-07-12 RX ORDER — DEXAMETHASONE SODIUM PHOSPHATE 10 MG/ML
10 INJECTION INTRAMUSCULAR; INTRAVENOUS ONCE
Status: COMPLETED | OUTPATIENT
Start: 2024-07-12 | End: 2024-07-12

## 2024-07-12 RX ORDER — ONDANSETRON HYDROCHLORIDE 2 MG/ML
4 INJECTION, SOLUTION INTRAVENOUS ONCE AS NEEDED
Status: SHIPPED | OUTPATIENT
Start: 2024-07-12

## 2024-07-12 RX ORDER — SODIUM CHLORIDE 9 MG/ML
1000 INJECTION, SOLUTION INTRAVENOUS ONCE
Status: CANCELLED | OUTPATIENT
Start: 2024-07-12 | End: 2024-07-12

## 2024-07-12 RX ORDER — ONDANSETRON HYDROCHLORIDE 2 MG/ML
4 INJECTION, SOLUTION INTRAVENOUS ONCE
Status: SHIPPED | OUTPATIENT
Start: 2024-07-12 | End: 2024-07-13

## 2024-07-12 RX ORDER — DIPHENHYDRAMINE HCL 50 MG/ML
25 VIAL (ML) INJECTION
Status: CANCELLED | OUTPATIENT
Start: 2024-07-12 | End: 2024-07-12

## 2024-07-12 RX ORDER — DIPHENHYDRAMINE HCL 50 MG/ML
25 VIAL (ML) INJECTION
Status: COMPLETED | OUTPATIENT
Start: 2024-07-12 | End: 2024-07-12

## 2024-07-12 RX ORDER — KETOROLAC TROMETHAMINE 30 MG/ML
30 INJECTION, SOLUTION INTRAMUSCULAR; INTRAVENOUS ONCE
Status: CANCELLED | OUTPATIENT
Start: 2024-07-12 | End: 2024-07-12

## 2024-07-12 RX ORDER — KETOROLAC TROMETHAMINE 30 MG/ML
30 INJECTION, SOLUTION INTRAMUSCULAR; INTRAVENOUS ONCE
Status: COMPLETED | OUTPATIENT
Start: 2024-07-12 | End: 2024-07-12

## 2024-07-12 RX ORDER — ONDANSETRON HYDROCHLORIDE 2 MG/ML
4 INJECTION, SOLUTION INTRAVENOUS ONCE AS NEEDED
Status: CANCELLED | OUTPATIENT
Start: 2024-07-12

## 2024-07-12 RX ORDER — SODIUM CHLORIDE 9 MG/ML
1000 INJECTION, SOLUTION INTRAVENOUS ONCE
Status: COMPLETED | OUTPATIENT
Start: 2024-07-12 | End: 2024-07-12

## 2024-07-12 RX ORDER — METOCLOPRAMIDE HYDROCHLORIDE 5 MG/ML
10 INJECTION INTRAMUSCULAR; INTRAVENOUS
Status: CANCELLED | OUTPATIENT
Start: 2024-07-12 | End: 2024-07-12

## 2024-07-12 RX ADMIN — SODIUM CHLORIDE 1000 ML: 9 INJECTION, SOLUTION INTRAVENOUS at 11:43

## 2024-07-12 RX ADMIN — DEXAMETHASONE SODIUM PHOSPHATE 10 MG: 10 INJECTION INTRAMUSCULAR; INTRAVENOUS at 14:05

## 2024-07-12 RX ADMIN — METOCLOPRAMIDE HYDROCHLORIDE 10 MG: 5 INJECTION INTRAMUSCULAR; INTRAVENOUS at 13:09

## 2024-07-12 RX ADMIN — KETOROLAC TROMETHAMINE 30 MG: 30 INJECTION, SOLUTION INTRAMUSCULAR; INTRAVENOUS at 12:46

## 2024-07-12 RX ADMIN — Medication 25 MG: at 11:42

## 2024-07-12 RX ADMIN — Medication 25 MG: at 12:45

## 2024-07-12 RX ADMIN — METOCLOPRAMIDE HYDROCHLORIDE 10 MG: 5 INJECTION INTRAMUSCULAR; INTRAVENOUS at 11:45

## 2024-07-12 RX ADMIN — METOCLOPRAMIDE HYDROCHLORIDE 10 MG: 5 INJECTION INTRAMUSCULAR; INTRAVENOUS at 12:11

## 2024-07-12 RX ADMIN — METOCLOPRAMIDE HYDROCHLORIDE 10 MG: 5 INJECTION INTRAMUSCULAR; INTRAVENOUS at 13:45

## 2024-07-12 ASSESSMENT — PAIN SCALES - GENERAL: PAINLEVEL: 3

## 2024-07-12 NOTE — PROGRESS NOTES
Pt escorted by RN back to infusion suite, pt verified by name and , ID band placed.     Arrived via [employer], did not drive.     Pt's pain level upon arrival to infusion suite 3/10. Pt gave verbal consent to obtain IV access and begin infusion treatment plan. Medication education given and all pt questions answered.      Infusion therapy started via 22G in the left AC on first attempt, see MAR and flowsheets for medication administration and VS.      Pt tolerated infusion with no complaints after treatment. Pain 110 at completion of treatment. IV removed, tip intact. AVS and infusion center info packet given to patient. Instructed pt to call office if they have any questions or concerns. Pt stable upon leaving infusion center, has ride home with  and daughter.

## 2024-07-12 NOTE — PATIENT INSTRUCTIONS
The goal of IV treatments is to reduce your pain level and and to complement your long-term preventative treatment.  You may continue to experience pain or headaches after discharge but steady improvement in the control of your symptoms is expected in the coming weeks to months.    Discharge Instructions After IV Headache Treatment:    If you had D[facility] infusion today, please avoid Triptans for another 24 hours.    If you are receiving infusion tomorrow, please arrive on time so that the nursing staff  may start your treatment in a timely fashion.    May sure you have someone to bring you in the morning as well as pick you up.  Our clinic closes at 3:30pm.    Make a follow up appointment in a few weeks before leaving if you are done with your infusion as planned by your provider.    For the first 24 hours after the IV migraine infusion:  -Do not drive or operate machinery.  -Do not sign important documents.  -Do your daily activities as tolerated.  -Do take you medications (including OTC and prescription) as prescribed.    If you have any problems before your next clinic appointment, please contact [facility] at [phone] between 8am and 4 pm.    Visit the ER or Urgent Care if:  -You have a headache that seems different or worse than your usual headache.  -You have a severe headache with fever or stiff neck.  -You have new onset problems with speech, vision, balance, weakness or numbness.  -You feel like you are going to faint, are confused, or if you have a seizure.  -You have an elevated temp of 101 F or higher.  -You notice redness or discharge at the IV site.

## 2024-07-24 ENCOUNTER — OFFICE VISIT (OUTPATIENT)
Dept: NEUROLOGY | Facility: CLINIC | Age: 44
End: 2024-07-24
Payer: COMMERCIAL

## 2024-07-24 VITALS
HEIGHT: 67 IN | DIASTOLIC BLOOD PRESSURE: 70 MMHG | WEIGHT: 198 LBS | OXYGEN SATURATION: 99 % | HEART RATE: 97 BPM | BODY MASS INDEX: 31.08 KG/M2 | SYSTOLIC BLOOD PRESSURE: 98 MMHG | RESPIRATION RATE: 16 BRPM

## 2024-07-24 DIAGNOSIS — G43.711 INTRACTABLE CHRONIC MIGRAINE WITHOUT AURA AND WITH STATUS MIGRAINOSUS: Primary | ICD-10-CM

## 2024-07-24 PROCEDURE — 64450 NJX AA&/STRD OTHER PN/BRANCH: CPT | Mod: 50,XU | Performed by: PHYSICIAN ASSISTANT

## 2024-07-24 PROCEDURE — 64405 NJX AA&/STRD GR OCPL NRV: CPT | Mod: 50,XU | Performed by: PHYSICIAN ASSISTANT

## 2024-07-24 PROCEDURE — 20552 NJX 1/MLT TRIGGER POINT 1/2: CPT | Performed by: PHYSICIAN ASSISTANT

## 2024-07-24 PROCEDURE — 99999 PR OFFICE/OUTPT VISIT,PROCEDURE ONLY: CPT | Performed by: PHYSICIAN ASSISTANT

## 2024-07-24 PROCEDURE — 64400 NJX AA&/STRD TRIGEMINAL NRV: CPT | Mod: XU,RT | Performed by: PHYSICIAN ASSISTANT

## 2024-07-24 NOTE — PROGRESS NOTES
Procedures  Nerve Blocks and Trigger Points Procedure Note:  Indication:    Diagnosis Plan   1. Intractable chronic migraine without aura and with status migrainosus  lidocaine (XYLOCAINE) 20 mg/mL (2 %) injection 9 mL          2. Other chronic pain  lidocaine (XYLOCAINE) 20 mg/mL (2 %) injection 9 mL             I explained the risks and benefits and obtained written consent from Andressa Baker.    Lidocaine 2% without epinephrine was drawn in a sterile syringe.     Lot number and expiration date documented in informed consent.     I performed a time-out, including 2 unique patient identifiers.     I located the areas of maximal tenderness corresponding to each nerve or trigger point, and cleaned with alcohol swabs.     I used a 30 gauge 1/2 inch needle to inject lidocaine over the following:        Right Greater Occipital Nerve 1 cc   Left Greater Occipital Nerve 1 cc   Right Lesser Occipital Nerve 1 cc   Left Lesser Occipital Nerve 1 cc     Right Auriculotemporal Nerve 1 cc    Left Auriculotemporal Nerve 1 cc    Right Supraorbital Nerve 0.25 cc   Left Supraorbital Nerve 0.25 cc   Right Supratrochlear Nerve 0.25 cc   Left Supratrochlear Nerve 0.25 cc     Left Upper Trapezius 1 cc  Right Upper Trapezius 1 cc    Total of 9 cc injected.     The procedure was well tolerated and there were no complications.         Odalys Ontiveros PA-C  [facility]  [phone]

## 2024-07-24 NOTE — PROCEDURES
Procedures  Nerve Blocks and Trigger Points Procedure Note:  Indication:    Diagnosis Plan   1. Intractable chronic migraine without aura and with status migrainosus  lidocaine (XYLOCAINE) 20 mg/mL (2 %) injection 9 mL          2. Other chronic pain  lidocaine (XYLOCAINE) 20 mg/mL (2 %) injection 9 mL             I explained the risks and benefits and obtained written consent from Andressa Baker.    Lidocaine 2% without epinephrine was drawn in a sterile syringe.     Lot number and expiration date documented in informed consent.     I performed a time-out, including 2 unique patient identifiers.     I located the areas of maximal tenderness corresponding to each nerve or trigger point, and cleaned with alcohol swabs.     I used a 30 gauge 1/2 inch needle to inject lidocaine over the following:        Right Greater Occipital Nerve 1 cc   Left Greater Occipital Nerve 1 cc   Right Lesser Occipital Nerve 1 cc   Left Lesser Occipital Nerve 1 cc     Right Auriculotemporal Nerve 1 cc    Left Auriculotemporal Nerve 1 cc    Right Supraorbital Nerve 0.25 cc   Left Supraorbital Nerve 0.25 cc   Right Supratrochlear Nerve 0.25 cc   Left Supratrochlear Nerve 0.25 cc     Left Upper Trapezius - two sites, 0.5 cc each  Right Upper Trapezius - two sites, 0.5 cc each    Total of 9 cc injected.     The procedure was well tolerated and there were no complications.         Odalys Ontiveros PA-C  [facility]  [phone]      NDC: 16222-427-53  Lot: [id number]  Exp: 10/2026

## 2024-07-24 NOTE — PROGRESS NOTES
Pt presents for nerve blocks. Recently had infusions in [place], provided some relief but not complete relief. Traveling tomorrow. Inquires about injections into tight areas of trapezius bilaterally. Discussed potential risks/benefits and pt elected to proceed. Instructed to take two OTC Aleve with food twice daily for pain/muscle soreness as needed.

## 2024-09-04 ENCOUNTER — OFFICE VISIT (OUTPATIENT)
Dept: NEUROLOGY | Facility: CLINIC | Age: 44
End: 2024-09-04
Attending: PSYCHIATRY & NEUROLOGY
Payer: COMMERCIAL

## 2024-09-04 VITALS
RESPIRATION RATE: 18 BRPM | HEIGHT: 67 IN | OXYGEN SATURATION: 99 % | DIASTOLIC BLOOD PRESSURE: 80 MMHG | WEIGHT: 198 LBS | BODY MASS INDEX: 31.08 KG/M2 | HEART RATE: 77 BPM | SYSTOLIC BLOOD PRESSURE: 112 MMHG

## 2024-09-04 DIAGNOSIS — G43.711 INTRACTABLE CHRONIC MIGRAINE WITHOUT AURA AND WITH STATUS MIGRAINOSUS: Primary | ICD-10-CM

## 2024-09-04 PROCEDURE — 64615 CHEMODENERV MUSC MIGRAINE: CPT | Performed by: PSYCHIATRY & NEUROLOGY

## 2024-09-04 PROCEDURE — 99214 OFFICE O/P EST MOD 30 MIN: CPT | Mod: 25 | Performed by: PSYCHIATRY & NEUROLOGY

## 2024-09-04 PROCEDURE — 3008F BODY MASS INDEX DOCD: CPT | Performed by: PSYCHIATRY & NEUROLOGY

## 2024-09-04 NOTE — PATIENT INSTRUCTIONS
Treatment plan:      1. Healthy Habits: The following recommendations can greatly reduce the number and severity of headaches.  Maintain regular sleep hours and get sufficient sleep (8-9 hours)  Do not skip meals, especially breakfast  Drink at least 64 oz or 8 cups of water daily - enough to urinate 5-6 times a day  Get at least 30 minutes of daily aerobic exercise (enough to increase your heart rate and sweat)     Keep track of headaches and use of acute medication (prescription or over-the-counter) in a calendar or notebook and bring that to your clinic visits.     2. Acute Treatment:      Continue with Nurtec ODT 75 mg as needed at onset of moderate to severe headache. Maximum 1 tablet in 24 hours.      May use Cefaly as needed for acute headache treatment for up to 1 hour (acute mode).      May continue with frovatriptan 2.5 mg for moderate to severe headache. May repeat 1 tablet 2 hours later. Maximum 2 tablets in 24 hr. Limit to 2 days/week.      Third line acute or rescue treatment: TRUDHESA 1.45 mg (administered as one metered spray of 0.725 mg into each nostril). The dose may be repeated, if needed, a minimum of 1 hour after the first dose. Do not use more than 2 doses within a 24-hour period or 3 doses within 7 days. Do not use within 24 hours of triptan.  - DO NOT TAKE WITHIN 24 HOURS OF FROVATRIPTAN.      Take Reglan 10 mg as needed for migraine related nausea or 15-30 min before the nasal spray to prevent nausea.     Future consideration: other triptans (naratriptan, almotriptan, or sumatriptan).     Lasmiditan or Sprix nasal spray can be tried for rescue treatment if needed in the future.     We may add an antinausea medication if needed for nausea or as co-adjuvant if monotherapy is not sufficient.      3. Preventive Treatment:      Continue with Aimovig 140 mg monthly.      Continue with Botox 195 units every 12 weeks.       Recommend re-starting magnesium for migraine and constipation (goal dose of  migraine prevention 400 - 600 mg)     Lidocaine cranial nerve blocks every 6-8 weeks.      May consider re-starting Cefaly 20 minutes every day for prevention (prevention mode).      Continue with Cymbalta 40 mg BID (for depression from psychiatrist) and lamictal 50 mg daily for anxiety as these may help as migraine preventive.     Future considerations: switch to Nurtec QOD in place of anti-CGRP antibody. Previously discussed medical marijuana.     Other nutraceutical options include: riboflavin, melatonin, feverfew, and coenzyme Q10.      Other options for oral preventive medications include: amitriptyline or nortriptyline, zonisamide, rimegepant, valproate, verapamil, gabapentin, pregabalin, candesartan, Namenda, escitalopram, and levetiracetam. We want to avoid weight gain.     Several non-invasive neuromodulation devices (gammaCore, Cefaly and Nerivio) are available to treat headaches preventively and/or acutely. These are typically not covered by insurance, but the companies have a trial period and will refund you if the device is ineffective and returned within that period.     Follow-up in the [facility] every 12 weeks for Botox and every 12 weeks in between botox treatments for nerve blocks.

## 2024-09-04 NOTE — LETTER
2024     Renee Brown DO  [address]  [place] [id number]    Patient: Andressa Baker  YOB: 1980  Date of Visit: 2024      Dear Dr. Joselyn Brown:    Thank you for referring Andressa Baker to me for evaluation. Below are my notes for this consultation.    If you have questions, please do not hesitate to call me. I look forward to following your patient along with you.         Sincerely,        Theresa Dalton MD        CC: No Recipients    Theresa Dalton MD  2024  3:05 PM  Sign when Signing Visit  Patient ID: Andressa Baker                              : 1980  MRN: [id number]                                            VISIT DATE: 2024   ENCOUNTER PROVIDER: Theresa Dalton    I had the pleasure of evaluating Andressa Baker in the [facility] on 2024 for a follow-up visit. The history was provided by Andressa Baker and supplemented by her medical records.    CHIEF COMPLAINT: Headache.    HISTORY OF PRESENT ILLNESS:  Andressa Baker is a very pleasant 44 y.o.  woman seen initially in 2022 for chronic severe headaches since her early 's, worsening in the last few months. Neurological exam was normal. Brain MRI () was unremarkable. There are no atypical features or symptoms suggestive of a serious underlying condition or secondary headache. In our opinion, she has chronic migraine exacerbated by hormonal changes and frequent use of acute medications (Excedrin and Rizatriptan - now resolved). There may be some contribution from myofascial neck pain, but there is no evidence of significant cervical spine disease.    Follow-up Clinic Note:  Last clinic visit: 2024 [facility] with Edyta    At the last visit  she reported a worsening of her headache condition. We recommended increasing the Aimovig dose. She was open to acute migraine infusion therapy and we  "scheduled her for that. We discussed trying to decrease the amount of frovatriptan and Trudhesa NS use to try to decrease possible medication overuse headaches.     7/24/2024 Nerve blocks visit only.  Per Odalys Ontiveros's note: \"Pt presents for nerve blocks. Recently had infusions in [place], provided some relief but not complete relief. Traveling tomorrow. Inquires about injections into tight areas of trapezius bilaterally. Discussed potential risks/benefits and pt elected to proceed. Instructed to take two OTC Aleve with food twice daily for pain/muscle soreness as needed.\"    She increased the dose of Aimovig in July and has noticed an improvement.    She also started Lamictal this summer for anxiety.    Overall, her headache condition has been better since her last visit. The headaches still occur several days per week, but they are less intense and resolve with Nurtec.    Headache frequency: still occurring 3-4 days/week but they are less intense and respond well to Nurtec now.    Headache characteristics: Unchanged. Less intense.   Preventive therapy: Botox 195 U ever 12 weeks. Nerve blocks every 12 weeks. Aimovig 140 mg monthly is helping. Cymbalta 40 mg BID (from psychiatrist). Not using Cefaly preventively. Lamotrigine 50 mg for anxiety.    Acute therapy: Nurtec working well again. Frova helps and uses it about 1-2/week. Trudhesa NS helps. Uses infrequently. Cefaly acutely helped some.  Other medical problems: no new medical problems. ALEXIS - using CPAP since Dec 2023    PMH/SH/FH: Reviewed and unchanged since previous visit or updated below.    Summary from previous visits.  Visit 7/11/2024  At the last visit, we treated her acute headache with Toradol IM and repeated her Botox treatment. We recommended adding riboflavin to magnesium. She was to continue Aimovig monthly and Botox and lidocaine blocks every 12 weeks. She was also on Cymbalta from her psychiatrist. She was to continue with Nurtec and frovatriptan for " acute therapy, and she has Trudhesa NS available for rescue therapy. She was to continue with Cefaly.   She has been experiencing daily migraines. She has been waking up with a severe headaches daily.   She has not experienced an improvement since her last Botox and Aimovig treatments.   She either takes Nurtec. If ineffective, she will take a triptan and then repeat the dose. Trudhesa has been the most helpful lately. She tries to limit the triptan to 2 to 3 days/week, and limits the Trudhesa to 3 days/week.   She takes Reglan and Aleve most night to help with the morning headaches.   She is open to infusion therapy.   She recently started using a .   Lamotrigine 25 mg was recently added.   Constipation has improved.   She is going to [place] with her  in a few week, then [facility].     Visit 6/12/2024   Last clinic visit: 5/15/2024 with Odalys Ontiveros  At the last visit, she received lidocaine nerve blocks. These helped.  She continued with Aimovig and felt that this was helping. She just got the last injection 2 days ago.  She has been on a headache cycle since last Monday, about 10 days ago.   Overall, headaches have been unchanged.   Headache frequency: about 3 days/week but not as incapacitating. Worse the last 10 days.   Headache characteristics: Unchanged.   Preventive therapy: Botox 195 U ever 12 weeks. Nerve blocks. Aimovig monthly seems to be working. Cymbalta 40 mg BID (from psychiatrist). Cefaly most days.  Acute therapy: Nurtec has not worked well recently. Frova helps for about 12 hours. Trudhesa NS helps, but the headache has not resolved. Cefaly acutely helps some.  Other medical problems: ALEXIS - using CPAP since Dec 2023    Visit 5/15/2024  At the last visit, pt underwent repeat Botox injection, and stopped Emgality and began Aimovig. She continued Nurtec and frovatriptan for acute therapy, and she has Trudhesa NS available for rescue therapy.  Relivion was discussed and she opted to  continue with Cefaly.   Overall, feeling better, is now having headache free time, up to a day or two. Fewer migraines, less severe. Occur 2-3x weekly   Will treat with Nurtec, if not helpful will take Frova as well (does this about 60% of the time). Usually will have to take a second Frova. Occasionally will have to do Trudhesa the next day for complete relief.   Began dry needling which seems to help, a day or 2 of relief. Does it 1-2x week.   Had third shot of Aimovig yesterday.   Headache frequency: 2-3x/week  Headache characteristics: Unchanged.   Preventive therapy: Botox 195 U ever 12 weeks. Nerve blocks. Aimovig monthly.   Acute therapy: Nurtec, Frova, Trudhesa  Other medical problems: ALEXIS - using CPAP since Dec 2023    Visit 3/19/24:   Last clinic visit: 2/5/2024  At the last visit, I repeated her lidocaine blocks and we discussed the possibility of doing acute infusions at the [facility] in [place] if needed. I recommended continuing with Botox, Emgality, and nerve blocks for preventive therapy. She was also on Cymbalta from her psychiatrist. She continued with Nurtec and frovatriptan for acute therapy, and she had Trudhesa NS available for rescue therapy.   She continues to use the CPAP consistently.  Overall, her headaches are not well controlled with her current regimen. She is interested in changing Emgality to a different mAb and she wasn't more information about Relivion.  Headache frequency: variable, overall unchanged.  Headache characteristics: Unchanged. More severe this past weekend  Preventive therapy: Emgality 120 mg monthly injections - no injection site reaction, Botox 195 units every 12 weeks. Nerve blocks helps.CEFALY.  Acute therapy: Nurtec, frovatriptan. Rescue: Trudhesa NS, Benadryl.   Other medical problems: ALEXIS - using CPAP since Dec 2023  PMH/SH/FH: Reviewed and unchanged since previous visit or updated below.    Visit 2/5/2024  At the last visit, she received her  Botox treatment and we recommended continuing with Emgality and nerve blocks for preventive therapy. She continued with Nurtec and frovatriptan for acute therapy, and she had Trudhesa NS available for rescue therapy.   Using CPAP consistently now up to 6-7 hours/night. She finds it helpful.  She stopped Lamictal (prescribed by psychiatrist for anxiety).  She has the Cefaly device, but she doesn't like the vibration and she has problems using it due to poor internet connection.  She has had worse headaches in the past 2 weeks. She had incapacitating pain and she is more functional today, but still has a headache. She took Ultram from her  and it helped a little. Her level of pain is now 2/10.  Headache characteristics: Unchanged. More severe this past weekend  Preventive therapy: Emgality 120 mg monthly injections - no injection site reaction, Botox 195 units every 12 weeks. Nerve blocks helps.CEFALY.  Acute therapy: Nurtec, frovatriptan. Rescue: Trudhesa NS, Benadryl.   Other medical problems: ALEXIS - using CPAP since Dec 2023    Visit 12/27/2023  At the last visit, nerve blocks administered.   Recommend scheduling NB again in 12 weeks.  She was diagnosed with sleep apnea. She started using a CPAP yesterday.   She is currently experiencing a wearing of the Botox and Emgality. She has been taking Nurtec more often, 4 - 5 days/week. She has been doing so with the frovatriptan.   The addition of the nerve blocks has been helpful.   She has the Cefaly device, but she needs to remember to use it preventively.   Headache characteristics: Unchanged.   Preventive therapy: Emgality 120 mg monthly injections - no injection site reaction, Botox 195 units every 12 weeks.   Acute therapy: Nurtec, frovatriptan. Rescue: Trudhesa NS, Benadryl.     Urgent visit 10/27/2023   She has not been able to start Emgality yet. She called the insurance and it is not ready at the pharmacy.  Current headache started on: Tuesday - 4 days  ago  Current level of pain: 4/10  Associated symptoms: nausea, photo and phonophobia  Patient denies motor deficits, sensory symptoms, and vision loss.   Medications tried at home in the last 24 hours: Reglan and benadryl last night. Other acute treatments earlier. None this morning.    Visit 10/3/2023   Last clinic visit: 9/19/2023   At the last visit, I have repeated the lidocaine nerve blocks. I recommended starting Cefaly for prevention and acute treatment. I gave her a sample of Zavzpret NS to see if this works better than Nurtec. She continued with Emgality, Botox and magnesium for prevention. We also discussed trying gluten free diet and changing the biologic for weight loss.   She stopped Ozempic and started gluten free diet, but is not doing this 100%.   She has Cefaly but has not used it much.  Zavzpret did not work well.  Overall, she continues to have very frequent migraine headaches. The may be less severe since the last visit.  Headache frequency: almost daily.  Headache characteristics: Unchanged.   Preventive therapy: Emgality loading dose on 3/28 - helping - they respond better to acute treatment. SE: constipation (may be from Emgality and Ozempic). Botox 195 U every 12 weeks. Cymbalta 40 mg BID (from psychiatrist).  Acute therapy: Rizatriptan or Frovatriptan - both help and are similar; better than eletriptan. Nurtec helps some, but inconsistently. DHE NS with benadryl helps for rescue but causes nausea and congestion.  Other medical problems: Denies new medical problems.     Visit 9/19/2023  At the last visit, she presented with another episode of status migrainosus. I repeated the lidocaine nerve blocks and recommended continuing with Nurtec for first line acute treatment. I prescribed generic rizatriptan-MLT for rescue in place of Frova. I recommend considering Cefaly or GammaCore and continuing with Emgality, Botox and magnesium for prevention.   She started taking Ozempic again 4 weeks ago and  atomoxetine for brain fog about 2 weeks ago.   She has noticed that her migraine headache condition gets worse every time she restarts Ozempic and improves when she stops it.   She had severe headaches requiring IM Toradol on 8/3 and 9/12.    She ordered Cefaly and recently got it, but has not used it yet.   She had some blood work done and was prescribed progesterone, but has not use the estrogen patch due to concerns about risk or blood clotting.   Overall, she continues to have very frequent migraine headaches.   Headache frequency: almost daily.  Headache characteristics: Unchanged.   Preventive therapy: Emgality loading dose on 3/28 - helping - they respond better to acute treatment. SE: constipation (may be from Emgality and Ozempic). Botox 195 U every 12 weeks. Cymbalta 40 mg BID (from psychiatrist).  Acute therapy: Rizatriptan or Frovatriptan - both help and are similar; better than eletriptan. Nurtec helps some, but inconsistently. Maxalt-MLT brand name was not covered and now it is not available (generic also works, but headache reoccurs 6-8 hours). DHE NS with benadryl helps for rescue but causes nausea and congestion.  Other medical problems: Denies new medical problems.     Visit 7/19/2023  At her last visit, she presented with status migrainosus. I repeated her nerve blocks and changed her acute regimen. I prescribed a trial of Nurtec and Maxalt-MLT (wants to try brand name to see if this works better than the generic) to use in place of Ubrelvy and Frova. I recommended continuing with Emgality, Botox and magnesium for prevention.   She received nerve blocks again on 6/16/2023 and Botox on 7/11/23.  She contacted us last week with a constant headache that had been gradually getting worse since her last visit.   She was prescribed a Medrol dose pack that she is finishing today.  Current headache started 2 weeks ago.  Current level of pain: 4/10  Associated symptoms: photophobia - this morning had nausea  and took Reglan  Patient denies motor deficits, sensory symptoms, and vision loss.   Medications tried at home in the last 24 hours: Reglan, Nurtec and Tylenol.  She has undergone some blood work to rule out hormonal imbalance and everything was reportedly within the normal limits.   She started using a  from her dentist.  Overall, she continues to have very frequent migraine headaches.   Headache frequency: almost daily.  Headache characteristics: Unchanged.   Preventive therapy: Emgality loading dose on 3/28 - helping. SE: constipation (may be from Emgality and Ozempic). Botox 195 U every 12 weeks. Cymbalta 40 mg BID (from psychiatrist).  Acute therapy: Frovatriptan seems to work better than eletriptan. Nurtec helps some, but inconsistently. Maxalt-MLT brand name was not covered. DHE NS with benadryl helps for rescue but causes nausea and congestion.  Other medical problems: Denies new medical problems.     Visit 5/3/2023  At the last visit, she was noticing some improvement after adding Emgality, but the headaches were still occur 5 days/week. I repeated her Botox treatment and recommended to increase the dose of magnesium. She continued with Ubrelvy acutely and Frovatriptan for rescue treatment.   She continues to have frequent almost daily headaches and her acute medications are not working as well.   She contacted us today to see if we could repeat the lidocaine nerve blocks as her headache has not resolved with her oral medications.  She took 2 doses of Frova yesterday and then Reglan + Benadryl + Tylenol last night. This morning she took Tylenol again. She run out of Ubrelvy and her pharmacy cannot refill it  Current headache rated 3-4/10.  Headache frequency: on average almost daily.  Headache characteristics: Unchanged.   Preventive therapy: Emgality loading dose on 3/28 is helping. SE: constipation (may be from Emgality and Ozempic). Botox 195 U every 12 weeks. Effexor 37.5 mg daily - switching  to Cymbalta 20 mg BID (from psychiatrist).  Acute therapy: Frovatriptan seems to work better than eletriptan. Ubrelvy helps for the day. DHE NS with benadryl helps for rescue but causes nausea and congestion.  Other medical problems: Denies new medical problems.     Visit 4/18/2023  At the last visit, I administered lidocaine nerve blocks again and recommended some changes in her preventive and acute regimen. I prescribed Emgality in place of Qulipta. She was planning switch from Effexor to a different antidepressant. I changed eletriptan to Frovatriptan for rescue treatment as eletriptan only helped for a few hours.  She started Emgality shortly after the last visit and tried Frova with good response.   Her psychiatrist is switching her from Effexor to Cymbalta.  Overall, she has noticed about 30% improvement in migraine headache frequency and response to acute treatment.    Headache frequency: on average 5 days/week now. Daily prior month.  Headache characteristics: Unchanged. More responsive to acute treatment.   Preventive therapy: Emgality loading dose on 3/28 is helping. SE: constipation (may be from Emgality and Ozempic). Botox 195 U every 12 weeks. Effexor 37.5 mg daily - switching to Cymbalta 20 mg BID (from psychiatrist).  Acute therapy: Frovatriptan seems to work better than eletriptan. Ubrelvy helps for the day. DHE NS with benadryl helps for rescue but causes nausea and congestion.  Other medical problems: Denies new medical problems.     Visit 3/27/2023  At the last visit I administered lidocaine nerve blocks which provided immediate benefit. However, the headaches continue to reoccur almost daily.   She is here to repeat the nerve blocks and discuss alternative acute and preventive treatments.   Her psychiatrist has recommended switching from Effexor to Lamictal. Cymbalta has been considered, but they are trying to find a weight neutral medication.  She restarted Ozempic.   Overall, she has had severe  refractory headaches almost daily for over a month.   Headache frequency: not continuous but almost daily since 2/15/2023  Headache characteristics: Unchanged.   Preventive therapy: Botox 195 U every 12 weeks. Effexor 112.4 mg daily. Qulipta 60 mg helping some (at 30 mg 2-3 weeks with no headaches or headaches that responded well to acute treatment, then had to increase to 60 mg and did not see an improvement). She is having some constipation and fatigue.  Acute therapy: Eletriptan helps some within an hour but comes back in a couple of hours. Ubrelvy helps for the day. DHE NS with benadryl helps for rescue but causes nausea and congestion.  Other medical problems: Denies new medical problems.     Visit 3/16/2023  At the last visit, she reported significant improvement of her headache condition after adding Qulipta initially, but she presented with persistent headache for several weeks. We recommended starting a course of daily naproxen for 1 - 2 weeks and discussed the need to decrease the amount of acute medications to prevent rebound headaches.   We switched naproxen to nabumetone on 3/13.  Current headache started on: 2/15/2023 on and off. Was headache free yesterday, but woke up with a severe headache again this morning.  Current level of pain: 8/10  Associated symptoms: phonophobia, photophobia and nausea.  Patient denies motor deficits, sensory symptoms, and vision loss.   Medications tried at home in the last 24 hours: Ubrelvy this morning and nabumetone last night.   Overall, she has had severe refractory headaches almost daily for the last month.  Headache frequency: not continuous but almost daily since 2/15/2023  Headache characteristics: Unchanged.   Preventive therapy: Botox. Effexor 112.4 mg daily. Qulipta 60 mg (at 30 mg 2-3 weeks with no headaches or headaches that responded well to acute treatment, then had to increase to 60 mg and did not see an improvement).  Acute therapy: Ubrelvy. Eletriptan.  DHE NS  Other medical problems: Denies new medical problems.     Telemed 3/7/2023  At the last visit, we recommended to add Qulipta for prevention and continue with Botox, Magnesium, and Effexor (from psychiatrist). I recommended to continue with the same acute and rescue regimen, Ubrelvy, Eletriptan, and DHE NS, but we discussed the possibility of trying Nurtec as needed. Nurtec and Anti-CGRP antibodies can also be considered for prevention.  Initially, within a few days of starting the Qulipta 30 mg, she noticed an improvement. A few weeks later, she increased the dose to 60 mg. Tolerating well. She was experiencing an occasional headache, which was relieved by Ubrelvy.   She went to [place] on February 15. She has been experiencing a persistent headache since that time. It was stressful traveling, but she was able to enjoy the trip. She admits to taking a lot of Ubrelvy and triptans. She took Trudhesa around her menstrual cycle. She found an old prescription for naproxen 500 mg, which she took over the weekend. She was headache free yesterday, but the headache recurred today.      Telemed 1/23/2023   Last clinic visit: 12/27/2022 with Edyta  At the last visit, she presented with refractory status migrainosus and was treated with lidocaine nerve blocks. These helped some, but not as much as the ones in September. She took dexamethasone 2 mg every morning for 5 days without much benefit, followed by prednisone taper starting at 40 mg for 6 days.   That headache episode eventually resolved with prednisone and she was headache free for a few days. However, she has continued to have very frequent headaches.   She contacted us today with a refractory headache and wanting to discuss other options for headache prevention.   Current headache started  4-5 days ago. She took Ubrelvy last night and woke up without headache. Around 11 am the headache came back and she took eletriptan 40 mg and a second dose around 1 pm. Now  the headache is rated at 4 pm.    She was prescribed Effexor by her psychiatrist and has been increasing the dose. The headache features have not changed.  Overall, the headaches have been occurring daily or almost daily.  Headache frequency: daily or almost daily, but not constant. Resolved with steroids for a few day.   Headache characteristics: Unchanged.   Preventive therapy: Botox. Magnesium 400 mg daily. Effexor 112.5 mg daily.  Acute therapy: Ubrelvy works well most of the times. Eletriptan for rescue worked well. Trudhesa NS works well for rescue.    Other medical problems: Denies new medical problems.     Visit 12/27/2022 Urgent visit - lidocaine nerve blocks   At the last visit, her headaches were improved, but still occurring 4-5 days/week. We increased the dose of Botox and recommended to continue with Effexor as this may also help. Otherwise, she was to continue with magnesium and the same acute regimen.  She presented today for an urgent visit.   Current headache started on: 12/5   Headache diary -   12/5 - Ubrelvy Trudhesa pm   Many days with Ubrelvy and eletriptan   12/15 -Excedrin Migraine  am and Ubrelvy   12/15 - Ubrelvy am; Ubrelvy 745 pm; (also had eyes dilated); took Aleve  and Tylenol at 745  12/18 - Ubrelvy  12/19 - Ubrelvy x 2; eletriptan at 7 eletriptan at 10  12/20 - Aleve  eletriptan 6 am; 745 pm Aleve  Tylenol; 845 pm eletriptan; 11 Zofran 8 mg  12/21- Ubrelvy 10 am 10 pm  12/22 Aleve  1 am  12/23 Ubrelvy 4 pm  12/24 Aleve  7 am  12/25 Ubrelvy  12/26 Ubrelvy 1:45 eletriptan 9 pm  12/27 eletriptan 1 pm  Current level of pain: 7   Associated symptoms: right side heaviness, dropping things; photophobia, phonophobia.   Effexor dose was increased.     Visit 11/3/2022   Last clinic visit: 9/26 for nerve blocks and 9/23 for follow-up   At the last visit, she received lidocaine nerve blocks which finally broke the refractory headache precipitated by the COVID booster.  Her psychiatrist switch  Zoloft to Effexor 3 weeks ago and this seems to be helping with mood and headaches.  Overall, the headaches have been better since she got the lidocaine nerve blocks and she feels that Effexor is also helping.   Headache frequency: on average 4-5 days/week with headaches. All of them required acute treatment with Ubrelvy and 2-3 days need eletriptan.     Headache characteristics: Unchanged.   Preventive therapy: Botox 155 U. Magnesium 400 mg daily. Effexor 75 mg daily.  Acute therapy: Ubrelvy working better now. Eletriptan for rescue worked well. Trudhesa NS works well for rescue.    Other medical problems: Denies new medical problems. Scheduled for meniscal repair surgery.     Urgent visit - lidocaine nerve blocks 9/26/2022    Telemed Visit 9/23/2022  She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

## 2024-09-04 NOTE — PROGRESS NOTES
"Patient ID: Andressa Baker                              : 1980  MRN: [id number]                                            VISIT DATE: 2024   ENCOUNTER PROVIDER: Theresa Dalton    I had the pleasure of evaluating Andressa Baker in the [facility] on 2024 for a follow-up visit. The history was provided by Andressa Baker and supplemented by her medical records.    CHIEF COMPLAINT: Headache.    HISTORY OF PRESENT ILLNESS:  Andressa Baker is a very pleasant 44 y.o.  woman seen initially in 2022 for chronic severe headaches since her early 20s, worsening in the last few months. Neurological exam was normal. Brain MRI () was unremarkable. There are no atypical features or symptoms suggestive of a serious underlying condition or secondary headache. In our opinion, she has chronic migraine exacerbated by hormonal changes and frequent use of acute medications (Excedrin and Rizatriptan - now resolved). There may be some contribution from myofascial neck pain, but there is no evidence of significant cervical spine disease.    Follow-up Clinic Note:  Last clinic visit: 2024 [facility] with Edyta    At the last visit  she reported a worsening of her headache condition. We recommended increasing the Aimovig dose. She was open to acute migraine infusion therapy and we scheduled her for that. We discussed trying to decrease the amount of frovatriptan and Trudhesa NS use to try to decrease possible medication overuse headaches.     2024 Nerve blocks visit only.  Per Odalys Ontiveros's note: \"Pt presents for nerve blocks. Recently had infusions in [place], provided some relief but not complete relief. Traveling tomorrow. Inquires about injections into tight areas of trapezius bilaterally. Discussed potential risks/benefits and pt elected to proceed. Instructed to take two OTC Aleve with food twice daily for pain/muscle soreness as needed.\"    She increased the " dose of Aimovig in July and has noticed an improvement.    She also started Lamictal this summer for anxiety.    Overall, her headache condition has been better since her last visit. The headaches still occur several days per week, but they are less intense and resolve with Nurtec.    Headache frequency: still occurring 3-4 days/week but they are less intense and respond well to Nurtec now.    Headache characteristics: Unchanged. Less intense.   Preventive therapy: Botox 195 U ever 12 weeks. Nerve blocks every 12 weeks. Aimovig 140 mg monthly is helping. Cymbalta 40 mg BID (from psychiatrist). Not using Cefaly preventively. Lamotrigine 50 mg for anxiety.    Acute therapy: Nurtec working well again. Frova helps and uses it about 1-2/week. Trudhesa NS helps. Uses infrequently. Cefaly acutely helped some.  Other medical problems: no new medical problems. ALEXIS - using CPAP since Dec 2023    PMH/SH/FH: Reviewed and unchanged since previous visit or updated below.    Summary from previous visits.  Visit 7/11/2024  At the last visit, we treated her acute headache with Toradol IM and repeated her Botox treatment. We recommended adding riboflavin to magnesium. She was to continue Aimovig monthly and Botox and lidocaine blocks every 12 weeks. She was also on Cymbalta from her psychiatrist. She was to continue with Nurtec and frovatriptan for acute therapy, and she has Trudhesa NS available for rescue therapy. She was to continue with Cefaly.   She has been experiencing daily migraines. She has been waking up with a severe headaches daily.   She has not experienced an improvement since her last Botox and Aimovig treatments.   She either takes Nurtec. If ineffective, she will take a triptan and then repeat the dose. Trudhesa has been the most helpful lately. She tries to limit the triptan to 2 to 3 days/week, and limits the Trudhesa to 3 days/week.   She takes Reglan and Aleve most night to help with the morning headaches.   She  is open to infusion therapy.   She recently started using a .   Lamotrigine 25 mg was recently added.   Constipation has improved.   She is going to [facility] with her  in a few week, then [facility].     Visit 6/12/2024   Last clinic visit: 5/15/2024 with Odalys Ontiveros  At the last visit, she received lidocaine nerve blocks. These helped.  She continued with Aimovig and felt that this was helping. She just got the last injection 2 days ago.  She has been on a headache cycle since last Monday, about 10 days ago.   Overall, headaches have been unchanged.   Headache frequency: about 3 days/week but not as incapacitating. Worse the last 10 days.   Headache characteristics: Unchanged.   Preventive therapy: Botox 195 U ever 12 weeks. Nerve blocks. Aimovig monthly seems to be working. Cymbalta 40 mg BID (from psychiatrist). Cefaly most days.  Acute therapy: Nurtec has not worked well recently. Frova helps for about 12 hours. Trudhesa NS helps, but the headache has not resolved. Cefaly acutely helps some.  Other medical problems: ALEXIS - using CPAP since Dec 2023    Visit 5/15/2024  At the last visit, pt underwent repeat Botox injection, and stopped Emgality and began Aimovig. She continued Nurtec and frovatriptan for acute therapy, and she has Trudhesa NS available for rescue therapy.  Relivion was discussed and she opted to continue with Cefaly.   Overall, feeling better, is now having headache free time, up to a day or two. Fewer migraines, less severe. Occur 2-3x weekly   Will treat with Nurtec, if not helpful will take Frova as well (does this about 60% of the time). Usually will have to take a second Frova. Occasionally will have to do Trudhesa the next day for complete relief.   Began dry needling which seems to help, a day or 2 of relief. Does it 1-2x week.   Had third shot of Aimovig yesterday.   Headache frequency: 2-3x/week  Headache characteristics: Unchanged.   Preventive therapy: Botox 195 U ever 12  weeks. Nerve blocks. Aimovig monthly.   Acute therapy: Nurtec, Frova, Trudhesa  Other medical problems: ALEXIS - using CPAP since Dec 2023    Visit 3/19/24:   Last clinic visit: 2/5/2024  At the last visit, I repeated her lidocaine blocks and we discussed the possibility of doing acute infusions at the [facility] in [place] if needed. I recommended continuing with Botox, Emgality, and nerve blocks for preventive therapy. She was also on Cymbalta from her psychiatrist. She continued with Nurtec and frovatriptan for acute therapy, and she had Trudhesa NS available for rescue therapy.   She continues to use the CPAP consistently.  Overall, her headaches are not well controlled with her current regimen. She is interested in changing Emgality to a different mAb and she wasn't more information about Relivion.  Headache frequency: variable, overall unchanged.  Headache characteristics: Unchanged. More severe this past weekend  Preventive therapy: Emgality 120 mg monthly injections - no injection site reaction, Botox 195 units every 12 weeks. Nerve blocks helps.CEFALY.  Acute therapy: Nurtec, frovatriptan. Rescue: Trudhesa NS, Benadryl.   Other medical problems: ALEXIS - using CPAP since Dec 2023  PMH/SH/FH: Reviewed and unchanged since previous visit or updated below.    Visit 2/5/2024  At the last visit, she received her Botox treatment and we recommended continuing with Emgality and nerve blocks for preventive therapy. She continued with Nurtec and frovatriptan for acute therapy, and she had Trudhesa NS available for rescue therapy.   Using CPAP consistently now up to 6-7 hours/night. She finds it helpful.  She stopped Lamictal (prescribed by psychiatrist for anxiety).  She has the Cefaly device, but she doesn't like the vibration and she has problems using it due to poor internet connection.  She has had worse headaches in the past 2 weeks. She had incapacitating pain and she is more functional today, but  still has a headache. She took Ultram from her  and it helped a little. Her level of pain is now 2/10.  Headache characteristics: Unchanged. More severe this past weekend  Preventive therapy: Emgality 120 mg monthly injections - no injection site reaction, Botox 195 units every 12 weeks. Nerve blocks helps.CEFALY.  Acute therapy: Nurtec, frovatriptan. Rescue: Trudhesa NS, Benadryl.   Other medical problems: ALEXIS - using CPAP since Dec 2023    Visit 12/27/2023  At the last visit, nerve blocks administered.   Recommend scheduling NB again in 12 weeks.  She was diagnosed with sleep apnea. She started using a CPAP yesterday.   She is currently experiencing a wearing of the Botox and Emgality. She has been taking Nurtec more often, 4 - 5 days/week. She has been doing so with the frovatriptan.   The addition of the nerve blocks has been helpful.   She has the Cefaly device, but she needs to remember to use it preventively.   Headache characteristics: Unchanged.   Preventive therapy: Emgality 120 mg monthly injections - no injection site reaction, Botox 195 units every 12 weeks.   Acute therapy: Nurtec, frovatriptan. Rescue: Trudhesa NS, Benadryl.     Urgent visit 10/27/2023   She has not been able to start Emgality yet. She called the insurance and it is not ready at the pharmacy.  Current headache started on: Tuesday - 4 days ago  Current level of pain: 4/10  Associated symptoms: nausea, photo and phonophobia  Patient denies motor deficits, sensory symptoms, and vision loss.   Medications tried at home in the last 24 hours: Reglan and benadryl last night. Other acute treatments earlier. None this morning.    Visit 10/3/2023   Last clinic visit: 9/19/2023   At the last visit, I have repeated the lidocaine nerve blocks. I recommended starting Cefaly for prevention and acute treatment. I gave her a sample of Zavzpret NS to see if this works better than Nurtec. She continued with Emgality, Botox and magnesium for  prevention. We also discussed trying gluten free diet and changing the biologic for weight loss.   She stopped Ozempic and started gluten free diet, but is not doing this 100%.   She has Cefaly but has not used it much.  Zavzpret did not work well.  Overall, she continues to have very frequent migraine headaches. The may be less severe since the last visit.  Headache frequency: almost daily.  Headache characteristics: Unchanged.   Preventive therapy: Emgality loading dose on 3/28 - helping - they respond better to acute treatment. SE: constipation (may be from Emgality and Ozempic). Botox 195 U every 12 weeks. Cymbalta 40 mg BID (from psychiatrist).  Acute therapy: Rizatriptan or Frovatriptan - both help and are similar; better than eletriptan. Nurtec helps some, but inconsistently. DHE NS with benadryl helps for rescue but causes nausea and congestion.  Other medical problems: Denies new medical problems.     Visit 9/19/2023  At the last visit, she presented with another episode of status migrainosus. I repeated the lidocaine nerve blocks and recommended continuing with Nurtec for first line acute treatment. I prescribed generic rizatriptan-MLT for rescue in place of Frova. I recommend considering Cefaly or GammaCore and continuing with Emgality, Botox and magnesium for prevention.   She started taking Ozempic again 4 weeks ago and atomoxetine for brain fog about 2 weeks ago.   She has noticed that her migraine headache condition gets worse every time she restarts Ozempic and improves when she stops it.   She had severe headaches requiring IM Toradol on 8/3 and 9/12.    She ordered Cefaly and recently got it, but has not used it yet.   She had some blood work done and was prescribed progesterone, but has not use the estrogen patch due to concerns about risk or blood clotting.   Overall, she continues to have very frequent migraine headaches.   Headache frequency: almost daily.  Headache characteristics: Unchanged.    Preventive therapy: Emgality loading dose on 3/28 - helping - they respond better to acute treatment. SE: constipation (may be from Emgality and Ozempic). Botox 195 U every 12 weeks. Cymbalta 40 mg BID (from psychiatrist).  Acute therapy: Rizatriptan or Frovatriptan - both help and are similar; better than eletriptan. Nurtec helps some, but inconsistently. Maxalt-MLT brand name was not covered and now it is not available (generic also works, but headache reoccurs 6-8 hours). DHE NS with benadryl helps for rescue but causes nausea and congestion.  Other medical problems: Denies new medical problems.     Visit 7/19/2023  At her last visit, she presented with status migrainosus. I repeated her nerve blocks and changed her acute regimen. I prescribed a trial of Nurtec and Maxalt-MLT (wants to try brand name to see if this works better than the generic) to use in place of Ubrelvy and Frova. I recommended continuing with Emgality, Botox and magnesium for prevention.   She received nerve blocks again on 6/16/2023 and Botox on 7/11/23.  She contacted us last week with a constant headache that had been gradually getting worse since her last visit.   She was prescribed a Medrol dose pack that she is finishing today.  Current headache started 2 weeks ago.  Current level of pain: 4/10  Associated symptoms: photophobia - this morning had nausea and took Reglan  Patient denies motor deficits, sensory symptoms, and vision loss.   Medications tried at home in the last 24 hours: Reglan, Nurtec and Tylenol.  She has undergone some blood work to rule out hormonal imbalance and everything was reportedly within the normal limits.   She started using a  from her dentist.  Overall, she continues to have very frequent migraine headaches.   Headache frequency: almost daily.  Headache characteristics: Unchanged.   Preventive therapy: Emgality loading dose on 3/28 - helping. SE: constipation (may be from Emgality and Ozempic).  Botox 195 U every 12 weeks. Cymbalta 40 mg BID (from psychiatrist).  Acute therapy: Frovatriptan seems to work better than eletriptan. Nurtec helps some, but inconsistently. Maxalt-MLT brand name was not covered. DHE NS with benadryl helps for rescue but causes nausea and congestion.  Other medical problems: Denies new medical problems.     Visit 5/3/2023  At the last visit, she was noticing some improvement after adding Emgality, but the headaches were still occur 5 days/week. I repeated her Botox treatment and recommended to increase the dose of magnesium. She continued with Ubrelvy acutely and Frovatriptan for rescue treatment.   She continues to have frequent almost daily headaches and her acute medications are not working as well.   She contacted us today to see if we could repeat the lidocaine nerve blocks as her headache has not resolved with her oral medications.  She took 2 doses of Frova yesterday and then Reglan + Benadryl + Tylenol last night. This morning she took Tylenol again. She run out of Ubrelvy and her pharmacy cannot refill it  Current headache rated 3-4/10.  Headache frequency: on average almost daily.  Headache characteristics: Unchanged.   Preventive therapy: Emgality loading dose on 3/28 is helping. SE: constipation (may be from Emgality and Ozempic). Botox 195 U every 12 weeks. Effexor 37.5 mg daily - switching to Cymbalta 20 mg BID (from psychiatrist).  Acute therapy: Frovatriptan seems to work better than eletriptan. Ubrelvy helps for the day. DHE NS with benadryl helps for rescue but causes nausea and congestion.  Other medical problems: Denies new medical problems.     Visit 4/18/2023  At the last visit, I administered lidocaine nerve blocks again and recommended some changes in her preventive and acute regimen. I prescribed Emgality in place of Qulipta. She was planning switch from Effexor to a different antidepressant. I changed eletriptan to Frovatriptan for rescue treatment as  eletriptan only helped for a few hours.  She started Emgality shortly after the last visit and tried Frova with good response.   Her psychiatrist is switching her from Effexor to Cymbalta.  Overall, she has noticed about 30% improvement in migraine headache frequency and response to acute treatment.    Headache frequency: on average 5 days/week now. Daily prior month.  Headache characteristics: Unchanged. More responsive to acute treatment.   Preventive therapy: Emgality loading dose on 3/28 is helping. SE: constipation (may be from Emgality and Ozempic). Botox 195 U every 12 weeks. Effexor 37.5 mg daily - switching to Cymbalta 20 mg BID (from psychiatrist).  Acute therapy: Frovatriptan seems to work better than eletriptan. Ubrelvy helps for the day. DHE NS with benadryl helps for rescue but causes nausea and congestion.  Other medical problems: Denies new medical problems.     Visit 3/27/2023  At the last visit I administered lidocaine nerve blocks which provided immediate benefit. However, the headaches continue to reoccur almost daily.   She is here to repeat the nerve blocks and discuss alternative acute and preventive treatments.   Her psychiatrist has recommended switching from Effexor to Lamictal. Cymbalta has been considered, but they are trying to find a weight neutral medication.  She restarted Ozempic.   Overall, she has had severe refractory headaches almost daily for over a month.   Headache frequency: not continuous but almost daily since 2/15/2023  Headache characteristics: Unchanged.   Preventive therapy: Botox 195 U every 12 weeks. Effexor 112.4 mg daily. Qulipta 60 mg helping some (at 30 mg 2-3 weeks with no headaches or headaches that responded well to acute treatment, then had to increase to 60 mg and did not see an improvement). She is having some constipation and fatigue.  Acute therapy: Eletriptan helps some within an hour but comes back in a couple of hours. Ubrelvy helps for the day. DHE NS  with benadryl helps for rescue but causes nausea and congestion.  Other medical problems: Denies new medical problems.     Visit 3/16/2023  At the last visit, she reported significant improvement of her headache condition after adding Qulipta initially, but she presented with persistent headache for several weeks. We recommended starting a course of daily naproxen for 1 - 2 weeks and discussed the need to decrease the amount of acute medications to prevent rebound headaches.   We switched naproxen to nabumetone on 3/13.  Current headache started on: 2/15/2023 on and off. Was headache free yesterday, but woke up with a severe headache again this morning.  Current level of pain: 8/10  Associated symptoms: phonophobia, photophobia and nausea.  Patient denies motor deficits, sensory symptoms, and vision loss.   Medications tried at home in the last 24 hours: Ubrelvy this morning and nabumetone last night.   Overall, she has had severe refractory headaches almost daily for the last month.  Headache frequency: not continuous but almost daily since 2/15/2023  Headache characteristics: Unchanged.   Preventive therapy: Botox. Effexor 112.4 mg daily. Qulipta 60 mg (at 30 mg 2-3 weeks with no headaches or headaches that responded well to acute treatment, then had to increase to 60 mg and did not see an improvement).  Acute therapy: Ubrelvy. Eletriptan. DHE NS  Other medical problems: Denies new medical problems.     Telemed 3/7/2023  At the last visit, we recommended to add Qulipta for prevention and continue with Botox, Magnesium, and Effexor (from psychiatrist). I recommended to continue with the same acute and rescue regimen, Ubrelvy, Eletriptan, and DHE NS, but we discussed the possibility of trying Nurtec as needed. Nurtec and Anti-CGRP antibodies can also be considered for prevention.  Initially, within a few days of starting the Qulipta 30 mg, she noticed an improvement. A few weeks later, she increased the dose to 60  mg. Tolerating well. She was experiencing an occasional headache, which was relieved by Ubrelvy.   She went to [place] on February 15. She has been experiencing a persistent headache since that time. It was stressful traveling, but she was able to enjoy the trip. She admits to taking a lot of Ubrelvy and triptans. She took Trudhesa around her menstrual cycle. She found an old prescription for naproxen 500 mg, which she took over the weekend. She was headache free yesterday, but the headache recurred today.      Telemed 1/23/2023   Last clinic visit: 12/27/2022 with Edyta  At the last visit, she presented with refractory status migrainosus and was treated with lidocaine nerve blocks. These helped some, but not as much as the ones in September. She took dexamethasone 2 mg every morning for 5 days without much benefit, followed by prednisone taper starting at 40 mg for 6 days.   That headache episode eventually resolved with prednisone and she was headache free for a few days. However, she has continued to have very frequent headaches.   She contacted us today with a refractory headache and wanting to discuss other options for headache prevention.   Current headache started  4-5 days ago. She took Ubrelvy last night and woke up without headache. Around 11 am the headache came back and she took eletriptan 40 mg and a second dose around 1 pm. Now the headache is rated at 4 pm.    She was prescribed Effexor by her psychiatrist and has been increasing the dose. The headache features have not changed.  Overall, the headaches have been occurring daily or almost daily.  Headache frequency: daily or almost daily, but not constant. Resolved with steroids for a few day.   Headache characteristics: Unchanged.   Preventive therapy: Botox. Magnesium 400 mg daily. Effexor 112.5 mg daily.  Acute therapy: Ubrelvy works well most of the times. Eletriptan for rescue worked well. Trudhesa NS works well for rescue.    Other medical  problems: Denies new medical problems.     Visit 12/27/2022 Urgent visit - lidocaine nerve blocks   At the last visit, her headaches were improved, but still occurring 4-5 days/week. We increased the dose of Botox and recommended to continue with Effexor as this may also help. Otherwise, she was to continue with magnesium and the same acute regimen.  She presented today for an urgent visit.   Current headache started on: 12/5   Headache diary -   12/5 - Ubrelvy Trudhesa pm   Many days with Ubrelvy and eletriptan   12/15 -Excedrin Migraine  am and Ubrelvy   12/15 - Ubrelvy am; Ubrelvy 745 pm; (also had eyes dilated); took Aleve  and Tylenol at 745  12/18 - Ubrelvy  12/19 - Ubrelvy x 2; eletriptan at 7 eletriptan at 10  12/20 - Aleve  eletriptan 6 am; 745 pm Aleve  Tylenol; 845 pm eletriptan; 11 Zofran 8 mg  12/21- Ubrelvy 10 am 10 pm  12/22 Aleve  1 am  12/23 Ubrelvy 4 pm  12/24 Aleve  7 am  12/25 Ubrelvy  12/26 Ubrelvy 1:45 eletriptan 9 pm  12/27 eletriptan 1 pm  Current level of pain: 7   Associated symptoms: right side heaviness, dropping things; photophobia, phonophobia.   Effexor dose was increased.     Visit 11/3/2022   Last clinic visit: 9/26 for nerve blocks and 9/23 for follow-up   At the last visit, she received lidocaine nerve blocks which finally broke the refractory headache precipitated by the COVID booster.  Her psychiatrist switch Zoloft to Effexor 3 weeks ago and this seems to be helping with mood and headaches.  Overall, the headaches have been better since she got the lidocaine nerve blocks and she feels that Effexor is also helping.   Headache frequency: on average 4-5 days/week with headaches. All of them required acute treatment with Ubrelvy and 2-3 days need eletriptan.     Headache characteristics: Unchanged.   Preventive therapy: Botox 155 U. Magnesium 400 mg daily. Effexor 75 mg daily.  Acute therapy: Ubrelvy working better now. Eletriptan for rescue worked well. Laura NIX works well for  rescue.    Other medical problems: Denies new medical problems. Scheduled for meniscal repair surgery.     Urgent visit - lidocaine nerve blocks 9/26/2022    Telemed Visit 9/23/2022  She mentioned that she underwent the new bivalent booster the night before the onset of this most recent headache.    At the last visit, she was evaluated for a severe headache that has been refractory to treatment. She underwent an acute migraine infusion.   dexamethasone sodium phosphate 10 mg  diphenhydramine HCl 25 mg, 25 mg  ketorolac tromethamine 30 mg  metoclopramide HCl (4 administrations)  valproate (DEPACON) 1,000 mg in sodium chloride... 1000 mg  After the infusion, she felt well for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

## 2024-09-04 NOTE — PROCEDURES
Procedures  Procedure Note for Botox Injections for Headache:    Indication for procedure:    Diagnosis Plan   1. Intractable chronic migraine without aura and with status migrainosus  [facility] Botulinum Toxin Injection Appointment Request    onabotulinumtoxinA (BOTOX) 200 unit injection 200 Units          I explained the risks and benefits and obtained consent from Andressa.  Onabotulinumtoxin A 200 U were diluted in 4 mL of saline.    Lot number and expiration date documented in informed consent and MAR.  I performed a time-out, including 2 unique patient identifiers with Andressa.  Using a 30 gauge 1/2 inch needle, I injected 0.1mL = 5 U into each site according to the Chronic Migraine Protocol (PREEMPT Studies).   The following table reports the number of sites injected in each muscle.     Muscle Midline Right Left   Procerus 1          1 1   Frontalis   2 2   Temporalis   4 (10+5+5+5 U) 4 (10+5+5+5 U)   Occipitalis   3 (10+5+5 U) 3 (10+5+5 U)   Cervical Paraspinal   2 2   Trapezius   3 (10+10+5 U) 3 (10+10+5 U)   Other:            195 Units were injected and 5 Units were wasted.     Andressa tolerated the procedure well, without complications. She was instructed that she could experience increased pain in the next 2-3 days, which should be treated with ice and anti-inflammatory medications.      We appreciate the opportunity to participate in the care of Andressa LEE Olivia.     Please, do not hesitate to call me at [phone] if you have any questions or concerns.        Theresa Lewis MD  [facility]  [phone]

## 2024-09-10 ENCOUNTER — OFFICE VISIT (OUTPATIENT)
Dept: FAMILY MEDICINE | Facility: CLINIC | Age: 44
End: 2024-09-10
Payer: COMMERCIAL

## 2024-09-10 VITALS
TEMPERATURE: 97.3 F | WEIGHT: 202 LBS | OXYGEN SATURATION: 98 % | BODY MASS INDEX: 31.71 KG/M2 | DIASTOLIC BLOOD PRESSURE: 74 MMHG | HEART RATE: 79 BPM | SYSTOLIC BLOOD PRESSURE: 114 MMHG | HEIGHT: 67 IN | RESPIRATION RATE: 16 BRPM

## 2024-09-10 DIAGNOSIS — G43.711 INTRACTABLE CHRONIC MIGRAINE WITHOUT AURA AND WITH STATUS MIGRAINOSUS: ICD-10-CM

## 2024-09-10 DIAGNOSIS — Z00.00 ENCOUNTER FOR PREVENTIVE CARE: Primary | ICD-10-CM

## 2024-09-10 DIAGNOSIS — Z51.81 THERAPEUTIC DRUG MONITORING: ICD-10-CM

## 2024-09-10 DIAGNOSIS — Z23 IMMUNIZATION DUE: ICD-10-CM

## 2024-09-10 DIAGNOSIS — Z83.711 FAMILY HISTORY OF HYPERPLASTIC COLON POLYPS: ICD-10-CM

## 2024-09-10 PROCEDURE — 90656 IIV3 VACC NO PRSV 0.5 ML IM: CPT | Performed by: STUDENT IN AN ORGANIZED HEALTH CARE EDUCATION/TRAINING PROGRAM

## 2024-09-10 PROCEDURE — 3008F BODY MASS INDEX DOCD: CPT | Performed by: STUDENT IN AN ORGANIZED HEALTH CARE EDUCATION/TRAINING PROGRAM

## 2024-09-10 PROCEDURE — 90471 IMMUNIZATION ADMIN: CPT | Performed by: STUDENT IN AN ORGANIZED HEALTH CARE EDUCATION/TRAINING PROGRAM

## 2024-09-10 PROCEDURE — 99396 PREV VISIT EST AGE 40-64: CPT | Mod: 25 | Performed by: STUDENT IN AN ORGANIZED HEALTH CARE EDUCATION/TRAINING PROGRAM

## 2024-09-10 PROCEDURE — 99214 OFFICE O/P EST MOD 30 MIN: CPT | Mod: 25 | Performed by: STUDENT IN AN ORGANIZED HEALTH CARE EDUCATION/TRAINING PROGRAM

## 2024-09-10 RX ORDER — EPINEPHRINE 0.3 MG/.3ML
INJECTION SUBCUTANEOUS
COMMUNITY
Start: 2024-07-30 | End: 2025-01-21

## 2024-09-10 RX ORDER — NORETHINDRONE 0.35 MG/1
1 TABLET ORAL DAILY
Qty: 28 TABLET | Refills: 12 | Status: SHIPPED | OUTPATIENT
Start: 2024-09-10 | End: 2025-01-21

## 2024-09-10 ASSESSMENT — ENCOUNTER SYMPTOMS
POLYDIPSIA: 0
ACTIVITY CHANGE: 0
ABDOMINAL PAIN: 0
POLYPHAGIA: 0
SLEEP DISTURBANCE: 0
TROUBLE SWALLOWING: 0
DIARRHEA: 0
CONSTIPATION: 0
HYPERACTIVE: 1
TREMORS: 0
VOICE CHANGE: 0
UNEXPECTED WEIGHT CHANGE: 0
PALPITATIONS: 0
NERVOUS/ANXIOUS: 1
FATIGUE: 0
BACK PAIN: 1
ADENOPATHY: 0
APPETITE CHANGE: 0
NECK PAIN: 1

## 2024-09-10 NOTE — PATIENT INSTRUCTIONS
"Genesight test- yes   Consider Minpill/Micronor     \"Who am I as a co-creator?\"  The [employer]esuit Guide to Almost Everything -BARBRA Cisneros All Year     March : early in cycle   You should ideally be fasting for 8+ hours prior to the labs so we can get a good look at your sugar and cholesterol.  Please do not restrict liquid.  Black coffee or water only until labs are drawn.    "

## 2024-09-10 NOTE — ASSESSMENT & PLAN NOTE
Chronic and controlled  Prescribed norethindrone (Micronor) 0.35 mg tablet  Consider Minpill/Micronor   Continue current regimen  Check fasting labs  RTO 1 year later

## 2024-09-10 NOTE — ASSESSMENT & PLAN NOTE
You should ideally be fasting for 8+ hours prior to the labs so we can get a good look at your sugar and cholesterol.  Please do not restrict liquid.  Black coffee or water only until labs are drawn.      Check fasting labs

## 2024-09-10 NOTE — PROGRESS NOTES
Subjective      Patient ID: Andressa Baker is a 44 y.o. female here for the following:   Follow-up      HPI    SUBJECTIVE:   44 y.o. female for annual routine checkup.     On highest dose of amovig for her migraines. Migraines happen 3 or 4 days a week even with medicine. Has taken botox, amovig, and zyrtec for her migraines. 90% of the time the medicine works for her migraines. When starts menstrual cycle, migraines come. Sometime around ovulation, migraines also come. Pt reports progesterone and estrogen levels are low. Does not do anything with migraine and back and neck pain.     Pt admits feeling anxious at times but not to the point of anxiety attacks. Had bad, depressive thoughts in past so saw psychiatrist. Even though has blessed life, feels anxious and depressed.    With Moujarno, lost 20 lbs. Wants to get down to 170 lb for energy levels and control cholesterol levels. Had heart palpitations in past from certain medications. Also got ablation in past.     Feels guilty about using birth control so looked at [facility]. Her son Nicolas got diagnosed with ADHD.     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 Drives her kids to sports but kids go to school on bus.    Pt's father has bad dementia.     Specific diet: Standard American diet  Exercise program: No formal routine   Sleep hours per night: Adequate    Dentist twice yearly: Yes - last time went 6 months ago; at every baby, she had to get a crown  Optometrist or Opthalmologist annually?  Yes    Do you wear your seatbelt? y  Do you wear sunscreen? y  Smoke detectors? y    Feel safe in your home? y    Last pap was with her last baby - 5 years ago.     What is your current birth control method? [facility]    Currently sexually active:     Do you have any children? Y     Do you have any family history of colon cancer? Y - brother (currently 45 years old) found at at 43 years old that he had sessile polyps; mother had polyps; uncle  from colon  cancer        Immunization History   Administered Date(s) Administered    Influenza Vaccine Quadrivalent 3 Yr And Older 09/15/2022    Influenza Vaccine Quadrivalent Preservative Free 6 Mons and Up 10/01/2019, 2020, 2021, 2023    Influenza Vaccine Trivalent Preservative Free 6 Mons And Up 09/10/2024    Influenza, Unspecified 2018, 10/01/2019, 2020, 2021, 2023    SARS-COV-2 (COVID-19) VACCINE, MODERNA MONOVALENT 2021, 2021, 2021    Tdap 2019      Menstrual History:  OB History          5    Para   5    Term   5       0    AB   0    Living   5         SAB   0    IAB   0    Ectopic   0    Multiple   0    Live Births   5           Obstetric Comments   Twin pregnancy with vanishing twin at 12-14 weeks in .              No LMP recorded.           PHQ 2 to 9:  No data recorded  No data recorded  No data recorded  No data recorded         Chemistry        Component Value Date/Time     2024 0727    K 4.4 2024 0727     2024 0727    CO2 30 2024 0727    BUN 12 2024 0727    CREATININE 0.7 2024 0727        Component Value Date/Time    CALCIUM 9.3 2024 0727    ALKPHOS 49 2024 0727    AST 16 2024 0727    ALT 20 2024 0727    BILITOT 0.4 2024 0727          Lab Results   Component Value Date    WBC 5.71 2023    HGB 14.3 2023    HCT 43.8 2023    MCV 87.6 2023     2023       Lab Results   Component Value Date    HGBA1C 5.1 2022    HGBA1C 5.0 2021    HGBA1C 5.2 2020     Lab Results   Component Value Date    CHOL 230 (H) 2024    CHOL 208 (H) 2022    CHOL 183 2020     Lab Results   Component Value Date    HDL 54 2024    HDL 50 (L) 2022    HDL 67 2020     Lab Results   Component Value Date    LDLCALC 148 (H) 2024    LDLCALC 126 (H) 2022    LDLCALC 108 (H) 2020     Lab Results  "  Component Value Date    TRIG 138 2024    TRIG 159 (H) 2022    TRIG 40 2020     No results found for: \"CHOLHDL\"    The 10-year ASCVD risk score (Keya MENDES, et al., 2019) is: 0.7%    Values used to calculate the score:      Age: 44 years      Sex: Female      Is Non- : No      Diabetic: No      Tobacco smoker: No      Systolic Blood Pressure: 114 mmHg      Is BP treated: No      HDL Cholesterol: 54 mg/dL      Total Cholesterol: 230 mg/dL    The following have been reviewed and updated as appropriate in this visit:      Allergies  Meds  Problems  Social History      Patient Active Problem List   Diagnosis    Anxiety    History of     Ventricular premature beats    Mild tricuspid regurgitation by prior echocardiogram    Class 1 obesity with serious comorbidity and body mass index (BMI) of 31.0 to 31.9 in adult    Difficulty voiding    Tension type headache    Insomnia    Lipedema    Pelvic floor dysfunction    Depression    Attention deficit hyperactivity disorder (ADHD), predominantly inattentive type    Routine adult health maintenance    Migraine headache    Chronic pain of right knee    Intractable chronic migraine without aura and with status migrainosus    Cervicalgia    Other chronic pain    Chronic pain of both knees    Nutritional counseling    Tinea versicolor    Mixed hyperlipidemia    Family history of colon cancer    Precancerous skin lesion    Encounter for preventive care    Mild obstructive sleep apnea    Acute pain    Status migrainosus    Immunization due    Family history of hyperplastic colon polyps    Therapeutic drug monitoring    .    Social History     Tobacco Use    Smoking status: Never    Smokeless tobacco: Never   Vaping Use    Vaping Use: Never used   Substance Use Topics    Alcohol use: Yes     Comment: occasionally    Drug use: No     Social History     Social History Narrative    Exercises 2-3 times per week -  and " she is trying to run again    Sleep sometimes interrupted    Eating plan: Patient attempts low glycemic index eating    No history of domestic violence    Lives with  and 5 children      [employer] -- medical legal work / part-time    Has a dog       Family History   Problem Relation Age of Onset    Hyperlipidemia Biological Mother     Hyperlipidemia Biological Father     Hypertension Biological Father     Prostate cancer Biological Father 78    Dementia Biological Father     Other Biological Father         dief from covid 19    Hyperlipidemia Maternal Grandmother     Clotting disorder Maternal Grandfather         developed in 60's - required tansfusions/platelets    Hyperlipidemia Paternal Grandmother     Dementia Paternal Grandmother     Diabetes Paternal Grandfather     No Known Problems Biological Child     Colon cancer Mother's Brother        Allergies as of 09/10/2024    (No Known Allergies)       Current Outpatient Medications   Medication Sig Dispense Refill    AIMOVIG AUTOINJECTOR 140 mg/mL auto-injector Inject 140 mg under the skin every 28 (twentyeight) days. 3 mL 3    botulinum toxin Type A (BOTOX)       dihydroergotamine (TRUDHESA) 0.725 mg/pump act. (4 mg/mL) spray,non-aerosol Administer 1 spray into each nostril as needed (migraine). The dose may be repeated, if needed, a minimum of 1 hour after the first dose. Do not use more than 2 doses within a 24-hour period or 3 doses within 7 days. 4 mL 11    doxylamine-pyridoxine, vit B6, (DICLEGIS) 10-10 mg tablet,delayed release (DR/EC) Take 1 tablet by mouth daily as needed.      DULoxetine (CYMBALTA) 20 mg capsule Take 40 mg by mouth 2 (two) times a day.      EPINEPHrine (EPIPEN) 0.3 mg/0.3 mL injection syringe USE 1 INJECTION IN THE MUSCLE AS NEEDED      ergocalciferol (VITAMIN D2) 50,000 unit(1250 mcg) capsule Take 50,000 Units by mouth once a week.      fexofenadine (ALLEGRA ALLERGY) 60 mg tablet Take by mouth every 12 hours.      fluticasone  propionate (FLONASE ALLERGY RELIEF) 50 mcg/actuation nasal spray Administer 2 sprays into affected nostril(s) daily.      frovatriptan (FROVA) 2.5 mg tablet TAKE 1 TABLET (2.5 MG TOTAL) BY MOUTH ONCE AS NEEDED FOR MIGRAINE INDICATIONS: A MIGRAINE HEADACHE. 12 tablet 11    herbal drugs (CALMME ORAL) Take by mouth daily.      ibuprofen (MOTRIN) 200 mg tablet Take 2-3 tablets by mouth.      lamoTRIgine (LaMICtal) 25 mg tablet Take 50 mg by mouth daily.      LORazepam (ATIVAN) 0.5 mg tablet Take 1 tablet (0.5 mg total) by mouth every 8 (eight) hours as needed for anxiety. 20 tablet 0    magnesium oxide 500 mg capsule Take 500 mg by mouth 2 (two) times a day.      metoclopramide (REGLAN) 10 mg tablet TAKE 1 TABLET (10 MG TOTAL) BY MOUTH 3 TIMES A DAY AS NEEDED FOR NAUSEA 20 tablet 2    naproxen sodium (ALEVE ORAL) Take by mouth as needed.      norethindrone (MICRONOR) 0.35 mg tablet Take 1 tablet (0.35 mg total) by mouth daily. 28 tablet 12    olopatadine (PATADAY) 0.2 % ophthalmic solution Administer 1 drop into affected eye(s) daily.      ondansetron (ZOFRAN) 4 mg tablet Take 4 mg by mouth every 8 (eight) hours as needed.      rimegepant (NURTEC ODT) 75 mg tablet,disintegrating Take 1 tablet (75 mg total) by mouth as needed (migraine headache). Dissolve 1 tablet under the tongue. One dose per day as needed. 16 tablet 11    tirzepatide (MOUNJARO) 2.5 mg/0.5 mL pen injector Inject 2.5 mg under the skin once a week. 2 mL 0    atomoxetine (STRATTERA) 40 mg capsule Take 40 mg by mouth 2 (two) times a day.      b complex vitamins capsule Take 1 capsule by mouth daily.      docosahexaenoic acid 200 mg capsule Prenatal + DHA      docosahexaenoic acid-epa 120-180 mg capsule Take 1,000 mg by mouth daily.      TURMERIC ORAL Take by mouth daily.       Current Facility-Administered Medications   Medication Dose Route Frequency Provider Last Rate Last Admin    ondansetron (ZOFRAN) injection 4 mg  4 mg intravenous Once PRN Amendt,  "BEKA Lan             Review of Systems   Constitutional:  Negative for activity change, appetite change, fatigue and unexpected weight change.   HENT:  Negative for trouble swallowing and voice change.    Cardiovascular:  Negative for chest pain, palpitations and leg swelling.   Gastrointestinal:  Negative for abdominal pain, constipation and diarrhea.   Endocrine: Negative for cold intolerance, heat intolerance, polydipsia, polyphagia and polyuria.   Musculoskeletal:  Positive for back pain and neck pain.   Skin:  Negative for rash.   Allergic/Immunologic: Negative for environmental allergies, food allergies and immunocompromised state.   Neurological:  Negative for tremors.        Has migraines 3 to 4 times a week   Hematological:  Negative for adenopathy.   Psychiatric/Behavioral:  Negative for sleep disturbance. The patient is nervous/anxious (sometimes feel anxious but not to the point of feeling anxiety attacks) and is hyperactive.         Had bad, depressive thoughts in past; feels anxious and depressed even though has blessed life           Objective   Vitals:   Vitals:    09/10/24 1030   BP: 114/74   BP Location: Left upper arm   Patient Position: Sitting   Pulse: 79   Resp: 16   Temp: 36.3 °C (97.3 °F)   SpO2: 98%   Weight: 91.6 kg (202 lb)   Height: 1.702 m (5' 7\")     Body mass index is 31.64 kg/m².  No LMP recorded.    No results found.    Physical Exam  Vitals and nursing note reviewed.   Constitutional:       General: She is not in acute distress.     Appearance: She is well-developed. She is not ill-appearing, toxic-appearing or diaphoretic.   HENT:      Head: Normocephalic and atraumatic.      Right Ear: Tympanic membrane, ear canal and external ear normal. There is no impacted cerumen.      Left Ear: Tympanic membrane, ear canal and external ear normal. There is no impacted cerumen.      Nose: Nose normal.      Mouth/Throat:      Mouth: Mucous membranes are moist.      Pharynx: No oropharyngeal " exudate or posterior oropharyngeal erythema.   Eyes:      General: No scleral icterus.        Right eye: No discharge.         Left eye: No discharge.      Conjunctiva/sclera: Conjunctivae normal.   Cardiovascular:      Rate and Rhythm: Normal rate and regular rhythm.      Heart sounds: Normal heart sounds. No murmur heard.     No friction rub. No gallop.   Pulmonary:      Effort: Pulmonary effort is normal. No respiratory distress.      Breath sounds: Normal breath sounds. No stridor. No wheezing, rhonchi or rales.   Abdominal:      General: Bowel sounds are normal. There is no distension.      Palpations: Abdomen is soft.      Tenderness: There is no abdominal tenderness. There is no guarding or rebound.   Musculoskeletal:      Cervical back: No tenderness.      Right lower leg: No edema.      Left lower leg: No edema.   Lymphadenopathy:      Cervical: No cervical adenopathy.   Skin:     General: Skin is warm and dry.      Capillary Refill: Capillary refill takes less than 2 seconds.      Coloration: Skin is not pale.      Findings: No rash.   Neurological:      General: No focal deficit present.      Mental Status: She is alert and oriented to person, place, and time. Mental status is at baseline.      Cranial Nerves: No cranial nerve deficit.      Gait: Gait normal.      Comments: CN 2-12 grossly intact. Moves all extremities spontaneously    Psychiatric:         Mood and Affect: Mood normal.         Behavior: Behavior is hyperactive.           ASSESSMENT & PLAN      Problem List Items Addressed This Visit          Other    Intractable chronic migraine without aura and with status migrainosus     Chronic and controlled  Prescribed norethindrone (Micronor) 0.35 mg tablet  Consider Minpill/Micronor   Continue current regimen  Check fasting labs  RTO 1 year later         Relevant Medications    norethindrone (MICRONOR) 0.35 mg tablet    Other Relevant Orders    TSH w reflex FT4    Comprehensive metabolic panel     "CBC and Differential    Lipid panel    Testosterone, total and free    Progesterone    DHEA-sulfate    ESTRADIOL    Encounter for preventive care - Primary     Well adult female   PAP smear per OB   Please send last PAP smear result  CRC screening at 46 years  Ordered mammogram and colonoscopy  Check fasting labs    Counseled on types of birth control    Look into consolation vs desolation  Look into website called [facility] All Year    [employer] test- yes   Consider Minpill/Micronor   Prescribed norethindrone (Micronor) 0.35 mg tablet    \"Who am I as a co-creator?\"  The samm Guide to Almost Everything -BARBRA Cisneros   [facility] All Year     March : early in cycle   You should ideally be fasting for 8+ hours prior to the labs so we can get a good look at your sugar and cholesterol.  Please do not restrict liquid.  Black coffee or water only until labs are drawn.    Counseled on necessity of self care  RTO 1 year or sooner PRN         Relevant Medications    norethindrone (MICRONOR) 0.35 mg tablet    Other Relevant Orders    TSH w reflex FT4    Comprehensive metabolic panel    CBC and Differential    Lipid panel    Testosterone, total and free    Progesterone    DHEA-sulfate    ESTRADIOL    BI SCREENING MAMMOGRAM BILATERAL(TOMOSYNTHESIS)    Immunization due     Administered influenza vaccine (9/10/2024)         Relevant Orders    Influenza vaccine trivalent 6 mons and older IM (Flulaval) (Completed)    Family history of hyperplastic colon polyps     Ordered colonoscopy         Relevant Orders    Direct Access Colonoscopy MLGA    Therapeutic drug monitoring     You should ideally be fasting for 8+ hours prior to the labs so we can get a good look at your sugar and cholesterol.  Please do not restrict liquid.  Black coffee or water only until labs are drawn.      Check fasting labs         Relevant Orders    TSH w reflex FT4    Comprehensive metabolic panel    CBC and Differential    Lipid panel    Testosterone, " total and free    Progesterone    DHEA-sulfate    ESTRADIOL     By signing my name below, I, Ruth Lewis, attest that this documentation has been prepared under the direction and in the presence of Renee Brown DO      9/10/2024 12:39 PM    Ruth Lewis    This note was completed in part utilizing a voice recognition software and in part with a scribe. Grammatical errors, random word insertion, spelling mistakes, and incomplete sentences may be an occasional consequence of the system secondary to software limitations, ambient noise and hardware issues. Please read the chart carefully and recognize, using context, where substitutions have occurred. If you have any questions or concerns about the context, text or information contained within the body of this dictation, please contact me, the provider, for further clarification.      Renee Brown D.O.  Family Medicine at [facility] 09/10/24

## 2024-09-10 NOTE — ASSESSMENT & PLAN NOTE
"Well adult female   PAP smear per OB   Please send last PAP smear result  CRC screening at 46 years  Ordered mammogram and colonoscopy  Check fasting labs    Counseled on types of birth control    Look into consolation vs desolation  Look into website called [facility] All Year    [employer] test- yes   Consider Minpill/Micronor   Prescribed norethindrone (Micronor) 0.35 mg tablet    \"Who am I as a co-creator?\"  The samm Guide to Almost Everything -BARBRA Cisneros   [facility] All Year March : early in cycle   You should ideally be fasting for 8+ hours prior to the labs so we can get a good look at your sugar and cholesterol.  Please do not restrict liquid.  Black coffee or water only until labs are drawn.    Counseled on necessity of self care  RTO 1 year or sooner PRN  "

## 2024-09-23 DIAGNOSIS — G43.711 INTRACTABLE CHRONIC MIGRAINE WITHOUT AURA AND WITH STATUS MIGRAINOSUS: Primary | ICD-10-CM

## 2024-10-18 ENCOUNTER — OFFICE VISIT (OUTPATIENT)
Dept: NEUROLOGY | Facility: CLINIC | Age: 44
End: 2024-10-18
Payer: COMMERCIAL

## 2024-10-18 DIAGNOSIS — M54.2 CERVICALGIA: ICD-10-CM

## 2024-10-18 DIAGNOSIS — G89.29 OTHER CHRONIC PAIN: Primary | ICD-10-CM

## 2024-10-18 PROCEDURE — 99999 PR OFFICE/OUTPT VISIT,PROCEDURE ONLY: CPT | Performed by: NURSE PRACTITIONER

## 2024-10-18 PROCEDURE — 64400 NJX AA&/STRD TRIGEMINAL NRV: CPT | Mod: XU,RT | Performed by: NURSE PRACTITIONER

## 2024-10-18 PROCEDURE — 64405 NJX AA&/STRD GR OCPL NRV: CPT | Mod: 50,XU | Performed by: NURSE PRACTITIONER

## 2024-10-18 PROCEDURE — 64450 NJX AA&/STRD OTHER PN/BRANCH: CPT | Mod: 50,XU | Performed by: NURSE PRACTITIONER

## 2024-10-18 PROCEDURE — 20552 NJX 1/MLT TRIGGER POINT 1/2: CPT | Performed by: NURSE PRACTITIONER

## 2024-10-18 RX ORDER — LIDOCAINE HYDROCHLORIDE 20 MG/ML
10 INJECTION, SOLUTION INFILTRATION; PERINEURAL ONCE
Status: COMPLETED | OUTPATIENT
Start: 2024-10-18 | End: 2024-10-18

## 2024-10-18 RX ADMIN — LIDOCAINE HYDROCHLORIDE 10 ML: 20 INJECTION, SOLUTION INFILTRATION; PERINEURAL at 14:59

## 2024-10-18 ASSESSMENT — PAIN SCALES - GENERAL: PAINLEVEL_OUTOF10: 5

## 2024-10-18 NOTE — PROCEDURES
Procedures       Nerve Blocks and Trigger Points Procedure Note:  Indication:    Diagnosis Plan   1. Other chronic pain  lidocaine (XYLOCAINE) 20 mg/mL (2 %) injection 10 mL      2. Cervicalgia  lidocaine (XYLOCAINE) 20 mg/mL (2 %) injection 10 mL           I explained the risks and benefits and obtained written consent from Andressa Baker. Signed consent form will be scanned to patient's electronic medical records.     Lidocaine 2% without epinephrine was drawn in a sterile syringe.     Lot number and expiration date documented in informed consent.     I performed a time-out, including 2 unique patient identifiers.     I located the areas of maximal tenderness corresponding to each nerve or trigger point, and cleaned with alcohol swabs.     I used a 30 gauge 1/2 inch needle to inject lidocaine over the following:        Right Greater Occipital Nerve 2 cc   Left Greater Occipital Nerve 2 cc   Right Lesser Occipital Nerve 1 cc   Left Lesser Occipital Nerve 1 cc     Right Auriculotemporal Nerve 1 cc    Left Auriculotemporal Nerve 1 cc    Right Supraorbital Nerve 0.25 cc   Left Supraorbital Nerve 0.25 cc   Right Supratrochlear Nerve 0.25 cc   Left Supratrochlear Nerve 0.25 cc     Right trapezius muscle trigger point injections 0.50 cc   Left trapezius muscle trigger point injections 0.50 cc     Total of 10 cc injected.      The procedure was well tolerated and there were no complications.         BEKA Up  [facility]

## 2024-10-18 NOTE — PROGRESS NOTES
Procedure visit     Last injections: 7/24/2024   Time interval: every 6 - 8 weeks     Lidocaine injections administered today.   10 cc.   See procedure note.

## 2024-10-30 RX ORDER — DIHYDROERGOTAMINE MESYLATE 4 MG/ML
1 SPRAY, METERED NASAL AS NEEDED
Qty: 4 ML | Refills: 11 | Status: SHIPPED | OUTPATIENT
Start: 2024-10-30 | End: 2025-02-20

## 2024-10-30 NOTE — TELEPHONE ENCOUNTER
Medicine Refill Request    Last Office Visit: 7/24/2024  *Dr. Lewis*  Last Telemedicine Visit: 3/7/2023 Edyta Zamora CRNP    Next Office Visit: 1/21/2025  Next Telemedicine Visit: Visit date not found     Third line acute or rescue treatment: TRUDHESA 1.45 mg (administered as one metered spray of 0.725 mg into each nostril). The dose may be repeated, if needed, a minimum of 1 hour after the first dose. Do not use more than 2 doses within a 24-hour period or 3 doses within 7 days. Do not use within 24 hours of triptan.  - DO NOT TAKE WITHIN 24 HOURS OF FROVATRIPTAN.

## 2024-11-26 ENCOUNTER — HOSPITAL ENCOUNTER (OUTPATIENT)
Dept: RADIOLOGY | Age: 44
Discharge: HOME | End: 2024-11-26
Attending: STUDENT IN AN ORGANIZED HEALTH CARE EDUCATION/TRAINING PROGRAM
Payer: COMMERCIAL

## 2024-11-26 DIAGNOSIS — Z00.00 ENCOUNTER FOR PREVENTIVE CARE: ICD-10-CM

## 2024-11-26 PROCEDURE — 77063 BREAST TOMOSYNTHESIS BI: CPT

## 2024-11-27 ENCOUNTER — OFFICE VISIT (OUTPATIENT)
Dept: NEUROLOGY | Facility: CLINIC | Age: 44
End: 2024-11-27
Attending: PSYCHIATRY & NEUROLOGY
Payer: COMMERCIAL

## 2024-11-27 VITALS
OXYGEN SATURATION: 99 % | RESPIRATION RATE: 16 BRPM | HEART RATE: 83 BPM | SYSTOLIC BLOOD PRESSURE: 114 MMHG | DIASTOLIC BLOOD PRESSURE: 78 MMHG

## 2024-11-27 DIAGNOSIS — G43.711 INTRACTABLE CHRONIC MIGRAINE WITHOUT AURA AND WITH STATUS MIGRAINOSUS: ICD-10-CM

## 2024-11-27 PROCEDURE — 99213 OFFICE O/P EST LOW 20 MIN: CPT | Mod: 25 | Performed by: PSYCHIATRY & NEUROLOGY

## 2024-11-27 PROCEDURE — 64615 CHEMODENERV MUSC MIGRAINE: CPT | Performed by: PSYCHIATRY & NEUROLOGY

## 2024-11-27 NOTE — PROCEDURES
Procedures  Procedure Note for Botox Injections for Headache:    Indication for procedure:    Diagnosis Plan   1. Intractable chronic migraine without aura and with status migrainosus  [facility] Botulinum Toxin Injection Appointment Request    onabotulinumtoxinA (BOTOX) 200 unit injection 200 Units          I explained the risks and benefits and obtained consent from Andressa.  Onabotulinumtoxin A 200 U were diluted in 4 mL of saline.    Lot number and expiration date documented in informed consent and MAR.  I performed a time-out, including 2 unique patient identifiers with Andressa.  Using a 30 gauge 1/2 inch needle, I injected 0.1mL = 5 U into each site according to the Chronic Migraine Protocol (PREEMPT Studies).   The following table reports the number of sites injected in each muscle.     Muscle Midline Right Left   Procerus 1          1 1   Frontalis   2 2   Temporalis   4 (10+5+5+5 U) 4 (10+5+5+5 U)   Occipitalis   3 (10+5+5 U) 3 (10+5+5 U)   Cervical Paraspinal   2 2   Trapezius   3 (10+10+5 U) 3 (10+10+5 U)   Other:            195 Units were injected and 5 Units were wasted.     Andressa tolerated the procedure well, without complications. She was instructed that she could experience increased pain in the next 2-3 days, which should be treated with ice and anti-inflammatory medications.      We appreciate the opportunity to participate in the care of Andressa LEE Olivia.     Please, do not hesitate to call me at [phone] if you have any questions or concerns.        Thereas Lewis MD  [facility]  [phone]

## 2024-11-27 NOTE — PROGRESS NOTES
Andressa LEE Olivia presents today for botox treatment.    HPI:  Chronic migraine condition has been stable until the last month.    She had a couple of severe episodes of headaches in November.    Progesterone only pill for 3 months did not help with the headaches and may have made them worse. She stopped it 2 weeks ago.    She had injections of botox in her masseters by her dentist in September. She did not notice any benefit in her headaches in the last 3 months.    Exam:  Vitals:    11/27/24 0932   BP: 114/78   Pulse: 83   Resp: 16   SpO2: 99%     Alert and oriented. Fluent with normal language and speech. Face symmetric.    Current Outpatient Medications:     AIMOVIG AUTOINJECTOR 140 mg/mL auto-injector, Inject 140 mg under the skin every 28 (twentyeight) days., Disp: 3 mL, Rfl: 3    b complex vitamins capsule, Take 1 capsule by mouth daily., Disp: , Rfl:     botulinum toxin Type A (BOTOX), , Disp: , Rfl:     dihydroergotamine (TRUDHESA) 0.725 mg/pump act. (4 mg/mL) spray,non-aerosol, Administer 1 spray into each nostril as needed (migraine). May repeat x 1 after one hour. Max: 2 doses/24-hour or 3 doses within 7 days., Disp: 4 mL, Rfl: 11    docosahexaenoic acid 200 mg capsule, Prenatal + DHA, Disp: , Rfl:     docosahexaenoic acid-epa 120-180 mg capsule, Take 1,000 mg by mouth daily., Disp: , Rfl:     DULoxetine (CYMBALTA) 20 mg capsule, Take 40 mg by mouth 2 (two) times a day., Disp: , Rfl:     ergocalciferol (VITAMIN D2) 50,000 unit(1250 mcg) capsule, Take 50,000 Units by mouth once a week., Disp: , Rfl:     frovatriptan (FROVA) 2.5 mg tablet, TAKE 1 TABLET (2.5 MG TOTAL) BY MOUTH ONCE AS NEEDED FOR MIGRAINE INDICATIONS: A MIGRAINE HEADACHE., Disp: 12 tablet, Rfl: 11    herbal drugs (CALMME ORAL), Take by mouth daily., Disp: , Rfl:     ibuprofen (MOTRIN) 200 mg tablet, Take 2-3 tablets by mouth., Disp: , Rfl:     lamoTRIgine (LaMICtal) 25 mg tablet, Take 50 mg by mouth daily., Disp: , Rfl:     LORazepam  (ATIVAN) 0.5 mg tablet, Take 1 tablet (0.5 mg total) by mouth every 8 (eight) hours as needed for anxiety., Disp: 20 tablet, Rfl: 0    magnesium oxide 500 mg capsule, Take 500 mg by mouth 2 (two) times a day., Disp: , Rfl:     metoclopramide (REGLAN) 10 mg tablet, TAKE 1 TABLET (10 MG TOTAL) BY MOUTH 3 TIMES A DAY AS NEEDED FOR NAUSEA, Disp: 20 tablet, Rfl: 2    naproxen sodium (ALEVE ORAL), Take by mouth as needed., Disp: , Rfl:     ondansetron (ZOFRAN) 4 mg tablet, Take 4 mg by mouth every 8 (eight) hours as needed., Disp: , Rfl:     rimegepant (NURTEC ODT) 75 mg tablet,disintegrating, Take 1 tablet (75 mg total) by mouth as needed (migraine headache). Dissolve 1 tablet under the tongue. One dose per day as needed., Disp: 16 tablet, Rfl: 11    tirzepatide (MOUNJARO) 2.5 mg/0.5 mL pen injector, Inject 2.5 mg under the skin once a week., Disp: 2 mL, Rfl: 0    atomoxetine (STRATTERA) 40 mg capsule, Take 40 mg by mouth 2 (two) times a day., Disp: , Rfl:     doxylamine-pyridoxine, vit B6, (DICLEGIS) 10-10 mg tablet,delayed release (DR/EC), Take 1 tablet by mouth daily as needed., Disp: , Rfl:     EPINEPHrine (EPIPEN) 0.3 mg/0.3 mL injection syringe, USE 1 INJECTION IN THE MUSCLE AS NEEDED, Disp: , Rfl:     fexofenadine (ALLEGRA ALLERGY) 60 mg tablet, Take by mouth every 12 hours. (Patient not taking: Reported on 11/27/2024), Disp: , Rfl:     fluticasone propionate (FLONASE ALLERGY RELIEF) 50 mcg/actuation nasal spray, Administer 2 sprays into affected nostril(s) daily. (Patient not taking: Reported on 11/27/2024), Disp: , Rfl:     norethindrone (MICRONOR) 0.35 mg tablet, Take 1 tablet (0.35 mg total) by mouth daily. (Patient not taking: Reported on 11/27/2024), Disp: 28 tablet, Rfl: 12    olopatadine (PATADAY) 0.2 % ophthalmic solution, Administer 1 drop into affected eye(s) daily. (Patient not taking: Reported on 11/27/2024), Disp: , Rfl:     TURMERIC ORAL, Take by mouth daily. (Patient not taking: Reported on  11/27/2024), Disp: , Rfl:     Current Facility-Administered Medications:     ondansetron (ZOFRAN) injection 4 mg, 4 mg, intravenous, Once PRN, Edyta Zamora CRNP    Assessment and plan:  Chronic migraine.  Recommend continuing with the current treatment plan.   Refills of other medications not needed today.    Follow-up for next botox in 12 weeks.        Theresa Lewis MD  [facility]  [phone]    I spent 20 minutes on this date of service performing the following activities: obtaining history, performing examination, entering orders, documenting, preparing for visit, and providing counseling and education.  This was in addition to the time dedicated to the procedure.

## 2024-12-13 ENCOUNTER — HOSPITAL ENCOUNTER (OUTPATIENT)
Dept: RADIOLOGY | Age: 44
Discharge: HOME | End: 2024-12-13
Attending: RADIOLOGY
Payer: COMMERCIAL

## 2024-12-13 DIAGNOSIS — R92.8 ABNORMAL MAMMOGRAM: ICD-10-CM

## 2024-12-13 PROCEDURE — 76642 ULTRASOUND BREAST LIMITED: CPT | Mod: LT

## 2024-12-13 PROCEDURE — 77065 DX MAMMO INCL CAD UNI: CPT | Mod: LT

## 2025-01-21 ENCOUNTER — OFFICE VISIT (OUTPATIENT)
Dept: NEUROLOGY | Facility: CLINIC | Age: 45
End: 2025-01-21
Payer: COMMERCIAL

## 2025-01-21 VITALS
WEIGHT: 188 LBS | SYSTOLIC BLOOD PRESSURE: 110 MMHG | BODY MASS INDEX: 29.51 KG/M2 | RESPIRATION RATE: 16 BRPM | OXYGEN SATURATION: 98 % | HEIGHT: 67 IN | DIASTOLIC BLOOD PRESSURE: 82 MMHG | HEART RATE: 84 BPM

## 2025-01-21 DIAGNOSIS — G89.29 OTHER CHRONIC PAIN: Primary | ICD-10-CM

## 2025-01-21 PROCEDURE — 20552 NJX 1/MLT TRIGGER POINT 1/2: CPT | Performed by: PHYSICIAN ASSISTANT

## 2025-01-21 PROCEDURE — 64400 NJX AA&/STRD TRIGEMINAL NRV: CPT | Mod: XU,RT | Performed by: PHYSICIAN ASSISTANT

## 2025-01-21 PROCEDURE — 64405 NJX AA&/STRD GR OCPL NRV: CPT | Mod: 50,XU | Performed by: PHYSICIAN ASSISTANT

## 2025-01-21 PROCEDURE — 64450 NJX AA&/STRD OTHER PN/BRANCH: CPT | Mod: 50,XU | Performed by: PHYSICIAN ASSISTANT

## 2025-01-21 PROCEDURE — 99999 PR OFFICE/OUTPT VISIT,PROCEDURE ONLY: CPT | Performed by: PHYSICIAN ASSISTANT

## 2025-01-21 RX ORDER — DESVENLAFAXINE 50 MG/1
50 TABLET, FILM COATED, EXTENDED RELEASE ORAL DAILY
COMMUNITY

## 2025-01-21 RX ORDER — LIDOCAINE HYDROCHLORIDE 20 MG/ML
10 INJECTION, SOLUTION INFILTRATION; PERINEURAL ONCE
Status: COMPLETED | OUTPATIENT
Start: 2025-01-21 | End: 2025-01-21

## 2025-01-21 RX ADMIN — LIDOCAINE HYDROCHLORIDE 10 ML: 20 INJECTION, SOLUTION INFILTRATION; PERINEURAL at 11:15

## 2025-01-21 NOTE — PROCEDURES
Procedures       Nerve Blocks and Trigger Points Procedure Note:  Indication:    Diagnosis Plan   1. Other chronic pain  Injection    lidocaine (XYLOCAINE) 20 mg/mL (2 %) injection 10 mL           I explained the risks and benefits and obtained written consent from Andressa Baker. Signed consent form will be scanned to patient's electronic medical records.     Lidocaine 2% without epinephrine was drawn in a sterile syringe.     Lot number and expiration date documented in informed consent.     I performed a time-out, including 2 unique patient identifiers.     I located the areas of maximal tenderness corresponding to each nerve or trigger point, and cleaned with alcohol swabs.     I used a 30 gauge 1/2 inch needle to inject lidocaine over the following:        Right Greater Occipital Nerve 2 cc   Left Greater Occipital Nerve 2 cc   Right Lesser Occipital Nerve 1 cc   Left Lesser Occipital Nerve 1 cc     Right Auriculotemporal Nerve 1 cc    Left Auriculotemporal Nerve 1 cc    Right Supraorbital Nerve 0.25 cc   Left Supraorbital Nerve 0.25 cc   Right Supratrochlear Nerve 0.25 cc   Left Supratrochlear Nerve 0.25 cc     Right trapezius muscle trigger point injections 0.50 cc   Left trapezius muscle trigger point injections 0.50 cc     Total of 10 cc injected.      The procedure was well tolerated and there were no complications.       KATE Anguiano  [facility]

## 2025-02-11 NOTE — PROGRESS NOTES
Procedure visit     Last injections: 1/21/2025    Time interval: every 6 - 8 weeks     Lidocaine injections administered today.   10 cc.   See procedure note.

## 2025-02-12 ENCOUNTER — OFFICE VISIT (OUTPATIENT)
Dept: NEUROLOGY | Facility: CLINIC | Age: 45
End: 2025-02-12
Payer: COMMERCIAL

## 2025-02-12 VITALS
SYSTOLIC BLOOD PRESSURE: 112 MMHG | WEIGHT: 188 LBS | OXYGEN SATURATION: 98 % | HEART RATE: 86 BPM | RESPIRATION RATE: 16 BRPM | DIASTOLIC BLOOD PRESSURE: 80 MMHG | BODY MASS INDEX: 29.51 KG/M2 | HEIGHT: 67 IN

## 2025-02-12 DIAGNOSIS — G89.29 OTHER CHRONIC PAIN: Primary | ICD-10-CM

## 2025-02-12 DIAGNOSIS — M54.2 CERVICALGIA: ICD-10-CM

## 2025-02-12 PROCEDURE — 64405 NJX AA&/STRD GR OCPL NRV: CPT | Mod: 50 | Performed by: NURSE PRACTITIONER

## 2025-02-12 PROCEDURE — 64400 NJX AA&/STRD TRIGEMINAL NRV: CPT | Mod: 50 | Performed by: NURSE PRACTITIONER

## 2025-02-12 PROCEDURE — 64450 NJX AA&/STRD OTHER PN/BRANCH: CPT | Mod: 50 | Performed by: NURSE PRACTITIONER

## 2025-02-12 PROCEDURE — 20552 NJX 1/MLT TRIGGER POINT 1/2: CPT | Performed by: NURSE PRACTITIONER

## 2025-02-12 PROCEDURE — 99999 PR OFFICE/OUTPT VISIT,PROCEDURE ONLY: CPT | Performed by: NURSE PRACTITIONER

## 2025-02-12 RX ORDER — LIDOCAINE HYDROCHLORIDE 20 MG/ML
10 INJECTION, SOLUTION INFILTRATION; PERINEURAL ONCE
Status: COMPLETED | OUTPATIENT
Start: 2025-02-12 | End: 2025-02-12

## 2025-02-12 RX ADMIN — LIDOCAINE HYDROCHLORIDE 10 ML: 20 INJECTION, SOLUTION INFILTRATION; PERINEURAL at 16:31

## 2025-02-12 NOTE — PROCEDURES
Procedures       Nerve Blocks and Trigger Points Procedure Note:  Indication:    Diagnosis Plan   1. Other chronic pain  lidocaine (XYLOCAINE) 20 mg/mL (2 %) injection 10 mL      2. Cervicalgia  lidocaine (XYLOCAINE) 20 mg/mL (2 %) injection 10 mL           I explained the risks and benefits and obtained written consent from Andressa Baker. Signed consent form will be scanned to patient's electronic medical records.     Lidocaine 2% without epinephrine was drawn in a sterile syringe.     Lot number and expiration date documented in informed consent.     I performed a time-out, including 2 unique patient identifiers.     I located the areas of maximal tenderness corresponding to each nerve or trigger point, and cleaned with alcohol swabs.     I used a 30 gauge 1/2 inch needle to inject lidocaine over the following:        Right Greater Occipital Nerve 2 cc   Left Greater Occipital Nerve 2 cc   Right Lesser Occipital Nerve 1 cc   Left Lesser Occipital Nerve 1 cc     Right Auriculotemporal Nerve 1 cc    Left Auriculotemporal Nerve 1 cc    Right Supraorbital Nerve 0.25 cc   Left Supraorbital Nerve 0.25 cc   Right Supratrochlear Nerve 0.25 cc   Left Supratrochlear Nerve 0.25 cc     Right trapezius muscle trigger point injections 0.50 cc   Left trapezius muscle trigger point injections 0.50 cc     Total of 10 cc injected.      The procedure was well tolerated and there were no complications.         BEKA Up  [facility]

## 2025-02-14 DIAGNOSIS — G43.901 STATUS MIGRAINOSUS: ICD-10-CM

## 2025-02-14 RX ORDER — FROVATRIPTAN SUCCINATE 2.5 MG/1
2.5 TABLET, FILM COATED ORAL ONCE AS NEEDED
Qty: 12 TABLET | Refills: 11 | Status: SHIPPED | OUTPATIENT
Start: 2025-02-14

## 2025-02-14 NOTE — TELEPHONE ENCOUNTER
Medicine Refill Request    Last Office Visit: 1/21/2025  *Edyta Zamora*  Last Telemedicine Visit: 3/7/2023 Edyta Zamora CRNP    Next Office Visit: 2/20/2025  Next Telemedicine Visit: Visit date not found        frovatriptan (FROVA) 2.5 mg tablet, TAKE 1 TABLET (2.5 MG TOTAL) BY MOUTH ONCE AS NEEDED FOR MIGRAINE

## 2025-02-20 ENCOUNTER — OFFICE VISIT (OUTPATIENT)
Dept: NEUROLOGY | Facility: CLINIC | Age: 45
End: 2025-02-20
Attending: PSYCHIATRY & NEUROLOGY
Payer: COMMERCIAL

## 2025-02-20 VITALS
HEART RATE: 93 BPM | OXYGEN SATURATION: 97 % | BODY MASS INDEX: 29.51 KG/M2 | DIASTOLIC BLOOD PRESSURE: 70 MMHG | SYSTOLIC BLOOD PRESSURE: 100 MMHG | HEIGHT: 67 IN | RESPIRATION RATE: 16 BRPM | WEIGHT: 188 LBS

## 2025-02-20 DIAGNOSIS — G43.711 INTRACTABLE CHRONIC MIGRAINE WITHOUT AURA AND WITH STATUS MIGRAINOSUS: Primary | ICD-10-CM

## 2025-02-20 DIAGNOSIS — M54.2 CERVICALGIA: ICD-10-CM

## 2025-02-20 PROCEDURE — 99215 OFFICE O/P EST HI 40 MIN: CPT | Mod: 25 | Performed by: PSYCHIATRY & NEUROLOGY

## 2025-02-20 PROCEDURE — 64615 CHEMODENERV MUSC MIGRAINE: CPT | Performed by: PSYCHIATRY & NEUROLOGY

## 2025-02-20 PROCEDURE — 3008F BODY MASS INDEX DOCD: CPT | Performed by: PSYCHIATRY & NEUROLOGY

## 2025-02-20 RX ORDER — KETOROLAC TROMETHAMINE 15.75 MG/1
SPRAY, METERED NASAL
Qty: 5 EACH | Refills: 11 | Status: SHIPPED | OUTPATIENT
Start: 2025-02-20

## 2025-02-20 RX ORDER — ADAPALENE AND BENZOYL PEROXIDE GEL, 0.1%/2.5% 1; 25 MG/G; MG/G
GEL TOPICAL
COMMUNITY
Start: 2024-12-04 | End: 2025-03-26 | Stop reason: ALTCHOICE

## 2025-02-20 NOTE — LETTER
"2025     Renee Brown,   [address]  [place] [id number]    Patient: Andressa Baker  YOB: 1980  Date of Visit: 2025      Dear Dr. Joselyn Brown:    Thank you for referring Andressa Baker to me for evaluation. Below are my notes for this consultation.    If you have questions, please do not hesitate to call me. I look forward to following your patient along with you.         Sincerely,        Theresa Dalton MD        CC: No Recipients    Theresa Dalton MD  2025 10:09 AM  Sign when Signing Visit  Patient ID: Andressa Baker                              : 1980  MRN: [id number]                                            VISIT DATE: 2025   ENCOUNTER PROVIDER: Theresa Dalton    I had the pleasure of evaluating Andressa Baker in the [facility] on 2025 for a follow-up visit. The history was provided by Andressa Mohrchowski and supplemented by her medical records.    CHIEF COMPLAINT: Headache.    HISTORY OF PRESENT ILLNESS:  Andressa Baker is a very pleasant 44 y.o.  woman seen initially in 2022 for chronic severe headaches since her early s, worsening in the last few months. Neurological exam was normal. Brain MRI () was unremarkable. There are no atypical features or symptoms suggestive of a serious underlying condition or secondary headache. In our opinion, she has chronic migraine exacerbated by hormonal changes and frequent use of acute medications (Excedrin and Rizatriptan - now resolved). There may be some contribution from myofascial neck pain, but there is no evidence of significant cervical spine disease.    Follow-up Clinic Note:  Last clinic visit: 2024    She returned for nerve blocks in January and February.    At her last visit we did not make any changes.    Overall, her headache condition has been \"a lot better\". The headaches " "still occur several days per week, but they are less intense and resolve with Nurtec most of the times.    Headache frequency: still needs acute treatment 5-6 days/week, but the headaches are less intense and respond well to Nurtec now.    Headache characteristics: Unchanged. Less intense.   Preventive therapy: Botox 195 U ever 12 weeks. Nerve blocks every 12 weeks. Aimovig 140 mg monthly is helping. Not using Cefaly consistently preventively. Lamotrigine 50 mg for anxiety. Switched Cymbalta to Pristiq (from psychiatrist).   Acute therapy: Nurtec working well again, about 80% of the times. Frova helps and uses it about 3/week. Trudhesa NS helps some, but causes side effects. She doesn't like it. Cefaly acutely helped some.  Other medical problems: no new medical problems. ALEXIS - using CPAP since Dec 2023    PMH/SH/FH: Reviewed and unchanged since previous visit or updated below.    Summary from previous visits.  Visit 11/27/2024  Chronic migraine condition has been stable until the last month.  She had a couple of severe episodes of headaches in November.  Progesterone only pill for 3 months did not help with the headaches and may have made them worse. She stopped it 2 weeks ago.  She had injections of botox in her masseters by her dentist in September. She did not notice any benefit in her headaches in the last 3 months.    Visit 9/4/2024  At the last visit  she reported a worsening of her headache condition. We recommended increasing the Aimovig dose. She was open to acute migraine infusion therapy and we scheduled her for that. We discussed trying to decrease the amount of frovatriptan and Trudhesa NS use to try to decrease possible medication overuse headaches.   7/24/2024 Nerve blocks visit only.  Per Odalys Ontiveros's note: \"Pt presents for nerve blocks. Recently had infusions in [facility], provided some relief but not complete relief. Traveling tomorrow. Inquires about injections into tight areas of trapezius " "bilaterally. Discussed potential risks/benefits and pt elected to proceed. Instructed to take two OTC Aleve with food twice daily for pain/muscle soreness as needed.\"  She increased the dose of Aimovig in July and has noticed an improvement.  She also started Lamictal this summer for anxiety.  Overall, her headache condition has been better since her last visit. The headaches still occur several days per week, but they are less intense and resolve with Nurtec.  Headache frequency: still occurring 3-4 days/week but they are less intense and respond well to Nurtec now.    Headache characteristics: Unchanged. Less intense.   Preventive therapy: Botox 195 U ever 12 weeks. Nerve blocks every 12 weeks. Aimovig 140 mg monthly is helping. Cymbalta 40 mg BID (from psychiatrist). Not using Cefaly preventively. Lamotrigine 50 mg for anxiety.    Acute therapy: Nurtec working well again. Frova helps and uses it about 1-2/week. Trudhesa NS helps. Uses infrequently. Cefaly acutely helped some.  Other medical problems: no new medical problems. ALEXIS - using CPAP since Dec 2023    Visit 7/11/2024  At the last visit, we treated her acute headache with Toradol IM and repeated her Botox treatment. We recommended adding riboflavin to magnesium. She was to continue Aimovig monthly and Botox and lidocaine blocks every 12 weeks. She was also on Cymbalta from her psychiatrist. She was to continue with Nurtec and frovatriptan for acute therapy, and she has Trudhesa NS available for rescue therapy. She was to continue with Cefaly.   She has been experiencing daily migraines. She has been waking up with a severe headaches daily.   She has not experienced an improvement since her last Botox and Aimovig treatments.   She either takes Nurtec. If ineffective, she will take a triptan and then repeat the dose. Trudhesa has been the most helpful lately. She tries to limit the triptan to 2 to 3 days/week, and limits the Trudhesa to 3 days/week.   She " takes Reglan and Aleve most night to help with the morning headaches.   She is open to infusion therapy.   She recently started using a .   Lamotrigine 25 mg was recently added.   Constipation has improved.   She is going to [facility] with her  in a few week, then [facility].     Visit 6/12/2024   Last clinic visit: 5/15/2024 with Odalys Ontiveros  At the last visit, she received lidocaine nerve blocks. These helped.  She continued with Aimovig and felt that this was helping. She just got the last injection 2 days ago.  She has been on a headache cycle since last Monday, about 10 days ago.   Overall, headaches have been unchanged.   Headache frequency: about 3 days/week but not as incapacitating. Worse the last 10 days.   Headache characteristics: Unchanged.   Preventive therapy: Botox 195 U ever 12 weeks. Nerve blocks. Aimovig monthly seems to be working. Cymbalta 40 mg BID (from psychiatrist). Cefaly most days.  Acute therapy: Nurtec has not worked well recently. Frova helps for about 12 hours. Trudhesa NS helps, but the headache has not resolved. Cefaly acutely helps some.  Other medical problems: ALEXIS - using CPAP since Dec 2023    Visit 5/15/2024  At the last visit, pt underwent repeat Botox injection, and stopped Emgality and began Aimovig. She continued Nurtec and frovatriptan for acute therapy, and she has Trudhesa NS available for rescue therapy.  Relivion was discussed and she opted to continue with Cefaly.   Overall, feeling better, is now having headache free time, up to a day or two. Fewer migraines, less severe. Occur 2-3x weekly   Will treat with Nurtec, if not helpful will take Frova as well (does this about 60% of the time). Usually will have to take a second Frova. Occasionally will have to do Trudhesa the next day for complete relief.   Began dry needling which seems to help, a day or 2 of relief. Does it 1-2x week.   Had third shot of Aimovig yesterday.   Headache frequency:  2-3x/week  Headache characteristics: Unchanged.   Preventive therapy: Botox 195 U ever 12 weeks. Nerve blocks. Aimovig monthly.   Acute therapy: Nurtec, Frova, Trudhesa  Other medical problems: ALEXIS - using CPAP since Dec 2023    Visit 3/19/24:   Last clinic visit: 2/5/2024  At the last visit, I repeated her lidocaine blocks and we discussed the possibility of doing acute infusions at the [facility] in [place] if needed. I recommended continuing with Botox, Emgality, and nerve blocks for preventive therapy. She was also on Cymbalta from her psychiatrist. She continued with Nurtec and frovatriptan for acute therapy, and she had Trudhesa NS available for rescue therapy.   She continues to use the CPAP consistently.  Overall, her headaches are not well controlled with her current regimen. She is interested in changing Emgality to a different mAb and she wasn't more information about Relivion.  Headache frequency: variable, overall unchanged.  Headache characteristics: Unchanged. More severe this past weekend  Preventive therapy: Emgality 120 mg monthly injections - no injection site reaction, Botox 195 units every 12 weeks. Nerve blocks helps.CEFALY.  Acute therapy: Nurtec, frovatriptan. Rescue: Trudhesa NS, Benadryl.   Other medical problems: ALEXIS - using CPAP since Dec 2023  PMH/SH/FH: Reviewed and unchanged since previous visit or updated below.    Visit 2/5/2024  At the last visit, she received her Botox treatment and we recommended continuing with Emgality and nerve blocks for preventive therapy. She continued with Nurtec and frovatriptan for acute therapy, and she had Trudhesa NS available for rescue therapy.   Using CPAP consistently now up to 6-7 hours/night. She finds it helpful.  She stopped Lamictal (prescribed by psychiatrist for anxiety).  She has the Cefaly device, but she doesn't like the vibration and she has problems using it due to poor internet connection.  She has had worse headaches  in the past 2 weeks. She had incapacitating pain and she is more functional today, but still has a headache. She took Ultram from her  and it helped a little. Her level of pain is now 2/10.  Headache characteristics: Unchanged. More severe this past weekend  Preventive therapy: Emgality 120 mg monthly injections - no injection site reaction, Botox 195 units every 12 weeks. Nerve blocks helps.CEFALY.  Acute therapy: Nurtec, frovatriptan. Rescue: Trudmarialuisaa NS, Benadryl.   Other medical problems: ALEXIS - using CPAP since Dec 2023    Visit 12/27/2023  At the last visit, nerve blocks administered.   Recommend scheduling NB again in 12 weeks.  She was diagnosed with sleep apnea. She started using a CPAP yesterday.   She is currently experiencing a wearing of the Botox and Emgality. She has been taking Nurtec more often, 4 - 5 days/week. She has been doing so with the frovatriptan.   The addition of the nerve blocks has been helpful.   She has the Cefaly device, but she needs to remember to use it preventively.   Headache characteristics: Unchanged.   Preventive therapy: Emgality 120 mg monthly injections - no injection site reaction, Botox 195 units every 12 weeks.   Acute therapy: Nurtec, frovatriptan. Rescue: Trudhesa NS, Benadryl.     Urgent visit 10/27/2023   She has not been able to start Emgality yet. She called the insurance and it is not ready at the pharmacy.  Current headache started on: Tuesday - 4 days ago  Current level of pain: 4/10  Associated symptoms: nausea, photo and phonophobia  Patient denies motor deficits, sensory symptoms, and vision loss.   Medications tried at home in the last 24 hours: Reglan and benadryl last night. Other acute treatments earlier. None this morning.    Visit 10/3/2023   Last clinic visit: 9/19/2023   At the last visit, I have repeated the lidocaine nerve blocks. I recommended starting Cefaly for prevention and acute treatment. I gave her a sample of Zavzpret NS to see if  this works better than Nurtec. She continued with Emgality, Botox and magnesium for prevention. We also discussed trying gluten free diet and changing the biologic for weight loss.   She stopped Ozempic and started gluten free diet, but is not doing this 100%.   She has Cefaly but has not used it much.  Zavzpret did not work well.  Overall, she continues to have very frequent migraine headaches. The may be less severe since the last visit.  Headache frequency: almost daily.  Headache characteristics: Unchanged.   Preventive therapy: Emgality loading dose on 3/28 - helping - they respond better to acute treatment. SE: constipation (may be from Emgality and Ozempic). Botox 195 U every 12 weeks. Cymbalta 40 mg BID (from psychiatrist).  Acute therapy: Rizatriptan or Frovatriptan - both help and are similar; better than eletriptan. Nurtec helps some, but inconsistently. DHE NS with benadryl helps for rescue but causes nausea and congestion.  Other medical problems: Denies new medical problems.     Visit 9/19/2023  At the last visit, she presented with another episode of status migrainosus. I repeated the lidocaine nerve blocks and recommended continuing with Nurtec for first line acute treatment. I prescribed generic rizatriptan-MLT for rescue in place of Frova. I recommend considering Cefaly or GammaCore and continuing with Emgality, Botox and magnesium for prevention.   She started taking Ozempic again 4 weeks ago and atomoxetine for brain fog about 2 weeks ago.   She has noticed that her migraine headache condition gets worse every time she restarts Ozempic and improves when she stops it.   She had severe headaches requiring IM Toradol on 8/3 and 9/12.    She ordered Cefaly and recently got it, but has not used it yet.   She had some blood work done and was prescribed progesterone, but has not use the estrogen patch due to concerns about risk or blood clotting.   Overall, she continues to have very frequent migraine  headaches.   Headache frequency: almost daily.  Headache characteristics: Unchanged.   Preventive therapy: Emgality loading dose on 3/28 - helping - they respond better to acute treatment. SE: constipation (may be from Emgality and Ozempic). Botox 195 U every 12 weeks. Cymbalta 40 mg BID (from psychiatrist).  Acute therapy: Rizatriptan or Frovatriptan - both help and are similar; better than eletriptan. Nurtec helps some, but inconsistently. Maxalt-MLT brand name was not covered and now it is not available (generic also works, but headache reoccurs 6-8 hours). DHE NS with benadryl helps for rescue but causes nausea and congestion.  Other medical problems: Denies new medical problems.     Visit 7/19/2023  At her last visit, she presented with status migrainosus. I repeated her nerve blocks and changed her acute regimen. I prescribed a trial of Nurtec and Maxalt-MLT (wants to try brand name to see if this works better than the generic) to use in place of Ubrelvy and Frova. I recommended continuing with Emgality, Botox and magnesium for prevention.   She received nerve blocks again on 6/16/2023 and Botox on 7/11/23.  She contacted us last week with a constant headache that had been gradually getting worse since her last visit.   She was prescribed a Medrol dose pack that she is finishing today.  Current headache started 2 weeks ago.  Current level of pain: 4/10  Associated symptoms: photophobia - this morning had nausea and took Reglan  Patient denies motor deficits, sensory symptoms, and vision loss.   Medications tried at home in the last 24 hours: Reglan, Nurtec and Tylenol.  She has undergone some blood work to rule out hormonal imbalance and everything was reportedly within the normal limits.   She started using a  from her dentist.  Overall, she continues to have very frequent migraine headaches.   Headache frequency: almost daily.  Headache characteristics: Unchanged.   Preventive therapy: Emgality  loading dose on 3/28 - helping. SE: constipation (may be from Emgality and Ozempic). Botox 195 U every 12 weeks. Cymbalta 40 mg BID (from psychiatrist).  Acute therapy: Frovatriptan seems to work better than eletriptan. Nurtec helps some, but inconsistently. Maxalt-MLT brand name was not covered. DHE NS with benadryl helps for rescue but causes nausea and congestion.  Other medical problems: Denies new medical problems.     Visit 5/3/2023  At the last visit, she was noticing some improvement after adding Emgality, but the headaches were still occur 5 days/week. I repeated her Botox treatment and recommended to increase the dose of magnesium. She continued with Ubrelvy acutely and Frovatriptan for rescue treatment.   She continues to have frequent almost daily headaches and her acute medications are not working as well.   She contacted us today to see if we could repeat the lidocaine nerve blocks as her headache has not resolved with her oral medications.  She took 2 doses of Frova yesterday and then Reglan + Benadryl + Tylenol last night. This morning she took Tylenol again. She run out of Ubrelvy and her pharmacy cannot refill it  Current headache rated 3-4/10.  Headache frequency: on average almost daily.  Headache characteristics: Unchanged.   Preventive therapy: Emgality loading dose on 3/28 is helping. SE: constipation (may be from Emgality and Ozempic). Botox 195 U every 12 weeks. Effexor 37.5 mg daily - switching to Cymbalta 20 mg BID (from psychiatrist).  Acute therapy: Frovatriptan seems to work better than eletriptan. Ubrelvy helps for the day. DHE NS with benadryl helps for rescue but causes nausea and congestion.  Other medical problems: Denies new medical problems.     Visit 4/18/2023  At the last visit, I administered lidocaine nerve blocks again and recommended some changes in her preventive and acute regimen. I prescribed Emgality in place of Qulipta. She was planning switch from Effexor to a  different antidepressant. I changed eletriptan to Frovatriptan for rescue treatment as eletriptan only helped for a few hours.  She started Emgality shortly after the last visit and tried Frova with good response.   Her psychiatrist is switching her from Effexor to Cymbalta.  Overall, she has noticed about 30% improvement in migraine headache frequency and response to acute treatment.    Headache frequency: on average 5 days/week now. Daily prior month.  Headache characteristics: Unchanged. More responsive to acute treatment.   Preventive therapy: Emgality loading dose on 3/28 is helping. SE: constipation (may be from Emgality and Ozempic). Botox 195 U every 12 weeks. Effexor 37.5 mg daily - switching to Cymbalta 20 mg BID (from psychiatrist).  Acute therapy: Frovatriptan seems to work better than eletriptan. Ubrelvy helps for the day. DHE NS with benadryl helps for rescue but causes nausea and congestion.  Other medical problems: Denies new medical problems.     Visit 3/27/2023  At the last visit I administered lidocaine nerve blocks which provided immediate benefit. However, the headaches continue to reoccur almost daily.   She is here to repeat the nerve blocks and discuss alternative acute and preventive treatments.   Her psychiatrist has recommended switching from Effexor to Lamictal. Cymbalta has been considered, but they are trying to find a weight neutral medication.  She restarted Ozempic.   Overall, she has had severe refractory headaches almost daily for over a month.   Headache frequency: not continuous but almost daily since 2/15/2023  Headache characteristics: Unchanged.   Preventive therapy: Botox 195 U every 12 weeks. Effexor 112.4 mg daily. Qulipta 60 mg helping some (at 30 mg 2-3 weeks with no headaches or headaches that responded well to acute treatment, then had to increase to 60 mg and did not see an improvement). She is having some constipation and fatigue.  Acute therapy: Eletriptan helps some  within an hour but comes back in a couple of hours. Ubrelvy helps for the day. DHE NS with benadryl helps for rescue but causes nausea and congestion.  Other medical problems: Denies new medical problems.     Visit 3/16/2023  At the last visit, she reported significant improvement of her headache condition after adding Qulipta initially, but she presented with persistent headache for several weeks. We recommended starting a course of daily naproxen for 1 - 2 weeks and discussed the need to decrease the amount of acute medications to prevent rebound headaches.   We switched naproxen to nabumetone on 3/13.  Current headache started on: 2/15/2023 on and off. Was headache free yesterday, but woke up with a severe headache again this morning.  Current level of pain: 8/10  Associated symptoms: phonophobia, photophobia and nausea.  Patient denies motor deficits, sensory symptoms, and vision loss.   Medications tried at home in the last 24 hours: Ubrelvy this morning and nabumetone last night.   Overall, she has had severe refractory headaches almost daily for the last month.  Headache frequency: not continuous but almost daily since 2/15/2023  Headache characteristics: Unchanged.   Preventive therapy: Botox. Effexor 112.4 mg daily. Qulipta 60 mg (at 30 mg 2-3 weeks with no headaches or headaches that responded well to acute treatment, then had to increase to 60 mg and did not see an improvement).  Acute therapy: Ubrelvy. Eletriptan. DHE NS  Other medical problems: Denies new medical problems.     Telemed 3/7/2023  At the last visit, we recommended to add Qulipta for prevention and continue with Botox, Magnesium, and Effexor (from psychiatrist). I recommended to continue with the same acute and rescue regimen, Ubrelvy, Eletriptan, and DHE NS, but we discussed the possibility of trying Nurtec as needed. Nurtec and Anti-CGRP antibodies can also be considered for prevention.  Initially, within a few days of starting the  Qulipta 30 mg, she noticed an improvement. A few weeks later, she increased the dose to 60 mg. Tolerating well. She was experiencing an occasional headache, which was relieved by Ubrelvy.   She went to [place] on February 15. She has been experiencing a persistent headache since that time. It was stressful traveling, but she was able to enjoy the trip. She admits to taking a lot of Ubrelvy and triptans. She took Trudhesa around her menstrual cycle. She found an old prescription for naproxen 500 mg, which she took over the weekend. She was headache free yesterday, but the headache recurred today.      Telemed 1/23/2023   Last clinic visit: 12/27/2022 with Edyta  At the last visit, she presented with refractory status migrainosus and was treated with lidocaine nerve blocks. These helped some, but not as much as the ones in September. She took dexamethasone 2 mg every morning for 5 days without much benefit, followed by prednisone taper starting at 40 mg for 6 days.   That headache episode eventually resolved with prednisone and she was headache free for a few days. However, she has continued to have very frequent headaches.   She contacted us today with a refractory headache and wanting to discuss other options for headache prevention.   Current headache started  4-5 days ago. She took Ubrelvy last night and woke up without headache. Around 11 am the headache came back and she took eletriptan 40 mg and a second dose around 1 pm. Now the headache is rated at 4 pm.    She was prescribed Effexor by her psychiatrist and has been increasing the dose. The headache features have not changed.  Overall, the headaches have been occurring daily or almost daily.  Headache frequency: daily or almost daily, but not constant. Resolved with steroids for a few day.   Headache characteristics: Unchanged.   Preventive therapy: Botox. Magnesium 400 mg daily. Effexor 112.5 mg daily.  Acute therapy: Ubrelvy works well most of the times.  Eletriptan for rescue worked well. Trudhesa NS works well for rescue.    Other medical problems: Denies new medical problems.     Visit 12/27/2022 Urgent visit - lidocaine nerve blocks   At the last visit, her headaches were improved, but still occurring 4-5 days/week. We increased the dose of Botox and recommended to continue with Effexor as this may also help. Otherwise, she was to continue with magnesium and the same acute regimen.  She presented today for an urgent visit.   Current headache started on: 12/5   Headache diary -   12/5 - Ubrelvy Trudhesa pm   Many days with Ubrelvy and eletriptan   12/15 -Excedrin Migraine  am and Ubrelvy   12/15 - Ubrelvy am; Ubrelvy 745 pm; (also had eyes dilated); took Aleve  and Tylenol at 745  12/18 - Ubrelvy  12/19 - Ubrelvy x 2; eletriptan at 7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

## 2025-02-20 NOTE — PROCEDURES
Procedures  Procedure Note for Botox Injections for Headache:    Indication for procedure:    Diagnosis Plan   1. Intractable chronic migraine without aura and with status migrainosus  [facility] Botulinum Toxin Injection Appointment Request    onabotulinumtoxinA (BOTOX) 200 unit injection 200 Units          I explained the risks and benefits and obtained consent from Andressa.  Onabotulinumtoxin A 200 U were diluted in 4 mL of saline.    Lot number and expiration date documented in informed consent and MAR.  I performed a time-out, including 2 unique patient identifiers with Andressa.  Using a 30 gauge 1/2 inch needle, I injected 0.1mL = 5 U into each site according to the Chronic Migraine Protocol (PREEMPT Studies).   The following table reports the number of sites injected in each muscle.     Muscle Midline Right Left   Procerus 1          1 1   Frontalis   2 2   Temporalis   4 (10+5+5+5 U) 4 (10+5+5+5 U)   Occipitalis   3 (10+5+5 U) 3 (10+5+5 U)   Cervical Paraspinal   2 2   Trapezius   3 (10+10+5 U) 3 (10+10+5 U)   Other:            195 Units were injected and 5 Units were wasted.     Andressa tolerated the procedure well, without complications. She was instructed that she could experience increased pain in the next 2-3 days, which should be treated with ice and anti-inflammatory medications.      We appreciate the opportunity to participate in the care of Andressa LEE Olivia.     Please, do not hesitate to call me at [phone] if you have any questions or concerns.        Theresa Lewis MD  [facility]  [phone]

## 2025-02-20 NOTE — PATIENT INSTRUCTIONS
"   Treatment plan:     1. Healthy Habits: The following recommendations can greatly reduce the number and severity of headaches.  Maintain regular sleep hours and get sufficient sleep (8-9 hours)  Do not skip meals, especially breakfast  Drink at least 64 oz or 8 cups of water daily - enough to urinate 5-6 times a day  Get at least 30 minutes of daily aerobic exercise (enough to increase your heart rate and sweat)    Keep track of headaches and use of acute medication (prescription or over-the-counter) in a calendar or notebook and bring that to your clinic visits.    2. Acute Treatment:     Continue with Nurtec ODT 75 mg as needed at onset of moderate to severe headache. Maximum 1 tablet in 24 hours.     May use Cefaly as needed for acute headache treatment for up to 1 hour (acute mode).     May continue with frovatriptan 2.5 mg for moderate to severe headache. May repeat 1 tablet 2 hours later. Maximum 2 tablets in 24 hr. Limit to 2 days/week.     Third line acute or rescue treatment: Sprix NS or Lasmiditan (Reyvow), depending on acute medications used in the prior 24 hours.  Sprix can be used any time after Nurtec or frovatriptan.  Lasmiditan cannot be used within 24 hours of frovatriptan.    Sprix Nasal Spray 15.75 mg/spray - 1 spray in each nostril every 6-8 hours as needed for severe headache. Maximum 8 sprays/day; 5 days/month.  Do not use it with other NSAIDS (Aleve or iburpofen)  Prescription sent electronically to a specialty pharmacy: [facility] - [address] [place], [place]     Lasmiditan (Reyvow) 50 mg once as needed at bedtime for refractory headache. Maximum 1 dose in 24 hours. Discussed sedation and dizziness as main side effects, and an 8 hour restriction on driving after a dose. Its weak ability to be habit forming requires prescription as a controlled substance, but this is limited by coverage of only 8 tablets per month.  Do not use within 24 hours of a \"triptan\" " without discussing this with your provider.  You can pay as little as $0 for up to 12 months if you are eligible and commercially insured.  Call 2-620-[phone] or text SAVE to [phone]. You can also register online at [employer]savings.     Take Reglan 10 mg as needed for migraine related nausea or 15-30 min before the nasal spray to prevent nausea.    Future consideration: other triptans (naratriptan, almotriptan, or sumatriptan). Zavzpret NS nasal spray can be tried for rescue.      We may add an antinausea medication if needed for nausea or as co-adjuvant if monotherapy is not sufficient.      3. Preventive Treatment:     Continue with Aimovig 140 mg monthly.     Continue with Botox 195 units every 12 weeks.      Continue with magnesium daily. Goal dose of migraine prevention 400 - 600 mg    Lidocaine cranial nerve blocks every 6-8 weeks.     May consider re-starting Cefaly 20 minutes every day for prevention (prevention mode).     Continue with lamictal 50 mg daily for anxiety as these may help as migraine preventive.    Future considerations: switch to Nurtec QOD in place of anti-CGRP antibody. Previously discussed medical marijuana.    Other nutraceutical options include: riboflavin, melatonin, feverfew, and coenzyme Q10.     Other options for oral preventive medications include: amitriptyline or nortriptyline, zonisamide, rimegepant, valproate, verapamil, gabapentin, pregabalin, candesartan, Namenda, escitalopram, and levetiracetam. We want to avoid weight gain.    Several non-invasive neuromodulation devices (gammaCore, Cefaly and Nerivio) are available to treat headaches preventively and/or acutely. These are typically not covered by insurance, but the companies have a trial period and will refund you if the device is ineffective and returned within that period.     Follow-up in the Headache Clinic every 12 weeks for Botox and every 12 weeks in between botox treatments for nerve blocks.

## 2025-02-20 NOTE — PROGRESS NOTES
"Patient ID: Andressa Baker                              : 1980  MRN: [id number]                                            VISIT DATE: 2025   ENCOUNTER PROVIDER: Theresa Dalton    I had the pleasure of evaluating Andressa Baker in the [facility] on 2025 for a follow-up visit. The history was provided by Andressa Baker and supplemented by her medical records.    CHIEF COMPLAINT: Headache.    HISTORY OF PRESENT ILLNESS:  Andressa Baker is a very pleasant 44 y.o.  woman seen initially in 2022 for chronic severe headaches since her early s, worsening in the last few months. Neurological exam was normal. Brain MRI () was unremarkable. There are no atypical features or symptoms suggestive of a serious underlying condition or secondary headache. In our opinion, she has chronic migraine exacerbated by hormonal changes and frequent use of acute medications (Excedrin and Rizatriptan - now resolved). There may be some contribution from myofascial neck pain, but there is no evidence of significant cervical spine disease.    Follow-up Clinic Note:  Last clinic visit: 2024    She returned for nerve blocks in January and February.    At her last visit we did not make any changes.    Overall, her headache condition has been \"a lot better\". The headaches still occur several days per week, but they are less intense and resolve with Nurtec most of the times.    Headache frequency: still needs acute treatment 5-6 days/week, but the headaches are less intense and respond well to Nurtec now.    Headache characteristics: Unchanged. Less intense.   Preventive therapy: Botox 195 U ever 12 weeks. Nerve blocks every 12 weeks. Aimovig 140 mg monthly is helping. Not using Cefaly consistently preventively. Lamotrigine 50 mg for anxiety. Switched Cymbalta to Pristiq (from psychiatrist).   Acute therapy: Nurtec working well again, about 80% of the times. Frova " "helps and uses it about 3/week. Trudhesa NS helps some, but causes side effects. She doesn't like it. Cefaly acutely helped some.  Other medical problems: no new medical problems. ALEXIS - using CPAP since Dec 2023    PMH/SH/FH: Reviewed and unchanged since previous visit or updated below.    Summary from previous visits.  Visit 11/27/2024  Chronic migraine condition has been stable until the last month.  She had a couple of severe episodes of headaches in November.  Progesterone only pill for 3 months did not help with the headaches and may have made them worse. She stopped it 2 weeks ago.  She had injections of botox in her masseters by her dentist in September. She did not notice any benefit in her headaches in the last 3 months.    Visit 9/4/2024  At the last visit  she reported a worsening of her headache condition. We recommended increasing the Aimovig dose. She was open to acute migraine infusion therapy and we scheduled her for that. We discussed trying to decrease the amount of frovatriptan and Trudhesa NS use to try to decrease possible medication overuse headaches.   7/24/2024 Nerve blocks visit only.  Per Odalys Ontiveros's note: \"Pt presents for nerve blocks. [facility], provided some relief but not complete relief. Traveling tomorrow. Inquires about injections into tight areas of trapezius bilaterally. Discussed potential risks/benefits and pt elected to proceed. Instructed to take two OTC Aleve with food twice daily for pain/muscle soreness as needed.\"  She increased the dose of Aimovig in July and has noticed an improvement.  She also started Lamictal this summer for anxiety.  Overall, her headache condition has been better since her last visit. The headaches still occur several days per week, but they are less intense and resolve with Nurtec.  Headache frequency: still occurring 3-4 days/week but they are less intense and respond well to Nurtec now.    Headache characteristics: Unchanged. " Less intense.   Preventive therapy: Botox 195 U ever 12 weeks. Nerve blocks every 12 weeks. Aimovig 140 mg monthly is helping. Cymbalta 40 mg BID (from psychiatrist). Not using Cefaly preventively. Lamotrigine 50 mg for anxiety.    Acute therapy: Nurtec working well again. Frova helps and uses it about 1-2/week. Trudhesa NS helps. Uses infrequently. Cefaly acutely helped some.  Other medical problems: no new medical problems. ALEXIS - using CPAP since Dec 2023    Visit 7/11/2024  At the last visit, we treated her acute headache with Toradol IM and repeated her Botox treatment. We recommended adding riboflavin to magnesium. She was to continue Aimovig monthly and Botox and lidocaine blocks every 12 weeks. She was also on Cymbalta from her psychiatrist. She was to continue with Nurtec and frovatriptan for acute therapy, and she has Trudhesa NS available for rescue therapy. She was to continue with Cefaly.   She has been experiencing daily migraines. She has been waking up with a severe headaches daily.   She has not experienced an improvement since her last Botox and Aimovig treatments.   She either takes Nurtec. If ineffective, she will take a triptan and then repeat the dose. Trudhesa has been the most helpful lately. She tries to limit the triptan to 2 to 3 days/week, and limits the Trudhesa to 3 days/week.   She takes Reglan and Aleve most night to help with the morning headaches.   She is open to infusion therapy.   She recently started using a .   Lamotrigine 25 mg was recently added.   Constipation has improved.   She is going to [facility] with her  in a few week, then [facility].     Visit 6/12/2024   Last clinic visit: 5/15/2024 with Odalys Ontiveros  At the last visit, she received lidocaine nerve blocks. These helped.  She continued with Aimovig and felt that this was helping. She just got the last injection 2 days ago.  She has been on a headache cycle since last Monday, about 10 days ago.   Overall,  headaches have been unchanged.   Headache frequency: about 3 days/week but not as incapacitating. Worse the last 10 days.   Headache characteristics: Unchanged.   Preventive therapy: Botox 195 U ever 12 weeks. Nerve blocks. Aimovig monthly seems to be working. Cymbalta 40 mg BID (from psychiatrist). Cefaly most days.  Acute therapy: Nurtec has not worked well recently. Frova helps for about 12 hours. Trudhesa NS helps, but the headache has not resolved. Cefaly acutely helps some.  Other medical problems: ALEXIS - using CPAP since Dec 2023    Visit 5/15/2024  At the last visit, pt underwent repeat Botox injection, and stopped Emgality and began Aimovig. She continued Nurtec and frovatriptan for acute therapy, and she has Trudhesa NS available for rescue therapy.  Relivion was discussed and she opted to continue with Cefaly.   Overall, feeling better, is now having headache free time, up to a day or two. Fewer migraines, less severe. Occur 2-3x weekly   Will treat with Nurtec, if not helpful will take Frova as well (does this about 60% of the time). Usually will have to take a second Frova. Occasionally will have to do Trudhesa the next day for complete relief.   Began dry needling which seems to help, a day or 2 of relief. Does it 1-2x week.   Had third shot of Aimovig yesterday.   Headache frequency: 2-3x/week  Headache characteristics: Unchanged.   Preventive therapy: Botox 195 U ever 12 weeks. Nerve blocks. Aimovig monthly.   Acute therapy: Nurtec, Frova, Trudhesa  Other medical problems: ALEXIS - using CPAP since Dec 2023    Visit 3/19/24:   Last clinic visit: 2/5/2024  At the last visit, I repeated her lidocaine blocks and we discussed the possibility of doing acute infusions at the [facility] in [place] if needed. I recommended continuing with Botox, Emgality, and nerve blocks for preventive therapy. She was also on Cymbalta from her psychiatrist. She continued with Nurtec and frovatriptan for acute  therapy, and she had Trudhesa NS available for rescue therapy.   She continues to use the CPAP consistently.  Overall, her headaches are not well controlled with her current regimen. She is interested in changing Emgality to a different mAb and she wasn't more information about Relivion.  Headache frequency: variable, overall unchanged.  Headache characteristics: Unchanged. More severe this past weekend  Preventive therapy: Emgality 120 mg monthly injections - no injection site reaction, Botox 195 units every 12 weeks. Nerve blocks helps.CEFALY.  Acute therapy: Nurtec, frovatriptan. Rescue: Trudhesa NS, Benadryl.   Other medical problems: ALEXIS - using CPAP since Dec 2023  PMH/SH/FH: Reviewed and unchanged since previous visit or updated below.    Visit 2/5/2024  At the last visit, she received her Botox treatment and we recommended continuing with Emgality and nerve blocks for preventive therapy. She continued with Nurtec and frovatriptan for acute therapy, and she had Trudhesa NS available for rescue therapy.   Using CPAP consistently now up to 6-7 hours/night. She finds it helpful.  She stopped Lamictal (prescribed by psychiatrist for anxiety).  She has the Cefaly device, but she doesn't like the vibration and she has problems using it due to poor internet connection.  She has had worse headaches in the past 2 weeks. She had incapacitating pain and she is more functional today, but still has a headache. She took Ultram from her  and it helped a little. Her level of pain is now 2/10.  Headache characteristics: Unchanged. More severe this past weekend  Preventive therapy: Emgality 120 mg monthly injections - no injection site reaction, Botox 195 units every 12 weeks. Nerve blocks helps.CEFALY.  Acute therapy: Nurtec, frovatriptan. Rescue: Trudhesa NS, Benadryl.   Other medical problems: ALEXIS - using CPAP since Dec 2023    Visit 12/27/2023  At the last visit, nerve blocks administered.   Recommend scheduling NB  again in 12 weeks.  She was diagnosed with sleep apnea. She started using a CPAP yesterday.   She is currently experiencing a wearing of the Botox and Emgality. She has been taking Nurtec more often, 4 - 5 days/week. She has been doing so with the frovatriptan.   The addition of the nerve blocks has been helpful.   She has the Cefaly device, but she needs to remember to use it preventively.   Headache characteristics: Unchanged.   Preventive therapy: Emgality 120 mg monthly injections - no injection site reaction, Botox 195 units every 12 weeks.   Acute therapy: Nurtec, frovatriptan. Rescue: Trudhesa NS, Benadryl.     Urgent visit 10/27/2023   She has not been able to start Emgality yet. She called the insurance and it is not ready at the pharmacy.  Current headache started on: Tuesday - 4 days ago  Current level of pain: 4/10  Associated symptoms: nausea, photo and phonophobia  Patient denies motor deficits, sensory symptoms, and vision loss.   Medications tried at home in the last 24 hours: Reglan and benadryl last night. Other acute treatments earlier. None this morning.    Visit 10/3/2023   Last clinic visit: 9/19/2023   At the last visit, I have repeated the lidocaine nerve blocks. I recommended starting Cefaly for prevention and acute treatment. I gave her a sample of Zavzpret NS to see if this works better than Nurtec. She continued with Emgality, Botox and magnesium for prevention. We also discussed trying gluten free diet and changing the biologic for weight loss.   She stopped Ozempic and started gluten free diet, but is not doing this 100%.   She has Cefaly but has not used it much.  Zavzpret did not work well.  Overall, she continues to have very frequent migraine headaches. The may be less severe since the last visit.  Headache frequency: almost daily.  Headache characteristics: Unchanged.   Preventive therapy: Emgality loading dose on 3/28 - helping - they respond better to acute treatment. SE:  constipation (may be from Emgality and Ozempic). Botox 195 U every 12 weeks. Cymbalta 40 mg BID (from psychiatrist).  Acute therapy: Rizatriptan or Frovatriptan - both help and are similar; better than eletriptan. Nurtec helps some, but inconsistently. DHE NS with benadryl helps for rescue but causes nausea and congestion.  Other medical problems: Denies new medical problems.     Visit 9/19/2023  At the last visit, she presented with another episode of status migrainosus. I repeated the lidocaine nerve blocks and recommended continuing with Nurtec for first line acute treatment. I prescribed generic rizatriptan-MLT for rescue in place of Frova. I recommend considering Cefaly or GammaCore and continuing with Emgality, Botox and magnesium for prevention.   She started taking Ozempic again 4 weeks ago and atomoxetine for brain fog about 2 weeks ago.   She has noticed that her migraine headache condition gets worse every time she restarts Ozempic and improves when she stops it.   She had severe headaches requiring IM Toradol on 8/3 and 9/12.    She ordered Cefaly and recently got it, but has not used it yet.   She had some blood work done and was prescribed progesterone, but has not use the estrogen patch due to concerns about risk or blood clotting.   Overall, she continues to have very frequent migraine headaches.   Headache frequency: almost daily.  Headache characteristics: Unchanged.   Preventive therapy: Emgality loading dose on 3/28 - helping - they respond better to acute treatment. SE: constipation (may be from Emgality and Ozempic). Botox 195 U every 12 weeks. Cymbalta 40 mg BID (from psychiatrist).  Acute therapy: Rizatriptan or Frovatriptan - both help and are similar; better than eletriptan. Nurtec helps some, but inconsistently. Maxalt-MLT brand name was not covered and now it is not available (generic also works, but headache reoccurs 6-8 hours). DHE NS with benadryl helps for rescue but causes nausea and  congestion.  Other medical problems: Denies new medical problems.     Visit 7/19/2023  At her last visit, she presented with status migrainosus. I repeated her nerve blocks and changed her acute regimen. I prescribed a trial of Nurtec and Maxalt-MLT (wants to try brand name to see if this works better than the generic) to use in place of Ubrelvy and Frova. I recommended continuing with Emgality, Botox and magnesium for prevention.   She received nerve blocks again on 6/16/2023 and Botox on 7/11/23.  She contacted us last week with a constant headache that had been gradually getting worse since her last visit.   She was prescribed a Medrol dose pack that she is finishing today.  Current headache started 2 weeks ago.  Current level of pain: 4/10  Associated symptoms: photophobia - this morning had nausea and took Reglan  Patient denies motor deficits, sensory symptoms, and vision loss.   Medications tried at home in the last 24 hours: Reglan, Nurtec and Tylenol.  She has undergone some blood work to rule out hormonal imbalance and everything was reportedly within the normal limits.   She started using a  from her dentist.  Overall, she continues to have very frequent migraine headaches.   Headache frequency: almost daily.  Headache characteristics: Unchanged.   Preventive therapy: Emgality loading dose on 3/28 - helping. SE: constipation (may be from Emgality and Ozempic). Botox 195 U every 12 weeks. Cymbalta 40 mg BID (from psychiatrist).  Acute therapy: Frovatriptan seems to work better than eletriptan. Nurtec helps some, but inconsistently. Maxalt-MLT brand name was not covered. DHE NS with benadryl helps for rescue but causes nausea and congestion.  Other medical problems: Denies new medical problems.     Visit 5/3/2023  At the last visit, she was noticing some improvement after adding Emgality, but the headaches were still occur 5 days/week. I repeated her Botox treatment and recommended to increase  the dose of magnesium. She continued with Ubrelvy acutely and Frovatriptan for rescue treatment.   She continues to have frequent almost daily headaches and her acute medications are not working as well.   She contacted us today to see if we could repeat the lidocaine nerve blocks as her headache has not resolved with her oral medications.  She took 2 doses of Frova yesterday and then Reglan + Benadryl + Tylenol last night. This morning she took Tylenol again. She run out of Ubrelvy and her pharmacy cannot refill it  Current headache rated 3-4/10.  Headache frequency: on average almost daily.  Headache characteristics: Unchanged.   Preventive therapy: Emgality loading dose on 3/28 is helping. SE: constipation (may be from Emgality and Ozempic). Botox 195 U every 12 weeks. Effexor 37.5 mg daily - switching to Cymbalta 20 mg BID (from psychiatrist).  Acute therapy: Frovatriptan seems to work better than eletriptan. Ubrelvy helps for the day. DHE NS with benadryl helps for rescue but causes nausea and congestion.  Other medical problems: Denies new medical problems.     Visit 4/18/2023  At the last visit, I administered lidocaine nerve blocks again and recommended some changes in her preventive and acute regimen. I prescribed Emgality in place of Qulipta. She was planning switch from Effexor to a different antidepressant. I changed eletriptan to Frovatriptan for rescue treatment as eletriptan only helped for a few hours.  She started Emgality shortly after the last visit and tried Frova with good response.   Her psychiatrist is switching her from Effexor to Cymbalta.  Overall, she has noticed about 30% improvement in migraine headache frequency and response to acute treatment.    Headache frequency: on average 5 days/week now. Daily prior month.  Headache characteristics: Unchanged. More responsive to acute treatment.   Preventive therapy: Emgality loading dose on 3/28 is helping. SE: constipation (may be from Emgality  and Ozempic). Botox 195 U every 12 weeks. Effexor 37.5 mg daily - switching to Cymbalta 20 mg BID (from psychiatrist).  Acute therapy: Frovatriptan seems to work better than eletriptan. Ubrelvy helps for the day. DHE NS with benadryl helps for rescue but causes nausea and congestion.  Other medical problems: Denies new medical problems.     Visit 3/27/2023  At the last visit I administered lidocaine nerve blocks which provided immediate benefit. However, the headaches continue to reoccur almost daily.   She is here to repeat the nerve blocks and discuss alternative acute and preventive treatments.   Her psychiatrist has recommended switching from Effexor to Lamictal. Cymbalta has been considered, but they are trying to find a weight neutral medication.  She restarted Ozempic.   Overall, she has had severe refractory headaches almost daily for over a month.   Headache frequency: not continuous but almost daily since 2/15/2023  Headache characteristics: Unchanged.   Preventive therapy: Botox 195 U every 12 weeks. Effexor 112.4 mg daily. Qulipta 60 mg helping some (at 30 mg 2-3 weeks with no headaches or headaches that responded well to acute treatment, then had to increase to 60 mg and did not see an improvement). She is having some constipation and fatigue.  Acute therapy: Eletriptan helps some within an hour but comes back in a couple of hours. Ubrelvy helps for the day. DHE NS with benadryl helps for rescue but causes nausea and congestion.  Other medical problems: Denies new medical problems.     Visit 3/16/2023  At the last visit, she reported significant improvement of her headache condition after adding Qulipta initially, but she presented with persistent headache for several weeks. We recommended starting a course of daily naproxen for 1 - 2 weeks and discussed the need to decrease the amount of acute medications to prevent rebound headaches.   We switched naproxen to nabumetone on 3/13.  Current headache  started on: 2/15/2023 on and off. Was headache free yesterday, but woke up with a severe headache again this morning.  Current level of pain: 8/10  Associated symptoms: phonophobia, photophobia and nausea.  Patient denies motor deficits, sensory symptoms, and vision loss.   Medications tried at home in the last 24 hours: Ubrelvy this morning and nabumetone last night.   Overall, she has had severe refractory headaches almost daily for the last month.  Headache frequency: not continuous but almost daily since 2/15/2023  Headache characteristics: Unchanged.   Preventive therapy: Botox. Effexor 112.4 mg daily. Qulipta 60 mg (at 30 mg 2-3 weeks with no headaches or headaches that responded well to acute treatment, then had to increase to 60 mg and did not see an improvement).  Acute therapy: Ubrelvy. Eletriptan. DHE NS  Other medical problems: Denies new medical problems.     Telemed 3/7/2023  At the last visit, we recommended to add Qulipta for prevention and continue with Botox, Magnesium, and Effexor (from psychiatrist). I recommended to continue with the same acute and rescue regimen, Ubrelvy, Eletriptan, and DHE NS, but we discussed the possibility of trying Nurtec as needed. Nurtec and Anti-CGRP antibodies can also be considered for prevention.  Initially, within a few days of starting the Qulipta 30 mg, she noticed an improvement. A few weeks later, she increased the dose to 60 mg. Tolerating well. She was experiencing an occasional headache, which was relieved by Ubrelvy.   She went to [place] on February 15. She has been experiencing a persistent headache since that time. It was stressful traveling, but she was able to enjoy the trip. She admits to taking a lot of Ubrelvy and triptans. She took Trudhesa around her menstrual cycle. She found an old prescription for naproxen 500 mg, which she took over the weekend. She was headache free yesterday, but the headache recurred today.      Telemed 1/23/2023   Last  clinic visit: 12/27/2022 with Edyta  At the last visit, she presented with refractory status migrainosus and was treated with lidocaine nerve blocks. These helped some, but not as much as the ones in September. She took dexamethasone 2 mg every morning for 5 days without much benefit, followed by prednisone taper starting at 40 mg for 6 days.   That headache episode eventually resolved with prednisone and she was headache free for a few days. However, she has continued to have very frequent headaches.   She contacted us today with a refractory headache and wanting to discuss other options for headache prevention.   Current headache started  4-5 days ago. She took Ubrelvy last night and woke up without headache. Around 11 am the headache came back and she took eletriptan 40 mg and a second dose around 1 pm. Now the headache is rated at 4 pm.    She was prescribed Effexor by her psychiatrist and has been increasing the dose. The headache features have not changed.  Overall, the headaches have been occurring daily or almost daily.  Headache frequency: daily or almost daily, but not constant. Resolved with steroids for a few day.   Headache characteristics: Unchanged.   Preventive therapy: Botox. Magnesium 400 mg daily. Effexor 112.5 mg daily.  Acute therapy: Ubrelvy works well most of the times. Eletriptan for rescue worked well. Trudhesa NS works well for rescue.    Other medical problems: Denies new medical problems.     Visit 12/27/2022 Urgent visit - lidocaine nerve blocks   At the last visit, her headaches were improved, but still occurring 4-5 days/week. We increased the dose of Botox and recommended to continue with Effexor as this may also help. Otherwise, she was to continue with magnesium and the same acute regimen.  She presented today for an urgent visit.   Current headache started on: 12/5   Headache diary -   12/5 - Ubrelvy Trudhesa pm   Many days with Ubrelvy and eletriptan   12/15 -Excedrin Migraine   am and Ubrelvy   12/15 - Ubrelvy am; Ubrelvy 745 pm; (also had eyes dilated); took Aleve  and Tylenol at 745  12/18 - Ubrelvy  12/19 - Ubrelvy x 2; eletriptan at 7 eletriptan at 10  12/20 - Aleve  eletriptan 6 am; 745 pm Aleve  Tylenol; 845 pm eletriptan; 11 Zofran 8 mg  12/21- Ubrelvy 10 am 10 pm  12/22 Aleve  1 am  12/23 Ubrelvy 4 pm  12/24 Aleve  7 am  12/25 Ubrelvy  12/26 Ubrelvy 1:45 eletriptan 9 pm  12/27 eletriptan 1 pm  Current level of pain: 7   Associated symptoms: right side heaviness, dropping things; photophobia, phonophobia.   Effexor dose was increased.     Visit 11/3/2022   Last clinic visit: 9/26 for nerve blocks and 9/23 for follow-up   At the last visit, she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

## 2025-03-24 ENCOUNTER — TELEMEDICINE (OUTPATIENT)
Dept: NEUROLOGY | Facility: CLINIC | Age: 45
End: 2025-03-24
Payer: COMMERCIAL

## 2025-03-24 DIAGNOSIS — M54.2 CERVICALGIA: ICD-10-CM

## 2025-03-24 DIAGNOSIS — G43.711 INTRACTABLE CHRONIC MIGRAINE WITHOUT AURA AND WITH STATUS MIGRAINOSUS: ICD-10-CM

## 2025-03-24 DIAGNOSIS — R51.9 WORSENING HEADACHES: Primary | ICD-10-CM

## 2025-03-24 PROCEDURE — 99215 OFFICE O/P EST HI 40 MIN: CPT | Mod: 95 | Performed by: PSYCHIATRY & NEUROLOGY

## 2025-03-24 NOTE — PROGRESS NOTES
Patient ID: Andressa Baker                              : 1980  MRN: [id number]                                            VISIT DATE: 3/24/2025   ENCOUNTER PROVIDER: Theresa Dalton    I had the pleasure of evaluating Andressa Baker in the [facility] on 3/24/2025 for a follow-up visit. The history was provided by Andressa Baker and supplemented by her medical records.    CHIEF COMPLAINT: Headache.    HISTORY OF PRESENT ILLNESS:  Andressa Baker is a very pleasant 44 y.o.  woman seen initially in 2022 for chronic severe headaches since her early s, worsening in the last few months. Neurological exam was normal. Brain MRI () was unremarkable. There are no atypical features or symptoms suggestive of a serious underlying condition or secondary headache. In our opinion, she has chronic migraine exacerbated by hormonal changes and frequent use of acute medications (Excedrin and Rizatriptan - now resolved). There may be some contribution from myofascial neck pain, but there is no evidence of significant cervical spine disease.    Follow-up Clinic Note:  Last clinic visit: 2025    At the last visit, her headache condition was stable. I repeated her botox treatment and recommended continuing with Aimovig, nerve blocks, and magnesium for preventive treatment. I encouraged her to use the cefaly consistently. I prescribed a trial of Lasmiditan and Sprix NS for rescue treatment. I recommended continuing with Nurtec and Frova for acute and second line rescue treatment.     She found the new rescue treatments helpful.    She reached out to us in [facility] reporting worsening headaches and she had questions about her prior imaging studies.    Overall, her migraine headaches have been worse in the last month. She reports having significant anxiety in the past few months and recently increased the dose of Pristiq. Other than the recent change in her  "medications there is no known precipitating factor for her worsening headaches and no changes in features.    Headache frequency: still needs acute treatment 4-5 days/week.   Headache characteristics: Unchanged.    Preventive therapy: Botox 195 U ever 12 weeks. Nerve blocks every 12 weeks. Aimovig 140 mg monthly is helping. Using Cefaly consistently preventively. Lamotrigine 50 mg for anxiety. Pristiq (from psychiatrist).  Acute therapy: Nurtec working well again most of the times. Frova helps and she limits it to 2/week. Cefaly acutely helped some. Sprix NS helped for rescue treatment. Lasmiditan 50 mg helped. No SE.  Other medical problems: no new medical problems. ALEXIS - using CPAP since Dec 2023    PMH/SH/FH: Reviewed and unchanged since previous visit or updated below.    Summary from previous visits.  Visit 2/20/2025  She returned for nerve blocks in January and February.  At her last visit we did not make any changes.  Overall, her headache condition has been \"a lot better\". The headaches still occur several days per week, but they are less intense and resolve with Nurtec most of the times.  Headache frequency: still needs acute treatment 5-6 days/week, but the headaches are less intense and respond well to Nurtec now.    Headache characteristics: Unchanged. Less intense.   Preventive therapy: Botox 195 U ever 12 weeks. Nerve blocks every 12 weeks. Aimovig 140 mg monthly is helping. Not using Cefaly consistently preventively. Lamotrigine 50 mg for anxiety. Switched Cymbalta to Pristiq (from psychiatrist).   Acute therapy: Nurtec working well again, about 80% of the times. Frova helps and uses it about 3/week. Trudhesa NS helps some, but causes side effects. She doesn't like it. Cefaly acutely helped some.  Other medical problems: no new medical problems. ALEXIS - using CPAP since Dec 2023    Visit 11/27/2024  Chronic migraine condition has been stable until the last month.  She had a couple of severe episodes of " "headaches in November.  Progesterone only pill for 3 months did not help with the headaches and may have made them worse. She stopped it 2 weeks ago.  She had injections of botox in her masseters by her dentist in September. She did not notice any benefit in her headaches in the last 3 months.    Visit 9/4/2024  At the last visit  she reported a worsening of her headache condition. We recommended increasing the Aimovig dose. She was open to acute migraine infusion therapy and we scheduled her for that. We discussed trying to decrease the amount of frovatriptan and Trudhesa NS use to try to decrease possible medication overuse headaches.   7/24/2024 Nerve blocks visit only.  Per Odalys Ontiveros's note: \"Pt presents for nerve blocks. Recently had infusions in [facility], provided some relief but not complete relief. Traveling tomorrow. Inquires about injections into tight areas of trapezius bilaterally. Discussed potential risks/benefits and pt elected to proceed. Instructed to take two OTC Aleve with food twice daily for pain/muscle soreness as needed.\"  She increased the dose of Aimovig in July and has noticed an improvement.  She also started Lamictal this summer for anxiety.  Overall, her headache condition has been better since her last visit. The headaches still occur several days per week, but they are less intense and resolve with Nurtec.  Headache frequency: still occurring 3-4 days/week but they are less intense and respond well to Nurtec now.    Headache characteristics: Unchanged. Less intense.   Preventive therapy: Botox 195 U ever 12 weeks. Nerve blocks every 12 weeks. Aimovig 140 mg monthly is helping. Cymbalta 40 mg BID (from psychiatrist). Not using Cefaly preventively. Lamotrigine 50 mg for anxiety.    Acute therapy: Nurtec working well again. Frova helps and uses it about 1-2/week. Trudhesa NS helps. Uses infrequently. Cefaly acutely helped some.  Other medical problems: no new medical problems. ALEXIS - using " CPAP since Dec 2023    Visit 7/11/2024  At the last visit, we treated her acute headache with Toradol IM and repeated her Botox treatment. We recommended adding riboflavin to magnesium. She was to continue Aimovig monthly and Botox and lidocaine blocks every 12 weeks. She was also on Cymbalta from her psychiatrist. She was to continue with Nurtec and frovatriptan for acute therapy, and she has Trudhesa NS available for rescue therapy. She was to continue with Cefaly.   She has been experiencing daily migraines. She has been waking up with a severe headaches daily.   She has not experienced an improvement since her last Botox and Aimovig treatments.   She either takes Nurtec. If ineffective, she will take a triptan and then repeat the dose. Trudhesa has been the most helpful lately. She tries to limit the triptan to 2 to 3 days/week, and limits the Trudhesa to 3 days/week.   She takes Reglan and Aleve most night to help with the morning headaches.   She is open to infusion therapy.   She recently started using a .   Lamotrigine 25 mg was recently added.   Constipation has improved.   She is going to [place] with her  in a few week, then [facility].     Visit 6/12/2024   Last clinic visit: 5/15/2024 with Odalys Ontiveros  At the last visit, she received lidocaine nerve blocks. These helped.  She continued with Aimovig and felt that this was helping. She just got the last injection 2 days ago.  She has been on a headache cycle since last Monday, about 10 days ago.   Overall, headaches have been unchanged.   Headache frequency: about 3 days/week but not as incapacitating. Worse the last 10 days.   Headache characteristics: Unchanged.   Preventive therapy: Botox 195 U ever 12 weeks. Nerve blocks. Aimovig monthly seems to be working. Cymbalta 40 mg BID (from psychiatrist). Cefaly most days.  Acute therapy: Nurtec has not worked well recently. Frova helps for about 12 hours. Trudhesa NS helps, but the headache has  not resolved. Cefaly acutely helps some.  Other medical problems: ALEXIS - using CPAP since Dec 2023    Visit 5/15/2024  At the last visit, pt underwent repeat Botox injection, and stopped Emgality and began Aimovig. She continued Nurtec and frovatriptan for acute therapy, and she has Trudhesa NS available for rescue therapy.  Relivion was discussed and she opted to continue with Cefaly.   Overall, feeling better, is now having headache free time, up to a day or two. Fewer migraines, less severe. Occur 2-3x weekly   Will treat with Nurtec, if not helpful will take Frova as well (does this about 60% of the time). Usually will have to take a second Frova. Occasionally will have to do Trudhesa the next day for complete relief.   Began dry needling which seems to help, a day or 2 of relief. Does it 1-2x week.   Had third shot of Aimovig yesterday.   Headache frequency: 2-3x/week  Headache characteristics: Unchanged.   Preventive therapy: Botox 195 U ever 12 weeks. Nerve blocks. Aimovig monthly.   Acute therapy: Nurtec, Frova, Trudhesa  Other medical problems: ALEXIS - using CPAP since Dec 2023    Visit 3/19/24:   Last clinic visit: 2/5/2024  At the last visit, I repeated her lidocaine blocks and we discussed the possibility of doing acute infusions at the [facility] in [place] if needed. I recommended continuing with Botox, Emgality, and nerve blocks for preventive therapy. She was also on Cymbalta from her psychiatrist. She continued with Nurtec and frovatriptan for acute therapy, and she had Trudhesa NS available for rescue therapy.   She continues to use the CPAP consistently.  Overall, her headaches are not well controlled with her current regimen. She is interested in changing Emgality to a different mAb and she wasn't more information about Relivion.  Headache frequency: variable, overall unchanged.  Headache characteristics: Unchanged. More severe this past weekend  Preventive therapy: Emgality 120 mg  monthly injections - no injection site reaction, Botox 195 units every 12 weeks. Nerve blocks helps.CEFALY.  Acute therapy: Nurtec, frovatriptan. Rescue: Trudhesa NS, Benadryl.   Other medical problems: ALEXIS - using CPAP since Dec 2023  PMH/SH/FH: Reviewed and unchanged since previous visit or updated below.    Visit 2/5/2024  At the last visit, she received her Botox treatment and we recommended continuing with Emgality and nerve blocks for preventive therapy. She continued with Nurtec and frovatriptan for acute therapy, and she had Trudhesa NS available for rescue therapy.   Using CPAP consistently now up to 6-7 hours/night. She finds it helpful.  She stopped Lamictal (prescribed by psychiatrist for anxiety).  She has the Cefaly device, but she doesn't like the vibration and she has problems using it due to poor internet connection.  She has had worse headaches in the past 2 weeks. She had incapacitating pain and she is more functional today, but still has a headache. She took Ultram from her  and it helped a little. Her level of pain is now 2/10.  Headache characteristics: Unchanged. More severe this past weekend  Preventive therapy: Emgality 120 mg monthly injections - no injection site reaction, Botox 195 units every 12 weeks. Nerve blocks helps.CEFALY.  Acute therapy: Nurtec, frovatriptan. Rescue: Trudhesa NS, Benadryl.   Other medical problems: ALEXIS - using CPAP since Dec 2023    Visit 12/27/2023  At the last visit, nerve blocks administered.   Recommend scheduling NB again in 12 weeks.  She was diagnosed with sleep apnea. She started using a CPAP yesterday.   She is currently experiencing a wearing of the Botox and Emgality. She has been taking Nurtec more often, 4 - 5 days/week. She has been doing so with the frovatriptan.   The addition of the nerve blocks has been helpful.   She has the Cefaly device, but she needs to remember to use it preventively.   Headache characteristics: Unchanged.    Preventive therapy: Emgality 120 mg monthly injections - no injection site reaction, Botox 195 units every 12 weeks.   Acute therapy: Nurtec, frovatriptan. Rescue: Trudhesa NS, Benadryl.     Urgent visit 10/27/2023   She has not been able to start Emgality yet. She called the insurance and it is not ready at the pharmacy.  Current headache started on: Tuesday - 4 days ago  Current level of pain: 4/10  Associated symptoms: nausea, photo and phonophobia  Patient denies motor deficits, sensory symptoms, and vision loss.   Medications tried at home in the last 24 hours: Reglan and benadryl last night. Other acute treatments earlier. None this morning.    Visit 10/3/2023   Last clinic visit: 9/19/2023   At the last visit, I have repeated the lidocaine nerve blocks. I recommended starting Cefaly for prevention and acute treatment. I gave her a sample of Zavzpret NS to see if this works better than Nurtec. She continued with Emgality, Botox and magnesium for prevention. We also discussed trying gluten free diet and changing the biologic for weight loss.   She stopped Ozempic and started gluten free diet, but is not doing this 100%.   She has Cefaly but has not used it much.  Zavzpret did not work well.  Overall, she continues to have very frequent migraine headaches. The may be less severe since the last visit.  Headache frequency: almost daily.  Headache characteristics: Unchanged.   Preventive therapy: Emgality loading dose on 3/28 - helping - they respond better to acute treatment. SE: constipation (may be from Emgality and Ozempic). Botox 195 U every 12 weeks. Cymbalta 40 mg BID (from psychiatrist).  Acute therapy: Rizatriptan or Frovatriptan - both help and are similar; better than eletriptan. Nurtec helps some, but inconsistently. DHE NS with benadryl helps for rescue but causes nausea and congestion.  Other medical problems: Denies new medical problems.     Visit 9/19/2023  At the last visit, she presented with  another episode of status migrainosus. I repeated the lidocaine nerve blocks and recommended continuing with Nurtec for first line acute treatment. I prescribed generic rizatriptan-MLT for rescue in place of Frova. I recommend considering Cefaly or GammaCore and continuing with Emgality, Botox and magnesium for prevention.   She started taking Ozempic again 4 weeks ago and atomoxetine for brain fog about 2 weeks ago.   She has noticed that her migraine headache condition gets worse every time she restarts Ozempic and improves when she stops it.   She had severe headaches requiring IM Toradol on 8/3 and 9/12.    She ordered Cefaly and recently got it, but has not used it yet.   She had some blood work done and was prescribed progesterone, but has not use the estrogen patch due to concerns about risk or blood clotting.   Overall, she continues to have very frequent migraine headaches.   Headache frequency: almost daily.  Headache characteristics: Unchanged.   Preventive therapy: Emgality loading dose on 3/28 - helping - they respond better to acute treatment. SE: constipation (may be from Emgality and Ozempic). Botox 195 U every 12 weeks. Cymbalta 40 mg BID (from psychiatrist).  Acute therapy: Rizatriptan or Frovatriptan - both help and are similar; better than eletriptan. Nurtec helps some, but inconsistently. Maxalt-MLT brand name was not covered and now it is not available (generic also works, but headache reoccurs 6-8 hours). DHE NS with benadryl helps for rescue but causes nausea and congestion.  Other medical problems: Denies new medical problems.     Visit 7/19/2023  At her last visit, she presented with status migrainosus. I repeated her nerve blocks and changed her acute regimen. I prescribed a trial of Nurtec and Maxalt-MLT (wants to try brand name to see if this works better than the generic) to use in place of Ubrelvy and Frova. I recommended continuing with Emgality, Botox and magnesium for prevention.    She received nerve blocks again on 6/16/2023 and Botox on 7/11/23.  She contacted us last week with a constant headache that had been gradually getting worse since her last visit.   She was prescribed a Medrol dose pack that she is finishing today.  Current headache started 2 weeks ago.  Current level of pain: 4/10  Associated symptoms: photophobia - this morning had nausea and took Reglan  Patient denies motor deficits, sensory symptoms, and vision loss.   Medications tried at home in the last 24 hours: Reglan, Nurtec and Tylenol.  She has undergone some blood work to rule out hormonal imbalance and everything was reportedly within the normal limits.   She started using a  from her dentist.  Overall, she continues to have very frequent migraine headaches.   Headache frequency: almost daily.  Headache characteristics: Unchanged.   Preventive therapy: Emgality loading dose on 3/28 - helping. SE: constipation (may be from Emgality and Ozempic). Botox 195 U every 12 weeks. Cymbalta 40 mg BID (from psychiatrist).  Acute therapy: Frovatriptan seems to work better than eletriptan. Nurtec helps some, but inconsistently. Maxalt-MLT brand name was not covered. DHE NS with benadryl helps for rescue but causes nausea and congestion.  Other medical problems: Denies new medical problems.     Visit 5/3/2023  At the last visit, she was noticing some improvement after adding Emgality, but the headaches were still occur 5 days/week. I repeated her Botox treatment and recommended to increase the dose of magnesium. She continued with Ubrelvy acutely and Frovatriptan for rescue treatment.   She continues to have frequent almost daily headaches and her acute medications are not working as well.   She contacted us today to see if we could repeat the lidocaine nerve blocks as her headache has not resolved with her oral medications.  She took 2 doses of Frova yesterday and then Reglan + Benadryl + Tylenol last night. This  morning she took Tylenol again. She run out of Ubrelvy and her pharmacy cannot refill it  Current headache rated 3-4/10.  Headache frequency: on average almost daily.  Headache characteristics: Unchanged.   Preventive therapy: Emgality loading dose on 3/28 is helping. SE: constipation (may be from Emgality and Ozempic). Botox 195 U every 12 weeks. Effexor 37.5 mg daily - switching to Cymbalta 20 mg BID (from psychiatrist).  Acute therapy: Frovatriptan seems to work better than eletriptan. Ubrelvy helps for the day. DHE NS with benadryl helps for rescue but causes nausea and congestion.  Other medical problems: Denies new medical problems.     Visit 4/18/2023  At the last visit, I administered lidocaine nerve blocks again and recommended some changes in her preventive and acute regimen. I prescribed Emgality in place of Qulipta. She was planning switch from Effexor to a different antidepressant. I changed eletriptan to Frovatriptan for rescue treatment as eletriptan only helped for a few hours.  She started Emgality shortly after the last visit and tried Frova with good response.   Her psychiatrist is switching her from Effexor to Cymbalta.  Overall, she has noticed about 30% improvement in migraine headache frequency and response to acute treatment.    Headache frequency: on average 5 days/week now. Daily prior month.  Headache characteristics: Unchanged. More responsive to acute treatment.   Preventive therapy: Emgality loading dose on 3/28 is helping. SE: constipation (may be from Emgality and Ozempic). Botox 195 U every 12 weeks. Effexor 37.5 mg daily - switching to Cymbalta 20 mg BID (from psychiatrist).  Acute therapy: Frovatriptan seems to work better than eletriptan. Ubrelvy helps for the day. DHE NS with benadryl helps for rescue but causes nausea and congestion.  Other medical problems: Denies new medical problems.     Visit 3/27/2023  At the last visit I administered lidocaine nerve blocks which provided  immediate benefit. However, the headaches continue to reoccur almost daily.   She is here to repeat the nerve blocks and discuss alternative acute and preventive treatments.   Her psychiatrist has recommended switching from Effexor to Lamictal. Cymbalta has been considered, but they are trying to find a weight neutral medication.  She restarted Ozempic.   Overall, she has had severe refractory headaches almost daily for over a month.   Headache frequency: not continuous but almost daily since 2/15/2023  Headache characteristics: Unchanged.   Preventive therapy: Botox 195 U every 12 weeks. Effexor 112.4 mg daily. Qulipta 60 mg helping some (at 30 mg 2-3 weeks with no headaches or headaches that responded well to acute treatment, then had to increase to 60 mg and did not see an improvement). She is having some constipation and fatigue.  Acute therapy: Eletriptan helps some within an hour but comes back in a couple of hours. Ubrelvy helps for the day. DHE NS with benadryl helps for rescue but causes nausea and congestion.  Other medical problems: Denies new medical problems.     Visit 3/16/2023  At the last visit, she reported significant improvement of her headache condition after adding Qulipta initially, but she presented with persistent headache for several weeks. We recommended starting a course of daily naproxen for 1 - 2 weeks and discussed the need to decrease the amount of acute medications to prevent rebound headaches.   We switched naproxen to nabumetone on 3/13.  Current headache started on: 2/15/2023 on and off. Was headache free yesterday, but woke up with a severe headache again this morning.  Current level of pain: 8/10  Associated symptoms: phonophobia, photophobia and nausea.  Patient denies motor deficits, sensory symptoms, and vision loss.   Medications tried at home in the last 24 hours: Ubrelvy this morning and nabumetone last night.   Overall, she has had severe refractory headaches almost daily  for the last month.  Headache frequency: not continuous but almost daily since 2/15/2023  Headache characteristics: Unchanged.   Preventive therapy: Botox. Effexor 112.4 mg daily. Qulipta 60 mg (at 30 mg 2-3 weeks with no headaches or headaches that responded well to acute treatment, then had to increase to 60 mg and did not see an improvement).  Acute therapy: Ubrelvy. Eletriptan. DHE NS  Other medical problems: Denies new medical problems.     Telemed 3/7/2023  At the last visit, we recommended to add Qulipta for prevention and continue with Botox, Magnesium, and Effexor (from psychiatrist). I recommended to continue with the same acute and rescue regimen, Ubrelvy, Eletriptan, and DHE NS, but we discussed the possibility of trying Nurtec as needed. Nurtec and Anti-CGRP antibodies can also be considered for prevention.  Initially, within a few days of starting the Qulipta 30 mg, she noticed an improvement. A few weeks later, she increased the dose to 60 mg. Tolerating well. She was experiencing an occasional headache, which was relieved by Ubrelvy.   She went to [place] on February 15. She has been experiencing a persistent headache since that time. It was stressful traveling, but she was able to enjoy the trip. She admits to taking a lot of Ubrelvy and triptans. She took Trudhesa around her menstrual cycle. She found an old prescription for naproxen 500 mg, which she took over the weekend. She was headache free yesterday, but the headache recurred today.      Telemed 1/23/2023   Last clinic visit: 12/27/2022 with Edyta  At the last visit, she presented with refractory status migrainosus and was treated with lidocaine nerve blocks. These helped some, but not as much as the ones in September. She took dexamethasone 2 mg every morning for 5 days without much benefit, followed by prednisone taper starting at 40 mg for 6 days.   That headache episode eventually resolved with prednisone and she was headache free for a  few days. However, she has continued to have very frequent headaches.   She contacted us today with a refractory headache and wanting to discuss other options for headache prevention.   Current headache started  4-5 days ago. She took Ubrelvy last night and woke up without headache. Around 11 am the headache came back and she took eletriptan 40 mg and a second dose around 1 pm. Now the headache is rated at 4 pm.    She was prescribed Effexor by her psychiatrist and has been increasing the dose. The headache features have not changed.  Overall, the headaches have been occurring daily or almost daily.  Headache frequency: daily or almost daily, but not constant.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

## 2025-03-24 NOTE — LETTER
2025     Renee Brown DO  [address]  [place] [id number]    Patient: Andressa Baker  YOB: 1980  Date of Visit: 3/24/2025      Dear Dr. Joselyn Brown:    Thank you for referring Andressa Baker to me for evaluation. Below are my notes for this consultation.    If you have questions, please do not hesitate to call me. I look forward to following your patient along with you.         Sincerely,        Theresa Dalton MD        CC: No Recipients    Theresa Dalton MD  3/24/2025  1:27 PM  Sign when Signing Visit  Patient ID: Andressa Baker                              : 1980  MRN: [id number]                                            VISIT DATE: 3/24/2025   ENCOUNTER PROVIDER: Theresa Dalton    I had the pleasure of evaluating Andressa Baker in the [facility] on 3/24/2025 for a follow-up visit. The history was provided by Andressa Mohrchowski and supplemented by her medical records.    CHIEF COMPLAINT: Headache.    HISTORY OF PRESENT ILLNESS:  Andressa Baker is a very pleasant 44 y.o.  woman seen initially in 2022 for chronic severe headaches since her early 's, worsening in the last few months. Neurological exam was normal. Brain MRI () was unremarkable. There are no atypical features or symptoms suggestive of a serious underlying condition or secondary headache. In our opinion, she has chronic migraine exacerbated by hormonal changes and frequent use of acute medications (Excedrin and Rizatriptan - now resolved). There may be some contribution from myofascial neck pain, but there is no evidence of significant cervical spine disease.    Follow-up Clinic Note:  Last clinic visit: 2025    At the last visit, her headache condition was stable. I repeated her botox treatment and recommended continuing with Aimovig, nerve blocks, and magnesium for preventive treatment. I  "encouraged her to use the cefaly consistently. I prescribed a trial of Lasmiditan and Sprix NS for rescue treatment. I recommended continuing with Nurtec and Frova for acute and second line rescue treatment.     She found the new rescue treatments helpful.    She reached out to us in [facility] reporting worsening headaches and she had questions about her prior imaging studies.    Overall, her migraine headaches have been worse in the last month. She reports having significant anxiety in the past few months and recently increased the dose of Pristiq. Other than the recent change in her medications there is no known precipitating factor for her worsening headaches and no changes in features.    Headache frequency: still needs acute treatment 4-5 days/week.   Headache characteristics: Unchanged.    Preventive therapy: Botox 195 U ever 12 weeks. Nerve blocks every 12 weeks. Aimovig 140 mg monthly is helping. Using Cefaly consistently preventively. Lamotrigine 50 mg for anxiety. Pristiq (from psychiatrist).  Acute therapy: Nurtec working well again most of the times. Frova helps and she limits it to 2/week. Cefaly acutely helped some. Sprix NS helped for rescue treatment. Lasmiditan 50 mg helped. No SE.  Other medical problems: no new medical problems. ALEXIS - using CPAP since Dec 2023    PMH/SH/FH: Reviewed and unchanged since previous visit or updated below.    Summary from previous visits.  Visit 2/20/2025  She returned for nerve blocks in January and February.  At her last visit we did not make any changes.  Overall, her headache condition has been \"a lot better\". The headaches still occur several days per week, but they are less intense and resolve with Nurtec most of the times.  Headache frequency: still needs acute treatment 5-6 days/week, but the headaches are less intense and respond well to Nurtec now.    Headache characteristics: Unchanged. Less intense.   Preventive therapy: Botox 195 U ever 12 weeks. Nerve " "blocks every 12 weeks. Aimovig 140 mg monthly is helping. Not using Cefaly consistently preventively. Lamotrigine 50 mg for anxiety. Switched Cymbalta to Pristiq (from psychiatrist).   Acute therapy: Nurtec working well again, about 80% of the times. Frova helps and uses it about 3/week. Trudhesa NS helps some, but causes side effects. She doesn't like it. Cefaly acutely helped some.  Other medical problems: no new medical problems. ALEXIS - using CPAP since Dec 2023    Visit 11/27/2024  Chronic migraine condition has been stable until the last month.  She had a couple of severe episodes of headaches in November.  Progesterone only pill for 3 months did not help with the headaches and may have made them worse. She stopped it 2 weeks ago.  She had injections of botox in her masseters by her dentist in September. She did not notice any benefit in her headaches in the last 3 months.    Visit 9/4/2024  At the last visit  she reported a worsening of her headache condition. We recommended increasing the Aimovig dose. She was open to acute migraine infusion therapy and we scheduled her for that. We discussed trying to decrease the amount of frovatriptan and Trudhesa NS use to try to decrease possible medication overuse headaches.   7/24/2024 Nerve blocks visit only.  Per Odalys Ontiveros's note: \"Pt presents for nerve blocks. Recently had infusions in [facility], provided some relief but not complete relief. Traveling tomorrow. Inquires about injections into tight areas of trapezius bilaterally. Discussed potential risks/benefits and pt elected to proceed. Instructed to take two OTC Aleve with food twice daily for pain/muscle soreness as needed.\"  She increased the dose of Aimovig in July and has noticed an improvement.  She also started Lamictal this summer for anxiety.  Overall, her headache condition has been better since her last visit. The headaches still occur several days per week, but they are less intense and resolve with " Nurtec.  Headache frequency: still occurring 3-4 days/week but they are less intense and respond well to Nurtec now.    Headache characteristics: Unchanged. Less intense.   Preventive therapy: Botox 195 U ever 12 weeks. Nerve blocks every 12 weeks. Aimovig 140 mg monthly is helping. Cymbalta 40 mg BID (from psychiatrist). Not using Cefaly preventively. Lamotrigine 50 mg for anxiety.    Acute therapy: Nurtec working well again. Frova helps and uses it about 1-2/week. Trudhesa NS helps. Uses infrequently. Cefaly acutely helped some.  Other medical problems: no new medical problems. ALEXIS - using CPAP since Dec 2023    Visit 7/11/2024  At the last visit, we treated her acute headache with Toradol IM and repeated her Botox treatment. We recommended adding riboflavin to magnesium. She was to continue Aimovig monthly and Botox and lidocaine blocks every 12 weeks. She was also on Cymbalta from her psychiatrist. She was to continue with Nurtec and frovatriptan for acute therapy, and she has Trudhesa NS available for rescue therapy. She was to continue with Cefaly.   She has been experiencing daily migraines. She has been waking up with a severe headaches daily.   She has not experienced an improvement since her last Botox and Aimovig treatments.   She either takes Nurtec. If ineffective, she will take a triptan and then repeat the dose. Trudhesa has been the most helpful lately. She tries to limit the triptan to 2 to 3 days/week, and limits the Trudhesa to 3 days/week.   She takes Reglan and Aleve most night to help with the morning headaches.   She is open to infusion therapy.   She recently started using a .   Lamotrigine 25 mg was recently added.   Constipation has improved.   She is going to [place] with her  in a few week, then [facility].     Visit 6/12/2024   Last clinic visit: 5/15/2024 with Odalys Ontiveros  At the last visit, she received lidocaine nerve blocks. These helped.  She continued with Aimovig and  felt that this was helping. She just got the last injection 2 days ago.  She has been on a headache cycle since last Monday, about 10 days ago.   Overall, headaches have been unchanged.   Headache frequency: about 3 days/week but not as incapacitating. Worse the last 10 days.   Headache characteristics: Unchanged.   Preventive therapy: Botox 195 U ever 12 weeks. Nerve blocks. Aimovig monthly seems to be working. Cymbalta 40 mg BID (from psychiatrist). Cefaly most days.  Acute therapy: Nurtec has not worked well recently. Frova helps for about 12 hours. Trudhesa NS helps, but the headache has not resolved. Cefaly acutely helps some.  Other medical problems: ALEXIS - using CPAP since Dec 2023    Visit 5/15/2024  At the last visit, pt underwent repeat Botox injection, and stopped Emgality and began Aimovig. She continued Nurtec and frovatriptan for acute therapy, and she has Trudhesa NS available for rescue therapy.  Relivion was discussed and she opted to continue with Cefaly.   Overall, feeling better, is now having headache free time, up to a day or two. Fewer migraines, less severe. Occur 2-3x weekly   Will treat with Nurtec, if not helpful will take Frova as well (does this about 60% of the time). Usually will have to take a second Frova. Occasionally will have to do Trudhesa the next day for complete relief.   Began dry needling which seems to help, a day or 2 of relief. Does it 1-2x week.   Had third shot of Aimovig yesterday.   Headache frequency: 2-3x/week  Headache characteristics: Unchanged.   Preventive therapy: Botox 195 U ever 12 weeks. Nerve blocks. Aimovig monthly.   Acute therapy: Nurtec, Frova, Trudhesa  Other medical problems: ALEXIS - using CPAP since Dec 2023    Visit 3/19/24:   Last clinic visit: 2/5/2024  At the last visit, I repeated her lidocaine blocks and we discussed the possibility of doing acute infusions at the [facility] in [place] if needed. I recommended continuing with  Botox, Emgality, and nerve blocks for preventive therapy. She was also on Cymbalta from her psychiatrist. She continued with Nurtec and frovatriptan for acute therapy, and she had Trudhesa NS available for rescue therapy.   She continues to use the CPAP consistently.  Overall, her headaches are not well controlled with her current regimen. She is interested in changing Emgality to a different mAb and she wasn't more information about Relivion.  Headache frequency: variable, overall unchanged.  Headache characteristics: Unchanged. More severe this past weekend  Preventive therapy: Emgality 120 mg monthly injections - no injection site reaction, Botox 195 units every 12 weeks. Nerve blocks helps.CEFALY.  Acute therapy: Nurtec, frovatriptan. Rescue: Trudhesa NS, Benadryl.   Other medical problems: ALEXIS - using CPAP since Dec 2023  PMH/SH/FH: Reviewed and unchanged since previous visit or updated below.    Visit 2/5/2024  At the last visit, she received her Botox treatment and we recommended continuing with Emgality and nerve blocks for preventive therapy. She continued with Nurtec and frovatriptan for acute therapy, and she had Trudhesa NS available for rescue therapy.   Using CPAP consistently now up to 6-7 hours/night. She finds it helpful.  She stopped Lamictal (prescribed by psychiatrist for anxiety).  She has the Cefaly device, but she doesn't like the vibration and she has problems using it due to poor internet connection.  She has had worse headaches in the past 2 weeks. She had incapacitating pain and she is more functional today, but still has a headache. She took Ultram from her  and it helped a little. Her level of pain is now 2/10.  Headache characteristics: Unchanged. More severe this past weekend  Preventive therapy: Emgality 120 mg monthly injections - no injection site reaction, Botox 195 units every 12 weeks. Nerve blocks helps.CEFALY.  Acute therapy: Nurtec, frovatriptan. Rescue: Trudhesa NS,  Benadryl.   Other medical problems: ALEXIS - using CPAP since Dec 2023    Visit 12/27/2023  At the last visit, nerve blocks administered.   Recommend scheduling NB again in 12 weeks.  She was diagnosed with sleep apnea. She started using a CPAP yesterday.   She is currently experiencing a wearing of the Botox and Emgality. She has been taking Nurtec more often, 4 - 5 days/week. She has been doing so with the frovatriptan.   The addition of the nerve blocks has been helpful.   She has the Cefaly device, but she needs to remember to use it preventively.   Headache characteristics: Unchanged.   Preventive therapy: Emgality 120 mg monthly injections - no injection site reaction, Botox 195 units every 12 weeks.   Acute therapy: Nurtec, frovatriptan. Rescue: Trudhesa NS, Benadryl.     Urgent visit 10/27/2023   She has not been able to start Emgality yet. She called the insurance and it is not ready at the pharmacy.  Current headache started on: Tuesday - 4 days ago  Current level of pain: 4/10  Associated symptoms: nausea, photo and phonophobia  Patient denies motor deficits, sensory symptoms, and vision loss.   Medications tried at home in the last 24 hours: Reglan and benadryl last night. Other acute treatments earlier. None this morning.    Visit 10/3/2023   Last clinic visit: 9/19/2023   At the last visit, I have repeated the lidocaine nerve blocks. I recommended starting Cefaly for prevention and acute treatment. I gave her a sample of Zavzpret NS to see if this works better than Nurtec. She continued with Emgality, Botox and magnesium for prevention. We also discussed trying gluten free diet and changing the biologic for weight loss.   She stopped Ozempic and started gluten free diet, but is not doing this 100%.   She has Cefaly but has not used it much.  Zavzpret did not work well.  Overall, she continues to have very frequent migraine headaches. The may be less severe since the last visit.  Headache frequency: almost  daily.  Headache characteristics: Unchanged.   Preventive therapy: Emgality loading dose on 3/28 - helping - they respond better to acute treatment. SE: constipation (may be from Emgality and Ozempic). Botox 195 U every 12 weeks. Cymbalta 40 mg BID (from psychiatrist).  Acute therapy: Rizatriptan or Frovatriptan - both help and are similar; better than eletriptan. Nurtec helps some, but inconsistently. DHE NS with benadryl helps for rescue but causes nausea and congestion.  Other medical problems: Denies new medical problems.     Visit 9/19/2023  At the last visit, she presented with another episode of status migrainosus. I repeated the lidocaine nerve blocks and recommended continuing with Nurtec for first line acute treatment. I prescribed generic rizatriptan-MLT for rescue in place of Frova. I recommend considering Cefaly or GammaCore and continuing with Emgality, Botox and magnesium for prevention.   She started taking Ozempic again 4 weeks ago and atomoxetine for brain fog about 2 weeks ago.   She has noticed that her migraine headache condition gets worse every time she restarts Ozempic and improves when she stops it.   She had severe headaches requiring IM Toradol on 8/3 and 9/12.    She ordered Cefaly and recently got it, but has not used it yet.   She had some blood work done and was prescribed progesterone, but has not use the estrogen patch due to concerns about risk or blood clotting.   Overall, she continues to have very frequent migraine headaches.   Headache frequency: almost daily.  Headache characteristics: Unchanged.   Preventive therapy: Emgality loading dose on 3/28 - helping - they respond better to acute treatment. SE: constipation (may be from Emgality and Ozempic). Botox 195 U every 12 weeks. Cymbalta 40 mg BID (from psychiatrist).  Acute therapy: Rizatriptan or Frovatriptan - both help and are similar; better than eletriptan. Nurtec helps some, but inconsistently. Maxalt-MLT brand name was  not covered and now it is not available (generic also works, but headache reoccurs 6-8 hours). DHE NS with benadryl helps for rescue but causes nausea and congestion.  Other medical problems: Denies new medical problems.     Visit 7/19/2023  At her last visit, she presented with status migrainosus. I repeated her nerve blocks and changed her acute regimen. I prescribed a trial of Nurtec and Maxalt-MLT (wants to try brand name to see if this works better than the generic) to use in place of Ubrelvy and Frova. I recommended continuing with Emgality, Botox and magnesium for prevention.   She received nerve blocks again on 6/16/2023 and Botox on 7/11/23.  She contacted us last week with a constant headache that had been gradually getting worse since her last visit.   She was prescribed a Medrol dose pack that she is finishing today.  Current headache started 2 weeks ago.  Current level of pain: 4/10  Associated symptoms: photophobia - this morning had nausea and took Reglan  Patient denies motor deficits, sensory symptoms, and vision loss.   Medications tried at home in the last 24 hours: Reglan, Nurtec and Tylenol.  She has undergone some blood work to rule out hormonal imbalance and everything was reportedly within the normal limits.   She started using a  from her dentist.  Overall, she continues to have very frequent migraine headaches.   Headache frequency: almost daily.  Headache characteristics: Unchanged.   Preventive therapy: Emgality loading dose on 3/28 - helping. SE: constipation (may be from Emgality and Ozempic). Botox 195 U every 12 weeks. Cymbalta 40 mg BID (from psychiatrist).  Acute therapy: Frovatriptan seems to work better than eletriptan. Nurtec helps some, but inconsistently. Maxalt-MLT brand name was not covered. DHE NS with benadryl helps for rescue but causes nausea and congestion.  Other medical problems: Denies new medical problems.     Visit 5/3/2023  At the last visit, she was  noticing some improvement after adding Emgality, but the headaches were still occur 5 days/week. I repeated her Botox treatment and recommended to increase the dose of magnesium. She continued with Ubrelvy acutely and Frovatriptan for rescue treatment.   She continues to have frequent almost daily headaches and her acute medications are not working as well.   She contacted us today to see if we could repeat the lidocaine nerve blocks as her headache has not resolved with her oral medications.  She took 2 doses of Frova yesterday and then Reglan + Benadryl + Tylenol last night. This morning she took Tylenol again. She run out of Ubrelvy and her pharmacy cannot refill it  Current headache rated 3-4/10.  Headache frequency: on average almost daily.  Headache characteristics: Unchanged.   Preventive therapy: Emgality loading dose on 3/28 is helping. SE: constipation (may be from Emgality and Ozempic). Botox 195 U every 12 weeks. Effexor 37.5 mg daily - switching to Cymbalta 20 mg BID (from psychiatrist).  Acute therapy: Frovatriptan seems to work better than eletriptan. Ubrelvy helps for the day. DHE NS with benadryl helps for rescue but causes nausea and congestion.  Other medical problems: Denies new medical problems.     Visit 4/18/2023  At the last visit, I administered lidocaine nerve blocks again and recommended some changes in her preventive and acute regimen. I prescribed Emgality in place of Qulipta. She was planning switch from Effexor to a different antidepressant. I changed eletriptan to Frovatriptan for rescue treatment as eletriptan only helped for a few hours.  She started Emgality shortly after the last visit and tried Frova with good response.   Her psychiatrist is switching her from Effexor to Cymbalta.  Overall, she has noticed about 30% improvement in migraine headache frequency and response to acute treatment.    Headache frequency: on average 5 days/week now. Daily prior month.  Headache  characteristics: Unchanged. More responsive to acute treatment.   Preventive therapy: Emgality loading dose on 3/28 is helping. SE: constipation (may be from Emgality and Ozempic). Botox 195 U every 12 weeks. Effexor 37.5 mg daily - switching to Cymbalta 20 mg BID (from psychiatrist).  Acute therapy: Frovatriptan seems to work better than eletriptan. Ubrelvy helps for the day. DHE NS with benadryl helps for rescue but causes nausea and congestion.  Other medical problems: Denies new medical problems.     Visit 3/27/2023  At the last visit I administered lidocaine nerve blocks which provided immediate benefit. However, the headaches continue to reoccur almost daily.   She is here to repeat the nerve blocks and discuss alternative acute and preventive treatments.   Her psychiatrist has recommended switching from Effexor to Lamictal. Cymbalta has been considered, but they are trying to find a weight neutral medication.  She restarted Ozempic.   Overall, she has had severe refractory headaches almost daily for over a month.   Headache frequency: not continuous but almost daily since 2/15/2023  Headache characteristics: Unchanged.   Preventive therapy: Botox 195 U every 12 weeks. Effexor 112.4 mg daily. Qulipta 60 mg helping some (at 30 mg 2-3 weeks with no headaches or headaches that responded well to acute treatment, then had to increase to 60 mg and did not see an improvement). She is having some constipation and fatigue.  Acute therapy: Eletriptan helps some within an hour but comes back in a couple of hours. Ubrelvy helps for the day. DHE NS with benadryl helps for rescue but causes nausea and congestion.  Other medical problems: Denies new medical problems.     Visit 3/16/2023  At the last visit, she reported significant improvement of her headache condition after adding Qulipta initially, but she presented with persistent headache for several weeks. We recommended starting a course of daily naproxen for 1 - 2  weeks and discussed the need to decrease the amount of acute medications to prevent rebound headaches.   We switched naproxen to nabumetone on 3/13.  Current headache started on: 2/15/2023 on and off. Was headache free yesterday, but woke up with a severe headache again this morning.  Current level of pain: 8/10  Associated symptoms: phonophobia, photophobia and nausea.  Patient denies motor deficits, sensory symptoms, and vision loss.   Medications tried at home in the last 24 hours: Ubrelvy this morning and nabumetone last night.   Overall, she has had severe refractory headaches almost daily for the last month.  Headache frequency: not continuous but almost daily since 2/15/2023  Headache characteristics: Unchanged.   Preventive therapy: Botox. Effexor 112.4 mg daily. Qulipta 60 mg (at 30 mg 2-3 weeks with no headaches or headaches that responded well to acute treatment, then had to increase to 60 mg and did not see an improvement).  Acute therapy: Ubrelvy. Eletriptan. DHE NS  Other medical problems: Denies new medical problems.     Telemed 3/7/2023  At the last visit, we recommended to add Qulipta for prevention and continue with Botox, Magnesium, and Effexor (from psychiatrist). I recommended to continue with the same acute and rescue regimen, Ubrelvy, Eletriptan, and DHE NS, but we discussed the possibility of trying Nurtec as needed. Nurtec and Anti-CGRP antibodies can also be considered for prevention.  Initially, within a few days of starting the Qulipta 30 mg, she noticed an improvement. A few weeks later, she increased the dose to 60 mg. Tolerating well. She was experiencing an occasional headache, which was relieved by Ubrelvy.   She went to [place] on February 15. She has been experiencing a persistent headache since that time. It was stressful traveling, but she was able to enjoy the trip. She admits to taking a lot of Ubrelvy and triptans. She took Trudhesa around her menstrual cycle. She found an old  prescription for naproxen 500 mg, which she took over the weekend. She was headache free yesterday, but the headache recurred today.      Telemed 1/23/2023   Last clinic visit: 12/27/2022 with Edyta  At the last visit, she presented with refractory status migrainosus and was treated with lidocaine nerve blocks. These helped some, but not as much as the ones in September. She took dexamethasone 2 mg every morning for 5 days without much benefit, followed by prednisone taper starting at 40 mg for 6 days.   That headache episode eventually resolved with prednisone and she was headache free for a few days. However, she has continued to have very frequent headaches.   She contacted us today with a refractory headache and wanting to discuss other options for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 communicating results.

## 2025-03-24 NOTE — PROGRESS NOTES
Verification of Patient Location:  The patient affirms they are currently located in the following state: [place]    Are you in your home or a private residence? Yes    Request for Consent:    Audio and Video Encounter   Laura, my name is Theresa Dalton MD.  Before we proceed, can you please verify your identification by telling me your full name and date of birth?  Can you tell me who is in the room with you?    You and I are about to have a telemedicine check-in or visit because you have requested it.  This is a live video-conference.  I am a real person, speaking to you in real time.  There is no one else with me on the video-conference. I am not recording this conversation and I am asking you not to record it.  This telemedicine visit will be billed to your health insurance or you, if you are self-insured.  You understand you will be responsible for any copayments or coinsurances that apply to your telemedicine visit.  Communication platform used for this encounter:  HubHuman Video Visit (Epic Video Client)       Before starting our telemedicine visit, I am required to get your consent for this virtual check-in or visit by telemedicine. Do you consent?    Patient Response to Request for Consent:  Yes

## 2025-03-24 NOTE — PATIENT INSTRUCTIONS
Diagnosis:  Worsening headaches  Chronic migraine without aura   Cervicalgia  Headache secondary to COVID vaccine booster - resolved     Evaluation:  Recommend consultation at the [facility] for second opinion and consideration of inpatient treatment.  Brain MRI with and without javier ordered today due to worsening headaches.  Lumbar puncture to rule out intracranial hypertension - discussed today - plan to consider if MRI is unrevealing.  No additional tests are needed at this time. If there are new symptoms or changes in the characteristics of the headaches, I will re-evaluate Andressa and consider if diagnostic tests are needed.  Previously recommend considering a consultation with Dr. Pichardo at [facility].     Treatment plan:     1. Healthy Habits: The following recommendations can greatly reduce the number and severity of headaches.  Maintain regular sleep hours and get sufficient sleep (8-9 hours)  Do not skip meals, especially breakfast  Drink at least 64 oz or 8 cups of water daily - enough to urinate 5-6 times a day  Get at least 30 minutes of daily aerobic exercise (enough to increase your heart rate and sweat)    Keep track of headaches and use of acute medication (prescription or over-the-counter) in a calendar or notebook and bring that to your clinic visits.    2. Acute Treatment:     Continue with Nurtec ODT 75 mg as needed at onset of moderate to severe headache. Maximum 1 tablet in 24 hours.     Continue with Cefaly as needed for acute headache treatment for up to 1 hour (acute mode).     Second line or rescue treatment: Continue with frovatriptan 2.5 mg for moderate to severe headache. May repeat 1 tablet 2 hours later. Maximum 2 tablets in 24 hr. Limit to 2 days/week.     Third line acute or rescue treatment: Sprix NS or Lasmiditan (Reyvow), depending on acute medications used in the prior 24 hours.  Sprix can be used any time after Nurtec or frovatriptan.  Lasmiditan  "cannot be used within 24 hours of frovatriptan.    Sprix Nasal Spray 15.75 mg/spray - 1 spray in each nostril every 6-8 hours as needed for severe headache. Maximum 8 sprays/day; 5 days/month.  Do not use it with other NSAIDS (Aleve or iburpofen)  Prescription sent electronically to a specialty pharmacy: [facility] - [address] [place], [place]     Lasmiditan (Reyvow) 50 mg once as needed at bedtime for refractory headache. Maximum 1 dose in 24 hours. Discussed sedation and dizziness as main side effects, and an 8 hour restriction on driving after a dose. Its weak ability to be habit forming requires prescription as a controlled substance, but this is limited by coverage of only 8 tablets per month.  Do not use within 24 hours of a \"triptan\" without discussing this with your provider.  You can pay as little as $0 for up to 12 months if you are eligible and commercially insured.  Call 9-977-[phone] or text SAVE to 53008. You can also register online at MeMeMe/Spottly.     Take Reglan 10 mg as needed for migraine related nausea or 15-30 min before the nasal spray to prevent nausea.    Future consideration: other triptans (naratriptan, almotriptan, or sumatriptan). Zavzpret NS nasal spray can be tried for rescue.      We may add an antinausea medication if needed for nausea or as co-adjuvant if monotherapy is not sufficient.      3. Preventive Treatment:     Hold off Aimovig in April to start Vyepti IV infusions in [facility] for preventive treatment. Process for insurance approval started today.    Continue with Botox 195 units every 12 weeks.      Continue with Lidocaine cranial nerve blocks every 12 weeks between botox appointments.     Continue with magnesium daily. Goal dose of migraine prevention 400 - 600 mg    Continue with Cefaly 20 minutes every day for prevention (prevention mode).     Continue with lamictal 50 mg daily for anxiety as these may help as migraine " preventive.    Monitor effect of changes in dose of Pristiq as this may have an effect on headaches.    Future considerations: trial of Diamox or switch Aimovig/Vyepti to Nurtec QOD. Previously discussed medical marijuana.    Other nutraceutical options include: riboflavin, melatonin, feverfew, and coenzyme Q10.     Other options for oral preventive medications include: amitriptyline or nortriptyline, zonisamide, rimegepant, valproate, verapamil, gabapentin, pregabalin, candesartan, Namenda, escitalopram, and levetiracetam. We want to avoid weight gain.    Several non-invasive neuromodulation devices (gammaCore, Cefaly and Nerivio) are available to treat headaches preventively and/or acutely. These are typically not covered by insurance, but the companies have a trial period and will refund you if the device is ineffective and returned within that period.     Follow-up in the Headache Clinic in 2-3 months (telemed OK) and every 12 weeks for Botox and every 12 weeks in between botox treatments for nerve blocks.

## 2025-03-26 ENCOUNTER — OFFICE VISIT (OUTPATIENT)
Dept: NEUROLOGY | Facility: CLINIC | Age: 45
End: 2025-03-26
Payer: COMMERCIAL

## 2025-03-26 VITALS
BODY MASS INDEX: 29.51 KG/M2 | WEIGHT: 188 LBS | HEART RATE: 88 BPM | SYSTOLIC BLOOD PRESSURE: 100 MMHG | RESPIRATION RATE: 14 BRPM | OXYGEN SATURATION: 99 % | HEIGHT: 67 IN | DIASTOLIC BLOOD PRESSURE: 70 MMHG

## 2025-03-26 DIAGNOSIS — M54.2 CERVICALGIA: ICD-10-CM

## 2025-03-26 DIAGNOSIS — G89.29 OTHER CHRONIC PAIN: Primary | ICD-10-CM

## 2025-03-26 PROCEDURE — 64405 NJX AA&/STRD GR OCPL NRV: CPT | Mod: 50,XU | Performed by: NURSE PRACTITIONER

## 2025-03-26 PROCEDURE — 99999 PR OFFICE/OUTPT VISIT,PROCEDURE ONLY: CPT | Performed by: NURSE PRACTITIONER

## 2025-03-26 PROCEDURE — 64450 NJX AA&/STRD OTHER PN/BRANCH: CPT | Mod: 50,XU | Performed by: NURSE PRACTITIONER

## 2025-03-26 PROCEDURE — 64400 NJX AA&/STRD TRIGEMINAL NRV: CPT | Mod: XU,RT | Performed by: NURSE PRACTITIONER

## 2025-03-26 PROCEDURE — 20552 NJX 1/MLT TRIGGER POINT 1/2: CPT | Performed by: NURSE PRACTITIONER

## 2025-03-26 RX ORDER — ONDANSETRON 4 MG/1
4 TABLET, ORALLY DISINTEGRATING ORAL EVERY 8 HOURS
COMMUNITY
Start: 2025-03-18

## 2025-03-26 RX ORDER — LIDOCAINE HYDROCHLORIDE 20 MG/ML
10 INJECTION, SOLUTION INFILTRATION; PERINEURAL ONCE
Status: COMPLETED | OUTPATIENT
Start: 2025-03-26 | End: 2025-03-26

## 2025-03-26 RX ADMIN — LIDOCAINE HYDROCHLORIDE 10 ML: 20 INJECTION, SOLUTION INFILTRATION; PERINEURAL at 15:28

## 2025-03-26 NOTE — PROCEDURES
Procedures       Nerve Blocks and Trigger Points Procedure Note:  Indication:    Diagnosis Plan   1. Other chronic pain  lidocaine (XYLOCAINE) 20 mg/mL (2 %) injection 10 mL      2. Cervicalgia  lidocaine (XYLOCAINE) 20 mg/mL (2 %) injection 10 mL          I explained the risks and benefits and obtained written consent from Andressa Baker. Signed consent form will be scanned to patient's electronic medical records.     Lidocaine 2% without epinephrine was drawn in a sterile syringe.     Lot number and expiration date documented in informed consent.     I performed a time-out, including 2 unique patient identifiers.     I located the areas of maximal tenderness corresponding to each nerve or trigger point, and cleaned with alcohol swabs.     I used a 30 gauge 1/2 inch needle to inject lidocaine over the following:        Right Greater Occipital Nerve 2 cc   Left Greater Occipital Nerve 2 cc   Right Lesser Occipital Nerve 1 cc   Left Lesser Occipital Nerve 1 cc     Right Auriculotemporal Nerve 1 cc    Left Auriculotemporal Nerve 1 cc    Right Supraorbital Nerve 0.25 cc   Left Supraorbital Nerve 0.25 cc   Right Supratrochlear Nerve 0.25 cc   Left Supratrochlear Nerve 0.25 cc     Right trapezius muscle trigger point injections 0.50 cc   Left trapezius muscle trigger point injections 0.50 cc     Total of 10 cc injected.      The procedure was well tolerated and there were no complications.         BEKA Up  [facility]

## 2025-04-01 RX ORDER — RIMEGEPANT SULFATE 75 MG/75MG
75 TABLET, ORALLY DISINTEGRATING ORAL AS NEEDED
Qty: 16 TABLET | Refills: 11 | Status: SHIPPED | OUTPATIENT
Start: 2025-04-01

## 2025-04-01 NOTE — TELEPHONE ENCOUNTER
Medicine Refill Request    Last Office Visit: 2/20/2025  Last Telemedicine Visit: 3/24/2025 Theresa Dalton MD    Next Office Visit: 5/15/2025  Next Telemedicine Visit: Visit date not found      Continue with Nurtec ODT 75 mg as needed at onset of moderate to severe headache. Maximum 1 tablet in 24 hours.

## 2025-04-02 DIAGNOSIS — G43.711 INTRACTABLE CHRONIC MIGRAINE WITHOUT AURA AND WITH STATUS MIGRAINOSUS: Primary | ICD-10-CM

## 2025-04-03 ENCOUNTER — HOSPITAL ENCOUNTER (OUTPATIENT)
Dept: RADIOLOGY | Facility: HOSPITAL | Age: 45
Discharge: HOME | End: 2025-04-03
Attending: PSYCHIATRY & NEUROLOGY
Payer: COMMERCIAL

## 2025-04-03 VITALS — BODY MASS INDEX: 29.44 KG/M2 | WEIGHT: 188 LBS

## 2025-04-03 DIAGNOSIS — R51.9 WORSENING HEADACHES: ICD-10-CM

## 2025-04-03 PROCEDURE — A9573: HCPCS | Mod: JZ | Performed by: PSYCHIATRY & NEUROLOGY

## 2025-04-03 PROCEDURE — 70553 MRI BRAIN STEM W/O & W/DYE: CPT

## 2025-04-03 PROCEDURE — A9579 GAD-BASE MR CONTRAST NOS,1ML: HCPCS | Mod: JZ | Performed by: PSYCHIATRY & NEUROLOGY

## 2025-04-03 RX ADMIN — GADOPICLENOL 8.5 ML: 485.1 INJECTION INTRAVENOUS at 11:54

## 2025-04-04 ENCOUNTER — RESULTS FOLLOW-UP (OUTPATIENT)
Dept: NEUROLOGY | Facility: CLINIC | Age: 45
End: 2025-04-04

## 2025-05-15 ENCOUNTER — OFFICE VISIT (OUTPATIENT)
Dept: NEUROLOGY | Facility: CLINIC | Age: 45
End: 2025-05-15
Attending: PSYCHIATRY & NEUROLOGY
Payer: COMMERCIAL

## 2025-05-15 VITALS — SYSTOLIC BLOOD PRESSURE: 102 MMHG | DIASTOLIC BLOOD PRESSURE: 76 MMHG | OXYGEN SATURATION: 96 % | HEART RATE: 82 BPM

## 2025-05-15 DIAGNOSIS — G43.711 INTRACTABLE CHRONIC MIGRAINE WITHOUT AURA AND WITH STATUS MIGRAINOSUS: Primary | ICD-10-CM

## 2025-05-15 PROCEDURE — 99214 OFFICE O/P EST MOD 30 MIN: CPT | Mod: 25 | Performed by: PSYCHIATRY & NEUROLOGY

## 2025-05-15 PROCEDURE — 64615 CHEMODENERV MUSC MIGRAINE: CPT | Performed by: PSYCHIATRY & NEUROLOGY

## 2025-06-23 ENCOUNTER — TELEPHONE (OUTPATIENT)
Facility: HOSPITAL | Age: 45
End: 2025-06-23
Payer: COMMERCIAL

## 2025-06-23 NOTE — TELEPHONE ENCOUNTER
I called patient for her insurance card number. The one in her chart is her spouse. We need the correct ID #. I asked her to either call with the updated ID or upload her insurance card on [employer] so we can do her auth.

## 2025-07-16 RX ORDER — LASMIDITAN 50 MG/1
TABLET ORAL
Qty: 8 TABLET | Refills: 5 | Status: SHIPPED | OUTPATIENT
Start: 2025-07-16

## 2025-07-16 NOTE — TELEPHONE ENCOUNTER
lasmiditan 50 mg tablet     Medicine Refill Request    Last Office Visit: 5/15/2025   Last Telemedicine Visit: 3/24/2025 Theresa Dalton MD     Next Office Visit: Visit date not found  Next Telemedicine Visit: Visit date not found

## 2025-07-24 ENCOUNTER — OFFICE VISIT (OUTPATIENT)
Facility: HOSPITAL | Age: 45
End: 2025-07-24
Payer: COMMERCIAL

## 2025-07-24 VITALS
BODY MASS INDEX: 30.61 KG/M2 | WEIGHT: 195 LBS | SYSTOLIC BLOOD PRESSURE: 110 MMHG | HEART RATE: 85 BPM | OXYGEN SATURATION: 97 % | HEIGHT: 67 IN | DIASTOLIC BLOOD PRESSURE: 90 MMHG

## 2025-07-24 DIAGNOSIS — G89.29 OTHER CHRONIC PAIN: Primary | ICD-10-CM

## 2025-07-24 RX ORDER — LIDOCAINE HYDROCHLORIDE 20 MG/ML
10 INJECTION, SOLUTION INFILTRATION; PERINEURAL ONCE
Status: COMPLETED | OUTPATIENT
Start: 2025-07-24 | End: 2025-07-24

## 2025-07-24 RX ADMIN — LIDOCAINE HYDROCHLORIDE 10 ML: 20 INJECTION, SOLUTION INFILTRATION; PERINEURAL at 16:40

## 2025-07-24 NOTE — PROCEDURES
Procedures    Nerve Blocks and Trigger Points Procedure Note:  Indication: No diagnosis found.     I explained the risks and benefits and obtained written consent from Andressa Baker. Signed consent form will be scanned to patient's electronic medical records.     Lidocaine 2% without epinephrine was drawn in a sterile syringe.     Lot number and expiration date documented in informed consent.     I performed a time-out, including 2 unique patient identifiers.     I located the areas of maximal tenderness corresponding to each nerve or trigger point, and cleaned with alcohol swabs.     I used a 30 gauge 1/2 inch needle to inject lidocaine over the following:      Right Greater Occipital Nerve 2 cc   Left Greater Occipital Nerve 2 cc   Right Lesser Occipital Nerve 1 cc   Left Lesser Occipital Nerve 1 cc     Right Auriculotemporal Nerve 1 cc    Left Auriculotemporal Nerve 1 cc    Right Supraorbital Nerve 0.25 cc   Left Supraorbital Nerve 0.25 cc   Right Supratrochlear Nerve 0.25 cc   Left Supratrochlear Nerve 0.25 cc     Right trapezius muscle trigger point injections 0.50 cc   Left trapezius muscle trigger point injections 0.50 cc     Total of 10 cc injected.      The procedure was well tolerated and there were no complications.         Theresa Lewis MD  [facility]  [phone]

## 2025-08-07 ENCOUNTER — OFFICE VISIT (OUTPATIENT)
Facility: HOSPITAL | Age: 45
End: 2025-08-07
Attending: PSYCHIATRY & NEUROLOGY
Payer: COMMERCIAL

## 2025-08-07 VITALS
OXYGEN SATURATION: 99 % | HEIGHT: 67 IN | HEART RATE: 86 BPM | BODY MASS INDEX: 30.92 KG/M2 | DIASTOLIC BLOOD PRESSURE: 90 MMHG | SYSTOLIC BLOOD PRESSURE: 120 MMHG | WEIGHT: 197 LBS

## 2025-08-07 DIAGNOSIS — G43.711 INTRACTABLE CHRONIC MIGRAINE WITHOUT AURA AND WITH STATUS MIGRAINOSUS: Primary | ICD-10-CM

## 2025-08-07 NOTE — PROCEDURES
Procedures  Procedure Note for Botox Injections for Headache:    Indication for procedure:    Diagnosis Plan   1. Intractable chronic migraine without aura and with status migrainosus  [facility] Botulinum Toxin Injection Appointment Request    botulinum toxin Type A (BOTOX) 200 unit injection 200 Units        I explained the risks and benefits and obtained consent from Andressa.  Onabotulinumtoxin A 200 U were diluted in 4 mL of saline.    Lot number and expiration date documented in informed consent and MAR.  I performed a time-out, including 2 unique patient identifiers with Andressa.  Using a 30 gauge 1/2 inch needle, I injected 0.1mL = 5 U into each site according to the Chronic Migraine Protocol (PREEMPT Studies).   The following table reports the number of sites injected in each muscle.     Muscle Midline Right Left   Procerus 1          1 1   Frontalis   2 2   Temporalis   4 (10+5+5+5 U) 4 (10+5+5+5 U)   Occipitalis   3 (10+5+5 U) 3 (10+5+5 U)   Cervical Paraspinal   2 2   Trapezius   3 (10+10+5 U) 3 (10+10+5 U)   Other:            195 Units were injected and 5 Units were wasted.     Andressa tolerated the procedure well, without complications. She was instructed that she could experience increased pain in the next 2-3 days, which should be treated with ice and anti-inflammatory medications.      We appreciate the opportunity to participate in the care of Andressa ROSA Baker.     Please, do not hesitate to call me at [phone] if you have any questions or concerns.        Theresa Lewis MD  [facility]  [phone]

## 2025-08-11 RX ORDER — RIMEGEPANT SULFATE 75 MG/75MG
75 TABLET, ORALLY DISINTEGRATING ORAL AS NEEDED
Qty: 16 TABLET | Refills: 11 | Status: SHIPPED | OUTPATIENT
Start: 2025-08-11

## 2025-08-13 ENCOUNTER — TELEPHONE (OUTPATIENT)
Dept: FAMILY MEDICINE | Facility: CLINIC | Age: 45
End: 2025-08-13
Payer: COMMERCIAL

## 2025-08-14 ENCOUNTER — OFFICE VISIT (OUTPATIENT)
Facility: HOSPITAL | Age: 45
End: 2025-08-14
Payer: COMMERCIAL

## 2025-08-14 VITALS
OXYGEN SATURATION: 98 % | BODY MASS INDEX: 30.61 KG/M2 | SYSTOLIC BLOOD PRESSURE: 110 MMHG | HEART RATE: 90 BPM | WEIGHT: 195 LBS | DIASTOLIC BLOOD PRESSURE: 90 MMHG | HEIGHT: 67 IN

## 2025-08-14 DIAGNOSIS — G89.29 OTHER CHRONIC PAIN: Primary | ICD-10-CM

## 2025-08-14 DIAGNOSIS — G43.711 INTRACTABLE CHRONIC MIGRAINE WITHOUT AURA AND WITH STATUS MIGRAINOSUS: Primary | ICD-10-CM

## 2025-08-14 DIAGNOSIS — M54.2 CERVICALGIA: ICD-10-CM

## 2025-08-14 PROCEDURE — 99214 OFFICE O/P EST MOD 30 MIN: CPT | Mod: 25 | Performed by: PSYCHIATRY & NEUROLOGY

## 2025-08-14 PROCEDURE — 3008F BODY MASS INDEX DOCD: CPT | Performed by: PSYCHIATRY & NEUROLOGY

## 2025-08-14 RX ORDER — LIDOCAINE HYDROCHLORIDE 20 MG/ML
10 INJECTION, SOLUTION INFILTRATION; PERINEURAL ONCE
Status: COMPLETED | OUTPATIENT
Start: 2025-08-14 | End: 2025-08-14

## 2025-08-14 RX ADMIN — LIDOCAINE HYDROCHLORIDE 10 ML: 20 INJECTION, SOLUTION INFILTRATION; PERINEURAL at 15:38

## 2025-08-15 ENCOUNTER — TELEPHONE (OUTPATIENT)
Facility: HOSPITAL | Age: 45
End: 2025-08-15
Payer: COMMERCIAL

## 2025-08-20 DIAGNOSIS — G43.711 INTRACTABLE CHRONIC MIGRAINE WITHOUT AURA AND WITH STATUS MIGRAINOSUS: Primary | ICD-10-CM

## 2025-08-20 RX ORDER — ALBUTEROL SULFATE 0.83 MG/ML
2.5 SOLUTION RESPIRATORY (INHALATION) ONCE AS NEEDED
Status: CANCELLED | OUTPATIENT
Start: 2025-08-20

## 2025-08-20 RX ORDER — SODIUM CHLORIDE 9 MG/ML
500 INJECTION, SOLUTION INTRAVENOUS ONCE AS NEEDED
Status: CANCELLED | OUTPATIENT
Start: 2025-08-20

## 2025-08-20 RX ORDER — FAMOTIDINE 10 MG/ML
20 INJECTION, SOLUTION INTRAVENOUS ONCE AS NEEDED
Status: CANCELLED | OUTPATIENT
Start: 2025-08-20

## 2025-08-21 ENCOUNTER — INFUSION (OUTPATIENT)
Dept: INFUSION THERAPY | Facility: CLINIC | Age: 45
End: 2025-08-21
Attending: PSYCHIATRY & NEUROLOGY
Payer: COMMERCIAL

## 2025-08-21 VITALS — DIASTOLIC BLOOD PRESSURE: 76 MMHG | HEART RATE: 83 BPM | SYSTOLIC BLOOD PRESSURE: 119 MMHG

## 2025-08-21 DIAGNOSIS — G43.711 INTRACTABLE CHRONIC MIGRAINE WITHOUT AURA AND WITH STATUS MIGRAINOSUS: Primary | ICD-10-CM

## 2025-08-21 RX ORDER — ALBUTEROL SULFATE 0.83 MG/ML
2.5 SOLUTION RESPIRATORY (INHALATION) ONCE AS NEEDED
OUTPATIENT
Start: 2025-11-13

## 2025-08-21 RX ORDER — FAMOTIDINE 10 MG/ML
20 INJECTION, SOLUTION INTRAVENOUS ONCE AS NEEDED
OUTPATIENT
Start: 2025-11-13

## 2025-08-21 RX ORDER — SODIUM CHLORIDE 9 MG/ML
500 INJECTION, SOLUTION INTRAVENOUS ONCE AS NEEDED
OUTPATIENT
Start: 2025-11-13

## 2025-08-21 ASSESSMENT — PAIN SCALES - GENERAL: PAINLEVEL_OUTOF10: 0-NO PAIN
